# Patient Record
Sex: FEMALE | Race: WHITE | NOT HISPANIC OR LATINO | Employment: OTHER | URBAN - METROPOLITAN AREA
[De-identification: names, ages, dates, MRNs, and addresses within clinical notes are randomized per-mention and may not be internally consistent; named-entity substitution may affect disease eponyms.]

---

## 2017-02-16 ENCOUNTER — GENERIC CONVERSION - ENCOUNTER (OUTPATIENT)
Dept: OTHER | Facility: OTHER | Age: 62
End: 2017-02-16

## 2017-03-23 ENCOUNTER — GENERIC CONVERSION - ENCOUNTER (OUTPATIENT)
Dept: OTHER | Facility: OTHER | Age: 62
End: 2017-03-23

## 2017-06-09 ENCOUNTER — ALLSCRIPTS OFFICE VISIT (OUTPATIENT)
Dept: OTHER | Facility: OTHER | Age: 62
End: 2017-06-09

## 2017-06-22 ENCOUNTER — APPOINTMENT (OUTPATIENT)
Dept: LAB | Facility: CLINIC | Age: 62
End: 2017-06-22
Payer: COMMERCIAL

## 2017-06-22 ENCOUNTER — TRANSCRIBE ORDERS (OUTPATIENT)
Dept: LAB | Facility: CLINIC | Age: 62
End: 2017-06-22

## 2017-06-22 PROCEDURE — 36415 COLL VENOUS BLD VENIPUNCTURE: CPT

## 2017-06-22 PROCEDURE — 83036 HEMOGLOBIN GLYCOSYLATED A1C: CPT

## 2017-06-22 PROCEDURE — 80053 COMPREHEN METABOLIC PANEL: CPT

## 2017-06-22 PROCEDURE — 82306 VITAMIN D 25 HYDROXY: CPT

## 2017-06-22 PROCEDURE — 80061 LIPID PANEL: CPT

## 2017-06-24 ENCOUNTER — GENERIC CONVERSION - ENCOUNTER (OUTPATIENT)
Dept: OTHER | Facility: OTHER | Age: 62
End: 2017-06-24

## 2017-06-24 ENCOUNTER — APPOINTMENT (EMERGENCY)
Dept: CT IMAGING | Facility: HOSPITAL | Age: 62
End: 2017-06-24
Payer: COMMERCIAL

## 2017-06-24 ENCOUNTER — APPOINTMENT (EMERGENCY)
Dept: RADIOLOGY | Facility: HOSPITAL | Age: 62
End: 2017-06-24
Payer: COMMERCIAL

## 2017-06-24 ENCOUNTER — HOSPITAL ENCOUNTER (EMERGENCY)
Facility: HOSPITAL | Age: 62
Discharge: HOME | End: 2017-06-24
Admitting: EMERGENCY MEDICINE
Payer: COMMERCIAL

## 2017-06-24 VITALS
OXYGEN SATURATION: 98 % | WEIGHT: 180 LBS | HEART RATE: 75 BPM | TEMPERATURE: 98.7 F | SYSTOLIC BLOOD PRESSURE: 132 MMHG | RESPIRATION RATE: 16 BRPM | BODY MASS INDEX: 36.98 KG/M2 | DIASTOLIC BLOOD PRESSURE: 64 MMHG

## 2017-06-24 DIAGNOSIS — J06.9 URI WITH COUGH AND CONGESTION: ICD-10-CM

## 2017-06-24 DIAGNOSIS — S46.912A MUSCLE STRAIN OF LEFT SCAPULAR REGION, INITIAL ENCOUNTER: Primary | ICD-10-CM

## 2017-06-24 LAB
ALBUMIN SERPL BCP-MCNC: 3.9 G/DL (ref 3.5–5)
ALP SERPL-CCNC: 92 U/L (ref 46–116)
ALT SERPL W P-5'-P-CCNC: 25 U/L (ref 12–78)
ANION GAP SERPL CALCULATED.3IONS-SCNC: 10 MMOL/L (ref 4–13)
APTT PPP: 28 SECONDS (ref 23–35)
AST SERPL W P-5'-P-CCNC: 10 U/L (ref 5–45)
BASOPHILS # BLD AUTO: 0.02 THOUSANDS/ΜL (ref 0–0.1)
BASOPHILS NFR BLD AUTO: 0 % (ref 0–1)
BILIRUB SERPL-MCNC: 0.2 MG/DL (ref 0.2–1)
BUN SERPL-MCNC: 16 MG/DL (ref 5–25)
CALCIUM SERPL-MCNC: 9.2 MG/DL (ref 8.3–10.1)
CHLORIDE SERPL-SCNC: 103 MMOL/L (ref 100–108)
CO2 SERPL-SCNC: 27 MMOL/L (ref 21–32)
CREAT SERPL-MCNC: 0.73 MG/DL (ref 0.6–1.3)
EOSINOPHIL # BLD AUTO: 0.08 THOUSAND/ΜL (ref 0–0.61)
EOSINOPHIL NFR BLD AUTO: 1 % (ref 0–6)
ERYTHROCYTE [DISTWIDTH] IN BLOOD BY AUTOMATED COUNT: 13.5 % (ref 11.6–15.1)
GFR SERPL CREATININE-BSD FRML MDRD: >60 ML/MIN/1.73SQ M
GLUCOSE SERPL-MCNC: 129 MG/DL (ref 65–140)
HCT VFR BLD AUTO: 37.2 % (ref 34.8–46.1)
HGB BLD-MCNC: 12.8 G/DL (ref 11.5–15.4)
INR PPP: 0.87 (ref 0.86–1.16)
LYMPHOCYTES # BLD AUTO: 2.64 THOUSANDS/ΜL (ref 0.6–4.47)
LYMPHOCYTES NFR BLD AUTO: 28 % (ref 14–44)
MAGNESIUM SERPL-MCNC: 1.7 MG/DL (ref 1.6–2.6)
MCH RBC QN AUTO: 29 PG (ref 26.8–34.3)
MCHC RBC AUTO-ENTMCNC: 34.4 G/DL (ref 31.4–37.4)
MCV RBC AUTO: 84 FL (ref 82–98)
MONOCYTES # BLD AUTO: 0.66 THOUSAND/ΜL (ref 0.17–1.22)
MONOCYTES NFR BLD AUTO: 7 % (ref 4–12)
NEUTROPHILS # BLD AUTO: 6.1 THOUSANDS/ΜL (ref 1.85–7.62)
NEUTS SEG NFR BLD AUTO: 64 % (ref 43–75)
NRBC BLD AUTO-RTO: 0 /100 WBCS
PLATELET # BLD AUTO: 282 THOUSANDS/UL (ref 149–390)
PMV BLD AUTO: 10.5 FL (ref 8.9–12.7)
POTASSIUM SERPL-SCNC: 3.9 MMOL/L (ref 3.5–5.3)
PROT SERPL-MCNC: 7.7 G/DL (ref 6.4–8.2)
PROTHROMBIN TIME: 11.8 SECONDS (ref 12.1–14.4)
RBC # BLD AUTO: 4.42 MILLION/UL (ref 3.81–5.12)
SODIUM SERPL-SCNC: 140 MMOL/L (ref 136–145)
WBC # BLD AUTO: 9.5 THOUSAND/UL (ref 4.31–10.16)

## 2017-06-24 PROCEDURE — 83735 ASSAY OF MAGNESIUM: CPT | Performed by: NURSE PRACTITIONER

## 2017-06-24 PROCEDURE — 71020 HB CHEST X-RAY 2VW FRONTAL&LATL: CPT

## 2017-06-24 PROCEDURE — 93005 ELECTROCARDIOGRAM TRACING: CPT | Performed by: NURSE PRACTITIONER

## 2017-06-24 PROCEDURE — 71275 CT ANGIOGRAPHY CHEST: CPT

## 2017-06-24 PROCEDURE — 36415 COLL VENOUS BLD VENIPUNCTURE: CPT | Performed by: NURSE PRACTITIONER

## 2017-06-24 PROCEDURE — 85730 THROMBOPLASTIN TIME PARTIAL: CPT | Performed by: NURSE PRACTITIONER

## 2017-06-24 PROCEDURE — 96360 HYDRATION IV INFUSION INIT: CPT

## 2017-06-24 PROCEDURE — 80053 COMPREHEN METABOLIC PANEL: CPT | Performed by: NURSE PRACTITIONER

## 2017-06-24 PROCEDURE — 99284 EMERGENCY DEPT VISIT MOD MDM: CPT

## 2017-06-24 PROCEDURE — 85025 COMPLETE CBC W/AUTO DIFF WBC: CPT | Performed by: NURSE PRACTITIONER

## 2017-06-24 PROCEDURE — 85610 PROTHROMBIN TIME: CPT | Performed by: NURSE PRACTITIONER

## 2017-06-24 RX ADMIN — SODIUM CHLORIDE 1000 ML: 0.9 INJECTION, SOLUTION INTRAVENOUS at 20:44

## 2017-06-24 RX ADMIN — IOHEXOL 85 ML: 350 INJECTION, SOLUTION INTRAVENOUS at 21:29

## 2017-06-25 LAB
ATRIAL RATE: 104 BPM
P AXIS: 52 DEGREES
PR INTERVAL: 136 MS
QRS AXIS: 64 DEGREES
QRSD INTERVAL: 82 MS
QT INTERVAL: 320 MS
QTC INTERVAL: 420 MS
T WAVE AXIS: 46 DEGREES
VENTRICULAR RATE: 104 BPM

## 2017-06-26 ENCOUNTER — ALLSCRIPTS OFFICE VISIT (OUTPATIENT)
Dept: OTHER | Facility: OTHER | Age: 62
End: 2017-06-26

## 2017-06-27 ENCOUNTER — TRANSCRIBE ORDERS (OUTPATIENT)
Dept: SLEEP CENTER | Facility: CLINIC | Age: 62
End: 2017-06-27

## 2017-06-27 ENCOUNTER — HOSPITAL ENCOUNTER (OUTPATIENT)
Dept: SLEEP CENTER | Facility: CLINIC | Age: 62
Discharge: HOME | End: 2017-06-27
Payer: COMMERCIAL

## 2017-06-27 DIAGNOSIS — G47.33 OSA (OBSTRUCTIVE SLEEP APNEA): Primary | ICD-10-CM

## 2017-06-27 DIAGNOSIS — G47.33 OBSTRUCTIVE SLEEP APNEA (ADULT) (PEDIATRIC): ICD-10-CM

## 2017-07-05 ENCOUNTER — GENERIC CONVERSION - ENCOUNTER (OUTPATIENT)
Dept: OTHER | Facility: OTHER | Age: 62
End: 2017-07-05

## 2017-08-03 ENCOUNTER — ALLSCRIPTS OFFICE VISIT (OUTPATIENT)
Dept: OTHER | Facility: OTHER | Age: 62
End: 2017-08-03

## 2017-09-11 ENCOUNTER — GENERIC CONVERSION - ENCOUNTER (OUTPATIENT)
Dept: OTHER | Facility: OTHER | Age: 62
End: 2017-09-11

## 2017-09-12 ENCOUNTER — ALLSCRIPTS OFFICE VISIT (OUTPATIENT)
Dept: OTHER | Facility: OTHER | Age: 62
End: 2017-09-12

## 2017-09-29 ENCOUNTER — ALLSCRIPTS OFFICE VISIT (OUTPATIENT)
Dept: OTHER | Facility: OTHER | Age: 62
End: 2017-09-29

## 2017-09-29 DIAGNOSIS — R63.5 ABNORMAL WEIGHT GAIN: ICD-10-CM

## 2017-09-29 DIAGNOSIS — K21.9 GASTRO-ESOPHAGEAL REFLUX DISEASE WITHOUT ESOPHAGITIS: ICD-10-CM

## 2017-09-29 DIAGNOSIS — M25.559 PAIN IN HIP: ICD-10-CM

## 2017-09-29 DIAGNOSIS — R10.13 EPIGASTRIC PAIN: ICD-10-CM

## 2017-09-29 DIAGNOSIS — Z12.31 ENCOUNTER FOR SCREENING MAMMOGRAM FOR MALIGNANT NEOPLASM OF BREAST: ICD-10-CM

## 2017-10-04 ENCOUNTER — ALLSCRIPTS OFFICE VISIT (OUTPATIENT)
Dept: OTHER | Facility: OTHER | Age: 62
End: 2017-10-04

## 2017-10-12 ENCOUNTER — TRANSCRIBE ORDERS (OUTPATIENT)
Dept: ADMINISTRATIVE | Facility: HOSPITAL | Age: 62
End: 2017-10-12

## 2017-10-12 ENCOUNTER — APPOINTMENT (OUTPATIENT)
Dept: LAB | Facility: HOSPITAL | Age: 62
End: 2017-10-12
Attending: OBSTETRICS & GYNECOLOGY
Payer: COMMERCIAL

## 2017-10-12 ENCOUNTER — HOSPITAL ENCOUNTER (OUTPATIENT)
Dept: RADIOLOGY | Facility: HOSPITAL | Age: 62
Discharge: HOME | End: 2017-10-12
Attending: OBSTETRICS & GYNECOLOGY
Payer: COMMERCIAL

## 2017-10-12 DIAGNOSIS — R10.13 EPIGASTRIC PAIN: ICD-10-CM

## 2017-10-12 DIAGNOSIS — M79.7 FIBROMYALGIA: Primary | ICD-10-CM

## 2017-10-12 DIAGNOSIS — M25.559 PAIN IN HIP: ICD-10-CM

## 2017-10-12 DIAGNOSIS — M79.7 FIBROMYALGIA: ICD-10-CM

## 2017-10-12 DIAGNOSIS — K21.9 GASTRO-ESOPHAGEAL REFLUX DISEASE WITHOUT ESOPHAGITIS: ICD-10-CM

## 2017-10-12 DIAGNOSIS — R63.5 ABNORMAL WEIGHT GAIN: ICD-10-CM

## 2017-10-12 LAB
25(OH)D3 SERPL-MCNC: 37.8 NG/ML (ref 30–100)
ALBUMIN SERPL BCP-MCNC: 3.4 G/DL (ref 3.5–5)
ALP SERPL-CCNC: 88 U/L (ref 46–116)
ALT SERPL W P-5'-P-CCNC: 23 U/L (ref 12–78)
AMYLASE SERPL-CCNC: 23 IU/L (ref 25–115)
ANION GAP SERPL CALCULATED.3IONS-SCNC: 8 MMOL/L (ref 4–13)
AST SERPL W P-5'-P-CCNC: 10 U/L (ref 5–45)
BASOPHILS # BLD AUTO: 0.03 THOUSANDS/ΜL (ref 0–0.1)
BASOPHILS NFR BLD AUTO: 0 % (ref 0–1)
BILIRUB SERPL-MCNC: 0.35 MG/DL (ref 0.2–1)
BUN SERPL-MCNC: 15 MG/DL (ref 5–25)
CALCIUM SERPL-MCNC: 9.1 MG/DL (ref 8.3–10.1)
CHLORIDE SERPL-SCNC: 104 MMOL/L (ref 100–108)
CO2 SERPL-SCNC: 28 MMOL/L (ref 21–32)
CREAT SERPL-MCNC: 0.67 MG/DL (ref 0.6–1.3)
EOSINOPHIL # BLD AUTO: 0.11 THOUSAND/ΜL (ref 0–0.61)
EOSINOPHIL NFR BLD AUTO: 2 % (ref 0–6)
ERYTHROCYTE [DISTWIDTH] IN BLOOD BY AUTOMATED COUNT: 12.8 % (ref 11.6–15.1)
GFR SERPL CREATININE-BSD FRML MDRD: 95 ML/MIN/1.73SQ M
GLUCOSE P FAST SERPL-MCNC: 120 MG/DL (ref 65–99)
HCT VFR BLD AUTO: 37.5 % (ref 34.8–46.1)
HGB BLD-MCNC: 12.6 G/DL (ref 11.5–15.4)
IRON SERPL-MCNC: 89 UG/DL (ref 50–170)
LIPASE SERPL-CCNC: 77 U/L (ref 73–393)
LYMPHOCYTES # BLD AUTO: 2.29 THOUSANDS/ΜL (ref 0.6–4.47)
LYMPHOCYTES NFR BLD AUTO: 33 % (ref 14–44)
MCH RBC QN AUTO: 28.2 PG (ref 26.8–34.3)
MCHC RBC AUTO-ENTMCNC: 33.6 G/DL (ref 31.4–37.4)
MCV RBC AUTO: 84 FL (ref 82–98)
MONOCYTES # BLD AUTO: 0.49 THOUSAND/ΜL (ref 0.17–1.22)
MONOCYTES NFR BLD AUTO: 7 % (ref 4–12)
NEUTROPHILS # BLD AUTO: 3.95 THOUSANDS/ΜL (ref 1.85–7.62)
NEUTS SEG NFR BLD AUTO: 58 % (ref 43–75)
NRBC BLD AUTO-RTO: 0 /100 WBCS
PLATELET # BLD AUTO: 244 THOUSANDS/UL (ref 149–390)
PMV BLD AUTO: 10.5 FL (ref 8.9–12.7)
POTASSIUM SERPL-SCNC: 4 MMOL/L (ref 3.5–5.3)
PROT SERPL-MCNC: 7.2 G/DL (ref 6.4–8.2)
RBC # BLD AUTO: 4.47 MILLION/UL (ref 3.81–5.12)
SODIUM SERPL-SCNC: 140 MMOL/L (ref 136–145)
TSH SERPL DL<=0.05 MIU/L-ACNC: 1.3 UIU/ML (ref 0.36–3.74)
WBC # BLD AUTO: 6.87 THOUSAND/UL (ref 4.31–10.16)

## 2017-10-12 PROCEDURE — 73502 X-RAY EXAM HIP UNI 2-3 VIEWS: CPT

## 2017-10-12 PROCEDURE — 83690 ASSAY OF LIPASE: CPT

## 2017-10-12 PROCEDURE — 80053 COMPREHEN METABOLIC PANEL: CPT

## 2017-10-12 PROCEDURE — 36415 COLL VENOUS BLD VENIPUNCTURE: CPT

## 2017-10-12 PROCEDURE — 84443 ASSAY THYROID STIM HORMONE: CPT

## 2017-10-12 PROCEDURE — 82306 VITAMIN D 25 HYDROXY: CPT

## 2017-10-12 PROCEDURE — 83014 H PYLORI DRUG ADMIN: CPT

## 2017-10-12 PROCEDURE — 83540 ASSAY OF IRON: CPT

## 2017-10-12 PROCEDURE — 83013 H PYLORI (C-13) BREATH: CPT

## 2017-10-12 PROCEDURE — 85025 COMPLETE CBC W/AUTO DIFF WBC: CPT

## 2017-10-12 PROCEDURE — 82150 ASSAY OF AMYLASE: CPT

## 2017-10-16 ENCOUNTER — GENERIC CONVERSION - ENCOUNTER (OUTPATIENT)
Dept: OTHER | Facility: OTHER | Age: 62
End: 2017-10-16

## 2017-10-17 ENCOUNTER — GENERIC CONVERSION - ENCOUNTER (OUTPATIENT)
Dept: OTHER | Facility: OTHER | Age: 62
End: 2017-10-17

## 2017-10-18 LAB — SCAN RESULT: NORMAL

## 2017-10-26 ENCOUNTER — HOSPITAL ENCOUNTER (OUTPATIENT)
Dept: ULTRASOUND IMAGING | Facility: HOSPITAL | Age: 62
Discharge: HOME | End: 2017-10-26
Payer: COMMERCIAL

## 2017-10-26 DIAGNOSIS — K21.9 GASTRO-ESOPHAGEAL REFLUX DISEASE WITHOUT ESOPHAGITIS: ICD-10-CM

## 2017-10-26 DIAGNOSIS — R10.13 EPIGASTRIC PAIN: ICD-10-CM

## 2017-10-26 PROCEDURE — 76700 US EXAM ABDOM COMPLETE: CPT

## 2017-10-27 NOTE — PROGRESS NOTES
Assessment  1  GERD without esophagitis (530 81) (K21 9)   2  Epigastric pain (789 06) (R10 13)   3  Hip pain (719 45) (M25 559)    Plan  Epigastric pain, GERD without esophagitis    · (1) AMYLASE; Status:Active; Requested for:04Oct2017;    · (1) CBC/PLT/DIFF; Status:Active; Requested for:04Oct2017;    · (1) COMPREHENSIVE METABOLIC PANEL; Status:Active; Requested for:04Oct2017;    · (1) H  PYLORI BREATH TEST; Status:Active; Requested VBM:04PLP7107;    · (1) LIPASE; Status:Active; Requested for:04Oct2017;    · * US ABDOMEN COMPLETE; Status:Hold For - Scheduling; Requested FSQ:18VUR2769;   GERD without esophagitis    · Avoid alcoholic beverages ; Status:Complete;   Done: 46MWD3920   · Avoid foods and beverages that contain caffeine ; Status:Complete;   Done: 94ZTN2917   · Do not eat anything for at least 2 hours before going to bed ; Status:Complete;   Done:  88TNI4483   · Do not take aspirin or anti-inflammatory medicines ; Status:Complete;   Done:  70JJJ7346   · Raise the head of your bed to keep stomach acid from coming back up ;  Status:Complete;   Done: 45OEE2711   · Regular aerobic exercise can help reduce stress ; Status:Complete;   Done: 92RMP9408   · Several things can be done to help treat and prevent your gastric reflux ;  Status:Complete;   Done: 48VYP3590  Hip pain    · * XR HIP/PELV 2-3 VWS RIGHT W PELVIS IF PERFORMED; Status:Active; Requested  GRC:69UAM7257;     Discussion/Summary    #1  epigastric pain with hx GERD and hiatal hernia- increase protonix to BID, avoid ETOH, spicey acidic foods  Take Mobic with full meal instead of before bed  Check amylase, lipase, cbc and cmp as well as H  Pylori breath test  If negative will have pt go for abd U/S  If that is negative and pt still symptomatic than will have pt go for GI eval and EGD  Right hip pain- check xray  Take Mobic daily with meal       Chief Complaint  c/o epigastric pain and bloating x several months   Pt  describes it as a bloating feeling in rib cage  states she has been going to the gym and working out but seems to be gaining weight and not losing weight  kck      History of Present Illness  HPI: Pt  here b/c for the past few months she has been having epigastric pain with a fullness in her upper abdomen  She feels like she is more bloated and gaining weight when she is really trying to work hard since she has been retired on weight loss what her food and going to the gym 3-4 to 5 times a week  She is on Protonix for GERD and does have a history of hiatal hernia however she has not felt any heartburn  She does not drink alcohol does not smoke and does not like spicy acidic foods  She does however take her Mobic daily at bedtime without food  She states that her bowel movements have been more yellow but no blood or mucus  No diarrhea constipation  No fevers she has not tried anything over-the-counter for this  also is complaining of right hip pain worse in the morning when she 1st wakes up that occasionally radiates across her low back  No popping or giving out is noted no history of injury  Not improve with Mobic use  Review of Systems    Constitutional: No fever, no chills, feels well, no tiredness, no recent weight gain or loss  ENT: no ear ache, no loss of hearing, no nosebleeds or nasal discharge, no sore throat or hoarseness  Cardiovascular: no complaints of slow or fast heart rate, no chest pain, no palpitations, no leg claudication or lower extremity edema  Respiratory: no complaints of shortness of breath, no wheezing, no dyspnea on exertion, no orthopnea or PND  Breasts: no complaints of breast pain, breast lump or nipple discharge  Gastrointestinal: abdominal pain, but-- as noted in HPI  Genitourinary: no complaints of dysuria, no incontinence, no pelvic pain, no dysmenorrhea, no vaginal discharge or abnormal vaginal bleeding  Musculoskeletal: as noted in HPI     Integumentary: no complaints of skin rash or lesion, no itching or dry skin, no skin wounds  Neurological: no complaints of headache, no confusion, no numbness or tingling, no dizziness or fainting  ROS reviewed  Active Problems  1  Arthralgia (719 40) (M25 50)   2  Breast self examination education, encounter for (V65 49) (Z71 89)   3  Carpal tunnel syndrome (354 0) (G56 00)   4  Depression (311) (F32 9)   5  Encounter for screening mammogram for malignant neoplasm of breast (V76 12)   (Z12 31)   6  Endometrial thickening on ultra sound (793 5) (R93 8)   7  Fibromyalgia (729 1) (M79 7)   8  GERD without esophagitis (530 81) (K21 9)   9  History of self breast exam   10  Hyperglycemia (790 29) (R73 9)   11  Hyperlipidemia (272 4) (E78 5)   12  Hyperthyroidism (242 90) (E05 90)   13  Influenza vaccination administered at current visit (V04 81) (Z23)   14  Menopause (627 2) (Z78 0)   15  Myalgia (729 1) (M79 1)   16  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)   17  Need for pneumococcal vaccination (V03 82) (Z23)   18  Obesity (278 00) (E66 9)   19  Osteoarthritis, localized, knee (715 36) (M17 10)   20  Painful orthopaedic hardware (996 78) (T84 84XA)   21  Peritonsillar abscess (475) (J36)   22  Restless legs syndrome (333 94) (G25 81)   23  Screening for human papillomavirus (HPV) (V73 81) (Z11 51)   24  Sicca syndrome (710 2) (M35 00)   25  Sinusitis, acute (461 9) (J01 90)   26  Sleep apnea (780 57) (G47 30)   27  Thyroid Nodule   28  Trapezius strain (840 8) (S46 819A)   29  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 (284 3) (I04 921)   30  Visit for routine gyn exam (V72 31) (Z01 419)   31  Vitamin D deficiency (268 9) (E55 9)   32  Vulvar lesion (624 8) (N90 89)   33  Weight gain (783 1) (R63 5)    Past Medical History  1  History of Acute suppurative otitis media without spontaneous rupture of ear drum,   unspecified laterality   2  History of Acute upper respiratory infection (465 9) (J06 9)   3   History of Adhesive capsulitis of right shoulder (726 0) (M75 01)   4  History of Ankylosis Of The Right Knee (718 56)   5  History of Blister of leg (916 2) (S80 829A)   6  Breast self examination education, encounter for (V65 49) (Z71 89)   7  History of Cellulitis of left lower extremity (682 6) (L03 116)   8  History of Cervical polyp (622 7) (N84 1)   9  History of Closed displaced fracture of greater tuberosity of humerus (812 03)   (S42 253A)   10  History of Encounter for screening colonoscopy (V76 51) (Z12 11)   11  History of  3 (V22 2) (Z33 1)   12  History of allergic rhinitis (V12 69) (Z87 09)   13  History of conjunctivitis (V12 49) (Z86 69)   14  History of dermatitis (V13 3) (Z87 2)   15  History of edema (V13 89) (Z87 898)   16  History of headache (V13 89) (Z87 898)   17  History of insomnia (V13 89) (Z87 898)   18  History of sinusitis (V12 69) (Z87 09)   19  History of upper respiratory infection (V12 09) (Z87 09)   20  History of Mouth dryness (527 7) (R68 2)   21  History of Multiple joint pain (719 49) (M25 50)   22  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)   23  History of Pain of lower leg, unspecified laterality (729 5) (M79 669)   24  History of Post-operative state (V45 89) (Z98 890)   25  History of Preop examination (V72 84) (Z01 818)   26  History of Superficial phlebitis and thrombophlebitis of left lower extremity (451 0)    (I80 02)   27  History of Visit For: Pre-procedural Exam Prior To General Surgery (V72 83)  Active Problems And Past Medical History Reviewed: The active problems and past medical history were reviewed and updated today  Family History  Mother    1  Family history of Anxiety and depression   2  Family history of Coronary artery disease (414 00) (I25 10)   3  Family history of Alzheimer's disease (V17 2) (Z82 0)   4  Family history of Osteoporosis (733 00) (M81 0)  Father    5  Family history of Osteoarthritis (715 90) (M19 90)  Sister    10   Family history of Carcinosarcoma Of The Uterus (V16 49)   7  Family history of    6  Family history of congestive cardiomyopathy (V17 49) (Z82 49)  Brother    5  Family history of malignant neoplasm of uterus (V16 49) (Z80 49)  Family History Reviewed: The family history was reviewed and updated today  Social History   · Caffeine Use   · Denied: History of Drug Use   · Marital History - Currently    · Never a smoker   · No alcohol use   · No caffeine use   · Mandaen Affiliation Hinduism   · Second hand smoke exposure (V15 89) (Z77 22)   · Two children   · Uses Safety Equipment - Seatbelts  The social history was reviewed and updated today  Surgical History  1  History of Breast Surgery Reduction Procedure Elective   2  History of Cholecystectomy Laparoscopic   3  History of Dilation And Curettage   4  History of Endovenous Ablation Of Incompetent Vein Laser   5  History of Knee Arthroscopy (Therapeutic)   6  History of Knee Replacement   7  History of Knee Replacement   8  History of Shoulder Surgery  Surgical History Reviewed: The surgical history was reviewed and updated today  Current Meds   1  Atorvastatin Calcium 10 MG Oral Tablet; TAKE 1 TABLET DAILY; Therapy: 84MLC3111 to (Evaluate:2018)  Requested for: 2017; Last   Rx:2017; Status: ACTIVE - Transmit to [facility] Ordered   2  Cevimeline HCl - 30 MG Oral Capsule; Take 1 capsule twice daily Recorded   3  Furosemide 20 MG Oral Tablet; TAKE 1 TABLET TWICE DAILY; Therapy: 59YDB4025 to (Evaluate:2018)  Requested for: 2017; Last   Rx:2017 Ordered   4  Meloxicam 15 MG Oral Tablet; Take 1 tablet daily  Requested for: 2017; Last   Rx:2017 Ordered   5  Methocarbamol 500 MG Oral Tablet; TAKE 1 TABLET AT BEDTIME NIGHTLY  MAY TAKE   1 TABLET DURING THE DAY ALSO AS NEEDED; Therapy: 63ZUW8022 to (Evaluate:77Qbs9151)  Requested for: 2017; Last   Rx:02Cfh6687 Ordered   6   Multi-Vitamins TABS; Therapy: (Antoine Bachelor) to Recorded   7  Pantoprazole Sodium 40 MG Oral Tablet Delayed Release; TAKE 1 TABLET 30   MINUTES BEFORE BREAKFAST DAILY; Therapy: 19TPY1978 to (Evaluate:15Mar2018)  Requested for: 20Mar2017; Last   Rx:20Mar2017 Ordered   8  PARoxetine HCl - 30 MG Oral Tablet; TAKE 1 TABLET DAILY AS DIRECTED; Therapy: 63KLN2526 to (Evaluate:15Mar2018)  Requested for: 20Mar2017; Last   Rx:20Mar2017 Ordered   9  ROPINIRole HCl - 0 5 MG Oral Tablet; Therapy: 48GVF8086 to Recorded   10  TraMADol HCl - 50 MG Oral Tablet; TAKE 1 TABLET Every 6 hours PRN for pain; Therapy: 55ROJ5215 to (Evaluate:20Sep2017); Last Rx:02Fbv5911 Ordered   11  TraZODone HCl - 50 MG Oral Tablet; TAKE 1 TABLET AT BEDTIME; Therapy: 20LJM3852 to (Last Rx:30Nov2015) Ordered   12  Vitamin D 2000 UNIT Oral Capsule; TAKE 2 CAPSULE Daily; Therapy: 22GDX0681 to (Last PQ:43IYL5741) Ordered    The medication list was reviewed and updated today  Allergies  1  Toradol   2  Ansaid TABS   3  Pneumovax 23 25 MCG/0 5ML Injection Injectable    Vitals   Recorded: 92POH5683 01:29PM   Heart Rate 72   Systolic 592, LUE, Sitting   Diastolic 60, LUE, Sitting   Height 4 ft 11 in   Weight 193 lb 8 oz   BMI Calculated 39 08   BSA Calculated 1 82     Physical Exam    Constitutional   General appearance: No acute distress, well appearing and well nourished  Eyes   Conjunctiva and lids: No swelling, erythema or discharge  Ears, Nose, Mouth, and Throat   External inspection of ears and nose: Normal     Pulmonary   Respiratory effort: No increased work of breathing or signs of respiratory distress  Auscultation of lungs: Clear to auscultation  Cardiovascular   Auscultation of heart: Normal rate and rhythm, normal S1 and S2, without murmurs  Examination of extremities for edema and/or varicosities: Normal     Abdomen   Abdomen: Abnormal  -- epigastric tenderness w/o mass, gaurding or rigidity, +BS     Musculoskeletal   Gait and station: Normal     Psychiatric   Mood and affect: Normal          Results/Data  PHQ-9 Adult Depression Screening 84TRA0691 01:32PM User, Ahs     Test Name Result Flag Reference   PHQ-9 Adult Depression Score 1     Over the last two weeks, how often have you been bothered by any of the following problems? Little interest or pleasure in doing things: Not at all - 0  Feeling down, depressed, or hopeless: Not at all - 0  Trouble falling or staying asleep, or sleeping too much: Not at all - 0  Feeling tired or having little energy: Several days - 1  Poor appetite or over eating: Not at all - 0  Feeling bad about yourself - or that you are a failure or have let yourself or your family down: Not at all - 0  Trouble concentrating on things, such as reading the newspaper or watching television: Not at all - 0  Moving or speaking so slowly that other people could have noticed   Or the opposite -  being so fidgety or restless that you have been moving around a lot more than usual: Not at all - 0  Thoughts that you would be better off dead, or of hurting yourself in some way: Not at all - 0   PHQ-9 Adult Depression Screening Negative     PHQ-9 Difficulty Level Not difficult at all     PHQ-9 Severity Minimal Depression         Future Appointments    Date/Time Provider Specialty Site   01/08/2018 11:00 AM Aristides Goetz, [address] [place]     Signatures   Electronically signed by : Radha Palacios, [facility]; Oct  4 2017  2:12PM EST                       (Author)    Electronically signed by : NENA Hood ; Oct  5 2017  3:31PM EST

## 2017-10-27 NOTE — PROGRESS NOTES
Assessment  1  Visit for routine gyn exam (V72 31) (Z01 419)   2  Encounter for screening mammogram for malignant neoplasm of breast (V76 12)   (Z12 31)   3  Weight gain (783 1) (R63 5)    Plan  Encounter for screening mammogram for malignant neoplasm of breast    · * MAMMO SCREENING BILATERAL W CAD; Status:Hold For - Scheduling; Requested  FTP:40VUD2625;    Perform:[facility]; Due:21Vjw3684; Ordered;For:Encounter for screening mammogram for malignant neoplasm of breast; Ordered By:Sheila Torres;  Weight gain    · (1) TSH; Status:Active; Requested MSX:[id number];    Perform:[facility]; NIF:05YPN4373; Ordered; For:Weight gain; Ordered By:Sheila Torres;    Discussion/Summary  cervical cancer screening is current next cervical cancer screening is due 2018 Breast cancer screening: the risks and benefits of breast cancer screening were discussed, self breast exam technique was taught, monthly self breast exam was advised, mammogram is current and mammogram has been ordered  Colorectal cancer screening: the risks and benefits of colorectal cancer screening were discussed and colorectal cancer screening is current  Advice and education were given regarding aerobic exercise and weight bearing exercise  Epigastric discomfort - she will see her amanda harris  gain - TSH ordered and she will discuss this with the amanda harris and also gave her information for medical weight loss program through the hospital  her mammogram with her  The patient has the current Goals: See discussion  The patent has the current Barriers: None  Patient is able to Self-Care  Chief Complaint  1  Visit For: Preventive General Multisystem Exam    History of Present Illness  HPI: Pt here for yearly, she has gained about 8# over the past few months, she has been exercising 4-5 days a week and watching her diet  She has discomfort in her epigastric region because she feels she has gained the weight in her abdomen   She had a vulvar cyst or abscess earlier this month that has resolved  She is due for a mammogram in November  GYN HM, Adult Female [facility]: The patient is being seen for a health maintenance evaluation  The last health maintenance visit was 1 year(s) ago  General Health: The patient's health since the last visit is described as good  Lifestyle:  She has weight concerns  -- She exercises regularly  -- She does not use tobacco    Reproductive health: the patient is postmenopausal    Screening:      Review of Systems    Constitutional: No fever, no chills, feels well, no tiredness, no recent weight gain or loss  ENT: no ear ache, no loss of hearing, no nosebleeds or nasal discharge, no sore throat or hoarseness  Cardiovascular: no complaints of slow or fast heart rate, no chest pain, no palpitations, no leg claudication or lower extremity edema  Respiratory: no complaints of shortness of breath, no wheezing, no dyspnea on exertion, no orthopnea or PND  Breasts: no complaints of breast pain, breast lump or nipple discharge  Gastrointestinal: no complaints of abdominal pain, no constipation, no nausea or diarrhea, no vomiting, no bloody stools  Genitourinary: no complaints of dysuria, no incontinence, no pelvic pain, no dysmenorrhea, no vaginal discharge or abnormal vaginal bleeding  Musculoskeletal: no complaints of arthralgia, no myalgia, no joint swelling or stiffness, no limb pain or swelling  Integumentary: no complaints of skin rash or lesion, no itching or dry skin, no skin wounds  Neurological: no complaints of headache, no confusion, no numbness or tingling, no dizziness or fainting  Active Problems  1  Arthralgia (719 40) (M25 50)   2  Breast self examination education, encounter for (V65 49) (Z71 89)   3  Carpal tunnel syndrome (354 0) (G56 00)   4  Depression (311) (F32 9)   5  Encounter for screening mammogram for malignant neoplasm of breast (V76 12)   (Z12 31)   6   Endometrial thickening on ultra sound (793  5) (R93 8)   7  Fibromyalgia (729 1) (M79 7)   8  GERD without esophagitis (530 81) (K21 9)   9  History of self breast exam   10  Hyperglycemia (790 29) (R73 9)   11  Hyperlipidemia (272 4) (E78 5)   12  Hyperthyroidism (242 90) (E05 90)   13  Influenza vaccination administered at current visit (V04 81) (Z23)   14  Menopause (627 2) (Z78 0)   15  Myalgia (729 1) (M79 1)   16  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)   17  Need for pneumococcal vaccination (V03 82) (Z23)   18  Obesity (278 00) (E66 9)   19  Osteoarthritis, localized, knee (715 36) (M17 10)   20  Painful orthopaedic hardware (996 78) (T84 84XA)   21  Peritonsillar abscess (475) (J36)   22  Restless legs syndrome (333 94) (G25 81)   23  Screening for human papillomavirus (HPV) (V73 81) (Z11 51)   24  Sicca syndrome (710 2) (M35 00)   25  Sinusitis, acute (461 9) (J01 90)   26  Sleep apnea (780 57) (G47 30)   27  Thyroid Nodule   28  Trapezius strain (840 8) (S46 819A)   29  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 (830 6) (W67 321)   30  Visit for routine gyn exam (V72 31) (Z01 419)   31  Vitamin D deficiency (268 9) (E55 9)   32   Vulvar lesion (624 8) (N90 89)    Past Medical History   · History of Acute suppurative otitis media without spontaneous rupture of ear drum,  unspecified laterality   · History of Acute upper respiratory infection (465 9) (J06 9)   · History of Adhesive capsulitis of right shoulder (726 0) (M75 01)   · History of Ankylosis Of The Right Knee (718 56)   · History of Blister of leg (916 2) (U40 770L)   · Breast self examination education, encounter for (V65 49) (Z71 89)   · History of Cellulitis of left lower extremity (682 6) (L03 116)   · History of Cervical polyp (622 7) (N84 1)   · History of Closed displaced fracture of greater tuberosity of humerus (812 03)  (M12 243V)   · History of Encounter for screening colonoscopy (V76 51) (Z12 11)   · History of  3 (V22 2) (Z33 1)   · History of allergic rhinitis (V12 69) (Z87 09)   · History of conjunctivitis (V12 49) (Z86 69)   · History of dermatitis (V13 3) (Z87 2)   · History of edema (V13 89) (J63 850)   · History of headache (V13 89) (Z08 348)   · History of insomnia (V13 89) (X78 961)   · History of sinusitis (V12 69) (Z87 09)   · History of upper respiratory infection (V12 09) (Z87 09)   · History of Mouth dryness (527 7) (R68 2)   · History of Multiple joint pain (719 49) (M25 50)   · 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)   · History of Pain of lower leg, unspecified laterality (729 5) (M79 669)   · History of Post-operative state (V45 89) (Z98 890)   · History of Preop examination (V72 84) (Z01 818)   · History of Superficial phlebitis and thrombophlebitis of left lower extremity (451 0)  (I80 02)   · History of Visit For: Pre-procedural Exam Prior To General Surgery (V72 83)    The active problems and past medical history were reviewed and updated today  Surgical History   · History of Breast Surgery Reduction Procedure Elective   · History of Cholecystectomy Laparoscopic   · History of Dilation And Curettage   · History of Endovenous Ablation Of Incompetent Vein Laser   · History of Knee Arthroscopy (Therapeutic)   · History of Knee Replacement   · History of Knee Replacement   · History of Shoulder Surgery    The surgical history was reviewed and updated today         Family History  Mother    · Family history of Anxiety and depression   · Family history of Coronary artery disease (414 00) (I25 10)   · Family history of Alzheimer's disease (V17 2) (Z82 0)   · Family history of Osteoporosis (733 00) (M81 0)  Father    · Family history of Osteoarthritis (715 90) (M19 90)  Sister    · Family history of Carcinosarcoma Of The Uterus (V16 49)   · Family history of    · Family history of congestive cardiomyopathy (V17 49) (Z82 49)  Brother    · Family history of malignant neoplasm of uterus (V16 49) (Z80 49)    The family history was reviewed and updated today  Social History   · Caffeine Use   · Denied: History of Drug Use   · Marital History - Currently    · Never a smoker   · No alcohol use   · No caffeine use   · [employer] Affiliation Christian   · Second hand smoke exposure (V15 89) (Z77 22)   · Two children   · Uses Safety Equipment - Seatbelts  The social history was reviewed and updated today  Current Meds   1  Amitriptyline HCl - 25 MG Oral Tablet; TAKE 1 TABLET AT BEDTIME; Therapy: 20LLA4460 to (Evaluate:15Mar2018)  Requested for: 20Mar2017; Last   Rx:20Mar2017 Ordered   2  Atorvastatin Calcium 10 MG Oral Tablet; TAKE 1 TABLET DAILY; Therapy: 49XRY7541 to (Evaluate:15Mar2018)  Requested for: 20Mar2017; Last   Rx:20Mar2017; Status: ACTIVE - Transmit to Pharmacy - Awaiting Verification Ordered   3  Cevimeline HCl - 30 MG Oral Capsule; Take 1 capsule twice daily Recorded   4  Furosemide 20 MG Oral Tablet; TAKE 1 TABLET TWICE DAILY; Therapy: 83SXG3870 to (Evaluate:15Mar2018)  Requested for: 20Mar2017; Last   Rx:20Mar2017 Ordered   5  Meloxicam 15 MG Oral Tablet; Take 1 tablet daily  Requested for: 20Mar2017; Last   Rx:20Mar2017 Ordered   6  Methocarbamol 500 MG Oral Tablet; TAKE 1 TABLET AT BEDTIME NIGHTLY  MAY TAKE   1 TABLET DURING THE DAY ALSO AS NEEDED; Therapy: 35MRC7278 to (Evaluate:28Ivl4597)  Requested for: 43Dcz4006; Last   Rx:80Vdt1280 Ordered   7  Multi-Vitamins TABS; Therapy: (Oumou Lawson) to Recorded   8  Pantoprazole Sodium 40 MG Oral Tablet Delayed Release; TAKE 1 TABLET 30   MINUTES BEFORE BREAKFAST DAILY; Therapy: 24TIK5198 to (Evaluate:15Mar2018)  Requested for: 20Mar2017; Last   Rx:20Mar2017 Ordered   9  PARoxetine HCl - 30 MG Oral Tablet; TAKE 1 TABLET DAILY AS DIRECTED; Therapy: 73EZC2520 to (Evaluate:15Mar2018)  Requested for: 20Mar2017; Last   Rx:20Mar2017 Ordered   10  ROPINIRole HCl - 0 5 MG Oral Tablet; Therapy: 85SIX3130 to Recorded   11   TraMADol HCl - 50 MG Oral Tablet; TAKE 1 TABLET Every 6 hours PRN for pain; Therapy: 03ODU5277 to (Evaluate:36Pto0580); Last Rx:61Rqx1001 Ordered   12  TraZODone HCl - 50 MG Oral Tablet; TAKE 1 TABLET AT BEDTIME; Therapy: 55HQT5657 to (Last Rx:30Nov2015) Ordered   13  Vitamin D 2000 UNIT Oral Capsule; TAKE 2 CAPSULE Daily; Therapy: 07LRD7678 to (Last Rx:59Rnc5583) Ordered    Allergies  1  Toradol   2  Ansaid TABS   3  Pneumovax 23 25 MCG/0 5ML Injection Injectable    Vitals   Recorded: 34TGP1005 61:68VU   Systolic 410, LUE, Sitting   Diastolic 82, LUE, Sitting   Height 4 ft 11 in   Weight 196 lb    BMI Calculated 39 59   BSA Calculated 1 83   LMP POSTMENOPAUSAL     Physical Exam    Constitutional   General appearance: No acute distress, well appearing and well nourished  Neck   Thyroid: Normal, no thyromegaly  Pulmonary   Auscultation of lungs: Clear to auscultation  Cardiovascular   Auscultation of heart: Normal rate and rhythm, normal S1 and S2, no murmurs  Genitourinary   External genitalia: Normal and no lesions appreciated  Vagina: Normal, no lesions or dryness appreciated  Urethra: Normal     Urethral meatus: Normal     Bladder: Normal, soft, non-tender and no prolapse or masses appreciated  Cervix: Normal, no palpable masses  Uterus: Normal, non-tender, not enlarged, and no palpable masses  Adnexa/parametria: Normal, non-tender and no fullness or masses appreciated  Anus, perineum, and rectum: Normal sphincter tone, no masses, and no prolapse  Chest   Breasts: Normal and no dimpling or skin changes noted  Abdomen   Abdomen: Normal, non-tender, and no organomegaly noted  -- no mass  Liver and spleen: No hepatomegaly or splenomegaly  Examination for hernias: No hernias appreciated      Psychiatric   Orientation to person, place, and time: Normal     Mood and affect: Normal        Future Appointments    Date/Time Provider Specialty Site   10/04/2017 01:30 PM Aristides Goetz [facility] [address] [facility] [place]   01/08/2018 11:00 AM Patricia Carreon, [facility]     Signatures   Electronically signed by :  Chi Velasquez DO; Sep 29 2017  5:14PM EST                       (Author)

## 2017-10-29 ENCOUNTER — GENERIC CONVERSION - ENCOUNTER (OUTPATIENT)
Dept: OTHER | Facility: OTHER | Age: 62
End: 2017-10-29

## 2017-11-17 ENCOUNTER — HOSPITAL ENCOUNTER (OUTPATIENT)
Dept: MAMMOGRAPHY | Facility: MEDICAL CENTER | Age: 62
Discharge: HOME | End: 2017-11-17
Payer: COMMERCIAL

## 2017-11-17 DIAGNOSIS — Z12.31 ENCOUNTER FOR SCREENING MAMMOGRAM FOR MALIGNANT NEOPLASM OF BREAST: ICD-10-CM

## 2017-11-17 PROCEDURE — G0202 SCR MAMMO BI INCL CAD: HCPCS

## 2017-11-28 ENCOUNTER — ALLSCRIPTS OFFICE VISIT (OUTPATIENT)
Dept: OTHER | Facility: OTHER | Age: 62
End: 2017-11-28

## 2017-11-28 ENCOUNTER — APPOINTMENT (OUTPATIENT)
Dept: RADIOLOGY | Facility: MEDICAL CENTER | Age: 62
End: 2017-11-28
Payer: COMMERCIAL

## 2017-11-28 ENCOUNTER — GENERIC CONVERSION - ENCOUNTER (OUTPATIENT)
Dept: OTHER | Facility: OTHER | Age: 62
End: 2017-11-28

## 2017-11-28 ENCOUNTER — TRANSCRIBE ORDERS (OUTPATIENT)
Dept: ADMINISTRATIVE | Facility: HOSPITAL | Age: 62
End: 2017-11-28

## 2017-11-28 DIAGNOSIS — R06.2 WHEEZING: ICD-10-CM

## 2017-11-28 DIAGNOSIS — J40 BRONCHITIS: ICD-10-CM

## 2017-11-28 DIAGNOSIS — R10.13 EPIGASTRIC PAIN: ICD-10-CM

## 2017-11-28 DIAGNOSIS — R05.9 COUGH: ICD-10-CM

## 2017-11-28 DIAGNOSIS — R19.4 CHANGE IN BOWEL HABITS: ICD-10-CM

## 2017-11-28 DIAGNOSIS — J30.9 ALLERGIC RHINITIS: ICD-10-CM

## 2017-11-28 DIAGNOSIS — R14.0 ABDOMINAL DISTENSION (GASEOUS): ICD-10-CM

## 2017-11-28 PROCEDURE — 71020 HB CHEST X-RAY 2VW FRONTAL&LATL: CPT

## 2017-11-29 NOTE — PROGRESS NOTES
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)   4  Wheezing (786 07) (R06 2)    Plan  Allergic rhinitis, Cough, Mycoplasmal tracheobronchitis, Wheezing    · Continue with our present treatment plan ; Status:Complete;   Done: 69NBT989353:36YE   · Drink plenty of fluids ; Status:Complete;   Done: 48GLO0683 08:37AM   · Follow-up PRN Evaluation and Treatment  Follow-up  Status: Complete  Done:28Nov2017 08:37AM   · * XR CHEST PA & LATERAL; Status:Active; Requested for:28Nov2017;   Cough    · Promethazine-Codeine 6 25-10 MG/5ML Oral Syrup; TAKE 5 ML EVERY 4 TO 6HOURS AS NEEDED FOR COUGH    Discussion/Summary    1  Acute bronchitis favor mycoplasma, doxycycline is ordered for toxicity discussedCough  Phenergan with codeine was reordered drowsiness discussedWheezing, Ventolin inhaler was ordered  Chest x-ray is orderedAllergic rhinitis, singular is ordered patient may continue her Alavert over-the-counterPatient to return in 1 week if still symptoms  Possible side effects of new medications were reviewed with the patient/guardian today  The treatment plan was reviewed with the patient/guardian  The patient/guardian understands and agrees with the treatment plan     Self Referrals: No      Chief Complaint  chest congestion since 11-25=17 taking allerest  productive cough with yellow mucus some wheezing  no fever      History of Present Illness  HPI: Patient's sinus pain and pressure seems to marge the patient does have some head congestion sneezing clear rhinorrhea over the past week or so patient increasing mild mildly harsh productive cough with thick yellow-green sputum  No chest pain shortness of breath no hemoptysis but occasional wheezing  Patient is using over-the-counter allergy medication without relief      Review of Systems   Constitutional: Negative fever chills or night sweats positive mild fatigue  ENT: as noted in HPI  Cardiovascular: no complaints of slow or fast heart rate, no chest pain, no palpitations, no leg claudication or lower extremity edema  Respiratory: as noted in HPI  Breasts: no complaints of breast pain, breast lump or nipple discharge  Gastrointestinal: no complaints of abdominal pain, no constipation, no nausea or diarrhea, no vomiting, no bloody stools  Genitourinary: no complaints of dysuria, no incontinence, no pelvic pain, no dysmenorrhea, no vaginal discharge or abnormal vaginal bleeding  Musculoskeletal: no complaints of arthralgia, no myalgia, no joint swelling or stiffness, no limb pain or swelling  Integumentary: no complaints of skin rash or lesion, no itching or dry skin, no skin wounds  Neurological: no complaints of headache, no confusion, no numbness or tingling, no dizziness or fainting  Active Problems    1  Arthralgia (719 40) (M25 50)   2  Breast self examination education, encounter for (V65 49) (Z71 89)   3  Carpal tunnel syndrome (354 0) (G56 00)   4  Depression (311) (F32 9)   5  Encounter for screening mammogram for malignant neoplasm of breast (V76 12) (Z12 31)   6  Endometrial thickening on ultra sound (793 5) (R93 8)   7  Epigastric pain (789 06) (R10 13)   8  Fibromyalgia (729 1) (M79 7)   9  GERD without esophagitis (530 81) (K21 9)   10  Hip pain (719 45) (M25 559)   11  History of self breast exam   12  Hyperglycemia (790 29) (R73 9)   13  Hyperlipidemia (272 4) (E78 5)   14  Hyperthyroidism (242 90) (E05 90)   15  Influenza vaccination administered at current visit (V04 81) (Z23)   16  Menopause (627 2) (Z78 0)   17  Myalgia (729 1) (M79 1)   18  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)   19  Need for pneumococcal vaccination (V03 82) (Z23)   20  Obesity (278 00) (E66 9)   21  Osteoarthritis, localized, knee (715 36) (M17 10)   22  Painful orthopaedic hardware (996 78) (T84 84XA)   23  Peritonsillar abscess (475) (J36)   24  Restless legs syndrome (333 94) (G25 81)   25  Screening for human papillomavirus (HPV) (V73 81) (Z11 51)   26  Sicca syndrome (710 2) (M35 00)   27  Sinusitis, acute (461 9) (J01 90)   28  Sleep apnea (780 57) (G47 30)   29  Thyroid Nodule   30  Trapezius strain (840 8) (S46 819A)   31  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 (772 4) (S35 107)   32  Visit for routine gyn exam (V72 31) (Z01 419)   33  Vitamin D deficiency (268 9) (E55 9)   34  Vulvar lesion (624 8) (N90 89)   35  Weight gain (783 1) (R63 5)    Past Medical History    1  History of Acute suppurative otitis media without spontaneous rupture of ear drum, unspecified laterality   2  History of Acute upper respiratory infection (465 9) (J06 9)   3  History of Adhesive capsulitis of right shoulder (726 0) (M75 01)   4  History of Ankylosis Of The Right Knee (718 56)   5  History of Blister of leg (916 2) (S80 829A)   6  Breast self examination education, encounter for (V65 49) (Z71 89)   7  History of Cellulitis of left lower extremity (682 6) (L03 116)   8  History of Cervical polyp (622 7) (N84 1)   9  History of Closed displaced fracture of greater tuberosity of humerus (812 03) (S42 253A)   10  History of Encounter for screening colonoscopy (V76 51) (Z12 11)   11  History of  3 (V22 2) (Z33 1)   12  History of allergic rhinitis (V12 69) (Z87 09)   13  History of conjunctivitis (V12 49) (Z86 69)   14  History of dermatitis (V13 3) (Z87 2)   15  History of edema (V13 89) (Z87 898)   16  History of headache (V13 89) (Z87 898)   17  History of insomnia (V13 89) (Z87 898)   18  History of sinusitis (V12 69) (Z87 09)   19  History of upper respiratory infection (V12 09) (Z87 09)   20  History of Mouth dryness (527 7) (R68 2)   21  History of Multiple joint pain (719 49) (M25 50)   22  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)   23  History of Pain of lower leg, unspecified laterality (729 5) (M26 189)   24   History of Post-operative state (V44 89) (Z98 890) 25  History of Preop examination (V72 84) (Z01 818)   26  History of Superficial phlebitis and thrombophlebitis of left lower extremity (451 0)  (I80 02)   27  History of Visit For: Pre-procedural Exam Prior To General Surgery (V72 83)    Family History  Mother    1  Family history of Anxiety and depression   2  Family history of Coronary artery disease (414 00) (I25 10)   3  Family history of Alzheimer's disease (V17 2) (Z82 0)   4  Family history of Osteoporosis (733 00) (M81 0)  Father    5  Family history of Osteoarthritis (715 90) (M19 90)  Sister    10  Family history of Carcinosarcoma Of The Uterus (V16 49)   7  Family history of    6  Family history of congestive cardiomyopathy (V17 49) (Z82 49)  Brother    5  Family history of malignant neoplasm of uterus (V16 49) (Z80 49)  Family History Reviewed: The family history was reviewed and updated today  Social History   · Caffeine Use   · Denied: History of Drug Use   · Marital History - Currently    · Never a smoker   · No alcohol use   · No caffeine use   · Nondenominational Affiliation [employer]ism   · Second hand smoke exposure (V15 89) (Z77 22)   · Two children   · Uses Safety Equipment - Seatbelts  The social history was reviewed and updated today  Surgical History    1  History of Breast Surgery Reduction Procedure Elective   2  History of Cholecystectomy Laparoscopic   3  History of Dilation And Curettage   4  History of Endovenous Ablation Of Incompetent Vein Laser   5  History of Knee Arthroscopy (Therapeutic)   6  History of Knee Replacement   7  History of Knee Replacement   8  History of Shoulder Surgery  Surgical History Reviewed: The surgical history was reviewed and updated today  Current Meds   1  Amitriptyline HCl - 25 MG Oral Tablet; TAKE 1 TABLET AT BEDTIME; Therapy: 45GYD3449 to (Evaluate:2018)  Requested for: 2017; Last Rx:2017 Ordered   2   Atorvastatin Calcium 10 MG Oral Tablet; TAKE 1 TABLET DAILY; Therapy: 77SJS6146 to (Evaluate:15Mar2018)  Requested for: 20Mar2017; Last Rx:20Mar2017; Status: ACTIVE - Transmit to Pharmacy - Awaiting Verification Ordered   3  Cefuroxime Axetil 250 MG Oral Tablet; TAKE 1 TABLET EVERY 12 HOURS DAILY; Therapy: 16DWU1214 to (Last Rx:17Oct2017)  Requested for: 44WIL8790 Ordered   4  Cevimeline HCl - 30 MG Oral Capsule; Take 1 capsule twice daily Recorded   5  Furosemide 20 MG Oral Tablet; TAKE 1 TABLET TWICE DAILY; Therapy: 59MGJ4713 to (Evaluate:15Mar2018)  Requested for: 20Mar2017; Last Rx:20Mar2017 Ordered   6  Meloxicam 15 MG Oral Tablet; Take 1 tablet daily  Requested for: 20Mar2017; Last Rx:20Mar2017 Ordered   7  Methocarbamol 500 MG Oral Tablet; TAKE 1 TABLET AT BEDTIME NIGHTLY  MAY TAKE 1 TABLET DURING THE DAY ALSO AS NEEDED; Therapy: 11IXW3493 to (Evaluate:22Sep2017)  Requested for: 26Fkt3870; Last Rx:86Nuo3851 Ordered   8  Multi-Vitamins TABS; Therapy: (Lennie Sellers) to Recorded   9  Pantoprazole Sodium 40 MG Oral Tablet Delayed Release; TAKE 1 TABLET 30 MINUTES BEFORE BREAKFAST DAILY; Therapy: 92VXB9030 to (Evaluate:15Mar2018)  Requested for: 20Mar2017; Last Rx:20Mar2017 Ordered   10  PARoxetine HCl - 30 MG Oral Tablet; TAKE 1 TABLET DAILY AS DIRECTED; Therapy: 74EVJ2096 to (Evaluate:15Mar2018)  Requested for: 20Mar2017; Last  Rx:20Mar2017 Ordered   11  Promethazine-Codeine 6 25-10 MG/5ML Oral Syrup; TAKE 5 ML EVERY 4 TO 6 HOURS  AS NEEDED FOR COUGH; Therapy: 67Nfx2119 to (Evaluate:10Nov2017)  Requested for: 91HJY0358; Last  Rx:06Nov2017 Ordered   12  ROPINIRole HCl - 0 5 MG Oral Tablet; Therapy: 72MGW1396 to Recorded   13  TraMADol HCl - 50 MG Oral Tablet; TAKE 1 TABLET Every 6 hours PRN for pain; Therapy: 54EDM7927 to (Evaluate:20Sep2017); Last Rx:12Sep2017 Ordered   14  TraZODone HCl - 50 MG Oral Tablet; TAKE 1 TABLET AT BEDTIME; Therapy: 70VWP2478 to (Last Rx:30Nov2015) Ordered   15  Vitamin D 2000 UNIT Oral Capsule; TAKE 2 CAPSULE Daily;   Therapy: 33IYA1552 to (Last SH:85XKT4212) Ordered    The medication list was reviewed and updated today  Allergies  1  Toradol   2  Ansaid TABS   3  Pneumovax 23 25 MCG/0 5ML Injection Injectable    Vitals   Recorded: 28Nov2017 08:23AM   Temperature 98 5 F   Heart Rate 88   Respiration 16   Systolic 843   Diastolic 78   Height 4 ft 11 in   Weight 196 lb 2 08 oz   BMI Calculated 39 61   BSA Calculated 1 83       Physical Exam   Constitutional  General appearance: No acute distress, well appearing and well nourished  Eyes  Conjunctiva and lids: No swelling, erythema or discharge  Ears, Nose, Mouth, and Throat  External inspection of ears and nose: Normal    Otoscopic examination: Tympanic membranes translucent with normal light reflex  Canals patent without erythema  Nasal mucosa, septum, and turbinates: Abnormal  -- Positive allergic turbinates negative sinus tenderness to percussion  Oropharynx: Abnormal  -- Scan clear postnasal drip mild injection negative exudate  Pulmonary  Respiratory effort: No increased work of breathing or signs of respiratory distress  Auscultation of lungs: Abnormal  -- Bilateral coarse breath sounds negative rhonchi negative wheeze negative rales  Cardiovascular  Palpation of heart: Normal PMI, no thrills  Auscultation of heart: Normal rate and rhythm, normal S1 and S2, without murmurs  Lymphatic  Palpation of lymph nodes in neck: Abnormal  -- Shotty anterior nodes  Musculoskeletal  Gait and station: Normal    Skin Warm and dry  Neurologic Negative gross focal motor deficit          Future Appointments    Date/Time Provider Specialty Site   01/08/2018 11:00 AM Zaida Gill, [place]   12/18/2017 01:20 PM Keira Del Castillo MD Gastroenterology Adult [facility] 9       Signatures   Electronically signed by : Megan Mansfield DO; Nov 28 2017  8:38AM EST                       (Author)

## 2017-12-13 ENCOUNTER — GENERIC CONVERSION - ENCOUNTER (OUTPATIENT)
Dept: FAMILY MEDICINE CLINIC | Facility: CLINIC | Age: 62
End: 2017-12-13

## 2017-12-18 ENCOUNTER — ALLSCRIPTS OFFICE VISIT (OUTPATIENT)
Dept: OTHER | Facility: OTHER | Age: 62
End: 2017-12-18

## 2017-12-18 ENCOUNTER — TRANSCRIBE ORDERS (OUTPATIENT)
Dept: ADMINISTRATIVE | Facility: HOSPITAL | Age: 62
End: 2017-12-18

## 2017-12-18 DIAGNOSIS — R14.0 ABDOMINAL DISTENTION: Primary | ICD-10-CM

## 2017-12-19 ENCOUNTER — APPOINTMENT (OUTPATIENT)
Dept: LAB | Facility: CLINIC | Age: 62
End: 2017-12-19
Payer: COMMERCIAL

## 2017-12-19 ENCOUNTER — TRANSCRIBE ORDERS (OUTPATIENT)
Dept: LAB | Facility: CLINIC | Age: 62
End: 2017-12-19

## 2017-12-19 DIAGNOSIS — R19.4 CHANGE IN BOWEL HABITS: ICD-10-CM

## 2017-12-19 DIAGNOSIS — R10.13 EPIGASTRIC PAIN: ICD-10-CM

## 2017-12-19 PROCEDURE — 83516 IMMUNOASSAY NONANTIBODY: CPT

## 2017-12-19 PROCEDURE — 87177 OVA AND PARASITES SMEARS: CPT

## 2017-12-19 PROCEDURE — 87209 SMEAR COMPLEX STAIN: CPT

## 2017-12-19 PROCEDURE — 86255 FLUORESCENT ANTIBODY SCREEN: CPT

## 2017-12-19 PROCEDURE — 36415 COLL VENOUS BLD VENIPUNCTURE: CPT

## 2017-12-19 PROCEDURE — 87329 GIARDIA AG IA: CPT

## 2017-12-19 PROCEDURE — 82784 ASSAY IGA/IGD/IGG/IGM EACH: CPT

## 2017-12-19 NOTE — CONSULTS
Assessment  1  Epigastric pain (789 06) (R10 13)   2  Bloating symptom (787 3) (R14 0)   3  Change in bowel habits (787 99) (R19 4)   4  Fatty liver (571 8) (K76 0)   5  Hiatal hernia (553 3) (K44 9)    Plan  Bloating symptom    · NM GASTRIC EMPTYING; Status:Need Information - Financial Authorization; Requestedfor:[id number];    Perform:[facility]; Due:84Nci0687; Ordered; For:Bloating symptom; Ordered By:Willie Hunt;  Change in bowel habits    · (1) C  DIFFICILE TOXIN BY PCR; Status:Active; Requested for:05Qka8734;    Perform:[facility]; Due:50Iev0420; Ordered; For:Change in bowel habits; Ordered By:Willie Hunt;   · (1) GIARDIA LAMBLIA; Status:Active; Requested for:95Uqe5862;    Perform:[facility]; Due:05Inv1051; Ordered; For:Change in bowel habits; Ordered By:Willie Hunt;   · (1) OVA AND PARASITES EXAM; Status:Active; Requested for:78Xyt7361;    Perform:[facility]; Due:68Siq4473; Ordered; For:Change in bowel habits; Ordered By:Willie Hunt;  Epigastric pain    · (1) CELIAC DISEASE AB PROFILE; Status:Active; Requested for:65Voz9097;    Perform:[facility]; Due:72Fmb3092; Ordered;For:Epigastric pain; Ordered By:Willie Hunt;   · EGD; Status:Hold For - Scheduling; Requested for:02Qpi2827;    Perform:[facility]; Due:35Hgv8171;Ordered;For:Epigastric pain; Ordered By:Willie Hunt;  Irritable bowel syndrome with diarrhea    · Xifaxan 550 MG Oral Tablet; TAKE 1 TABLET 3 times daily   Rx By: Ap Hunt; Dispense: 14 Days ; #:42 Tablet; Refill: 0;For: Irritable bowel syndrome with diarrhea; LETY = N; Verified Transmission to [facility]01 Last Updated By: System, [employer]; 12/18/2017 2:03:25 PM    Discussion/Summary  Discussion Summary:   Epigastric pain DDx IBS vs  Celiac dz vs  SIBO vs  PUD   US of RUQ neg besides fatty liver- EGD for further eval- Trial of FODMAP- trial of probioitics- Avoidance of dairy- Limit NSAIDsgas/bloating rule out gastroparesisFatty liver- Discussed weight loss- Considering sleeve surgery- Discussed vit E therapyHx of hiatal hernia- Discussed may need this fix during sleeve surgery  Chief Complaint  Chief Complaint Free Text Note Form: Pt being seen for consult Abdominal pain, Diarrhea, Nausea, Vomiting  Patient referred by Nathaly Wiggins      History of Present Illness  HPI: Denny Farmer is a 57 yo F with high BMI presents for evaluation of ABD bloating, epigastric pain, change in bowel habits, and fatty liver disease  Further eval for epigastric pain she has had an ultrasound which did not identify any choledocholithiasis with previous cholecystectomy  She does take Meloxicam  Abdominal pain is located in the epigastric region worse with oral intake but pain can also be constant and chronic  No clear alleviating or aggravating factors identified  Change in bowel habits maybe due to IBS D  Colonoscopy earlier this year was within normal limits  This was performed at outside facility  She has ABD bloating and distention associated with this  Recent antibiotics use but symptoms started prior to this  US of the ABD identified fatty liver disease  BMI is close to 40  She is consider sleeve surgery  Last EGD was 8 years ago  HH was found on the EGD  No alarm signs  Lipase and HP breath test were neg  Review of Systems  Complete-Female GI Adult:  Constitutional: No fever, no chills, feels well, no tiredness, no recent weight gain or weight loss  Eyes: No complaints of eye pain, no red eyes, no eyesight problems, no discharge, no dry eyes, no itching of eyes  ENT: no complaints of earache, no loss of hearing, no nose bleeds, no nasal discharge, no sore throat, no hoarseness  Cardiovascular: No complaints of slow heart rate, no fast heart rate, no chest pain, no palpitations, no leg claudication, no lower extremity edema  Respiratory: No complaints of shortness of breath, no wheezing, no cough, no SOB on exertion, no orthopnea, no PND  Gastrointestinal: abdominal pain,-- nausea,-- vomiting-- and-- diarrhea, but-- no constipation-- and-- no blood in stools  Genitourinary: No complaints of dysuria, no incontinence, no pelvic pain, no dysmenorrhea, no vaginal discharge or bleeding  Musculoskeletal: No complaints of arthralgias, no myalgias, no joint swelling or stiffness, no limb pain or swelling  Integumentary: No complaints of skin rash or lesions, no itching, no skin wounds, no breast pain or lump  Neurological: No complaints of headache, no confusion, no convulsions, no numbness, no dizziness or fainting, no tingling, no limb weakness, no difficulty walking  Psychiatric: Not suicidal, no sleep disturbance, no anxiety or depression, no change in personality, no emotional problems  Endocrine: No complaints of proptosis, no hot flashes, no muscle weakness, no deepening of the voice, no feelings of weakness  Hematologic/Lymphatic: No complaints of swollen glands, no swollen glands in the neck, does not bleed easily, does not bruise easily  ROS Reviewed:   ROS reviewed  Active Problems  1  Allergic rhinitis (477 9) (J30 9)   2  Arthralgia (719 40) (M25 50)   3  Breast self examination education, encounter for (V65 49) (Z71 89)   4  Carpal tunnel syndrome (354 0) (G56 00)   5  Cough (786 2) (R05)   6  Depression (311) (F32 9)   7  Encounter for screening mammogram for malignant neoplasm of breast (V76 12) (Z12 31)   8  Endometrial thickening on ultra sound (793 5) (R93 8)   9  Epigastric pain (789 06) (R10 13)   10  Fibromyalgia (729 1) (M79 7)   11  GERD without esophagitis (530 81) (K21 9)   12  Hip pain (719 45) (M25 559)   13  History of self breast exam   14  Hyperglycemia (790 29) (R73 9)   15  Hyperlipidemia (272 4) (E78 5)   16  Hyperthyroidism (242 90) (E05 90)   17  Influenza vaccination administered at current visit (V04 81) (Z23)   18  Menopause (627 2) (Z78 0)   19  Myalgia (729 1) (M79 1)   20   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,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,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)   21  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)   22  Need for pneumococcal vaccination (V03 82) (Z23)   23  Obesity (278 00) (E66 9)   24  Osteoarthritis, localized, knee (715 36) (M17 10)   25  Painful orthopaedic hardware (996 78) (T84 84XA)   26  Peritonsillar abscess (475) (J36)   27  Restless legs syndrome (333 94) (G25 81)   28  Screening for human papillomavirus (HPV) (V73 81) (Z11 51)   29  Sicca syndrome (710 2) (M35 00)   30  Sinusitis, acute (461 9) (J01 90)   31  Sleep apnea (780 57) (G47 30)   32  Thyroid Nodule   33  Trapezius strain (840 8) (S46 819A)   34  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 (859 0) (C85 016)   35  Visit for routine gyn exam (V72 31) (Z01 419)   36  Vitamin D deficiency (268 9) (E55 9)   37  Vulvar lesion (624 8) (N90 89)   38  Weight gain (783 1) (R63 5)   39  Wheezing (786 07) (R06 2)    Past Medical History  1  History of Acute suppurative otitis media without spontaneous rupture of ear drum, unspecified laterality   2  History of Acute upper respiratory infection (465 9) (J06 9)   3  History of Adhesive capsulitis of right shoulder (726 0) (M75 01)   4  History of Ankylosis Of The Right Knee (718 56)   5  History of Blister of leg (916 2) (S80 829A)   6  Breast self examination education, encounter for (V65 49) (Z71 89)   7  History of Cellulitis of left lower extremity (682 6) (L03 116)   8  History of Cervical polyp (622 7) (N84 1)   9  History of Closed displaced fracture of greater tuberosity of humerus (812 03) (S42 253A)   10  History of Encounter for screening colonoscopy (V76 51) (Z12 11)   11  History of  3 (V22 2) (Z33 1)   12  History of allergic rhinitis (V12 69) (Z87 09)   13  History of conjunctivitis (V12 49) (Z86 69)   14  History of dermatitis (V13 3) (Z87 2)   15  History of edema (V13 89) (Z87 898)   16  History of headache (V13 89) (Z87 898)   17  History of insomnia (V13 89) (Z87 898)   18   History of sinusitis (V12 69) (Z87 09)   19  History of upper respiratory infection (V12 09) (Z87 09)   20  History of Mouth dryness (527 7) (R68 2)   21  History of Multiple joint pain (719 49) (M25 50)   22  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)   23  History of Pain of lower leg, unspecified laterality (729 5) (M79 669)   24  History of Post-operative state (V45 89) (Z98 890)   25  History of Preop examination (V72 84) (Z01 818)   26  History of Superficial phlebitis and thrombophlebitis of left lower extremity (451 0)  (I80 02)   27  History of Visit For: Pre-procedural Exam Prior To General Surgery (V72 83)  Active Problems And Past Medical History Reviewed: The active problems and past medical history were reviewed and updated today  Surgical History  1  History of Breast Surgery Reduction Procedure Elective   2  History of Cholecystectomy Laparoscopic   3  History of Dilation And Curettage   4  History of Endovenous Ablation Of Incompetent Vein Laser   5  History of Knee Arthroscopy (Therapeutic)   6  History of Knee Replacement   7  History of Knee Replacement   8  History of Shoulder Surgery  Surgical History Reviewed: The surgical history was reviewed and updated today  Family History  Mother    1  Family history of Anxiety and depression   2  Family history of Coronary artery disease (414 00) (I25 10)   3  Family history of Alzheimer's disease (V17 2) (Z82 0)   4  Family history of Osteoporosis (733 00) (M81 0)  Father    5  Family history of Osteoarthritis (715 90) (M19 90)  Sister    10  Family history of Carcinosarcoma Of The Uterus (V16 49)   7  Family history of    6  Family history of congestive cardiomyopathy (V17 49) (Z82 49)  Brother    5  Family history of malignant neoplasm of uterus (V16 49) (Z80 49)  Family History Reviewed: The family history was reviewed and updated today         Social History   · Caffeine Use   · Denied: History of Drug Use   · Marital History - Currently    · Never a smoker   · No alcohol use   · No caffeine use   · Jainism Affiliation Taoist   · Second hand smoke exposure (V15 89) (Z77 22)   · Two children   · Uses Safety Equipment - Seatbelts  Social History Reviewed: The social history was reviewed and updated today  Current Meds   1  Atorvastatin Calcium 10 MG Oral Tablet; TAKE 1 TABLET DAILY; Therapy: 02ZUR1457 to (Evaluate:15Mar2018)  Requested for: 20Mar2017; Last Rx:20Mar2017; Status: ACTIVE - Transmit to [facility] Ordered   2  Cefuroxime Axetil 250 MG Oral Tablet; TAKE 1 TABLET EVERY 12 HOURS DAILY; Therapy: 64JUF2847 to (Last Rx:17Oct2017)  Requested for: 17Oct2017 Ordered   3  Cevimeline HCl - 30 MG Oral Capsule; Take 1 capsule twice daily Recorded   4  Doxycycline Hyclate 100 MG Oral Capsule; Take 1 capsule twice daily; Therapy: 10ITN8574 to (Evaluate:61Vbo7556)  Requested for: 65BYU8071; Last Rx:28Nov2017 Ordered   5  Furosemide 20 MG Oral Tablet; TAKE 1 TABLET TWICE DAILY; Therapy: 51LNW4674 to (Evaluate:15Mar2018)  Requested for: 20Mar2017; Last Rx:20Mar2017 Ordered   6  Meloxicam 15 MG Oral Tablet; Take 1 tablet daily  Requested for: 20Mar2017; Last Rx:20Mar2017 Ordered   7  Methocarbamol 500 MG Oral Tablet; TAKE 1 TABLET AT BEDTIME NIGHTLY  MAY TAKE 1 TABLET DURING THE DAY ALSO AS NEEDED; Therapy: 71NNV7452 to (Evaluate:39Dan5529)  Requested for: 95Gsa2877; Last Rx:52Qko2405 Ordered   8  Montelukast Sodium 10 MG Oral Tablet; TAKE 1 TABLET BY MOUTH ONCE DAILY; Therapy: 38MUM3638 to (Ki Leslie)  Requested for: 51DZA7658; Last Rx:28Nov2017 Ordered   9  Multi-Vitamins TABS; Therapy: (Recorded:99Spc8845) to Recorded   10  Pantoprazole Sodium 40 MG Oral Tablet Delayed Release; TAKE 1 TABLET 30 MINUTES  BEFORE BREAKFAST DAILY; Therapy: 89FTL4722 to (Evaluate:15Mar2018)  Requested for: 20Mar2017; Last  Rx:20Mar2017 Ordered   11   PARoxetine HCl - 30 MG Oral Tablet; TAKE 1 TABLET DAILY AS DIRECTED; Therapy: 21LFJ4499 to (Evaluate:15Mar2018)  Requested for: 20Mar2017; Last  Rx:20Mar2017 Ordered   12  Promethazine-Codeine 6 25-10 MG/5ML Oral Syrup; TAKE 5 ML EVERY 4 TO 6 HOURS  AS NEEDED FOR COUGH; Therapy: 02UKR9940 to (Evaluate:61Ypn6654); Last Rx:28Nov2017 Ordered   13  ROPINIRole HCl - 0 5 MG Oral Tablet; Therapy: 16DHR1611 to Recorded   14  TraMADol HCl - 50 MG Oral Tablet; TAKE 1 TABLET Every 6 hours PRN for pain; Therapy: 72JBO5471 to (Evaluate:20Sep2017); Last Rx:40Yak2094 Ordered   15  TraZODone HCl - 50 MG Oral Tablet; TAKE 1 TABLET AT BEDTIME; Therapy: 00WZW6468 to (Last Rx:30Nov2015) Ordered   16  Ventolin  (90 Base) MCG/ACT Inhalation Aerosol Solution; INHALE 1-2 PUFFS  EVERY 4-6 HOURS AS NEEDED AND AS DIRECTED; Therapy: 83BRM9519 to (Last Rx:28Nov2017)  Requested for: 85VUZ2230 Ordered   17  Vitamin D 2000 UNIT Oral Capsule; TAKE 2 CAPSULE Daily; Therapy: 84PMF6312 to (Last Rx:20May2015) Ordered    Allergies  1  Toradol   2  Ansaid TABS   3  Pneumovax 23 25 MCG/0 5ML Injection Injectable    Physical Exam   Constitutional  General appearance: No acute distress, well appearing and well nourished  Eyes  Conjunctiva and lids: No swelling, erythema or discharge  Ears, Nose, Mouth, and Throat  External inspection of ears and nose: Normal    Oropharynx: Normal with no erythema, edema, exudate or lesions  Pulmonary  Respiratory effort: No increased work of breathing or signs of respiratory distress  Auscultation of lungs: Clear to auscultation  Cardiovascular  Palpation of heart: Normal PMI, no thrills  Auscultation of heart: Normal rate and rhythm, normal S1 and S2, without murmurs  Examination of extremities for edema and/or varicosities: Normal    Abdomen  Abdomen: Non-tender, no masses  Liver and spleen: No hepatomegaly or splenomegaly  Musculoskeletal  Gait and station: Normal    Neurologic  Cranial nerves: Cranial nerves 2-12 intact     Psychiatric  Orientation to person, place, and time: Normal          Results/Data  * US ABDOMEN COMPLETE 26Oct2017 09:03AM Disha Lamar Order Number: [id number]   - Patient Instructions: To schedule this appointment, please contact Central Scheduling at [phone]  Test Name Result Flag Reference   US ABDOMEN COMPLETE (Report)     ABDOMEN ULTRASOUND, COMPLETE WITH DOPPLER   INDICATION: Upper abdominal pain  K21 9: Gastro-esophageal reflux disease without esophagitis  R10 13: Epigastric pain  History taken directly from the electronic ordering system  Epigastric pain  History of cholecystectomy  COMPARISON: None  TECHNIQUE:  Real-time ultrasound of the abdomen was performed with a curvilinear transducer with both volumetric sweeps and still imaging techniques  FINDINGS:   PANCREAS: Portions of the pancreas are obscured by bowel gas  Visualized portions of the pancreas are unremarkable  AORTA AND IVC: Visualized portions are normal for patient age  LIVER:  Size: Mildly enlarged  The liver measures 16 9 cm in the midclavicular line  Contour: Surface contour is smooth  Parenchyma: There is moderate diffuse increased echogenicity with smooth echotexture and acoustic beam attenuation  Most consistent with moderate hepatic steatosis  No evidence of suspicious mass  Limited imaging of the main portal vein shows it to be patent and hepatopetal    BILIARY:  The gallbladder is surgically absent  Sonographic Enrigue Simona sign is negative  No intrahepatic biliary dilatation  CBD measures 4 mm  No choledocholithiasis  KIDNEY:   Right kidney measures 12 5 cm  Within normal limits  Left kidney measures 11 8 cm  Within normal limits  SPLEEN:   Measures 9 9 cm  Within normal limits  ASCITES: None  IMPRESSION:   Enlarged echogenic liver indicative of hepatic steatosis    Workstation performed: [id number]   Signed by:   Dav Trimble MD  10/29/17     (1) TSH 13XYP8966 11:01AM Jhon ROCHA Order Number: [id number]     Test Name Result Flag Reference   TSH 1 301 uIU/mL  0 358-3 740   Patients undergoing fluorescein dye angiography may retain small amounts of fluorescein in the body for 48-72 hours post procedure  Samples containing fluorescein can produce falsely depressed TSH values  If the patient had this procedure,a specimen should be resubmitted post fluorescein clearance  The recommended reference ranges for TSH during pregnancy are as follows: First trimester 0 1 to 2 5 uIU/mL Second trimester  0 2 to 3 0 uIU/mL Third trimester 0 3 to 3 0 uIU/m     (1) AMYLASE 12Oct2017 11:01AM [employer] Order Number: [id number]     Test Name Result Flag Reference   AMYLASE 23 IU/L L      (1) LIPASE 95LJA9132 11:01AM [employer] Order Number: [id number]     Test Name Result Flag Reference   LIPASE 77 u/L       (1) CBC/PLT/DIFF 34TQM4491 11:01AM [employer] Order Number: [id number]     Test Name Result Flag Reference   WBC COUNT 6 87 Thousand/uL  4 31-10 16   RBC COUNT 4 47 Million/uL  3 81-5 12   HEMOGLOBIN 12 6 g/dL  11 5-15 4   HEMATOCRIT 37 5 %  34 8-46  1   MCV 84 fL  82-98   MCH 28 2 pg  26 8-34 3   MCHC 33 6 g/dL  31 4-37 4   RDW 12 8 %  11 6-15 1   MPV 10 5 fL  8 9-12 7   PLATELET COUNT 378 Thousands/uL  149-390   nRBC AUTOMATED 0 /100 WBCs     NEUTROPHILS RELATIVE PERCENT 58 %  43-75   LYMPHOCYTES RELATIVE PERCENT 33 %  14-44   MONOCYTES RELATIVE PERCENT 7 %  4-12   EOSINOPHILS RELATIVE PERCENT 2 %  0-6   BASOPHILS RELATIVE PERCENT 0 %  0-1   NEUTROPHILS ABSOLUTE COUNT 3 95 Thousands/? ??L  1 85-7 62   LYMPHOCYTES ABSOLUTE COUNT 2 29 Thousands/? ??L  0 60-4 47   MONOCYTES ABSOLUTE COUNT 0 49 Thousand/? ??L  0 17-1 22   EOSINOPHILS ABSOLUTE COUNT 0 11 Thousand/? ??L  0 00-0 61   BASOPHILS ABSOLUTE COUNT 0 03 Thousands/? ??L  0 00-0 10     (1) COMPREHENSIVE METABOLIC PANEL 24EVO2742 27:62OH Erika Nathanael   TW Order Number: [id number]     Test Name Result Flag Reference   SODIUM 140 mmol/L  136-145   POTASSIUM 4 0 mmol/L  3 5-5 3   CHLORIDE 104 mmol/L  100-108   CARBON DIOXIDE 28 mmol/L  21-32   ANION GAP (CALC) 8 mmol/L  4-13   BLOOD UREA NITROGEN 15 mg/dL  5-25   CREATININE 0 67 mg/dL  0 60-1 30   Standardized to IDMS reference method   CALCIUM 9 1 mg/dL  8 3-10 1   BILI, TOTAL 0 35 mg/dL  0 20-1 00   ALK PHOSPHATAS 88 U/L     ALT (SGPT) 23 U/L  12-78   Specimen collection should occur prior to Sulfasalazine administration due to the potential for falsely depressed results  AST(SGOT) 10 U/L  5-45   Specimen collection should occur prior to Sulfasalazine administration due to the potential for falsely depressed results  ALBUMIN 3 4 g/dL L 3 5-5 0   TOTAL PROTEIN 7 2 g/dL  6 4-8 2   eGFR 95 ml/min/1 73sq m     National Kidney Disease Education Program recommendations are as follows: GFR calculation is accurate only with a steady state creatinine Chronic Kidney disease less than 60 ml/min/1 73 sq  meters Kidney failure less than 15 ml/min/1 73 sq  meters  GLUCOSE FASTING 120 mg/dL H 65-99   Specimen collection should occur prior to Sulfasalazine administration due to the potential for falsely depressed results  Specimen collection should occur prior to Sulfapyridine administration due to the potential for falsely elevated results       (1) [address] TEST 80SVO0839 11:01AM Rosa Parks    Order Number: [id number]     Test Name Result Flag Reference   SCAN RESULT SEE WRITTEN REPORT       Future Appointments    Date/Time Provider Specialty Site   01/08/2018 11:00 AM [facility]   12/26/2017 01:00 PM FREDDY Sears, RD Dietitian [facility]     Signatures   Electronically signed by : Evangelist Navarro MD; Dec 18 2017  2:19PM EST                       (Author)

## 2017-12-20 ENCOUNTER — ANESTHESIA EVENT (OUTPATIENT)
Dept: GASTROENTEROLOGY | Facility: HOSPITAL | Age: 62
End: 2017-12-20
Payer: COMMERCIAL

## 2017-12-20 RX ORDER — MONTELUKAST SODIUM 10 MG/1
10 TABLET ORAL
COMMUNITY
End: 2018-03-01

## 2017-12-20 RX ORDER — ALBUTEROL SULFATE 90 UG/1
2 AEROSOL, METERED RESPIRATORY (INHALATION) EVERY 6 HOURS PRN
COMMUNITY
End: 2018-03-01 | Stop reason: ALTCHOICE

## 2017-12-20 RX ORDER — PROMETHAZINE HYDROCHLORIDE AND CODEINE PHOSPHATE 6.25; 1 MG/5ML; MG/5ML
5 SYRUP ORAL EVERY 4 HOURS PRN
COMMUNITY
End: 2018-03-01

## 2017-12-21 ENCOUNTER — ANESTHESIA (OUTPATIENT)
Dept: GASTROENTEROLOGY | Facility: HOSPITAL | Age: 62
End: 2017-12-21
Payer: COMMERCIAL

## 2017-12-21 ENCOUNTER — GENERIC CONVERSION - ENCOUNTER (OUTPATIENT)
Dept: OTHER | Facility: OTHER | Age: 62
End: 2017-12-21

## 2017-12-21 ENCOUNTER — HOSPITAL ENCOUNTER (OUTPATIENT)
Facility: HOSPITAL | Age: 62
Setting detail: OUTPATIENT SURGERY
Discharge: HOME | End: 2017-12-21
Attending: INTERNAL MEDICINE | Admitting: INTERNAL MEDICINE
Payer: COMMERCIAL

## 2017-12-21 ENCOUNTER — GENERIC CONVERSION - ENCOUNTER (OUTPATIENT)
Dept: GASTROENTEROLOGY | Facility: CLINIC | Age: 62
End: 2017-12-21

## 2017-12-21 VITALS
HEART RATE: 81 BPM | DIASTOLIC BLOOD PRESSURE: 72 MMHG | OXYGEN SATURATION: 96 % | SYSTOLIC BLOOD PRESSURE: 110 MMHG | BODY MASS INDEX: 39.51 KG/M2 | TEMPERATURE: 98.3 F | HEIGHT: 59 IN | WEIGHT: 196 LBS | RESPIRATION RATE: 18 BRPM

## 2017-12-21 DIAGNOSIS — R10.13 EPIGASTRIC PAIN: ICD-10-CM

## 2017-12-21 LAB
ENDOMYSIUM IGA SER QL: NEGATIVE
G LAMBLIA AG STL QL IA: NEGATIVE
GLIADIN PEPTIDE IGA SER-ACNC: 12 UNITS (ref 0–19)
GLIADIN PEPTIDE IGG SER-ACNC: 2 UNITS (ref 0–19)
IGA SERPL-MCNC: 225 MG/DL (ref 87–352)
TTG IGA SER-ACNC: <2 U/ML (ref 0–3)
TTG IGG SER-ACNC: <2 U/ML (ref 0–5)

## 2017-12-21 PROCEDURE — 88342 IMHCHEM/IMCYTCHM 1ST ANTB: CPT | Performed by: INTERNAL MEDICINE

## 2017-12-21 PROCEDURE — 88341 IMHCHEM/IMCYTCHM EA ADD ANTB: CPT | Performed by: INTERNAL MEDICINE

## 2017-12-21 PROCEDURE — 88305 TISSUE EXAM BY PATHOLOGIST: CPT | Performed by: INTERNAL MEDICINE

## 2017-12-21 RX ORDER — PROPOFOL 10 MG/ML
INJECTION, EMULSION INTRAVENOUS AS NEEDED
Status: DISCONTINUED | OUTPATIENT
Start: 2017-12-21 | End: 2017-12-21 | Stop reason: SURG

## 2017-12-21 RX ORDER — SODIUM CHLORIDE 9 MG/ML
125 INJECTION, SOLUTION INTRAVENOUS CONTINUOUS
Status: DISCONTINUED | OUTPATIENT
Start: 2017-12-21 | End: 2017-12-22 | Stop reason: HOSPADM

## 2017-12-21 RX ORDER — GABAPENTIN 300 MG/1
300 CAPSULE ORAL 3 TIMES DAILY
Status: ON HOLD | COMMUNITY
End: 2018-06-28 | Stop reason: ALTCHOICE

## 2017-12-21 RX ADMIN — PROPOFOL 50 MG: 10 INJECTION, EMULSION INTRAVENOUS at 08:07

## 2017-12-21 RX ADMIN — PROPOFOL 50 MG: 10 INJECTION, EMULSION INTRAVENOUS at 08:08

## 2017-12-21 RX ADMIN — PROPOFOL 50 MG: 10 INJECTION, EMULSION INTRAVENOUS at 08:03

## 2017-12-21 RX ADMIN — PROPOFOL 150 MG: 10 INJECTION, EMULSION INTRAVENOUS at 08:00

## 2017-12-21 RX ADMIN — SODIUM CHLORIDE 125 ML/HR: 0.9 INJECTION, SOLUTION INTRAVENOUS at 07:25

## 2017-12-21 RX ADMIN — PROPOFOL 50 MG: 10 INJECTION, EMULSION INTRAVENOUS at 08:05

## 2017-12-21 NOTE — ANESTHESIA PREPROCEDURE EVALUATION
Review of Systems/Medical History  Patient summary reviewed  Chart reviewed  No history of anesthetic complications     Cardiovascular  Hyperlipidemia,    Pulmonary  Sleep apnea CPAP, ,        GI/Hepatic    GERD poorly controlled, Liver disease (fatty liver), ,  Hiatal hernia,             Endo/Other  History of thyroid disease , hypothyroidism, Arthritis     GYN       Hematology  Negative hematology ROS      Musculoskeletal  Obesity  morbid obesity,        Neurology    Neuromuscular disease ,   Comment: Fibromyalgia  [facility]   Anxiety, Depression , being treated for depression,            Physical Exam    Airway    Mallampati score: III  TM Distance: >3 FB  Neck ROM: full     Dental   No notable dental hx     Cardiovascular  Rhythm: regular, Rate: normal, Cardiovascular exam normal    Pulmonary  Pulmonary exam normal Breath sounds clear to auscultation,     Other Findings        Anesthesia Plan  ASA Score- 3       Anesthesia Type- IV sedation with anesthesia with ASA Monitors  Additional Monitors:   Airway Plan:           Induction- intravenous  Informed Consent- Anesthetic plan and risks discussed with patient  I personally reviewed this patient with the CRNA  Discussed and agreed on the Anesthesia Plan with the CRNA  Melony Szymanski

## 2017-12-21 NOTE — DISCHARGE INSTRUCTIONS
Gastritis   WHAT YOU NEED TO KNOW:   Gastritis is inflammation or irritation of the lining of your stomach  DISCHARGE INSTRUCTIONS:   Call 911 for any of the following:   · You develop chest pain or shortness of breath  Seek care immediately if:   · You vomit blood  · You have black or bloody bowel movements  · You have severe stomach or back pain  Contact your healthcare provider if:   · You have a fever  · You have new or worsening symptoms, even after treatment  · You have questions or concerns about your condition or care  Medicines:   · Medicines  may be given to help treat a bacterial infection or decrease stomach acid  · Take your medicine as directed  Contact your healthcare provider if you think your medicine is not helping or if you have side effects  Tell him or her if you are allergic to any medicine  Keep a list of the medicines, vitamins, and herbs you take  Include the amounts, and when and why you take them  Bring the list or the pill bottles to follow-up visits  Carry your medicine list with you in case of an emergency  Manage or prevent gastritis:   · Do not smoke  Nicotine and other chemicals in cigarettes and cigars can make your symptoms worse and cause lung damage  Ask your healthcare provider for information if you currently smoke and need help to quit  E-cigarettes or smokeless tobacco still contain nicotine  Talk to your healthcare provider before you use these products  · Do not drink alcohol  Alcohol can prevent healing and make your gastritis worse  Talk to your healthcare provider if you need help to stop drinking  · Do not take NSAIDs or aspirin unless directed  These and similar medicines can cause irritation  If your healthcare provider says it is okay to take NSAIDs, take them with food  · Do not eat foods that cause irritation  Foods such as oranges and salsa can cause burning or pain  Eat a variety of healthy foods   Examples include fruits (not citrus), vegetables, low-fat dairy products, beans, whole-grain breads, and lean meats and fish  Try to eat small meals, and drink water with your meals  Do not eat for at least 3 hours before you go to bed  · Find ways to relax and decrease stress  Stress can increase stomach acid and make gastritis worse  Activities such as yoga, meditation, or listening to music can help you relax  Spend time with friends, or do things you enjoy  Follow up with your healthcare provider as directed: You may need ongoing tests or treatment, or referral to a gastroenterologist  Write down your questions so you remember to ask them during your visits  © 2017 [employer] [employer] Information is for End User's use only and may not be sold, redistributed or otherwise used for commercial purposes  All illustrations and images included in CareNotes® are the copyrighted property of A D [employer] M , Inc  or [employer]  The above information is an  only  It is not intended as medical advice for individual conditions or treatments  Talk to your doctor, nurse or pharmacist before following any medical regimen to see if it is safe and effective for you  Gastric Polyps   WHAT YOU NEED TO KNOW:   Gastric polyps are growths that form in the lining of your stomach  They are not cancerous, but certain types of polyps can change into cancer  DISCHARGE INSTRUCTIONS:   Follow up with your healthcare provider as directed: You may need more tests or procedures  Write down any questions so you remember to ask them at your visits  Medicines:   · Medicines may  be given if you have an infection caused by H  pylori bacteria  · Take your medicine as directed  Contact your healthcare provider if you think your medicine is not helping or if you have side effects  Tell him or her if you are allergic to any medicine  Keep a list of the medicines, vitamins, and herbs you take   Include the amounts, and when and why you take them  Bring the list or the pill bottles to follow-up visits  Carry your medicine list with you in case of an emergency  Seek care immediately if:   · You have blood in your vomit  · You have dark bowel movements  · You have severe pain in your abdomen that does not go away after you take medicine  Contact your healthcare provider if:   · You have indigestion that does not go away with treatment  · You vomit after meals  · You have questions or concerns about your condition or care  © 2017 [address] [employer]  Information is for End User's use only and may not be sold, redistributed or otherwise used for commercial purposes  All illustrations and images included in CareNotes® are the copyrighted property of A D A M , Inc  or [address]  The above information is an  only  It is not intended as medical advice for individual conditions or treatments  Talk to your doctor, nurse or pharmacist before following any medical regimen to see if it is safe and effective for you

## 2017-12-21 NOTE — OP NOTE
OPERATIVE REPORT  PATIENT NAME: Porfirio Cuba    :  1955  MRN: [id number]  Pt Location: [facility] 01    SURGERY DATE: 2017    Surgeon(s) and Role:     * Cory Carl MD - Primary    ESOPHAGOGASTRODUODENOSCOPY    PROCEDURE: EGD    SEDATION: Monitored anesthesia care, check anesthesia records    ASA Class: 2    INDICATIONS:   Gastroesophageal reflux disease, epigastric pain, and bloating  CONSENT:  Informed consent was obtained for the procedure, including sedation after explaining the risks and benefits of the procedure  Risks including but not limited to bleeding, perforation, infection, and missed lesion  PREPARATION:   Telemetry, pulse oximetry, blood pressure were monitored throughout the procedure  Patient was identified by myself both verbally and by visual inspection of ID band  DESCRIPTION:   Patient was placed in the left lateral decubitus position and was sedated with the above medication  The gastroscope was introduced in to the oropharynx and the esophagus was intubated under direct visualization  Scope was passed down the esophagus up to 2nd part of the duodenum  A careful inspection was made as the gastroscope was withdrawn, including a retroflexed view of the stomach; findings and interventions are described below  FINDINGS:    #1  Esophagus- normal     #2  Stomach-gastric antral vascular ectasia  Random biopsies were taken from antrum body to rule out Helicobacter pylori infection  Multiple polyps were seen in the body of the stomach  The largest was removed with a cold snare polypectomy    #3  Duodenum-there is a 15 mm polyp at the junction of the second and third part of the duodenum  Multiple biopsies were taken to see if this is an adenoma           IMPRESSIONS:      Gastric polyps  Duodenal polyp  Gastric antral vascular ectasia    RECOMMENDATIONS:     Await pathology results  Follow-up in the office in one month  If the duodenal polyp is an adenoma then she will need a repeat endoscopy for removal     COMPLICATIONS:  None; patient tolerated the procedure well            DISPOSITION: PACU           CONDITION: Stable      SIGNATURE: Sarah Lawrence MD  DATE: December 21, 2017  TIME: 8:25 AM

## 2017-12-26 ENCOUNTER — GENERIC CONVERSION - ENCOUNTER (OUTPATIENT)
Dept: OTHER | Facility: OTHER | Age: 62
End: 2017-12-26

## 2017-12-26 DIAGNOSIS — E55.9 VITAMIN D DEFICIENCY: ICD-10-CM

## 2017-12-26 DIAGNOSIS — R73.9 HYPERGLYCEMIA: ICD-10-CM

## 2017-12-26 DIAGNOSIS — F32.9 MAJOR DEPRESSIVE DISORDER, SINGLE EPISODE: ICD-10-CM

## 2017-12-26 DIAGNOSIS — E78.5 HYPERLIPIDEMIA: ICD-10-CM

## 2017-12-26 DIAGNOSIS — G25.81 RESTLESS LEGS SYNDROME: ICD-10-CM

## 2017-12-26 DIAGNOSIS — M79.7 FIBROMYALGIA: ICD-10-CM

## 2017-12-26 DIAGNOSIS — E05.90 THYROTOXICOSIS WITHOUT THYROID STORM: ICD-10-CM

## 2017-12-26 DIAGNOSIS — K21.9 GASTRO-ESOPHAGEAL REFLUX DISEASE WITHOUT ESOPHAGITIS: ICD-10-CM

## 2017-12-26 LAB — O+P STL CONC: NORMAL

## 2017-12-27 ENCOUNTER — GENERIC CONVERSION - ENCOUNTER (OUTPATIENT)
Dept: OTHER | Facility: OTHER | Age: 62
End: 2017-12-27

## 2017-12-29 ENCOUNTER — APPOINTMENT (OUTPATIENT)
Dept: LAB | Facility: CLINIC | Age: 62
End: 2017-12-29
Payer: COMMERCIAL

## 2017-12-29 ENCOUNTER — TRANSCRIBE ORDERS (OUTPATIENT)
Dept: LAB | Facility: CLINIC | Age: 62
End: 2017-12-29

## 2017-12-29 DIAGNOSIS — R73.9 HYPERGLYCEMIA: ICD-10-CM

## 2017-12-29 DIAGNOSIS — E05.90 THYROTOXICOSIS WITHOUT THYROID STORM: ICD-10-CM

## 2017-12-29 DIAGNOSIS — E78.5 HYPERLIPIDEMIA: ICD-10-CM

## 2017-12-29 DIAGNOSIS — K21.9 GASTRO-ESOPHAGEAL REFLUX DISEASE WITHOUT ESOPHAGITIS: ICD-10-CM

## 2017-12-29 DIAGNOSIS — E55.9 VITAMIN D DEFICIENCY: ICD-10-CM

## 2017-12-29 DIAGNOSIS — M79.7 FIBROMYALGIA: ICD-10-CM

## 2017-12-29 DIAGNOSIS — F32.9 MAJOR DEPRESSIVE DISORDER, SINGLE EPISODE: ICD-10-CM

## 2017-12-29 DIAGNOSIS — G25.81 RESTLESS LEGS SYNDROME: ICD-10-CM

## 2017-12-29 LAB
25(OH)D3 SERPL-MCNC: 28.9 NG/ML (ref 30–100)
ALBUMIN SERPL BCP-MCNC: 3.6 G/DL (ref 3.5–5)
ALP SERPL-CCNC: 83 U/L (ref 46–116)
ALT SERPL W P-5'-P-CCNC: 22 U/L (ref 12–78)
ANION GAP SERPL CALCULATED.3IONS-SCNC: 6 MMOL/L (ref 4–13)
AST SERPL W P-5'-P-CCNC: 11 U/L (ref 5–45)
BILIRUB SERPL-MCNC: 0.44 MG/DL (ref 0.2–1)
BUN SERPL-MCNC: 12 MG/DL (ref 5–25)
CALCIUM SERPL-MCNC: 9 MG/DL (ref 8.3–10.1)
CHLORIDE SERPL-SCNC: 108 MMOL/L (ref 100–108)
CHOLEST SERPL-MCNC: 144 MG/DL (ref 50–200)
CO2 SERPL-SCNC: 25 MMOL/L (ref 21–32)
CREAT SERPL-MCNC: 0.57 MG/DL (ref 0.6–1.3)
EST. AVERAGE GLUCOSE BLD GHB EST-MCNC: 134 MG/DL
GFR SERPL CREATININE-BSD FRML MDRD: 100 ML/MIN/1.73SQ M
GLUCOSE P FAST SERPL-MCNC: 113 MG/DL (ref 65–99)
HBA1C MFR BLD: 6.3 % (ref 4.2–6.3)
HDLC SERPL-MCNC: 45 MG/DL (ref 40–60)
LDLC SERPL CALC-MCNC: 75 MG/DL (ref 0–100)
POTASSIUM SERPL-SCNC: 4 MMOL/L (ref 3.5–5.3)
PROT SERPL-MCNC: 7.5 G/DL (ref 6.4–8.2)
SODIUM SERPL-SCNC: 139 MMOL/L (ref 136–145)
TRIGL SERPL-MCNC: 121 MG/DL

## 2017-12-29 PROCEDURE — 80061 LIPID PANEL: CPT

## 2017-12-29 PROCEDURE — 36415 COLL VENOUS BLD VENIPUNCTURE: CPT

## 2017-12-29 PROCEDURE — 83036 HEMOGLOBIN GLYCOSYLATED A1C: CPT

## 2017-12-29 PROCEDURE — 82306 VITAMIN D 25 HYDROXY: CPT

## 2017-12-29 PROCEDURE — 80053 COMPREHEN METABOLIC PANEL: CPT

## 2018-01-08 ENCOUNTER — GENERIC CONVERSION - ENCOUNTER (OUTPATIENT)
Dept: OTHER | Facility: OTHER | Age: 63
End: 2018-01-08

## 2018-01-09 NOTE — MISCELLANEOUS
Message   Recorded as Task   Date: 09/15/2016 06:44 AM, Created By: Amy Martínez   Task Name: Review Note   Assigned To: [facility]   Regarding Patient: Raghu Herman, Status: Active   Comment:    Amy Martínez - 15 Sep 2016 6:44 AM     TASK CREATED  her ovaries appear normal but her endometrial lining is thickened, she should come in for an emb, thanks        Active Problems    1  Arthralgia (719 40) (M25 50)   2  Breast self examination education, encounter for (V65 49) (Z71 89)   3  Carpal tunnel syndrome (354 0) (G56 00)   4  Depression (311) (F32 9)   5  Encounter for screening mammogram for malignant neoplasm of breast (V76 12)   (Z12 31)   6  Fibromyalgia (729 1) (M79 7)   7  GERD without esophagitis (530 81) (K21 9)   8  History of self breast exam   9  Hyperlipidemia (272 4) (E78 5)   10  Hyperthyroidism (242 90) (E05 90)   11  Influenza vaccination administered at current visit (V04 81) (Z23)   12  Menopause (627 2) (Z78 0)   13  Myalgia (729 1) (M79 1)   14  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)   15  Obesity (278 00) (E66 9)   16  Osteoarthritis, localized, knee (715 36) (M17 9)   17  Painful orthopaedic hardware (996 78) (T84 84XA)   18  Restless legs syndrome (333 94) (G25 81)   19  Screening for human papillomavirus (HPV) (V73 81) (Z11 51)   20  Sicca syndrome (710 2) (M35 00)   21  Sleep apnea (780 57) (G47 30)   22  Thyroid Nodule   23  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 (086 5) (A81 276)   24  Visit for routine gyn exam (V72 31) (Z01 419)   25  Vitamin D deficiency (268 9) (E55 9)    Current Meds   1  Amitriptyline HCl - 50 MG Oral Tablet; TAKE 1 TABLET DAILY AT BEDTIME; Therapy: 58TYF8971 to (Deborra Mins)  Requested for: 12QRP3312; Last   Rx:21Omx6034 Ordered   2  Atorvastatin Calcium 10 MG Oral Tablet; TAKE 1 TABLET DAILY; Therapy: 28ADR0081 to (Deborra Mins)  Requested for: 74APB0381; Last   Rx:86Fsu7926 Ordered   3   Cevimeline HCl - 30 MG Oral Capsule; Take 1 capsule twice daily Recorded   4  Furosemide 20 MG Oral Tablet; TAKE 1 TABLET TWICE DAILY; Therapy: 11BCX9604 to (Evaluate:01Jun2017)  Requested for: 69PDF2180; Last   DX:59ZDE6314 Ordered   5  Meloxicam 15 MG Oral Tablet; Take 1 tablet daily  Requested for: 48PVW8004; Last   Rx:04Qlc7402 Ordered   6  Multi-Vitamins TABS; Therapy: (Lynda Ha) to Recorded   7  Pantoprazole Sodium 40 MG Oral Tablet Delayed Release (Protonix); TAKE 1 TABLET   30 MINUTES BEFORE BREAKFAST DAILY; Therapy: 29PUY3912 to (Kristian Kocher)  Requested for: 93AEK5675; Last   Rx:10May2016 Ordered   8  PARoxetine HCl - 30 MG Oral Tablet (Paxil); TAKE 1 TABLET DAILY AS DIRECTED; Therapy: 83URM1566 to (Evaluate:20May2017)  Requested for: 80MYK8828; Last   Rx:08Mey8556 Ordered   9  ROPINIRole HCl - 0 5 MG Oral Tablet; Therapy: 25MGM0475 to Recorded   10  TraZODone HCl - 50 MG Oral Tablet; TAKE 1 TABLET AT BEDTIME; Therapy: 76ADM9109 to (Last Rx:30Nov2015) Ordered   11  Vitamin D 2000 UNIT Oral Capsule; TAKE 2 CAPSULE Daily; Therapy: 87UPT9418 to (Last Rx:20May2015) Ordered    Allergies    1  Toradol   2   Ansaid TABS    Signatures   Electronically signed by : Temo Gutierrez, ; Sep 15 2016  8:13AM EST                       (Author)

## 2018-01-10 NOTE — MISCELLANEOUS
Message  per email from Dajuan Phelps "This pt is scheduled for an EVLT on 4/29/16 with Dr Chapin Spotted  Pt is calling because she has a flight out to [facility] for vacation on 5/15/16  Would she be able to fly at that time?" discussed w/ sidney Puga for pt to fly as long as she wears her comp hose  called pt and informed of same and also instructed to ambulate during flight  pt has comp hose and will wear them  Active Problems    1  Acute upper respiratory infection (465 9) (J06 9)   2  Adhesive capsulitis of right shoulder (726 0) (M75 01)   3  Allergic rhinitis (477 9) (J30 9)   4  Ankylosis Of The Right Knee (718 56)   5  Arthralgia (719 40) (M25 50)   6  Atrial fibrillation (427 31) (I48 91)   7  Breast self examination education, encounter for (V65 49) (Z71 89)   8  Carpal tunnel syndrome (354 0) (G56 00)   9  Cervical polyp (622 7) (N84 1)   10  Closed displaced fracture of greater tuberosity of humerus (812 03) (S42 253A)   11  Depression (311) (F32 9)   12  Dermatitis (692 9) (L30 9)   13  Edema (782 3) (R60 9)   14  Encounter for screening mammogram for malignant neoplasm of breast (V76 12)    (Z12 31)   15  Esophageal reflux (530 81) (K21 9)   16  Fibromyalgia (729 1) (M79 7)   17  Headache (784 0) (R51)   18  History of self breast exam   19  Hyperlipidemia (272 4) (E78 5)   20  Hyperthyroidism (242 90) (E05 90)   21  Influenza vaccination administered at current visit (V04 81) (Z23)   22  Insomnia (780 52) (G47 00)   23  Menopause (627 2) (Z78 0)   24  Mouth dryness (527 7) (R68 2)   25  Multiple joint pain (719 49) (M25 50)   26  Myalgia (729 1) (M79 1)   27  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)   28  Nontoxic nodular goiter (241 9) (E04 9)   29  Obesity (278 00) (E66 9)   30  Osteoarthritis, localized, knee (715 36) (M17 9)   31  Pain of lower leg, unspecified laterality (729 5) (M79 669)   32  Painful orthopaedic hardware (996 78) (T84 84XA)   33  Pelvic and perineal pain (625 9) (R10 2)   34  Periodic limb movement sleep disorder (327 51) (G47 61)   35  Preop examination (V72 84) (Z01 818)   36  Restless legs syndrome (333 94) (G25 81)   37  Screening for human papillomavirus (HPV) (V73 81) (Z11 51)   38  Sicca syndrome (710 2) (M35 00)   39  Sinusitis (473 9) (J32 9)   40  Sleep apnea (780 57) (G47 30)   41  Thyroid Nodule   42  Upper respiratory tract infection (465 9) (J06 9)   43  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 (687 7) (C18 009)   44  Visit for routine gyn exam (V72 31) (Z01 419)   45  Visit For: Pre-procedural Exam Prior To General Surgery (V72 83)   46  Vitamin D deficiency (268 9) (E55 9)    Current Meds   1  Amitriptyline HCl - 50 MG Oral Tablet; TAKE 1 TABLET DAILY AT BEDTIME; Therapy: 48YRX3163 to (Evaluate:61Ncs8711)  Requested for: 20JEU0095; Last   Rx:08Jys0449 Ordered   2  Atorvastatin Calcium 10 MG Oral Tablet; TAKE 1 TABLET DAILY; Therapy: 54RHV8171 to (Evaluate:78Oze2189)  Requested for: 22LMA0929; Last   Rx:45Bda3585 Ordered   3  Cefuroxime Axetil 250 MG Oral Tablet; TAKE 1 TABLET EVERY 12 HOURS DAILY; Therapy: 60EBX2437 to (Last Rx:52Apg7925)  Requested for: 16Ufm6498 Ordered   4  Cevimeline HCl - 30 MG Oral Capsule; Take 1 capsule twice daily Recorded   5  Endocet 5-325 MG Oral Tablet; TAKE 1 TABLET EVERY 4 TO 6 HOURS AS NEEDED   FOR PAIN;   Therapy: 91GKB1314 to (Last Rx:10Ydh4535) Ordered   6  Fluticasone Propionate 50 MCG/ACT Nasal Suspension; USE 2 SPRAYS IN EACH   NOSTRIL ONCE DAILY; Therapy: 57Umt8781 to (Last Rx:62Ygs5159)  Requested for: 84Pzm3967 Ordered   7  Furosemide 20 MG Oral Tablet; TAKE 1 TABLET DAILY AS DIRECTED; Therapy: 35KTL7425 to (Evaluate:82Tkm7364)  Requested for: 78CKN4373; Last   Rx:32Eyx5112 Ordered   8  Meloxicam 15 MG Oral Tablet; Take 1 tablet daily  Requested for: 70JAW7609; Last   Rx:50Lvd1171 Ordered   9  Multi-Vitamins TABS; Therapy: (Recorded:92Wvn5309) to Recorded   10   OxyCODONE HCl - 5 MG Oral Tablet; TAKE 1 TO 2 TABLETS EVERY 6 HOURS AS    NEEDED FOR PAIN;    Therapy: 44GCR8804 to (Evaluate:26Jan2016); Last Rx:19Jan2016 Ordered   11  Pantoprazole Sodium 40 MG Oral Tablet Delayed Release (Protonix); TAKE 1 TABLET    30 MINUTES BEFORE BREAKFAST DAILY; Therapy: 38PAJ5057 to (Evaluate:14May2016)  Requested for: 07UJJ2719; Last    Rx:20May2015 Ordered   12  PARoxetine HCl - 30 MG Oral Tablet (Paxil); TAKE 1 TABLET DAILY AS DIRECTED; Therapy: 63PXU5585 to (Evaluate:14May2016)  Requested for: 12EEV1133; Last    Rx:20May2015 Ordered   13  Promethazine-Codeine 6 25-10 MG/5ML Oral Syrup; TAKE 5 ML EVERY 6 HOURS AS    NEEDED; Therapy: 49JRW4661 to (Evaluate:77Hln9289); Last Rx:01Nuf1384 Ordered   14  ROPINIRole HCl - 0 5 MG Oral Tablet; Therapy: 35CQV0371 to Recorded   15  TraZODone HCl - 50 MG Oral Tablet; TAKE 1 TABLET AT BEDTIME; Therapy: 16LMI2086 to (Last Rx:30Nov2015) Ordered   16  TraZODone HCl - 50 MG Oral Tablet; Therapy: 69EAK0986 to Recorded   17  Vitamin D 2000 UNIT Oral Capsule; TAKE 2 CAPSULE Daily; Therapy: 38OVA0401 to (Last Rx:20May2015) Ordered    Allergies    1  Toradol   2   Ansaid TABS    Signatures   Electronically signed by : Licha Holt, ; Feb 23 2016 12:47PM EST                       (Author)

## 2018-01-10 NOTE — RESULT NOTES
Discussion/Summary     Pls let pt know that her U/S abd is normal except for a fatty liver  If she is still having epigastric symptoms than we should refer her to  GI for further eval with likely an EGD  Thank you  Verified Results  * [address] [id number] 09:03AM Miguel Homans Order Number: [id number]    - Patient Instructions: To schedule this appointment, please contact Central Scheduling at [phone]  Test Name Result Flag Reference   US ABDOMEN COMPLETE (Report)     ABDOMEN ULTRASOUND, COMPLETE WITH DOPPLER     INDICATION: Upper abdominal pain  K21 9: Gastro-esophageal reflux disease without esophagitis   R10 13: Epigastric pain  History taken directly from the electronic ordering system  Epigastric pain  History of cholecystectomy  COMPARISON: None  TECHNIQUE:  Real-time ultrasound of the abdomen was performed with a curvilinear transducer with both volumetric sweeps and still imaging techniques  FINDINGS:     PANCREAS: Portions of the pancreas are obscured by bowel gas  Visualized portions of the pancreas are unremarkable  AORTA AND IVC: Visualized portions are normal for patient age  LIVER:   Size: Mildly enlarged  The liver measures 16 9 cm in the midclavicular line  Contour: Surface contour is smooth  Parenchyma: There is moderate diffuse increased echogenicity with smooth echotexture and acoustic beam attenuation  Most consistent with moderate hepatic steatosis  No evidence of suspicious mass  Limited imaging of the main portal vein shows it to be patent and hepatopetal      BILIARY:   The gallbladder is surgically absent  Sonographic Omega Nyhan sign is negative  No intrahepatic biliary dilatation  CBD measures 4 mm  No choledocholithiasis  KIDNEY:    Right kidney measures 12 5 cm  Within normal limits  Left kidney measures 11 8 cm  Within normal limits  SPLEEN:    Measures 9 9 cm  Within normal limits       ASCITES: None        IMPRESSION:     Enlarged echogenic liver indicative of hepatic steatosis       Workstation performed: [id number]     Signed by:    Vijay Best MD   10/29/17       Signatures   Electronically signed by : Radha Palacios, [facility]; Oct 29 2017  1:36PM EST                       (Author)

## 2018-01-11 ENCOUNTER — HOSPITAL ENCOUNTER (OUTPATIENT)
Facility: HOSPITAL | Age: 63
Setting detail: OUTPATIENT SURGERY
Discharge: HOME | End: 2018-01-11
Attending: INTERNAL MEDICINE | Admitting: INTERNAL MEDICINE
Payer: COMMERCIAL

## 2018-01-11 ENCOUNTER — GENERIC CONVERSION - ENCOUNTER (OUTPATIENT)
Dept: GASTROENTEROLOGY | Facility: CLINIC | Age: 63
End: 2018-01-11

## 2018-01-11 ENCOUNTER — ANESTHESIA (OUTPATIENT)
Dept: GASTROENTEROLOGY | Facility: HOSPITAL | Age: 63
End: 2018-01-11
Payer: COMMERCIAL

## 2018-01-11 ENCOUNTER — ANESTHESIA EVENT (OUTPATIENT)
Dept: GASTROENTEROLOGY | Facility: HOSPITAL | Age: 63
End: 2018-01-11
Payer: COMMERCIAL

## 2018-01-11 VITALS
HEIGHT: 59 IN | OXYGEN SATURATION: 98 % | SYSTOLIC BLOOD PRESSURE: 117 MMHG | HEART RATE: 77 BPM | TEMPERATURE: 97.6 F | DIASTOLIC BLOOD PRESSURE: 88 MMHG | WEIGHT: 195 LBS | RESPIRATION RATE: 16 BRPM | BODY MASS INDEX: 39.31 KG/M2

## 2018-01-11 DIAGNOSIS — D13.2 BENIGN NEOPLASM OF DUODENUM: ICD-10-CM

## 2018-01-11 PROCEDURE — 88305 TISSUE EXAM BY PATHOLOGIST: CPT | Performed by: INTERNAL MEDICINE

## 2018-01-11 RX ORDER — LIDOCAINE HYDROCHLORIDE 10 MG/ML
INJECTION, SOLUTION EPIDURAL; INFILTRATION; INTRACAUDAL; PERINEURAL AS NEEDED
Status: DISCONTINUED | OUTPATIENT
Start: 2018-01-11 | End: 2018-01-11 | Stop reason: SURG

## 2018-01-11 RX ORDER — PROPOFOL 10 MG/ML
INJECTION, EMULSION INTRAVENOUS CONTINUOUS PRN
Status: DISCONTINUED | OUTPATIENT
Start: 2018-01-11 | End: 2018-01-11 | Stop reason: SURG

## 2018-01-11 RX ORDER — PROPOFOL 10 MG/ML
INJECTION, EMULSION INTRAVENOUS AS NEEDED
Status: DISCONTINUED | OUTPATIENT
Start: 2018-01-11 | End: 2018-01-11 | Stop reason: SURG

## 2018-01-11 RX ORDER — SODIUM CHLORIDE 9 MG/ML
50 INJECTION, SOLUTION INTRAVENOUS CONTINUOUS
Status: DISCONTINUED | OUTPATIENT
Start: 2018-01-11 | End: 2018-01-11 | Stop reason: HOSPADM

## 2018-01-11 RX ADMIN — PROPOFOL 150 MG: 10 INJECTION, EMULSION INTRAVENOUS at 12:52

## 2018-01-11 RX ADMIN — SODIUM CHLORIDE 50 ML/HR: 0.9 INJECTION, SOLUTION INTRAVENOUS at 11:56

## 2018-01-11 RX ADMIN — LIDOCAINE HYDROCHLORIDE 100 MG: 10 INJECTION, SOLUTION EPIDURAL; INFILTRATION; INTRACAUDAL; PERINEURAL at 12:52

## 2018-01-11 RX ADMIN — PROPOFOL 100 MCG/KG/MIN: 10 INJECTION, EMULSION INTRAVENOUS at 12:52

## 2018-01-11 RX ADMIN — GLUCAGON HYDROCHLORIDE 0.5 MG: KIT at 12:54

## 2018-01-11 NOTE — ANESTHESIA POSTPROCEDURE EVALUATION
Post-Op Assessment Note      CV Status:  Stable    Mental Status:  Alert and awake    Hydration Status:  Stable    PONV Controlled:  None    Airway Patency:  Patent        Staff: Anesthesiologist           BP      Temp      Pulse     Resp      SpO2

## 2018-01-11 NOTE — ANESTHESIA PREPROCEDURE EVALUATION
Review of Systems/Medical History  Patient summary reviewed  Chart reviewed      Cardiovascular  Hyperlipidemia,    Pulmonary  Sleep apnea CPAP, ,        GI/Hepatic    GERD well controlled, Liver disease, ,  Hiatal hernia,             Endo/Other  History of thyroid disease , Arthritis     GYN       Hematology   Musculoskeletal       Neurology    Neuromuscular disease ,    Psychology           Physical Exam    Airway    Mallampati score: III  TM Distance: <3 FB  Neck ROM: limited     Dental   No notable dental hx     Cardiovascular  Rhythm: regular, Rate: normal, Cardiovascular exam normal    Pulmonary  Pulmonary exam normal Breath sounds clear to auscultation,     Other Findings        Anesthesia Plan  ASA Score- 3     Anesthesia Type- IV sedation with anesthesia with ASA Monitors  Additional Monitors:   Airway Plan:         Plan Factors-    Induction- intravenous  Postoperative Plan-     Informed Consent- Anesthetic plan and risks discussed with patient

## 2018-01-11 NOTE — OP NOTE
**** GI/ENDOSCOPY REPORT ****     PATIENT NAME: BILLY VELA - VISIT ID:     INTRODUCTION: Esophagogastroduodenoscopy - A 58 female patient presents   for an outpatient Esophagogastroduodenoscopy at [address] [place] [address]  INDICATIONS: Duodenal polyp  CONSENT: The benefits, risks, and alternatives to the procedure were   discussed and informed consent was obtained from the patient  PREPARATION:  EKG, pulse, pulse oximetry and blood pressure were monitored   throughout the procedure  ASA Classification: Class 3 - Patient has severe   systemic disturbance that may or may not be related to the disorder   requiring surgery  MEDICATIONS: Anesthesia-check records     PROCEDURE:  The endoscope was passed without difficulty through the mouth   under direct visualization and advanced to the 2nd portion of the   duodenum  The scope was withdrawn and the mucosa was carefully examined  FINDINGS:   Esophagus: The esophagus appeared to be normal  The GE   junction was observed 37 cm from the entry site  Stomach: Multiple small   and medium-sized polyps was found in the body of the stomach  Some of hte   polyps were removed by cold snare polypectomy and were retrieved  Duodenum: A single medium-sized semi-pedunculated polyp was found in the   3rd portion of the duodenum  Saline and methylene blue was injected at the   base of each polyp to raise it prior to removal  The polyp was completely   removed by polypectomy with a hot snare  The polyp was retrieved  The   duodenal bulb and 2nd portion of the duodenum and ampulla appeared to be   normal  The proximal jejunum was normal as well  COMPLICATIONS: There were no complications  IMPRESSIONS: Normal esophagus  Polyps found in the body of the stomach  Likely fundic gland polys - removed and retrieved  A polyp found in the   3rd portion of the duodenum  Polypectomy was performed with saline   injection lift and hot snare  RECOMMENDATIONS: Follow up biopsies  Follow up with Dr Traci Multani  ESTIMATED BLOOD LOSS:     PATHOLOGY SPECIMENS: Polypectomy performed, using, hot snare  PROCEDURE CODES:     ICD-9 Codes: 211 1 Benign neoplasm of stomach 211 2 Benign neoplasm of   duodenum, jejunum, and ileum     ICD-10 Codes: 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

## 2018-01-11 NOTE — H&P
History and Physical -  [facility]  Miguel Grimes 58 y o  female MRN: [id number]    HPI: Miguel Grimes is a 58y o  year old female who presents with tubular adenoma in the duodenum  Plan for EGD with EMR  Review of Systems    Historical Information   Past Medical History:   Diagnosis Date    Allergic rhinitis     Bowel habit changes     Carpal tunnel syndrome     Chronic GERD     CPAP (continuous positive airway pressure) dependence     Fatty liver     Fibromyalgia     Hiatal hernia     High cholesterol     Insomnia     Liver disease     Morbid obesity (HCC)     Osteoarthritis     Palpitations     pt denies a fib    Restless leg     Sicca syndrome (HCC)     Sleep apnea     Thyroid nodule     nodule on thyroid    Varicose veins of left leg with edema      Past Surgical History:   Procedure Laterality Date    BLADDER SURGERY      sling    CHOLECYSTECTOMY  1998    CYST REMOVAL      thigh    DILATION AND CURETTAGE OF UTERUS      ESOPHAGOGASTRODUODENOSCOPY      ESOPHAGOGASTRODUODENOSCOPY N/A 12/21/2017    Procedure: ESOPHAGOGASTRODUODENOSCOPY (EGD) with biopsy and polypectomy;  Surgeon: Nelia Jimenez MD;  Location: [facility];   Service: Gastroenterology    HARDWARE REMOVAL      right shoulder    JOINT REPLACEMENT      both knees    KNEE ARTHROSCOPY      ORIF HUMERAL SHAFT FRACTURE Right     NM ENDOVENOUS LASER, 1ST VEIN Left 4/29/2016    Procedure: GREATER SAPHENOUS VEIN EVLT ;  Surgeon: Praveena Talbert DO;  Location: [facility];  Service: Vascular    NM PHLEB VEINS - EXTREM - TO 20 Left 4/29/2016    Procedure: AND STAB PHLEBECTOMIES ;  Surgeon: Praveena Talbert DO;  Location: [facility];  Service: Vascular    REDUCTION MAMMAPLASTY      Reduction    TOTAL KNEE ARTHROPLASTY Bilateral      Social History   History   Alcohol Use No     History   Drug Use No     History   Smoking Status    Never Smoker   Smokeless Tobacco    Never Used     Family History   Problem Relation Age of Onset    Heart disease Mother     Alzheimer's disease Mother     Arthritis Father        Meds/Allergies     Prescriptions Prior to Admission   Medication    atorvastatin (LIPITOR) 10 mg tablet    cevimeline (EVOXAC) 30 MG capsule    furosemide (LASIX) 20 mg tablet    meloxicam (MOBIC) 15 mg tablet    Multiple Vitamin (MULTIVITAMIN) capsule    pantoprazole (PROTONIX) 40 mg tablet    PARoxetine (PAXIL) 30 mg tablet    POTASSIUM PO    traZODone (DESYREL) 50 mg tablet    albuterol (PROVENTIL HFA,VENTOLIN HFA) 90 mcg/act inhaler    Cholecalciferol (VITAMIN D-3 PO)    Cyanocobalamin (VITAMIN B-12 PO)    docusate sodium (COLACE) 100 mg capsule    gabapentin (NEURONTIN) 300 mg capsule    montelukast (SINGULAIR) 10 mg tablet    promethazine-codeine (PHENERGAN WITH CODEINE) 6 25-10 mg/5 mL syrup    Pseudoephedrine-Acetaminophen (ALLEREST PO)    rOPINIRole (REQUIP) 2 mg tablet       Allergies   Allergen Reactions    Flurbiprofen      NSAIDS    Ketorolac Tromethamine Itching    Pneumovax [Pneumococcal Polysaccharide Vaccine] Swelling     Arm swelling and a rash       Objective     Blood pressure 143/84, pulse 88, temperature 97 6 °F (36 4 °C), temperature source Oral, resp  rate 18, height 4' 10 5" (1 486 m), weight 88 5 kg (195 lb), SpO2 97 %  PHYSICAL EXAM    Gen: NAD  CV: RRR  CHEST: Clear  ABD: soft, NT/ND  EXT: no edema  Neuro: AAO      ASSESSMENT/PLAN:  This is a 58y o  year old female here for EGD/EMR for duodenal polyp      PLAN:   Procedure:  EGD/EMR

## 2018-01-11 NOTE — MISCELLANEOUS
Message   Recorded as Task   Date: 07/05/2017 11:58 AM, Created By: Nikki Hopkins   Task Name: Medical Complaint Callback   Assigned To: Elizabet Bentley   Regarding Patient: Onel Sloan, Status: Active   CommentKalani Saez - 05 Jul 2017 11:58 AM     TASK CREATED  Caller: Self; Medical Complaint; [phone] (Home); [phone] (Work)  patient still has pain from when she pulled her muscle she say the tylenol isnt helping her, wants to know if you would be willing to refill the muscle relaxer until the pain is resolved  please advise [phone]   Texie Meckel - 05 Jul 2017 12:43 PM     TASK REASSIGNED: Previously Assigned To Andria Coates  Pls let pt know I called this in for her  Iqra Gordillo - 05 Jul 2017 1:25 PM     TASK REPLIED TO: Previously Assigned To Elizabet Bentley                      I spoke with pt  she has enough of the muscle relaxer, what she was asking for from something to help with the pain  Andria Coates - 05 Jul 2017 1:40 PM     TASK REPLIED TO: Previously Assigned To [employer]   Ok we can give her tramadol 50 mg to take 1 every 6 hours as needed 20/0  Thank you! Elizabet Bentley - 05 Jul 2017 2:05 PM     TASK EDITED  Pt  is notified and rx called to [facility]        Active Problems    1  Arthralgia (719 40) (M25 50)   2  Breast self examination education, encounter for (V65 49) (Z71 89)   3  Carpal tunnel syndrome (354 0) (G56 00)   4  Depression (311) (F32 9)   5  Encounter for screening mammogram for malignant neoplasm of breast (V76 12)   (Z12 31)   6  Endometrial thickening on ultra sound (793 5) (R93 8)   7  Fibromyalgia (729 1) (M79 7)   8  GERD without esophagitis (530 81) (K21 9)   9  History of self breast exam   10  Hyperglycemia (790 29) (R73 9)   11  Hyperlipidemia (272 4) (E78 5)   12  Hyperthyroidism (242 90) (E05 90)   13  Influenza vaccination administered at current visit (V04 81) (Z23)   14  Menopause (627 2) (Z78 0)   15   Myalgia (729 1) (M79 1) 16  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)   17  Need for pneumococcal vaccination (V03 82) (Z23)   18  Obesity (278 00) (E66 9)   19  Osteoarthritis, localized, knee (715 36) (M17 10)   20  Painful orthopaedic hardware (996 78) (T84 84XA)   21  Restless legs syndrome (333 94) (G25 81)   22  Screening for human papillomavirus (HPV) (V73 81) (Z11 51)   23  Sicca syndrome (710 2) (M35 00)   24  Sinusitis, acute (461 9) (J01 90)   25  Sleep apnea (780 57) (G47 30)   26  Thyroid Nodule   27  Trapezius strain (840 8) (S46 819A)   28  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 (146 8) (W51 210)   29  Visit for routine gyn exam (V72 31) (Z01 419)   30  Vitamin D deficiency (268 9) (E55 9)    Current Meds   1  Amitriptyline HCl - 25 MG Oral Tablet; TAKE 1 TABLET AT BEDTIME; Therapy: 91OYG7803 to (Evaluate:15Mar2018)  Requested for: 20Mar2017; Last   Rx:20Mar2017 Ordered   2  Atorvastatin Calcium 10 MG Oral Tablet; TAKE 1 TABLET DAILY; Therapy: 84JPD5831 to (Evaluate:15Mar2018)  Requested for: 20Mar2017; Last   Rx:20Mar2017; Status: ACTIVE - Transmit to Pharmacy - Awaiting Verification Ordered   3  Azithromycin 250 MG Oral Tablet; TAKE 2 TABLETS ON DAY 1 THEN TAKE 1 TABLET A   DAY FOR 4 DAYS; Therapy: 74NHA2100 to (Evaluate:14Jun2017)  Requested for: 92MCU0805; Last   JU:60RVS6607 Ordered   4  Cevimeline HCl - 30 MG Oral Capsule; Take 1 capsule twice daily Recorded   5  Furosemide 20 MG Oral Tablet; TAKE 1 TABLET TWICE DAILY; Therapy: 92HTI3104 to (Evaluate:15Mar2018)  Requested for: 20Mar2017; Last   Rx:20Mar2017 Ordered   6  Meloxicam 15 MG Oral Tablet; Take 1 tablet daily  Requested for: 20Mar2017; Last   Rx:20Mar2017 Ordered   7  Methocarbamol 500 MG Oral Tablet; Take one at bedtime nightly  May take one during   day also, as needed; Therapy: 31IHB1070 to (Last Rx:58Fkz1284)  Requested for: 08RTJ1400 Ordered   8  Multi-Vitamins TABS; Therapy: (Sylvia Klein) to Recorded   9  Pantoprazole Sodium 40 MG Oral Tablet Delayed Release (Protonix); TAKE 1 TABLET   30 MINUTES BEFORE BREAKFAST DAILY; Therapy: 19XIO8934 to (Evaluate:15Mar2018)  Requested for: 20Mar2017; Last   Rx:20Mar2017 Ordered   10  PARoxetine HCl - 30 MG Oral Tablet (Paxil); TAKE 1 TABLET DAILY AS DIRECTED; Therapy: 29FDD2990 to (Evaluate:15Mar2018)  Requested for: 20Mar2017; Last    Rx:20Mar2017 Ordered   11  Promethazine-Codeine 6 25-10 MG/5ML Oral Syrup; TAKE 5 ML EVERY 6 HOURS AS    NEEDED; Therapy: 13HYF2647 to (Evaluate:19Jun2017); Last Rx:09Jun2017 Ordered   12  ROPINIRole HCl - 0 5 MG Oral Tablet; Therapy: 33GMM1388 to Recorded   13  TraZODone HCl - 50 MG Oral Tablet; TAKE 1 TABLET AT BEDTIME; Therapy: 10GJL9798 to (Last Rx:30Nov2015) Ordered   14  Vitamin D 2000 UNIT Oral Capsule; TAKE 2 CAPSULE Daily; Therapy: 76IZX2530 to (Last Rx:61Wpg1881) Ordered    Allergies    1  Toradol   2  Ansaid TABS   3   Pneumovax 23 25 MCG/0 5ML Injection Injectable    Plan  Health Maintenance    · TraMADol HCl - 50 MG Oral Tablet; TAKE 1 TABLET Every 6 hours PRN for pain    Signatures   Electronically signed by : Roxann Montez, ; Jul 5 2017  2:05PM EST                       (Author) Attending Only

## 2018-01-12 ENCOUNTER — GENERIC CONVERSION - ENCOUNTER (OUTPATIENT)
Dept: OTHER | Facility: OTHER | Age: 63
End: 2018-01-12

## 2018-01-12 VITALS
DIASTOLIC BLOOD PRESSURE: 78 MMHG | HEIGHT: 59 IN | SYSTOLIC BLOOD PRESSURE: 132 MMHG | WEIGHT: 194.38 LBS | BODY MASS INDEX: 39.19 KG/M2

## 2018-01-12 VITALS
HEIGHT: 59 IN | WEIGHT: 196.13 LBS | RESPIRATION RATE: 16 BRPM | BODY MASS INDEX: 39.54 KG/M2 | HEART RATE: 88 BPM | TEMPERATURE: 98.5 F | DIASTOLIC BLOOD PRESSURE: 78 MMHG | SYSTOLIC BLOOD PRESSURE: 120 MMHG

## 2018-01-13 VITALS
WEIGHT: 196 LBS | DIASTOLIC BLOOD PRESSURE: 82 MMHG | SYSTOLIC BLOOD PRESSURE: 132 MMHG | HEIGHT: 59 IN | BODY MASS INDEX: 39.51 KG/M2

## 2018-01-13 NOTE — MISCELLANEOUS
Message   Date: 11 Sep 2017 3:38 PM EST, Recorded By: Ingris Arzola For: Eduardo Aus: David Sterling, Self   Phone: [phone] (Home), [phone] (Work)   Reason: Medical Complaint   pt has a large pimple on the vulvar area that has been hurting for 1 week    pt will schedule an apt, but I told her to do warm soaks until she comes in           Active Problems    1  Arthralgia (719 40) (M25 50)   2  Breast self examination education, encounter for (V65 49) (Z71 89)   3  Carpal tunnel syndrome (354 0) (G56 00)   4  Depression (311) (F32 9)   5  Encounter for screening mammogram for malignant neoplasm of breast (V76 12)   (Z12 31)   6  Endometrial thickening on ultra sound (793 5) (R93 8)   7  Fibromyalgia (729 1) (M79 7)   8  GERD without esophagitis (530 81) (K21 9)   9  History of self breast exam   10  Hyperglycemia (790 29) (R73 9)   11  Hyperlipidemia (272 4) (E78 5)   12  Hyperthyroidism (242 90) (E05 90)   13  Influenza vaccination administered at current visit (V04 81) (Z23)   14  Menopause (627 2) (Z78 0)   15  Myalgia (729 1) (M79 1)   16  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)   17  Need for pneumococcal vaccination (V03 82) (Z23)   18  Obesity (278 00) (E66 9)   19  Osteoarthritis, localized, knee (715 36) (M17 10)   20  Painful orthopaedic hardware (996 78) (T84 84XA)   21  Peritonsillar abscess (475) (J36)   22  Restless legs syndrome (333 94) (G25 81)   23  Screening for human papillomavirus (HPV) (V73 81) (Z11 51)   24  Sicca syndrome (710 2) (M35 00)   25  Sinusitis, acute (461 9) (J01 90)   26  Sleep apnea (780 57) (G47 30)   27  Thyroid Nodule   28  Trapezius strain (840 8) (S46 819A)   29  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 (925 0) (X92 337)   30  Visit for routine gyn exam (V72 31) (Z01 419)   31  Vitamin D deficiency (268 9) (E55 9)    Current Meds   1  Amitriptyline HCl - 25 MG Oral Tablet; TAKE 1 TABLET AT BEDTIME;    Therapy: 11HMT9134 to (Evaluate:15Mar2018)  Requested for: 20Mar2017; Last   Rx:20Mar2017 Ordered   2  Amoxicillin-Pot Clavulanate 875-125 MG Oral Tablet; TAKE 1 TABLET TWICE DAILY   AFTER MEALS UNTIL FINISHED; Therapy: 66Xey3961 to (Last Rx:03Aug2017)  Requested for: 03Aug2017 Ordered   3  Atorvastatin Calcium 10 MG Oral Tablet; TAKE 1 TABLET DAILY; Therapy: 99XSG3089 to (Evaluate:15Mar2018)  Requested for: 20Mar2017; Last   Rx:20Mar2017; Status: ACTIVE - Transmit to Pharmacy - Awaiting Verification Ordered   4  Cevimeline HCl - 30 MG Oral Capsule; Take 1 capsule twice daily Recorded   5  Furosemide 20 MG Oral Tablet; TAKE 1 TABLET TWICE DAILY; Therapy: 93QHQ4335 to (Evaluate:15Mar2018)  Requested for: 20Mar2017; Last   Rx:20Mar2017 Ordered   6  Meloxicam 15 MG Oral Tablet; Take 1 tablet daily  Requested for: 20Mar2017; Last   Rx:20Mar2017 Ordered   7  Methocarbamol 500 MG Oral Tablet; TAKE 1 TABLET AT BEDTIME NIGHTLY  MAY TAKE   1 TABLET DURING THE DAY ALSO AS NEEDED; Therapy: 65TFY4416 to (Evaluate:22Sep2017)  Requested for: 23Aug2017; Last   Rx:23Aug2017 Ordered   8  Multi-Vitamins TABS; Therapy: (Ry John) to Recorded   9  Pantoprazole Sodium 40 MG Oral Tablet Delayed Release (Protonix); TAKE 1 TABLET   30 MINUTES BEFORE BREAKFAST DAILY; Therapy: 66KDB0369 to (Evaluate:15Mar2018)  Requested for: 20Mar2017; Last   Rx:20Mar2017 Ordered   10  PARoxetine HCl - 30 MG Oral Tablet (Paxil); TAKE 1 TABLET DAILY AS DIRECTED; Therapy: 97CZH7398 to (Evaluate:15Mar2018)  Requested for: 20Mar2017; Last    Rx:20Mar2017 Ordered   11  Promethazine-Codeine 6 25-10 MG/5ML Oral Syrup; TAKE 5 ML EVERY 6 HOURS AS    NEEDED; Therapy: 62IMF0925 to (Evaluate:19Jun2017); Last Rx:09Jun2017 Ordered   12  ROPINIRole HCl - 0 5 MG Oral Tablet; Therapy: 79JLH1481 to Recorded   13  TraMADol HCl - 50 MG Oral Tablet; TAKE 1 TABLET Every 6 hours PRN for pain; Therapy: 94SDM5893 to (Evaluate:22Aug2017);  Last Rx:14Aug2017 Ordered   14  TraZODone HCl - 50 MG Oral Tablet; TAKE 1 TABLET AT BEDTIME; Therapy: 76ILR6067 to (Last Rx:39Suy9490) Ordered   15  Vitamin D 2000 UNIT Oral Capsule; TAKE 2 CAPSULE Daily; Therapy: 53AQC8486 to (Last Rx:82Uyt1008) Ordered    Allergies    1  Toradol   2  Ansaid TABS   3   Pneumovax 23 25 MCG/0 5ML Injection Injectable    Signatures   Electronically signed by : Carlos Sosa, ; Sep 11 2017  3:40PM EST                       (Author)

## 2018-01-14 VITALS
HEIGHT: 59 IN | DIASTOLIC BLOOD PRESSURE: 70 MMHG | SYSTOLIC BLOOD PRESSURE: 140 MMHG | WEIGHT: 188.38 LBS | BODY MASS INDEX: 37.98 KG/M2 | HEART RATE: 76 BPM

## 2018-01-14 VITALS
SYSTOLIC BLOOD PRESSURE: 150 MMHG | BODY MASS INDEX: 37.95 KG/M2 | HEIGHT: 59 IN | WEIGHT: 188.25 LBS | DIASTOLIC BLOOD PRESSURE: 70 MMHG | TEMPERATURE: 98.3 F | HEART RATE: 80 BPM

## 2018-01-14 VITALS
HEART RATE: 72 BPM | BODY MASS INDEX: 39.01 KG/M2 | DIASTOLIC BLOOD PRESSURE: 60 MMHG | SYSTOLIC BLOOD PRESSURE: 130 MMHG | HEIGHT: 59 IN | WEIGHT: 193.5 LBS

## 2018-01-14 NOTE — MISCELLANEOUS
Message   Recorded as Task   Date: 11/05/2016 01:06 PM, Created By: Pari Peters   Task Name: Go to Result   Assigned To: Samson Correa   Regarding Patient: Uzair Mckeon, Status: In Progress   Comment:    Pari Peters - 05 Nov 2016 1:06 PM     TASK CREATED  benign endometrium   Thea Frye - 07 Nov 2016 10:20 AM     TASK IN PROGRESS   Pt informed  Active Problems    1  Arthralgia (719 40) (M25 50)   2  Breast self examination education, encounter for (V65 49) (Z71 89)   3  Carpal tunnel syndrome (354 0) (G56 00)   4  Depression (311) (F32 9)   5  Encounter for screening mammogram for malignant neoplasm of breast (V76 12)   (Z12 31)   6  Endometrial thickening on ultra sound (793 5) (R93 8)   7  Fibromyalgia (729 1) (M79 7)   8  GERD without esophagitis (530 81) (K21 9)   9  History of self breast exam   10  Hyperlipidemia (272 4) (E78 5)   11  Hyperthyroidism (242 90) (E05 90)   12  Influenza vaccination administered at current visit (V04 81) (Z23)   13  Menopause (627 2) (Z78 0)   14  Myalgia (729 1) (M79 1)   15  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)   16  Need for pneumococcal vaccination (V03 82) (Z23)   17  Obesity (278 00) (E66 9)   18  Osteoarthritis, localized, knee (715 36) (M17 9)   19  Painful orthopaedic hardware (996 78) (T84 84XA)   20  Restless legs syndrome (333 94) (G25 81)   21  Screening for human papillomavirus (HPV) (V73 81) (Z11 51)   22  Sicca syndrome (710 2) (M35 00)   23  Sleep apnea (780 57) (G47 30)   24  Thyroid Nodule   25  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 (215 4) (A69 463)   26  Visit for routine gyn exam (V72 31) (Z01 419)   27  Vitamin D deficiency (268 9) (E55 9)    Current Meds   1  Amitriptyline HCl - 50 MG Oral Tablet; TAKE 1 TABLET DAILY AT BEDTIME; Therapy: 57OIA7542 to (Anitha Henley)  Requested for: 05CCJ4327; Last   Rx:02Zbs8168 Ordered   2   Atorvastatin Calcium 10 MG Oral Tablet; TAKE 1 TABLET DAILY; Therapy: 35YJM7465 to (Maia Turcios)  Requested for: 53XVC3745; Last   Rx:73Ool8889 Ordered   3  Cevimeline HCl - 30 MG Oral Capsule; Take 1 capsule twice daily Recorded   4  Furosemide 20 MG Oral Tablet; TAKE 1 TABLET TWICE DAILY; Therapy: 79FZK7067 to (Evaluate:01Jun2017)  Requested for: 09UGE5908; Last   VV:79AHD7957 Ordered   5  Meloxicam 15 MG Oral Tablet; Take 1 tablet daily  Requested for: 75MBA8226; Last   Rx:25Hfz8770 Ordered   6  Multi-Vitamins TABS; Therapy: (Rosiland Buerger) to Recorded   7  Pantoprazole Sodium 40 MG Oral Tablet Delayed Release (Protonix); TAKE 1 TABLET   30 MINUTES BEFORE BREAKFAST DAILY; Therapy: 67KUB1351 to (Maia Turcios)  Requested for: 14WXA0150; Last   Rx:10May2016 Ordered   8  PARoxetine HCl - 30 MG Oral Tablet (Paxil); TAKE 1 TABLET DAILY AS DIRECTED; Therapy: 32TYU9686 to (Evaluate:20May2017)  Requested for: 76RPK7291; Last   Rx:25May2016 Ordered   9  ROPINIRole HCl - 0 5 MG Oral Tablet; Therapy: 47GEK2852 to Recorded   10  TraZODone HCl - 50 MG Oral Tablet; TAKE 1 TABLET AT BEDTIME; Therapy: 77ULV2739 to (Last Rx:30Nov2015) Ordered   11  Vitamin D 2000 UNIT Oral Capsule; TAKE 2 CAPSULE Daily; Therapy: 53CUH6529 to (Last Rx:29Dei9166) Ordered    Allergies    1  Toradol   2   Ansaid TABS    Signatures   Electronically signed by : Thomas Durham, ; Nov 7 2016 12:43PM EST                       (Author)

## 2018-01-15 VITALS
SYSTOLIC BLOOD PRESSURE: 130 MMHG | BODY MASS INDEX: 38.4 KG/M2 | HEIGHT: 59 IN | WEIGHT: 190.5 LBS | DIASTOLIC BLOOD PRESSURE: 70 MMHG | HEART RATE: 80 BPM | TEMPERATURE: 98.6 F

## 2018-01-15 NOTE — MISCELLANEOUS
Message   Recorded as Task   Date: 10/12/2017 10:26 PM, Created By: Mcarthur Boas   Task Name: Go to Result   Assigned To: Albert Cummings   Regarding Patient: Dwayne Bernabe, Status: In Progress   Comment:    Mcarthur Boas - 12 Oct 2017 10:26 PM     TASK CREATED  nml tsh   Thea Frye - 13 Oct 2017 8:11 AM     TASK IN PROGRESS   Thea Frye - 16 Oct 2017 8:54 AM     TASK EDITED  pt informed        Active Problems    1  Arthralgia (719 40) (M25 50)   2  Breast self examination education, encounter for (V65 49) (Z71 89)   3  Carpal tunnel syndrome (354 0) (G56 00)   4  Depression (311) (F32 9)   5  Encounter for screening mammogram for malignant neoplasm of breast (V76 12)   (Z12 31)   6  Endometrial thickening on ultra sound (793 5) (R93 8)   7  Epigastric pain (789 06) (R10 13)   8  Fibromyalgia (729 1) (M79 7)   9  GERD without esophagitis (530 81) (K21 9)   10  Hip pain (719 45) (M25 559)   11  History of self breast exam   12  Hyperglycemia (790 29) (R73 9)   13  Hyperlipidemia (272 4) (E78 5)   14  Hyperthyroidism (242 90) (E05 90)   15  Influenza vaccination administered at current visit (V04 81) (Z23)   16  Menopause (627 2) (Z78 0)   17  Myalgia (729 1) (M79 1)   18  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)   19  Need for pneumococcal vaccination (V03 82) (Z23)   20  Obesity (278 00) (E66 9)   21  Osteoarthritis, localized, knee (715 36) (M17 10)   22  Painful orthopaedic hardware (996 78) (T84 84XA)   23  Peritonsillar abscess (475) (J36)   24  Restless legs syndrome (333 94) (G25 81)   25  Screening for human papillomavirus (HPV) (V73 81) (Z11 51)   26  Sicca syndrome (710 2) (M35 00)   27  Sinusitis, acute (461 9) (J01 90)   28  Sleep apnea (780 57) (G47 30)   29  Thyroid Nodule   30  Trapezius strain (840 8) (S46 819A)   31  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 (574 6) (Z71 686)   32  Visit for routine gyn exam (V72 31) (Z01 041)   33   Vitamin D deficiency (268 9) (E55 9)   34  Vulvar lesion (624 8) (N90 89)   35  Weight gain (783 1) (R63 5)    Current Meds   1  Amitriptyline HCl - 25 MG Oral Tablet; TAKE 1 TABLET AT BEDTIME; Therapy: 94DQI4641 to (Evaluate:15Mar2018)  Requested for: 20Mar2017; Last   Rx:20Mar2017 Ordered   2  Atorvastatin Calcium 10 MG Oral Tablet; TAKE 1 TABLET DAILY; Therapy: 38WLI2908 to (Evaluate:15Mar2018)  Requested for: 20Mar2017; Last   Rx:20Mar2017; Status: ACTIVE - Transmit to Pharmacy - Awaiting Verification Ordered   3  Cevimeline HCl - 30 MG Oral Capsule; Take 1 capsule twice daily Recorded   4  Furosemide 20 MG Oral Tablet; TAKE 1 TABLET TWICE DAILY; Therapy: 15VTI6993 to (Evaluate:15Mar2018)  Requested for: 20Mar2017; Last   Rx:20Mar2017 Ordered   5  Meloxicam 15 MG Oral Tablet; Take 1 tablet daily  Requested for: 20Mar2017; Last   Rx:20Mar2017 Ordered   6  Methocarbamol 500 MG Oral Tablet; TAKE 1 TABLET AT BEDTIME NIGHTLY  MAY TAKE   1 TABLET DURING THE DAY ALSO AS NEEDED; Therapy: 05PIW6282 to (Evaluate:03Vfx9240)  Requested for: 69Iiz1942; Last   Rx:60Glu7216 Ordered   7  Multi-Vitamins TABS; Therapy: (Britta Camp) to Recorded   8  Pantoprazole Sodium 40 MG Oral Tablet Delayed Release (Protonix); TAKE 1 TABLET   30 MINUTES BEFORE BREAKFAST DAILY; Therapy: 16OEF5782 to (Evaluate:15Mar2018)  Requested for: 20Mar2017; Last   Rx:20Mar2017 Ordered   9  PARoxetine HCl - 30 MG Oral Tablet (Paxil); TAKE 1 TABLET DAILY AS DIRECTED; Therapy: 38BPG6030 to (Evaluate:15Mar2018)  Requested for: 20Mar2017; Last   Rx:20Mar2017 Ordered   10  ROPINIRole HCl - 0 5 MG Oral Tablet; Therapy: 96JOD2458 to Recorded   11  TraMADol HCl - 50 MG Oral Tablet; TAKE 1 TABLET Every 6 hours PRN for pain; Therapy: 99HZQ2068 to (Evaluate:20Sep2017); Last Rx:13Anb9162 Ordered   12  TraZODone HCl - 50 MG Oral Tablet; TAKE 1 TABLET AT BEDTIME; Therapy: 78ROI3280 to (Last Rx:30Nov2015) Ordered   13   Vitamin D 2000 UNIT Oral Capsule; TAKE 2 CAPSULE Daily; Therapy: 92GTR1730 to (Last Rx:69Tzc2323) Ordered    Allergies    1  Toradol   2  Ansaid TABS   3   Pneumovax 23 25 MCG/0 5ML Injection Injectable    Signatures   Electronically signed by : Spencer Edge, ; Oct 16 2017  8:55AM EST                       (Author)

## 2018-01-16 NOTE — MISCELLANEOUS
Message  Pt called and said she has a cord like lump to lower thigh incision  Was told that is expected s/p EVLT  She is to watch it, use warm compresses  She will call us Friday to see if it is any better  She is to call us if area worsens, pain becomes constant, leg is red and painful  Active Problems    1  Acute upper respiratory infection (465 9) (J06 9)   2  Adhesive capsulitis of right shoulder (726 0) (M75 01)   3  Allergic rhinitis (477 9) (J30 9)   4  Ankylosis Of The Right Knee (718 56)   5  Arthralgia (719 40) (M25 50)   6  Atrial fibrillation (427 31) (I48 91)   7  Breast self examination education, encounter for (V65 49) (Z71 89)   8  Carpal tunnel syndrome (354 0) (G56 00)   9  Cervical polyp (622 7) (N84 1)   10  Closed displaced fracture of greater tuberosity of humerus (812 03) (S42 253A)   11  Depression (311) (F32 9)   12  Dermatitis (692 9) (L30 9)   13  Edema (782 3) (R60 9)   14  Encounter for screening mammogram for malignant neoplasm of breast (V76 12)    (Z12 31)   15  Esophageal reflux (530 81) (K21 9)   16  Fibromyalgia (729 1) (M79 7)   17  Headache (784 0) (R51)   18  History of self breast exam   19  Hyperlipidemia (272 4) (E78 5)   20  Hyperthyroidism (242 90) (E05 90)   21  Influenza vaccination administered at current visit (V04 81) (Z23)   22  Insomnia (780 52) (G47 00)   23  Menopause (627 2) (Z78 0)   24  Mouth dryness (527 7) (R68 2)   25  Multiple joint pain (719 49) (M25 50)   26  Myalgia (729 1) (M79 1)   27  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)   28  Nontoxic nodular goiter (241 9) (E04 9)   29  Obesity (278 00) (E66 9)   30  Osteoarthritis, localized, knee (715 36) (M17 9)   31  Pain of lower leg, unspecified laterality (729 5) (M79 669)   32  Painful orthopaedic hardware (996 78) (T84 84XA)   33  Pelvic and perineal pain (625 9) (R10 2)   34  Periodic limb movement sleep disorder (327 51) (G47 61)   35  Post-operative state (V45 89) (Z98 89)   36   Preop examination (V72 84) (Z01 818)   37  Restless legs syndrome (333 94) (G25 81)   38  Screening for human papillomavirus (HPV) (V73 81) (Z11 51)   39  Sicca syndrome (710 2) (M35 00)   40  Sinusitis (473 9) (J32 9)   41  Sleep apnea (780 57) (G47 30)   42  Thyroid Nodule   43  Upper respiratory tract infection (465 9) (J06 9)   44  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 (878 5) (E32 512)   45  Visit for routine gyn exam (V72 31) (Z01 419)   46  Visit For: Pre-procedural Exam Prior To General Surgery (V72 83)   47  Vitamin D deficiency (268 9) (E55 9)    Current Meds   1  Amitriptyline HCl - 50 MG Oral Tablet; TAKE 1 TABLET DAILY AT BEDTIME; Therapy: 84NIA6195 to (Sailaja Mcrae)  Requested for: 31BBV4017; Last   Rx:72Gee9309 Ordered   2  Atorvastatin Calcium 10 MG Oral Tablet; TAKE 1 TABLET DAILY; Therapy: 78DCE9382 to (Sailaja Mcrae)  Requested for: 79QJB6554; Last   Rx:55Afq0537 Ordered   3  Cefuroxime Axetil 250 MG Oral Tablet; TAKE 1 TABLET EVERY 12 HOURS DAILY; Therapy: 94BWU4952 to (Last Rx:90Ntw0776)  Requested for: 88Qgu7968 Ordered   4  Cevimeline HCl - 30 MG Oral Capsule; Take 1 capsule twice daily Recorded   5  Endocet 5-325 MG Oral Tablet; TAKE 1 TABLET EVERY 4 TO 6 HOURS AS NEEDED   FOR PAIN;   Therapy: 39EMU2478 to (Last Rx:31Wkc6888) Ordered   6  Fluticasone Propionate 50 MCG/ACT Nasal Suspension; USE 2 SPRAYS IN EACH   NOSTRIL ONCE DAILY; Therapy: 72Edg5498 to (Last Rx:89Vab8154)  Requested for: 40Dun5556 Ordered   7  Furosemide 20 MG Oral Tablet; TAKE 1 TABLET DAILY AS DIRECTED; Therapy: 67GRT7107 to (Sailaja Mcrae)  Requested for: 96DXG8379; Last   Rx:19Ngb9842 Ordered   8  Meloxicam 15 MG Oral Tablet; Take 1 tablet daily  Requested for: 64MOE2998; Last   Rx:93Iuy2748 Ordered   9  Multi-Vitamins TABS; Therapy: (Recorded:76Cnp5756) to Recorded   10  OxyCODONE HCl - 5 MG Oral Capsule; TAKE 1 CAPSULE EVERY 4 HOURS AS    NEEDED;     Therapy: 28BKM4370 to (Evaluate:15May2016); Last Rx:10May2016 Ordered   11  OxyCODONE HCl - 5 MG Oral Tablet; TAKE 1 TO 2 TABLETS EVERY 6 HOURS AS    NEEDED FOR PAIN;    Therapy: 65VXA0975 to (Evaluate:26Jan2016); Last Rx:19Jan2016 Ordered   12  OxyCODONE HCl - 5 MG Oral Tablet; TAKE 1 TO 2 TABLETS EVERY 6 HOURS AS    NEEDED FOR PAIN;    Therapy: 92NBN3317 to (Evaluate:05Mar2016); Last Rx:03Ebk7810 Ordered   13  Pantoprazole Sodium 40 MG Oral Tablet Delayed Release (Protonix); TAKE 1 TABLET    30 MINUTES BEFORE BREAKFAST DAILY; Therapy: 41PVW4200 to (Amy Grubbs)  Requested for: 84IGB7463; Last    Rx:10May2016 Ordered   14  PARoxetine HCl - 30 MG Oral Tablet (Paxil); TAKE 1 TABLET DAILY AS DIRECTED; Therapy: 32VGL3407 to (Evaluate:14May2016)  Requested for: 23VPA5561; Last    Rx:20May2015 Ordered   15  Promethazine-Codeine 6 25-10 MG/5ML Oral Syrup; TAKE 5 ML EVERY 6 HOURS AS    NEEDED; Therapy: 57JOL3396 to (Evaluate:72Aad2059); Last Rx:19Pyr8371 Ordered   16  ROPINIRole HCl - 0 5 MG Oral Tablet; Therapy: 57XOU7768 to Recorded   17  TraZODone HCl - 50 MG Oral Tablet; TAKE 1 TABLET AT BEDTIME; Therapy: 74IBD9673 to (Last Rx:30Nov2015) Ordered   18  TraZODone HCl - 50 MG Oral Tablet; Therapy: 07PDN9546 to Recorded   19  Vitamin D 2000 UNIT Oral Capsule; TAKE 2 CAPSULE Daily; Therapy: 88QVV2599 to (Last Rx:20May2015) Ordered    Allergies    1  Toradol   2   Ansaid TABS    Signatures   Electronically signed by : Kelsi Liang, ; May 23 2016  2:26PM EST                       (Author)

## 2018-01-16 NOTE — MISCELLANEOUS
Message   Recorded as Task   Date: 11/05/2016 01:06 PM, Created By: Paulina Sánchez   Task Name: Go to Result   Assigned To: Hitesh Dee   Regarding Patient: Fabricio Morel, Status: In Progress   Comment:    Paulina Sánchez - 05 Nov 2016 1:06 PM     TASK CREATED  benign endometrium   Thea Frye - 07 Nov 2016 10:20 AM     TASK IN PROGRESS        Active Problems    1  Arthralgia (719 40) (M25 50)   2  Breast self examination education, encounter for (V65 49) (Z71 89)   3  Carpal tunnel syndrome (354 0) (G56 00)   4  Depression (311) (F32 9)   5  Encounter for screening mammogram for malignant neoplasm of breast (V76 12)   (Z12 31)   6  Endometrial thickening on ultra sound (793 5) (R93 8)   7  Fibromyalgia (729 1) (M79 7)   8  GERD without esophagitis (530 81) (K21 9)   9  History of self breast exam   10  Hyperlipidemia (272 4) (E78 5)   11  Hyperthyroidism (242 90) (E05 90)   12  Influenza vaccination administered at current visit (V04 81) (Z23)   13  Menopause (627 2) (Z78 0)   14  Myalgia (729 1) (M79 1)   15  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)   16  Need for pneumococcal vaccination (V03 82) (Z23)   17  Obesity (278 00) (E66 9)   18  Osteoarthritis, localized, knee (715 36) (M17 9)   19  Painful orthopaedic hardware (996 78) (T84 84XA)   20  Restless legs syndrome (333 94) (G25 81)   21  Screening for human papillomavirus (HPV) (V73 81) (Z11 51)   22  Sicca syndrome (710 2) (M35 00)   23  Sleep apnea (780 57) (G47 30)   24  Thyroid Nodule   25  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 (833 2) (E60 512)   26  Visit for routine gyn exam (V72 31) (Z01 419)   27  Vitamin D deficiency (268 9) (E55 9)    Current Meds   1  Amitriptyline HCl - 50 MG Oral Tablet; TAKE 1 TABLET DAILY AT BEDTIME; Therapy: 57LKQ4298 to (Pawan Graham)  Requested for: 53CBZ0935; Last   Rx:60Qng7692 Ordered   2  Atorvastatin Calcium 10 MG Oral Tablet; TAKE 1 TABLET DAILY;    Therapy: 20ACV1904 to (Simba Lenard)  Requested for: 44SOD4027; Last   Rx:10May2016 Ordered   3  Cevimeline HCl - 30 MG Oral Capsule; Take 1 capsule twice daily Recorded   4  Furosemide 20 MG Oral Tablet; TAKE 1 TABLET TWICE DAILY; Therapy: 43CSZ7709 to (Evaluate:01Jun2017)  Requested for: 46NWS0083; Last   CU:26CGY0669 Ordered   5  Meloxicam 15 MG Oral Tablet; Take 1 tablet daily  Requested for: 42DZE5724; Last   Rx:10May2016 Ordered   6  Multi-Vitamins TABS; Therapy: (Langston Snellen) to Recorded   7  Pantoprazole Sodium 40 MG Oral Tablet Delayed Release (Protonix); TAKE 1 TABLET   30 MINUTES BEFORE BREAKFAST DAILY; Therapy: 89TQB1536 to (Simba Lenard)  Requested for: 46SSE5412; Last   Rx:10May2016 Ordered   8  PARoxetine HCl - 30 MG Oral Tablet (Paxil); TAKE 1 TABLET DAILY AS DIRECTED; Therapy: 80BHK4426 to (Evaluate:20May2017)  Requested for: 90NUK8442; Last   Rx:25May2016 Ordered   9  ROPINIRole HCl - 0 5 MG Oral Tablet; Therapy: 11CSP5270 to Recorded   10  TraZODone HCl - 50 MG Oral Tablet; TAKE 1 TABLET AT BEDTIME; Therapy: 11OKA7170 to (Last Rx:30Nov2015) Ordered   11  Vitamin D 2000 UNIT Oral Capsule; TAKE 2 CAPSULE Daily; Therapy: 47WXV1787 to (Last Rx:20May2015) Ordered    Allergies    1  Toradol   2   Ansaid TABS    Signatures   Electronically signed by : Kyle Palacios, ; Nov 14 2016  2:57PM EST                       (Author)

## 2018-01-17 NOTE — RESULT NOTES
Verified Results  * XR CHEST PA & LATERAL 47UBC8604 09:03AM Kelly Camargo Order Number: [id number]     Test Name Result Flag Reference   XR CHEST PA & LATERAL (Report)     CHEST - DUAL ENERGY     INDICATION: Productive cough, wheezing, chest tightness     COMPARISON: 6/24/2017     VIEWS: PA (including soft tissue/bone algorithms) and lateral projections     IMAGES: 4     FINDINGS:        Cardiomediastinal silhouette appears unremarkable  The lungs are clear  No pneumothorax or pleural effusion  Visualized osseous structures appear within normal limits for the patient's age  IMPRESSION:     No active pulmonary disease          Workstation performed: [id number]     Signed by:   Romario Westbrook MD   11/28/17

## 2018-01-18 ENCOUNTER — GENERIC CONVERSION - ENCOUNTER (OUTPATIENT)
Dept: OTHER | Facility: OTHER | Age: 63
End: 2018-01-18

## 2018-01-18 ENCOUNTER — HOSPITAL ENCOUNTER (OUTPATIENT)
Dept: NUCLEAR MEDICINE | Facility: HOSPITAL | Age: 63
Discharge: HOME | End: 2018-01-18
Attending: INTERNAL MEDICINE
Payer: COMMERCIAL

## 2018-01-18 DIAGNOSIS — R14.0 ABDOMINAL DISTENTION: ICD-10-CM

## 2018-01-18 PROCEDURE — 78264 GASTRIC EMPTYING IMG STUDY: CPT

## 2018-01-18 PROCEDURE — A9541 TC99M SULFUR COLLOID: HCPCS

## 2018-01-19 ENCOUNTER — ALLSCRIPTS OFFICE VISIT (OUTPATIENT)
Dept: OTHER | Facility: OTHER | Age: 63
End: 2018-01-19

## 2018-01-20 NOTE — CONSULTS
Assessment   1  Morbid obesity (278 01) (E66 01)   2  Hyperlipidemia (272 4) (E78 5)   3  Obstructive sleep apnea on CPAP (327 23,V46 8) (G47 33,Z99 89)   4  Osteoarthritis, localized, knee (715 36) (M17 10)   5  Gastric polyps (211 1) (K31 7)    Review of Systems   Focused-Female:      Constitutional: No fever, no chills, feels well, no tiredness, no recent weight gain or loss  ENT: no ear ache, no loss of hearing, no nosebleeds or nasal discharge, no sore throat or hoarseness  Cardiovascular: no complaints of slow or fast heart rate, no chest pain, no palpitations, no leg claudication or lower extremity edema  Respiratory: no complaints of shortness of breath, no wheezing, no dyspnea on exertion, no orthopnea or PND  Breasts: no complaints of breast pain, breast lump or nipple discharge  Gastrointestinal: no complaints of abdominal pain, no constipation, no nausea or diarrhea, no vomiting, no bloody stools  Genitourinary: no complaints of dysuria, no incontinence, no pelvic pain, no dysmenorrhea, no vaginal discharge or abnormal vaginal bleeding  Musculoskeletal: no complaints of arthralgia, no myalgia, no joint swelling or stiffness, no limb pain or swelling  Integumentary: no complaints of skin rash or lesion, no itching or dry skin, no skin wounds  Neurological: no complaints of headache, no confusion, no numbness or tingling, no dizziness or fainting  ROS Reviewed:    ROS reviewed        Active Problems    · Acid reflux (530 81) (K21 9)   · 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)   · Allergic rhinitis (477 9) (J30 9)   · Arthralgia (719 40) (M25 50)   · Bloating symptom (787 3) (R14 0)   · Breast self examination education, encounter for (V65 49) (Z71 89)   · Carpal tunnel syndrome (354 0) (G56 00)   · Change in bowel habits (787 99) (R19 4)   · Cough (786 2) (R05)   · Depression (311) (F32 9)   · Edema, unspecified type (782 3) (R60 9)   · Encounter for screening mammogram for malignant neoplasm of breast (V76 12)    (Z12 31)   · Endometrial thickening on ultra sound (793 5) (R93 8)   · Epigastric pain (789 06) (R10 13)   · Fatty liver (571 8) (K76 0)   · Fibromyalgia (729 1) (M79 7)   · Gastric polyps (211 1) (K31 7)   · GERD without esophagitis (530 81) (K21 9)   · Hiatal hernia (553 3) (K44 9)   · Hip pain (719 45) (M25 559)   · History of self breast exam   · Hyperglycemia (790 29) (R73 9)   · Hyperlipidemia (272 4) (E78 5)   · Hyperthyroidism (242 90) (E05 90)   · Influenza vaccination administered at current visit (V04 81) (Z23)   · Insomnia, unspecified type (780 52) (G47 00)   · Irritable bowel syndrome with diarrhea (564 1) (K58 0)   · Menopause (627 2) (Z78 0)   · Morbid obesity (278 01) (E66 01)   · Myalgia (729 1) (M79 1)   · 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,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,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)   · 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)   · Need for pneumococcal vaccination (V03 82) (Z23)   · Obesity (278 00) (E66 9)   · Obstructive sleep apnea on CPAP (327 23,V46 8) (G47 33,Z99 89)   · Osteoarthritis, localized, knee (715 36) (M17 10)   · Painful orthopaedic hardware (996 78) (T84 84XA)   · Peritonsillar abscess (475) (J36)   · Restless legs syndrome (333 94) (G25 81)   · Screening for human papillomavirus (HPV) (V73 81) (Z11 51)   · Sicca syndrome (710 2) (M35 00)   · Sinusitis, acute (461 9) (J01 90)   · Sleep apnea (780 57) (G47 30)   · Thyroid Nodule   · Trapezius strain (840 8) (W81 781W)   · 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 (230 1) (H80 738)   · Visit for routine gyn exam (V72 31) (Z01 419)   · Vitamin D deficiency (268 9) (E55 9)   · Vulvar lesion (624 8) (N90 89)   · Weight gain (783 1) (R63 5)   · Wheezing (786 07) (R06 2)    Discussion/Summary   Discussion Summary:    59 yo female with a long standing h/o of obesity and inability to sustain any meaningful weight loss on her own despite several attempts   is interested in the Laparoscopic Sleeve gastrectomy  a part of her pre op evaluation, she will be referred to a cardiologist    She has obstructive sleep apnea with CPAP machine  had an EGD in December that revealed multiple gastric polyps and a large duodenal polyp that was removed about a week later by Dr Rivka Harrell  Pathology revealed no evidence of dysplasia or malignancy  have spent over 45 minutes with her face to face in the office today discussing her options and details of the surgery  We have seen an animation of the surgery on the computer that illustrates how the operation is done and how the anatomy will be altered with the procedure  Over 50% of this was coordinating care  was given the opportunity to ask questions and I have answered all of them  have discussed and educated the patient with regards to the components of our multidisciplinary program and the importance of compliance and follow up in the post operative period  The patient was also instructed with regards to the importance of behavior modification, nutritional counseling, support meeting attendance and lifestyle changes that are important to ensure success  there is a great statistical chance of improvement or even resolution of most of her associated comorbidities, the results vary from patient to patient and they largely depend on her commitment and compliance  needs to lose 10 lbs prior to the operation  Chief Complaint   Chief Complaint Free Text Note Form: Pt in office today for consult with Dr Catherine Castanon  History of Present Illness   HPI: 69-year-old female with a longstanding history of morbid obesity  Here today to discuss bariatric options  Bariatric Surgery Evaluation: The patient is being seen for an initial visit for bariatric surgery evaluation  Symptoms:  inability to lose weight  Initial presentation included inability to lose weight  Reported interest in weight loss is high   Diets tried in the past include low calorie, low carbohydrate and low fat  Past Medical History   1  History of Acute suppurative otitis media without spontaneous rupture of ear drum,     unspecified laterality   2  History of Acute upper respiratory infection (465 9) (J06 9)   3  History of Adhesive capsulitis of right shoulder (726 0) (M75 01)   4  History of Ankylosis Of The Right Knee (718 56)   5  History of Blister of leg (916 2) (S80 829A)   6  Breast self examination education, encounter for (V65 49) (Z71 89)   7  History of Cellulitis of left lower extremity (682 6) (L03 116)   8  History of Cervical polyp (622 7) (N84 1)   9  History of Closed displaced fracture of greater tuberosity of humerus (812 03)     (S42 253A)   10  History of Encounter for screening colonoscopy (V76 51) (Z12 11)   11  History of  3 (V22 2) (Z33 1)   12  History of allergic rhinitis (V12 69) (Z87 09)   13  History of conjunctivitis (V12 49) (Z86 69)   14  History of dermatitis (V13 3) (Z87 2)   15  History of headache (V13 89) (Z87 898)   16  History of sinusitis (V12 69) (Z87 09)   17  History of upper respiratory infection (V12 09) (Z87 09)   18  History of Mouth dryness (527 7) (R68 2)   19  History of Multiple joint pain (719 49) (M25 50)   20  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)   21  History of Pain of lower leg, unspecified laterality (729 5) (M79 669)   22  History of Post-operative state (V45 89) (Z98 890)   23  History of Preop examination (V72 84) (Z01 818)   24  History of Superficial phlebitis and thrombophlebitis of left lower extremity (451 0)      (I80 02)   25  History of Visit For: Pre-procedural Exam Prior To General Surgery (V72 83)  Active Problems And Past Medical History Reviewed: The active problems and past medical history were reviewed and updated today  Surgical History   1  History of Breast Surgery Reduction Procedure Elective   2  History of Cholecystectomy Laparoscopic   3  History of Dilation And Curettage   4  History of Endovenous Ablation Of Incompetent Vein Laser   5  History of Knee Arthroscopy (Therapeutic)   6  History of Knee Replacement   7  History of Knee Replacement   8  History of Shoulder Surgery  Surgical History Reviewed: The surgical history was reviewed and updated today  Family History   Mother    1  Family history of Anxiety and depression   2  Family history of Coronary artery disease (414 00) (I25 10)   3  Family history of Alzheimer's disease (V17 2) (Z82 0)   4  Family history of Osteoporosis (733 00) (M81 0)  Father    5  Family history of Osteoarthritis (715 90) (M19 90)  Sister    10  Family history of Carcinosarcoma Of The Uterus (V16 49)   7  Family history of    6  Family history of congestive cardiomyopathy (V17 49) (Z82 49)  Brother    5  Family history of malignant neoplasm of uterus (V16 49) (Z80 49)  Family History Reviewed: The family history was reviewed and updated today  Social History    · Caffeine Use   · Denied: History of Drug Use   · Marital History - Currently    · Never a smoker   · No alcohol use   · No caffeine use   · Rastafari Affiliation Yazdanism   · Second hand smoke exposure (V15 89) (Z77 22)   · Two children   · Uses Safety Equipment - Seatbelts  Social History Reviewed: The social history was reviewed and updated today  Current Meds    1  Atorvastatin Calcium 10 MG Oral Tablet; TAKE 1 TABLET DAILY; Therapy: 39WOO2542 to (Evaluate:2018)  Requested for: 2017; Last     Rx:2017; Status: ACTIVE - Transmit to [facility] Verification Ordered   2  Cevimeline HCl - 30 MG Oral Capsule; Take 1 capsule twice daily Recorded   3  Furosemide 20 MG Oral Tablet; TAKE 1 TABLET TWICE DAILY; Therapy: 91MVI8325 to (Evaluate:2018)  Requested for: 2017; Last     Rx:2017 Ordered   4  Gabapentin 300 MG TABS; Therapy: (Recorded:23Ris4765) to Recorded   5  Meloxicam 15 MG Oral Tablet;  Take 1 tablet daily Requested for: 20Mar2017; Last     Rx:20Mar2017 Ordered   6  Multi-Vitamins TABS; Therapy: (Juan Flatten) to Recorded   7  Pantoprazole Sodium 40 MG Oral Tablet Delayed Release; TAKE 1 TABLET 30 MINUTES     BEFORE BREAKFAST DAILY; Therapy: 42OKN2964 to (Evaluate:15Mar2018)  Requested for: 20Mar2017; Last     Rx:20Mar2017 Ordered   8  PARoxetine HCl - 30 MG Oral Tablet; TAKE 1 TABLET DAILY AS DIRECTED; Therapy: 59IDS3065 to (Evaluate:15Mar2018)  Requested for: 20Mar2017; Last     Rx:20Mar2017 Ordered   9  TraZODone HCl - 50 MG Oral Tablet; TAKE 1 TABLET AT BEDTIME; Therapy: 13BBX8235 to (Last Rx:30Nov2015) Ordered   10  Ventolin  (90 Base) MCG/ACT Inhalation Aerosol Solution; INHALE 1-2 PUFFS      EVERY 4-6 HOURS AS NEEDED AND AS DIRECTED; Therapy: 25KPU6187 to (Last Rx:28Nov2017)  Requested for: 20IXE3803 Ordered   11  Vitamin D 2000 UNIT Oral Capsule; TAKE 2 CAPSULE Daily; Therapy: 56DMP7668 to (Last PO:76PLV8935) Ordered  Medication List Reviewed: The medication list was reviewed and updated today  Allergies   1  Toradol   2  Ansaid TABS   3  Pneumovax 23 25 MCG/0 5ML Injection Injectable    Vitals   Vital Signs    Recorded: 55AES9303 11:07AM   Temperature 98 6 F   Heart Rate 86   Respiration 16   Systolic 586   Diastolic 82   Height 4 ft 10 in   Weight 199 lb    BMI Calculated 41 59   BSA Calculated 1 82     Physical Exam        Constitutional      General appearance: No acute distress, well appearing and well nourished  well developed,-- appears healthy,-- morbidly obese,-- well hydrated-- and-- appearance reflects stated age  Psychiatric      Orientation to person, place, and time: Normal        Mood and affect: Normal           Future Appointments      Date/Time Provider Specialty Site   01/22/2018 10:20 AM NENA Chin   Cardiology  CARDIOLOGY  [place]     Signatures    Electronically signed by : NENA Nguyen ; Jan 19 2018 11:26AM EST (Author)

## 2018-01-22 ENCOUNTER — GENERIC CONVERSION - ENCOUNTER (OUTPATIENT)
Dept: OTHER | Facility: OTHER | Age: 63
End: 2018-01-22

## 2018-01-22 ENCOUNTER — ALLSCRIPTS OFFICE VISIT (OUTPATIENT)
Dept: OTHER | Facility: OTHER | Age: 63
End: 2018-01-22

## 2018-01-22 VITALS
TEMPERATURE: 96.4 F | RESPIRATION RATE: 18 BRPM | BODY MASS INDEX: 41.79 KG/M2 | HEART RATE: 76 BPM | HEIGHT: 58 IN | DIASTOLIC BLOOD PRESSURE: 80 MMHG | SYSTOLIC BLOOD PRESSURE: 128 MMHG | WEIGHT: 199.1 LBS

## 2018-01-22 VITALS
TEMPERATURE: 98.6 F | WEIGHT: 199 LBS | RESPIRATION RATE: 16 BRPM | DIASTOLIC BLOOD PRESSURE: 82 MMHG | SYSTOLIC BLOOD PRESSURE: 138 MMHG | BODY MASS INDEX: 41.77 KG/M2 | HEIGHT: 58 IN | HEART RATE: 86 BPM

## 2018-01-22 VITALS
TEMPERATURE: 98.2 F | HEART RATE: 64 BPM | WEIGHT: 198.13 LBS | SYSTOLIC BLOOD PRESSURE: 124 MMHG | HEIGHT: 59 IN | BODY MASS INDEX: 39.94 KG/M2 | DIASTOLIC BLOOD PRESSURE: 58 MMHG

## 2018-01-23 VITALS — HEIGHT: 59 IN | WEIGHT: 199.06 LBS | BODY MASS INDEX: 40.13 KG/M2

## 2018-01-23 VITALS — WEIGHT: 199.1 LBS | HEIGHT: 58 IN | BODY MASS INDEX: 41.79 KG/M2

## 2018-01-23 NOTE — CONSULTS
Chief Complaint  Pt being seen for consult Abdominal pain, Diarrhea, Nausea, Vomiting  Patient referred by Edwige Moise      History of Present Illness  Hamida Hastings is a 57 yo F with high BMI presents for evaluation of ABD bloating, epigastric pain, change in bowel habits, and fatty liver disease  Further eval for epigastric pain she has had an ultrasound which did not identify any choledocholithiasis with previous cholecystectomy  She does take Meloxicam  Abdominal pain is located in the epigastric region worse with oral intake but pain can also be constant and chronic  No clear alleviating or aggravating factors identified  Change in bowel habits maybe due to IBS D  Colonoscopy earlier this year was within normal limits  This was performed at outside facility  She has ABD bloating and distention associated with this  Recent antibiotics use but symptoms started prior to this  US of the ABD identified fatty liver disease  BMI is close to 40  She is consider sleeve surgery  Last EGD was 8 years ago  HH was found on the EGD  No alarm signs  Lipase and HP breath test were neg  Review of Systems    Constitutional: No fever, no chills, feels well, no tiredness, no recent weight gain or weight loss  Eyes: No complaints of eye pain, no red eyes, no eyesight problems, no discharge, no dry eyes, no itching of eyes  ENT: no complaints of earache, no loss of hearing, no nose bleeds, no nasal discharge, no sore throat, no hoarseness  Cardiovascular: No complaints of slow heart rate, no fast heart rate, no chest pain, no palpitations, no leg claudication, no lower extremity edema  Respiratory: No complaints of shortness of breath, no wheezing, no cough, no SOB on exertion, no orthopnea, no PND  Gastrointestinal: abdominal pain, nausea, vomiting and diarrhea, but no constipation and no blood in stools     Genitourinary: No complaints of dysuria, no incontinence, no pelvic pain, no dysmenorrhea, no vaginal discharge or bleeding  Musculoskeletal: No complaints of arthralgias, no myalgias, no joint swelling or stiffness, no limb pain or swelling  Integumentary: No complaints of skin rash or lesions, no itching, no skin wounds, no breast pain or lump  Neurological: No complaints of headache, no confusion, no convulsions, no numbness, no dizziness or fainting, no tingling, no limb weakness, no difficulty walking  Psychiatric: Not suicidal, no sleep disturbance, no anxiety or depression, no change in personality, no emotional problems  Endocrine: No complaints of proptosis, no hot flashes, no muscle weakness, no deepening of the voice, no feelings of weakness  Hematologic/Lymphatic: No complaints of swollen glands, no swollen glands in the neck, does not bleed easily, does not bruise easily  ROS reviewed  Active Problems    1  Allergic rhinitis (477 9) (J30 9)   2  Arthralgia (719 40) (M25 50)   3  Breast self examination education, encounter for (V65 49) (Z71 89)   4  Carpal tunnel syndrome (354 0) (G56 00)   5  Cough (786 2) (R05)   6  Depression (311) (F32 9)   7  Encounter for screening mammogram for malignant neoplasm of breast (V76 12)   (Z12 31)   8  Endometrial thickening on ultra sound (793 5) (R93 8)   9  Epigastric pain (789 06) (R10 13)   10  Fibromyalgia (729 1) (M79 7)   11  GERD without esophagitis (530 81) (K21 9)   12  Hip pain (719 45) (M25 559)   13  History of self breast exam   14  Hyperglycemia (790 29) (R73 9)   15  Hyperlipidemia (272 4) (E78 5)   16  Hyperthyroidism (242 90) (E05 90)   17  Influenza vaccination administered at current visit (V04 81) (Z23)   18  Menopause (627 2) (Z78 0)   19  Myalgia (729 1) (M79 1)   20  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,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,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)   21  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)   22  Need for pneumococcal vaccination (V03 82) (Z23)   23  Obesity (278 00) (E66 9)   24   Osteoarthritis, localized, knee (715 36) (M17 10)   25  Painful orthopaedic hardware (996 78) (T84 84XA)   26  Peritonsillar abscess (475) (J36)   27  Restless legs syndrome (333 94) (G25 81)   28  Screening for human papillomavirus (HPV) (V73 81) (Z11 51)   29  Sicca syndrome (710 2) (M35 00)   30  Sinusitis, acute (461 9) (J01 90)   31  Sleep apnea (780 57) (G47 30)   32  Thyroid Nodule   33  Trapezius strain (840 8) (S46 819A)   34  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 (779 1) (R31 155)   35  Visit for routine gyn exam (V72 31) (Z01 419)   36  Vitamin D deficiency (268 9) (E55 9)   37  Vulvar lesion (624 8) (N90 89)   38  Weight gain (783 1) (R63 5)   39   Wheezing (786 07) (R06 2)    Past Medical History    · History of Acute suppurative otitis media without spontaneous rupture of ear drum,  unspecified laterality   · History of Acute upper respiratory infection (465 9) (J06 9)   · History of Adhesive capsulitis of right shoulder (726 0) (M75 01)   · History of Ankylosis Of The Right Knee (718 56)   · History of Blister of leg (916 2) (L03 016A)   · Breast self examination education, encounter for (V65 49) (Z71 89)   · History of Cellulitis of left lower extremity (682 6) (L03 116)   · History of Cervical polyp (622 7) (N84 1)   · History of Closed displaced fracture of greater tuberosity of humerus (812 03)  (S51 459B)   · History of Encounter for screening colonoscopy (V76 51) (Z12 11)   · History of  3 (V22 2) (Z33 1)   · History of allergic rhinitis (V12 69) (Z87 09)   · History of conjunctivitis (V12 49) (Z86 69)   · History of dermatitis (V13 3) (Z87 2)   · History of edema (V13 89) (H89 223)   · History of headache (V13 89) (X25 257)   · History of insomnia (V13 89) (V71 691)   · History of sinusitis (V12 69) (Z87 09)   · History of upper respiratory infection (V12 09) (Z87 09)   · History of Mouth dryness (527 7) (R68 2)   · History of Multiple joint pain (719 49) (M25 50)   · 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)   · History of Pain of lower leg, unspecified laterality (729 5) (M79 669)   · History of Post-operative state (V45 89) (Z98 890)   · History of Preop examination (V72 84) (Z01 818)   · History of Superficial phlebitis and thrombophlebitis of left lower extremity (451 0)  (I80 02)   · History of Visit For: Pre-procedural Exam Prior To General Surgery (V72 83)    The active problems and past medical history were reviewed and updated today  Surgical History    · History of Breast Surgery Reduction Procedure Elective   · History of Cholecystectomy Laparoscopic   · History of Dilation And Curettage   · History of Endovenous Ablation Of Incompetent Vein Laser   · History of Knee Arthroscopy (Therapeutic)   · History of Knee Replacement   · History of Knee Replacement   · History of Shoulder Surgery    The surgical history was reviewed and updated today  Family History    · Family history of Anxiety and depression   · Family history of Coronary artery disease (414 00) (I25 10)   · Family history of Alzheimer's disease (V17 2) (Z82 0)   · Family history of Osteoporosis (733 00) (M81 0)    · Family history of Osteoarthritis (715 90) (M19 90)    · Family history of Carcinosarcoma Of The Uterus (V16 49)   · Family history of    · Family history of congestive cardiomyopathy (V17 49) (Z82 49)    · Family history of malignant neoplasm of uterus (V16 49) (Z80 49)    The family history was reviewed and updated today  Social History    · Caffeine Use   · Denied: History of Drug Use   · Marital History - Currently    · Never a smoker   · No alcohol use   · No caffeine use   · [employer]   · Second hand smoke exposure (V15 89) (Z77 22)   · Two children   · Uses Safety Equipment - Seatbelts  The social history was reviewed and updated today  Current Meds   1  Atorvastatin Calcium 10 MG Oral Tablet; TAKE 1 TABLET DAILY;    Therapy: 45MMQ7595 to (Evaluate:15Mar2018)  Requested for: 20Mar2017; Last   Rx:20Mar2017; Status: ACTIVE - Transmit to Pharmacy - Awaiting Verification Ordered   2  Cefuroxime Axetil 250 MG Oral Tablet; TAKE 1 TABLET EVERY 12 HOURS DAILY; Therapy: 69BMC9567 to (Last Rx:17Oct2017)  Requested for: 17Oct2017 Ordered   3  Cevimeline HCl - 30 MG Oral Capsule; Take 1 capsule twice daily Recorded   4  Doxycycline Hyclate 100 MG Oral Capsule; Take 1 capsule twice daily; Therapy: 46LZI4026 to (Evaluate:27Rgt8495)  Requested for: 01POQ3378; Last   Rx:28Nov2017 Ordered   5  Furosemide 20 MG Oral Tablet; TAKE 1 TABLET TWICE DAILY; Therapy: 12KVN5892 to (Evaluate:15Mar2018)  Requested for: 20Mar2017; Last   Rx:20Mar2017 Ordered   6  Meloxicam 15 MG Oral Tablet; Take 1 tablet daily  Requested for: 20Mar2017; Last   Rx:20Mar2017 Ordered   7  Methocarbamol 500 MG Oral Tablet; TAKE 1 TABLET AT BEDTIME NIGHTLY  MAY TAKE 1   TABLET DURING THE DAY ALSO AS NEEDED; Therapy: 24TWR8510 to (Evaluate:44Icj6271)  Requested for: 23Aug2017; Last   Rx:23Aug2017 Ordered   8  Montelukast Sodium 10 MG Oral Tablet; TAKE 1 TABLET BY MOUTH ONCE DAILY; Therapy: 04TUW6792 to (Nayana Riley)  Requested for: 11YOV6556; Last   Rx:28Nov2017 Ordered   9  Multi-Vitamins TABS; Therapy: (Recorded:25Sdn4604) to Recorded   10  Pantoprazole Sodium 40 MG Oral Tablet Delayed Release; TAKE 1 TABLET 30 MINUTES    BEFORE BREAKFAST DAILY; Therapy: 52PHZ7099 to (Evaluate:15Mar2018)  Requested for: 20Mar2017; Last    Rx:20Mar2017 Ordered   11  PARoxetine HCl - 30 MG Oral Tablet; TAKE 1 TABLET DAILY AS DIRECTED; Therapy: 21BCE2168 to (Evaluate:15Mar2018)  Requested for: 20Mar2017; Last    Rx:20Mar2017 Ordered   12  Promethazine-Codeine 6 25-10 MG/5ML Oral Syrup; TAKE 5 ML EVERY 4 TO 6 HOURS    AS NEEDED FOR COUGH; Therapy: 44XOR0416 to (Evaluate:53Edb7752); Last Rx:28Nov2017 Ordered   13  ROPINIRole HCl - 0 5 MG Oral Tablet; Therapy: 45SBA0628 to Recorded   14  TraMADol HCl - 50 MG Oral Tablet; TAKE 1 TABLET Every 6 hours PRN for pain; Therapy: 61RVU9618 to (Evaluate:69Xyy6525); Last Rx:57Uww3483 Ordered   15  TraZODone HCl - 50 MG Oral Tablet; TAKE 1 TABLET AT BEDTIME; Therapy: 68QUH4378 to (Last Rx:30Nov2015) Ordered   16  Ventolin  (90 Base) MCG/ACT Inhalation Aerosol Solution; INHALE 1-2 PUFFS    EVERY 4-6 HOURS AS NEEDED AND AS DIRECTED; Therapy: 43VRA4376 to (Last Rx:28Nov2017)  Requested for: 77COQ7468 Ordered   17  Vitamin D 2000 UNIT Oral Capsule; TAKE 2 CAPSULE Daily; Therapy: 63MIC1986 to (Last Rx:42Ile5178) Ordered    Allergies    1  Toradol   2  Ansaid TABS   3  Pneumovax 23 25 MCG/0 5ML Injection Injectable    Physical Exam    Constitutional   General appearance: No acute distress, well appearing and well nourished  Eyes   Conjunctiva and lids: No swelling, erythema or discharge  Ears, Nose, Mouth, and Throat   External inspection of ears and nose: Normal     Oropharynx: Normal with no erythema, edema, exudate or lesions  Pulmonary   Respiratory effort: No increased work of breathing or signs of respiratory distress  Auscultation of lungs: Clear to auscultation  Cardiovascular   Palpation of heart: Normal PMI, no thrills  Auscultation of heart: Normal rate and rhythm, normal S1 and S2, without murmurs  Examination of extremities for edema and/or varicosities: Normal     Abdomen   Abdomen: Non-tender, no masses  Liver and spleen: No hepatomegaly or splenomegaly  Musculoskeletal   Gait and station: Normal     Neurologic   Cranial nerves: Cranial nerves 2-12 intact  Psychiatric   Orientation to person, place, and time: Normal          Results/Data  * US ABDOMEN COMPLETE 26Oct2017 09:03AM [facility] Order Number: [id number]    - Patient Instructions: To schedule this appointment, please contact Central Scheduling at [phone]       Test Name Result Flag Reference   US ABDOMEN COMPLETE (Report) ABDOMEN ULTRASOUND, COMPLETE WITH DOPPLER     INDICATION: Upper abdominal pain  K21 9: Gastro-esophageal reflux disease without esophagitis   R10 13: Epigastric pain  History taken directly from the electronic ordering system  Epigastric pain  History of cholecystectomy  COMPARISON: None  TECHNIQUE:  Real-time ultrasound of the abdomen was performed with a curvilinear transducer with both volumetric sweeps and still imaging techniques  FINDINGS:     PANCREAS: Portions of the pancreas are obscured by bowel gas  Visualized portions of the pancreas are unremarkable  AORTA AND IVC: Visualized portions are normal for patient age  LIVER:   Size: Mildly enlarged  The liver measures 16 9 cm in the midclavicular line  Contour: Surface contour is smooth  Parenchyma: There is moderate diffuse increased echogenicity with smooth echotexture and acoustic beam attenuation  Most consistent with moderate hepatic steatosis  No evidence of suspicious mass  Limited imaging of the main portal vein shows it to be patent and hepatopetal      BILIARY:   The gallbladder is surgically absent  Sonographic Cortez Gold sign is negative  No intrahepatic biliary dilatation  CBD measures 4 mm  No choledocholithiasis  KIDNEY:    Right kidney measures 12 5 cm  Within normal limits  Left kidney measures 11 8 cm  Within normal limits  SPLEEN:    Measures 9 9 cm  Within normal limits  ASCITES: None  IMPRESSION:     Enlarged echogenic liver indicative of hepatic steatosis       Workstation performed: [id number]     Signed by: Ray Mancera MD   10/29/17     (1) TSH 53Phk7527 11:01AM Yue ROCHA Order Number: [id number]     Test Name Result Flag Reference   TSH 1 301 uIU/mL  0 358-3 740   Patients undergoing fluorescein dye angiography may retain small amounts of fluorescein in the body for 48-72 hours post procedure   Samples containing fluorescein can produce falsely depressed TSH values  If the patient had this procedure,a specimen should be resubmitted post fluorescein clearance  The recommended reference ranges for TSH during pregnancy are as follows:  First trimester 0 1 to 2 5 uIU/mL  Second trimester  0 2 to 3 0 uIU/mL  Third trimester 0 3 to 3 0 uIU/m     (1) AMYLASE 12Oct2017 11:01AM [facility] Order Number: [id number]     Test Name Result Flag Reference   AMYLASE 23 IU/L L      (1) LIPASE 51JGQ7279 11:01AM [facility] Order Number: [id number]     Test Name Result Flag Reference   LIPASE 77 u/L       (1) CBC/PLT/DIFF 11WHT1311 11:01AM [facility] Order Number: [id number]     Test Name Result Flag Reference   WBC COUNT 6 87 Thousand/uL  4 31-10 16   RBC COUNT 4 47 Million/uL  3 81-5 12   HEMOGLOBIN 12 6 g/dL  11 5-15 4   HEMATOCRIT 37 5 %  34 8-46  1   MCV 84 fL  82-98   MCH 28 2 pg  26 8-34 3   MCHC 33 6 g/dL  31 4-37 4   RDW 12 8 %  11 6-15 1   MPV 10 5 fL  8 9-12 7   PLATELET COUNT 090 Thousands/uL  149-390   nRBC AUTOMATED 0 /100 WBCs     NEUTROPHILS RELATIVE PERCENT 58 %  43-75   LYMPHOCYTES RELATIVE PERCENT 33 %  14-44   MONOCYTES RELATIVE PERCENT 7 %  4-12   EOSINOPHILS RELATIVE PERCENT 2 %  0-6   BASOPHILS RELATIVE PERCENT 0 %  0-1   NEUTROPHILS ABSOLUTE COUNT 3 95 Thousands/? ??L  1 85-7 62   LYMPHOCYTES ABSOLUTE COUNT 2 29 Thousands/? ??L  0 60-4 47   MONOCYTES ABSOLUTE COUNT 0 49 Thousand/? ??L  0 17-1 22   EOSINOPHILS ABSOLUTE COUNT 0 11 Thousand/? ??L  0 00-0 61   BASOPHILS ABSOLUTE COUNT 0 03 Thousands/? ??L  0 00-0 10     (1) COMPREHENSIVE METABOLIC PANEL 91SSY1854 91:24TX [facility] Order Number: [id number]     Test Name Result Flag Reference   SODIUM 140 mmol/L  136-145   POTASSIUM 4 0 mmol/L  3 5-5 3   CHLORIDE 104 mmol/L  100-108   CARBON DIOXIDE 28 mmol/L  21-32   ANION GAP (CALC) 8 mmol/L  4-13   BLOOD UREA NITROGEN 15 mg/dL  5-25   CREATININE 0 67 mg/dL 0  60-1 30   Standardized to IDMS reference method   CALCIUM 9 1 mg/dL  8 3-10 1   BILI, TOTAL 0 35 mg/dL  0 20-1 00   ALK PHOSPHATAS 88 U/L     ALT (SGPT) 23 U/L  12-78   Specimen collection should occur prior to Sulfasalazine administration due to the potential for falsely depressed results  AST(SGOT) 10 U/L  5-45   Specimen collection should occur prior to Sulfasalazine administration due to the potential for falsely depressed results  ALBUMIN 3 4 g/dL L 3 5-5 0   TOTAL PROTEIN 7 2 g/dL  6 4-8 2   eGFR 95 ml/min/1 73sq m     National Kidney Disease Education Program recommendations are as follows:  GFR calculation is accurate only with a steady state creatinine  Chronic Kidney disease less than 60 ml/min/1 73 sq  meters  Kidney failure less than 15 ml/min/1 73 sq  meters  GLUCOSE FASTING 120 mg/dL H 65-99   Specimen collection should occur prior to Sulfasalazine administration due to the potential for falsely depressed results  Specimen collection should occur prior to Sulfapyridine administration due to the potential for falsely elevated results  (1) [facility] TEST 92CIV1912 11:01AM Wilber Larson    Order Number: [id number]     Test Name Result Flag Reference   SCAN RESULT SEE WRITTEN REPORT       Assessment    1  Epigastric pain (789 06) (R10 13)   2  Bloating symptom (787 3) (R14 0)   3  Change in bowel habits (787 99) (R19 4)   4  Fatty liver (571 8) (K76 0)   5  Hiatal hernia (553 3) (K44 9)    Plan  Bloating symptom    · NM GASTRIC EMPTYING; Status:Need Information - Financial Authorization; Requested  for:[id number];    [facility]; Due:21Pgz8950; Ordered; For:Bloating symptom; Ordered By:Willie Hunt;  Change in bowel habits    · (1) C  DIFFICILE TOXIN BY PCR; Status:Active; Requested for:76Kvs0448;    Perform:[facility]; Due:91Jrb1584; Ordered;   For:Change in bowel habits; Ordered By:Willie Hunt;   · (1) GIARDIA LAMBLIA; Status:Active; Requested for:02Vvd3234;    Perform:[facility]; Due:47Yhv6043; Ordered; For:Change in bowel habits; Ordered By:Willie Hunt;   · (1) OVA AND PARASITES EXAM; Status:Active; Requested for:01Kmu6639;    Perform:[facility]; Due:94Nlv6955; Ordered; For:Change in bowel habits; Ordered By:Willie Hunt;  Epigastric pain    · (1) CELIAC DISEASE AB PROFILE; Status:Active; Requested for:82Rxq1152;    Perform:[facility]; Due:33Wtf9937; Ordered;  For:Epigastric pain; Ordered By:Willie Hunt;   · EGD; Status:Hold For - Scheduling; Requested for:28Ons0003;    Perform:[facility]; NOC:10LLL5089;ASCWHLR;  For:Epigastric pain; Ordered By:Willie Hunt;  Irritable bowel syndrome with diarrhea    · Xifaxan 550 MG Oral Tablet; TAKE 1 TABLET 3 times daily   Rx By: Kumar Sweeney; Dispense: 14 Days ; #:42 Tablet; Refill: 0; For: Irritable bowel syndrome with diarrhea; LETY = N; Verified Transmission to [facility] #[id number] Last Updated By: System, [employer]; 12/18/2017 2:03:25 PM    Discussion/Summary    Epigastric pain DDx IBS vs  Celiac dz vs  SIBO vs  PUD  US of RUQ neg besides fatty liver   - EGD for further eval   - Trial of FODMAP   - trial of probioitics   - Avoidance of dairy   - Limit NSAIDs    gas/bloating rule out gastroparesis     Fatty liver   - Discussed weight loss   - Considering sleeve surgery   - Discussed vit E therapy     Hx of hiatal hernia   - Discussed may need this fix during sleeve surgery        Future Appointments    Signatures   Electronically signed by : Evangelist Navarro MD; Dec 18 2017  2:19PM EST                       (Author)

## 2018-01-23 NOTE — RESULT NOTES
Discussion/Summary   Gastric emptying is normal     Verified Results  NM GASTRIC EMPTYING 86TFZ4963 08:00AM Cari Wing     Test Name Result Flag Reference   NM GASTRIC EMPTYING (Report)     GASTRIC EMPTYING STUDY     INDICATION: R14 0: Abdominal distension (gaseous)  History taken directly from the electronic ordering system  COMPARISON: None available     TECHNIQUE:  The study was performed following the oral administration of 0 9 mCi Tc-99m sulfur colloid combined with scrambled eggs, as part of a standard meal  Following the meal, one minute anterior and posterior images were obtained immediately and    at 0 25 hours, 0 5 hour, 1 hour, 1 5 hour, 2 hour, 3 hour   intervals from the time of ingestion  Geometric mean analyses were then performed  As of March 1, 2016, this gastric emptying protocol has been modified and updated  The gastric emptying    times and the normal values reported below reflect the change in protocol  FINDINGS:     Gastric emptying at 0 5 hours = 18 (N < 30%)    Gastric emptying at 1 hour = 33 % (N = 10 - 70%)   Gastric emptying at 2 hours = 70 % (N = > 40%)   Gastric emptying at 3 hours = 92 % (N = > 70%)   Linear T-1/2 = 100 minutes         IMPRESSION:     Normal rate of gastric emptying         Workstation performed: [id number]     Signed by:   Brian Lloyd MD   1/18/18

## 2018-01-23 NOTE — PROGRESS NOTES
History of Present Illness  Bariatric MNT [facility] Screening Preop [facility]:     She was on time  the appointment lasted: 60 minutes  The patient was present at the session  The diagnosis/reason for the appointment is: She has Grade III Obesity with a BMI of 41 6  Diet Order: Nutritional Assesment for Bariatric Surgery  Her goal weight is 143#-151#  She has the following comorbidities: PETER, CPAP, Arthritis, OA, Hiatal Hernia, Heartburn, Reflux, Gastritis, Rona  Labs: Cherelle Kenney She was reminded to have her labs drawn   She does not need to monitor her glucose  She does not have a meter to test her blood glucose  Exercise Frequency:  She exercises bowls once weekly, gym 3-5 times per week: bike/elliptical/treadmill/pilates/stretchy bands  Relationship to food: She is an emotional eater, is a stress eater, eats when she is bored and grazes  She knows the difference between being comfortably full and uncomfortably full and knows the difference between being stuffed and comfortably full  She felt/thought they felt her best at 140# pounds she last weighed this 7 years ago  She completed a food journal She drinks 8+ cups of water daily She drinks 2-3 caffienated beverages daily Her motivators are:Pt wants to improve health and quality of life  Her obesity/being overweight is related to positive energy balance and is evidenced by: BMI=41 6  Knowledge Deficit Prior to Education  She is new to the diet  Barriers to education:  She has no barriers to education  Medical Nutrition Therapy Intervention: Individualized Meal Plan, Daily Food /Exercise Diary, Lifestyle /Behavior Modification Techniques, Basic Pathophysiology of Obesity, Checklist of the Overview of Lesson Plans and Surgical changes to Stomach/GI     Area's Reviewed: Post - surgery goal weight, Web forum, Lifestyle Annabelle Fonseca Modification Techniques, [employer] in [place], [employer] ( hand out for [employer]) and Pre- surgery goals Olive Dumont  Brief Review of Vitamins, diet progression for post-op and Pathophysiology of Obesity  Her comprehension of the presented material was good  Her receptivity to the presented material was good  Her motivation was good  Provided: Nutrition Guidelines for Gastric Surgery, Food Journaling Application handout, Bariatric Program Pre- Surgery Nutrition and Goals  Goals: Her set goal for physical activity is walk , for 30 minutes, 5 days a week  Review [employer] in [place]   Post Surgery: She will adhere to the diet progression: remain hydrated, consume adequate protein; and take vitamins as outlined in guidelines  Patient is a 58year old who is here for nutritional evaluation for weight loss surgery  I reviewed co-morbidities and medications with patient  She first recalls having problems with weight gain as a young adult  She has dieted in the past with variable success but would regain the weight back  For her personal pre-op goals she will: eat three meals daily, stop snacking between meals, and eliminate drinking regular soda  She plans to start food journaling to track her intake  Provided pt with information on [employer] paul  She was instructed on the importance of consistent vitamin and mineral intake after her surgery to prevent deficiencies  She currently takes 1000 mcg vitamin B12, 4000 IU Vitamin D3, and a Probiotic daily  Reviewed importance of support after surgery and discussed the [employer] paul, [employer], PEP rally, and Support Group  Patient states adequate knowledge of nutrition, exercise, and behavior modification required for long term success  Pt  is recommended for surgery and is aware to practice lifestyle modification, complete all six lesson plans in bariatric manual, and complete two-week liver shrinking diet prior to surgery  Recommendations: She was provided contact information for any questions  She is recommended for surgery   ~He/She needs additional education  She states adequate knowledge of nutrition, exercise, and behavior modification  She will attend a Team Meeting approximately 2-3 weeks prior to surgery  Active Problems    1  Allergic rhinitis (477 9) (J30 9)   2  Arthralgia (719 40) (M25 50)   3  Bloating symptom (787 3) (R14 0)   4  Breast self examination education, encounter for (V65 49) (Z71 89)   5  Carpal tunnel syndrome (354 0) (G56 00)   6  Change in bowel habits (787 99) (R19 4)   7  Cough (786 2) (R05)   8  Depression (311) (F32 9)   9  Encounter for screening mammogram for malignant neoplasm of breast (V76 12)   (Z12 31)   10  Endometrial thickening on ultra sound (793 5) (R93 8)   11  Epigastric pain (789 06) (R10 13)   12  Fatty liver (571 8) (K76 0)   13  Fibromyalgia (729 1) (M79 7)   14  GERD without esophagitis (530 81) (K21 9)   15  Hiatal hernia (553 3) (K44 9)   16  Hip pain (719 45) (M25 559)   17  History of self breast exam   18  Hyperglycemia (790 29) (R73 9)   19  Hyperlipidemia (272 4) (E78 5)   20  Hyperthyroidism (242 90) (E05 90)   21  Influenza vaccination administered at current visit (V04 81) (Z23)   22  Irritable bowel syndrome with diarrhea (564 1) (K58 0)   23  Menopause (627 2) (Z78 0)   24  Myalgia (729 1) (M79 1)   25  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,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,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)   26  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)   27  Need for pneumococcal vaccination (V03 82) (Z23)   28  Obesity (278 00) (E66 9)   29  Osteoarthritis, localized, knee (715 36) (M17 10)   30  Painful orthopaedic hardware (996 78) (T84 84XA)   31  Peritonsillar abscess (475) (J36)   32  Restless legs syndrome (333 94) (G25 81)   33  Screening for human papillomavirus (HPV) (V73 81) (Z11 51)   34  Sicca syndrome (710 2) (M35 00)   35  Sinusitis, acute (461 9) (J01 90)   36  Sleep apnea (780 57) (G47 30)   37  Thyroid Nodule   38  Trapezius strain (840 8) (S45 469A)   39   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 (653 1) (Y64 214)   40  Visit for routine gyn exam (V72 31) (Z01 419)   41  Vitamin D deficiency (268 9) (E55 9)   42  Vulvar lesion (624 8) (N90 89)   43  Weight gain (783 1) (R63 5)   44  Wheezing (786 07) (R06 2)    Past Medical History    1  History of Acute suppurative otitis media without spontaneous rupture of ear drum,   unspecified laterality   2  History of Acute upper respiratory infection (465 9) (J06 9)   3  History of Adhesive capsulitis of right shoulder (726 0) (M75 01)   4  History of Ankylosis Of The Right Knee (718 56)   5  History of Blister of leg (916 2) (S80 829A)   6  Breast self examination education, encounter for (V65 49) (Z71 89)   7  History of Cellulitis of left lower extremity (682 6) (L03 116)   8  History of Cervical polyp (622 7) (N84 1)   9  History of Closed displaced fracture of greater tuberosity of humerus (812 03)   (S42 253A)   10  History of Encounter for screening colonoscopy (V76 51) (Z12 11)   11  History of  3 (V22 2) (Z33 1)   12  History of allergic rhinitis (V12 69) (Z87 09)   13  History of conjunctivitis (V12 49) (Z86 69)   14  History of dermatitis (V13 3) (Z87 2)   15  History of edema (V13 89) (Z87 898)   16  History of headache (V13 89) (Z87 898)   17  History of insomnia (V13 89) (Z87 898)   18  History of sinusitis (V12 69) (Z87 09)   19  History of upper respiratory infection (V12 09) (Z87 09)   20  History of Mouth dryness (527 7) (R68 2)   21  History of Multiple joint pain (719 49) (M25 50)   22  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)   23  History of Pain of lower leg, unspecified laterality (729 5) (M79 669)   24  History of Post-operative state (V45 89) (Z98 890)   25  History of Preop examination (V72 84) (Z01 818)   26  History of Superficial phlebitis and thrombophlebitis of left lower extremity (451 0)    (I80 02)   27   History of Visit For: Pre-procedural Exam Prior To General Surgery (X06 72)    Surgical History 1  History of Breast Surgery Reduction Procedure Elective   2  History of Cholecystectomy Laparoscopic   3  History of Dilation And Curettage   4  History of Endovenous Ablation Of Incompetent Vein Laser   5  History of Knee Arthroscopy (Therapeutic)   6  History of Knee Replacement   7  History of Knee Replacement   8  History of Shoulder Surgery    Family History  Mother    1  Family history of Anxiety and depression   2  Family history of Coronary artery disease (414 00) (I25 10)   3  Family history of Alzheimer's disease (V17 2) (Z82 0)   4  Family history of Osteoporosis (733 00) (M81 0)  Father    5  Family history of Osteoarthritis (715 90) (M19 90)  Sister    10  Family history of Carcinosarcoma Of The Uterus (V16 49)   7  Family history of    6  Family history of congestive cardiomyopathy (V17 49) (Z82 49)  Brother    5  Family history of malignant neoplasm of uterus (V16 49) (Z80 49)    Social History    · Caffeine Use   · Denied: History of Drug Use   · Marital History - Currently    · Never a smoker   · No alcohol use   · No caffeine use   · [employer]   · Second hand smoke exposure (V15 89) (Z77 22)   · Two children   · Uses Safety Equipment - Seatbelts    Current Meds   1  Atorvastatin Calcium 10 MG Oral Tablet; TAKE 1 TABLET DAILY; Therapy: 83GMN2400 to (Evaluate:2018)  Requested for: 2017; Last   Rx:2017; [facility]dered   2  Cefuroxime Axetil 250 MG Oral Tablet; TAKE 1 TABLET EVERY 12 HOURS DAILY; Therapy: 47OII7761 to (Last Rx:2017)  Requested for: 2017 Ordered   3  Cevimeline HCl - 30 MG Oral Capsule; Take 1 capsule twice daily Recorded   4  Doxycycline Hyclate 100 MG Oral Capsule; Take 1 capsule twice daily; Therapy: 32PUP9626 to (Evaluate:49Wgn3248)  Requested for: 93KRS0841; Last   Rx:2017 Ordered   5  Furosemide 20 MG Oral Tablet; TAKE 1 TABLET TWICE DAILY;    Therapy: 78BXU4731 to (Evaluate:15Mar2018)  Requested for: 20Mar2017; Last   Rx:20Mar2017 Ordered   6  Meloxicam 15 MG Oral Tablet; Take 1 tablet daily  Requested for: 20Mar2017; Last   Rx:20Mar2017 Ordered   7  Methocarbamol 500 MG Oral Tablet; TAKE 1 TABLET AT BEDTIME NIGHTLY  MAY TAKE 1   TABLET DURING THE DAY ALSO AS NEEDED; Therapy: 60MGN2938 to (Evaluate:22Sep2017)  Requested for: 23Aug2017; Last   Rx:23Aug2017 Ordered   8  Montelukast Sodium 10 MG Oral Tablet; TAKE 1 TABLET BY MOUTH ONCE DAILY; Therapy: 88PEG3668 to (Praveena Gomes)  Requested for: 46LZH6623; Last   Rx:28Nov2017 Ordered   9  Multi-Vitamins TABS; Therapy: (Recorded:63Oyh4558) to Recorded   10  Pantoprazole Sodium 40 MG Oral Tablet Delayed Release; TAKE 1 TABLET 30 MINUTES    BEFORE BREAKFAST DAILY; Therapy: 19DQQ3119 to (Evaluate:15Mar2018)  Requested for: 20Mar2017; Last    Rx:20Mar2017 Ordered   11  PARoxetine HCl - 30 MG Oral Tablet; TAKE 1 TABLET DAILY AS DIRECTED; Therapy: 98APS4698 to (Evaluate:15Mar2018)  Requested for: 20Mar2017; Last    Rx:20Mar2017 Ordered   12  Promethazine-Codeine 6 25-10 MG/5ML Oral Syrup; TAKE 5 ML EVERY 4 TO 6 HOURS    AS NEEDED FOR COUGH; Therapy: 66WOQ1202 to (Evaluate:75Ssx6549); Last Rx:28Nov2017 Ordered   13  ROPINIRole HCl - 0 5 MG Oral Tablet; Therapy: 54YBX8732 to Recorded   14  TraMADol HCl - 50 MG Oral Tablet; TAKE 1 TABLET Every 6 hours PRN for pain; Therapy: 12PQN9644 to (Evaluate:20Sep2017); Last Rx:12Sep2017 Ordered   15  TraZODone HCl - 50 MG Oral Tablet; TAKE 1 TABLET AT BEDTIME; Therapy: 15KOP9078 to (Last Rx:30Nov2015) Ordered   16  Ventolin  (90 Base) MCG/ACT Inhalation Aerosol Solution; INHALE 1-2 PUFFS    EVERY 4-6 HOURS AS NEEDED AND AS DIRECTED; Therapy: 98IGX2051 to (Last Rx:28Nov2017)  Requested for: 67TZF3481 Ordered   17  Vitamin D 2000 UNIT Oral Capsule; TAKE 2 CAPSULE Daily; Therapy: 02WUD7123 to (Last Rx:98Nai1611) Ordered   18   Xifaxan 550 MG Oral Tablet; TAKE 1 TABLET 3 times daily; Therapy: 46HQC1976 to (Evaluate:01Jan2018)  Requested for: 72HEO8498; Last    Rx:79Gue7977 Ordered    Allergies    1  Toradol   2  Ansaid TABS   3  Pneumovax 23 25 MCG/0 5ML Injection Injectable    Vitals  Signs   Recorded: 82RNS8202 02:42PM   Height: 4 ft 10 in  Weight: 199 lb 1 6 oz  BMI Calculated: 41 61  BSA Calculated: 1 82    Future Appointments    Date/Time Provider Specialty Site   01/08/2018 11:00 AM [facility]     Signatures   Electronically signed by : SAVANNAH AbbottRD;  Dec 26 2017  2:42PM EST                       (Author)    Electronically signed by : NENA Nguyen ; Dec 26 2017  3:26PM EST                       (Validation)

## 2018-01-23 NOTE — CONSULTS
Assessment   1  Obesity (278 00) (E66 9)   2  Hyperlipidemia (272 4) (E78 5)   3  Preop cardiovascular exam (V72 81) (Z01 810)    Plan   Obesity    · Follow-up PRN Evaluation and Treatment  Follow-up  Status: Complete  Done:    55LAG5336   Ordered; For: Obesity; Ordered By: Joseph Spaulding Performed:  Due: 34BSH2820  Preop cardiovascular exam    · EKG/ECG- POC; Status:Complete;   Done: 85YVK2498 10:26AM   Perform: In Office; Last Updated By:Jose Harevy; 1/22/2018 10:30:16 AM;Ordered; For:Preop cardiovascular exam; Ordered By:Cipriano Ortiz;    Discussion/Summary      It is my impression that the patient represents a low risk for upcoming bariatric surgery  I have cleared her for this procedure  She is reasonably active and having no cardiac symptomatology  Her EKG and exam is normal  She does have hyperlipidemia which is well controlled with atorvastatin  She does have mild glucose intolerance which should correct with weight loss  Hopefully her sleep apnea will be resolved with significant weight loss  I will see her on an as-needed basis  Chief Complaint   Patient is here for bariatric surgery clearance      History of Present Illness   Cardiology HPI Free Text Note Form [facility]: The patient is a 57-year-old female with history of obesity  She anticipates having bariatric surgery  She smoked very little in her youth  She does not have hypertension  She has a history of hyperlipidemia which has been well controlled with atorvastatin  She is moderately active and denies chest pain or shortness of breath  She does get an occasional palpitation which sounds like perhaps a premature be  She does get some edema if she consumes too much salt  She denies lightheadedness  Review of Systems           Cardiac: palpitations present , but-- no chest pain,-- no rhythm problems,-- no heart murmur present-- and-- no signs of swelling  Skin: No complaints of nonhealing sores or skin rash        Genitourinary: post menopausal, but-- no frequent urination at night,-- no difficult urination,-- no blood in urine,-- no kidney stones,-- no loss of bladder control,-- no kidney problems,-- no birth control or hormone replacement therapy-- and-- not pregnant      Psychological: no depression-- and-- no anxiety  General: no trouble sleeping  Respiratory: no shortness of breath-- and-- no cough/sputum  Gastrointestinal: heartburn, but-- no nausea,-- no vomiting,-- no bloody stools,-- no diarrhea-- and-- no constipation      Neurological: no numbness,-- no weakness-- and-- no headaches      Musculoskeletal: arthritis, but-- no back pain       ROS reviewed  Active Problems   1  Acid reflux (530 81) (K21 9)   2  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)   3  Allergic rhinitis (477 9) (J30 9)   4  Arthralgia (719 40) (M25 50)   5  Bloating symptom (787 3) (R14 0)   6  Breast self examination education, encounter for (V65 49) (Z71 89)   7  Carpal tunnel syndrome (354 0) (G56 00)   8  Change in bowel habits (787 99) (R19 4)   9  Cough (786 2) (R05)   10  Depression (311) (F32 9)   11  Edema, unspecified type (782 3) (R60 9)   12  Encounter for screening mammogram for malignant neoplasm of breast (V76 12)      (Z12 31)   13  Endometrial thickening on ultra sound (793 5) (R93 8)   14  Epigastric pain (789 06) (R10 13)   15  Fatty liver (571 8) (K76 0)   16  Fibromyalgia (729 1) (M79 7)   17  Gastric polyps (211 1) (K31 7)   18  GERD without esophagitis (530 81) (K21 9)   19  Hiatal hernia (553 3) (K44 9)   20  Hip pain (719 45) (M25 559)   21  History of self breast exam   22  Hyperglycemia (790 29) (R73 9)   23  Hyperlipidemia (272 4) (E78 5)   24  Hyperthyroidism (242 90) (E05 90)   25  Influenza vaccination administered at current visit (V04 81) (Z23)   26  Insomnia, unspecified type (780 52) (G47 00)   27  Irritable bowel syndrome with diarrhea (564 1) (K58 0)   28  Menopause (627 2) (Z78 0)   29   Morbid obesity (278 01) (E66 01)   30  Myalgia (729 1) (M79 1)   31  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,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,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)   32  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)   33  Need for pneumococcal vaccination (V03 82) (Z23)   34  Obesity (278 00) (E66 9)   35  Obstructive sleep apnea on CPAP (327 23,V46 8) (G47 33,Z99 89)   36  Osteoarthritis, localized, knee (715 36) (M17 10)   37  Painful orthopaedic hardware (996 78) (T84 84XA)   38  Peritonsillar abscess (475) (J36)   39  Preop cardiovascular exam (V72 81) (Z01 810)   40  Restless legs syndrome (333 94) (G25 81)   41  Screening for human papillomavirus (HPV) (V73 81) (Z11 51)   42  Sicca syndrome (710 2) (M35 00)   43  Sinusitis, acute (461 9) (J01 90)   44  Sleep apnea (780 57) (G47 30)   45  Thyroid Nodule   46  Trapezius strain (840 8) (S46 819A)   47  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 (200 6) (O63 425)   48  Visit for routine gyn exam (V72 31) (Z01 419)   49  Vitamin D deficiency (268 9) (E55 9)   50  Vulvar lesion (624 8) (N90 89)   51  Weight gain (783 1) (R63 5)   52   Wheezing (786 07) (R06 2)    Past Medical History    · History of Acute suppurative otitis media without spontaneous rupture of ear drum,    unspecified laterality   · History of Acute upper respiratory infection (465 9) (J06 9)   · History of Adhesive capsulitis of right shoulder (726 0) (M75 01)   · History of Ankylosis Of The Right Knee (718 56)   · History of Blister of leg (916 2) (M81 005A)   · Breast self examination education, encounter for (V65 49) (Z71 89)   · History of Cellulitis of left lower extremity (682 6) (L03 116)   · History of Cervical polyp (622 7) (N84 1)   · History of Closed displaced fracture of greater tuberosity of humerus (812 03)    (W70 704T)   · History of Encounter for screening colonoscopy (V76 51) (Z12 11)   · History of  3 (V22 2) (Z33 1)   · History of allergic rhinitis (V12 69) (Z87 09)   · History of conjunctivitis (V12 49) (Z86 69)   · History of dermatitis (V13 3) (Z87 2)   · History of headache (V13 89) (S41 023)   · History of sinusitis (V12 69) (Z87 09)   · History of upper respiratory infection (V12 09) (Z87 09)   · History of Mouth dryness (527 7) (R68 2)   · History of Multiple joint pain (719 49) (M25 50)   · 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)   · History of Pain of lower leg, unspecified laterality (729 5) (M79 669)   · History of Post-operative state (V45 89) (Z98 890)   · History of Preop examination (V72 84) (Z01 818)   · History of Superficial phlebitis and thrombophlebitis of left lower extremity (451 0)    (I80 02)   · History of Visit For: Pre-procedural Exam Prior To General Surgery (V72 83)     The active problems and past medical history were reviewed and updated today  Surgical History    · History of Breast Surgery Reduction Procedure Elective   · History of Cholecystectomy Laparoscopic   · History of Dilation And Curettage   · History of Endovenous Ablation Of Incompetent Vein Laser   · History of Knee Arthroscopy (Therapeutic)   · History of Knee Replacement   · History of Knee Replacement   · History of Shoulder Surgery     The surgical history was reviewed and updated today  Family History   Mother    · Family history of Anxiety and depression   · Family history of Coronary artery disease (414 00) (I25 10)   · Family history of Alzheimer's disease (V17 2) (Z82 0)   · Family history of Osteoporosis (733 00) (M81 0)  Father    · Family history of Osteoarthritis (715 90) (M19 90)  Sister    · Family history of Carcinosarcoma Of The Uterus (V16 49)   · Family history of    · Family history of congestive cardiomyopathy (V17 49) (Z82 49)  Brother    · Family history of malignant neoplasm of uterus (V16 49) (Z80 49)  Family History Reviewed: The family history was reviewed and updated today         Social History    · Caffeine Use   · Denied: History of Drug Use   · Former smoker (V15 82) (Z87 891)   · smoked a little when she was young   · Marital History - Currently    · No alcohol use   · No caffeine use   · Worship Affiliation [employer]   · Second hand smoke exposure (V15 89) (Z77 22)   · Two children   · Uses Safety Equipment - Seatbelts  The social history was reviewed and updated today  The social history was reviewed and is unchanged  Current Meds    1  Atorvastatin Calcium 10 MG Oral Tablet; TAKE 1 TABLET DAILY; Therapy: 36HVB1582 to (Evaluate:15Mar2018)  Requested for: 20Mar2017; Last     Rx:20Mar2017; Status: ACTIVE - [facility] Ordered   2  Cevimeline HCl - 30 MG Oral Capsule; Take 1 capsule twice daily Recorded   3  Furosemide 20 MG Oral Tablet; TAKE 1 TABLET TWICE DAILY; Therapy: 08TAM5154 to (Evaluate:15Mar2018)  Requested for: 20Mar2017; Last     Rx:20Mar2017 Ordered   4  Gabapentin 300 MG TABS; Therapy: (Recorded:35Frn0891) to Recorded   5  Meloxicam 15 MG Oral Tablet; Take 1 tablet daily  Requested for: 20Mar2017; Last     Rx:20Mar2017 Ordered   6  Multi-Vitamins TABS; Therapy: (Jazmine Adam) to Recorded   7  Pantoprazole Sodium 40 MG Oral Tablet Delayed Release; TAKE 1 TABLET 30 MINUTES     BEFORE BREAKFAST DAILY; Therapy: 10BCX8933 to (Evaluate:15Mar2018)  Requested for: 20Mar2017; Last     Rx:20Mar2017 Ordered   8  PARoxetine HCl - 30 MG Oral Tablet; TAKE 1 TABLET DAILY AS DIRECTED; Therapy: 48YDS4441 to (Evaluate:15Mar2018)  Requested for: 20Mar2017; Last     Rx:20Mar2017 Ordered   9  TraZODone HCl - 50 MG Oral Tablet; TAKE 1 TABLET AT BEDTIME; Therapy: 42LAJ3771 to (Last Rx:30Nov2015) Ordered   10  Ventolin  (90 Base) MCG/ACT Inhalation Aerosol Solution; INHALE 1-2 PUFFS      EVERY 4-6 HOURS AS NEEDED AND AS DIRECTED; Therapy: 48FAK6928 to (Last Rx:28Nov2017)  Requested for: 17NNE8110 Ordered   11  Vitamin D 2000 UNIT Oral Capsule; TAKE 2 CAPSULE Daily;       Therapy: 91KGW2131 to (Last XO:63QQV0304) Ordered     The medication list was reviewed and updated today  Allergies   1  Toradol   2  Ansaid TABS   3  Pneumovax 23 25 MCG/0 5ML Injection Injectable    Vitals   Signs   Heart Rate: 89  Respiration: 16  Systolic: 389, RUE, Sitting  Diastolic: 66, RUE, Sitting  Height: 4 ft 10 in  Weight: 197 lb   BMI Calculated: 41 17  BSA Calculated: 1 81    Physical Exam        Constitutional      General appearance: Abnormal        Eyes      Conjunctiva and Sclera examination: Conjunctiva pink, sclera anicteric  Ears, Nose, Mouth, and Throat - Oropharynx: Clear, nares are clear, mucous membranes are moist       Neck      Neck and thyroid: Normal, supple, trachea midline, no thyromegaly  Pulmonary      Auscultation of lungs: Clear to auscultation, no rales, no rhonchi, no wheezing, good air movement  Cardiovascular      Auscultation of heart: Normal rate and rhythm, normal S1 and S2, no murmurs  Carotid pulses: Normal, 2+ bilaterally  Pedal pulses: Normal, 2+ bilaterally  Examination of extremities for edema and/or varicosities: Normal        Abdomen      Abdomen: Non-tender and no distention  Liver and spleen: No hepatomegaly or splenomegaly  Results/Data   NM GASTRIC EMPTYING 28WNT9118 08:00AM Ramya Zaragoza      Test Name Result Flag Reference   NM GASTRIC EMPTYING (Report)     GASTRIC EMPTYING STUDY           INDICATION: R14 0: Abdominal distension (gaseous)  History taken directly from the electronic ordering system  COMPARISON: None available           TECHNIQUE:  The study was performed following the oral administration of 0 9 mCi Tc-99m sulfur colloid combined with scrambled eggs, as part of a standard meal  Following the meal, one minute anterior and posterior images were obtained immediately and       at 0 25 hours, 0 5 hour, 1 hour, 1 5 hour, 2 hour, 3 hour   intervals from the time of ingestion   Geometric mean analyses were then performed  As of March 1, 2016, this gastric emptying protocol has been modified and updated  The gastric emptying       times and the normal values reported below reflect the change in protocol  FINDINGS:           Gastric emptying at 0 5 hours = 18 (N < 30%)       Gastric emptying at 1 hour = 33 % (N = 10 - 70%)      Gastric emptying at 2 hours = 70 % (N = > 40%)      Gastric emptying at 3 hours = 92 % (N = > 70%)      Linear T-1/2 = 100 minutes                     IMPRESSION:           Normal rate of gastric emptying  Workstation performed: [id number]           Signed by:      Ghazal Cowart MD      1/18/18        End of Encounter Meds   1  PARoxetine HCl - 30 MG Oral Tablet (Paxil); TAKE 1 TABLET DAILY AS DIRECTED; Therapy: 55HHJ5831 to (Evaluate:15Mar2018)  Requested for: 20Mar2017; Last     Rx:20Mar2017 Ordered  2  Furosemide 20 MG Oral Tablet; TAKE 1 TABLET TWICE DAILY; Therapy: 00RWU6659 to (Evaluate:15Mar2018)  Requested for: 20Mar2017; Last     Rx:20Mar2017 Ordered  3  Pantoprazole Sodium 40 MG Oral Tablet Delayed Release (Protonix); TAKE 1 TABLET 30     MINUTES BEFORE BREAKFAST DAILY; Therapy: 33EAV4439 to (Evaluate:15Mar2018)  Requested for: 20Mar2017; Last     Rx:20Mar2017 Ordered  4  Atorvastatin Calcium 10 MG Oral Tablet; TAKE 1 TABLET DAILY; Therapy: 86PYX2406 to (Evaluate:15Mar2018)  Requested for: 20Mar2017; Last     Rx:20Mar2017; Status: ACTIVE - Transmit to Pharmacy - Awaiting Verification Ordered  5  TraZODone HCl - 50 MG Oral Tablet; TAKE 1 TABLET AT BEDTIME; Therapy: 10SYU1886 to (Last Rx:30Nov2015) Ordered  6  Meloxicam 15 MG Oral Tablet; Take 1 tablet daily  Requested for: 20Mar2017; Last     Rx:20Mar2017 Ordered  7  Vitamin D 2000 UNIT Oral Capsule; TAKE 2 CAPSULE Daily; Therapy: 64DQK5312 to (Last Rx:20May2015) Ordered  8   Ventolin  (90 Base) MCG/ACT Inhalation Aerosol Solution; INHALE 1-2 PUFFS     EVERY 4-6 HOURS AS NEEDED AND AS DIRECTED; Therapy: 71GKC8868 to (Last Rx:28Nov2017)  Requested for: 38DES4217 Ordered  9  Cevimeline HCl - 30 MG Oral Capsule; Take 1 capsule twice daily Recorded   10  Gabapentin 300 MG TABS; Therapy: (Recorded:08Udg6648) to Recorded   11  Multi-Vitamins TABS;       Therapy: (Recorded:94Spo6469) to Recorded    Signatures    Electronically signed by : NENA Culp ; Jan 22 2018 11:07AM EST                       (Author)

## 2018-01-23 NOTE — RESULT NOTES
Discussion/Summary   celiac serologies are neg      Verified Results  (1) CELIAC DISEASE AB PROFILE 95LPP8535 11:02AM [facility] Order Number: [id number]     Test Name Result Flag Reference   tTG IGG <2 U/mL  0 - 5   Negative        0 - 5                                Weak Positive   6 - 9                                Positive           >9   tTG IGA <2 U/mL  0 - 3   Negative        0 -  3                                Weak Positive   4 - 10                                Positive           >10   Tissue Transglutaminase (tTG) has been identified   as the endomysial antigen  Studies have demonstr-   ated that endomysial IgA antibodies have over 99%   specificity for gluten sensitive enteropathy     GLIADA 12 units  0 - 19   Negative                   0 - 19                     Weak Positive             20 - 30                     Moderate to Strong Positive   >30   GLIADG 2 units  0 - 19   Negative                   0 - 19                     Weak Positive             20 - 30                     Moderate to Strong Positive   >30   ENDOMYSIAL AB IGA Negative  Negative   Performed at:  [facility] [place] [address] [facility] [address]  [id number]  : Carlo Ingram MD, Phone:  [phone]    mg/dL  [phone]  352

## 2018-01-23 NOTE — RESULT NOTES
Discussion/Summary   neg ova and parasite      Verified Results  (1) OVA AND PARASITES EXAM 78VBV7905 11:02AM Loise Rolling Order Number: [id number]     Test Name Result Flag Reference   O&P CONC  EXAM      No ova, cysts, or parasites seen     One negative specimen does not rule out the possibility of a  parasitic infection  These results were obtained using wet preparation(s) and trichrome  stained smear  This test does not include testing for Cryptosporidium  parvum, Cyclospora, or Microsporidia      Performed at:  [address] [place] [address]  [id number]  : Aminta Juarez MD, Phone:  [phone]
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
## 2018-01-24 VITALS
DIASTOLIC BLOOD PRESSURE: 60 MMHG | SYSTOLIC BLOOD PRESSURE: 130 MMHG | HEART RATE: 68 BPM | BODY MASS INDEX: 41.85 KG/M2 | WEIGHT: 199.38 LBS | HEIGHT: 58 IN

## 2018-01-24 NOTE — RESULT NOTES
Verified Results  (1) TISSUE EXAM [id number] 01:06PM Catrachita Martinez     Test Name Result Flag Reference   LAB AP CASE REPORT (Report)     Surgical Pathology Report             Case: [id number]                   Authorizing Provider: Rosa Doshi MD       Collected:      01/11/2018 1306        Ordering Location:   [address] [place] [address]   Received:      01/11/2018 [address] [place] [address] Endoscopy                               Pathologist:      Wilbur Oppenheim, MD                                 Specimens:  A) - Polyp, Stomach/Small Intestine, duodenal polyp                          B) - Polyp, Stomach/Small Intestine, gastric x 4   LAB AP FINAL DIAGNOSIS (Report)     A  Duodenal polyp (biopsy):    - Portions of polypoid small bowel mucosa, favor adenomatous polyp     - No high-grade dysplasia or malignancy identified  B  Gastric polyp ??4 (biopsy):    - Portions of fundic gland polyps  - No dysplasia or malignancy identified  Electronically signed by Wilbur Oppenheim, MD on 1/15/2018 at 10:37 AM   LAB AP NOTE      Interpretation performed [facility]  LAB AP SURGICAL ADDITIONAL INFORMATION (Report)     All controls performed with the immunohistochemical stains reported above   reacted appropriately  These tests were developed and their performance   characteristics determined by [facility] or   [facility]  They may not be cleared or approved by the [place]  Food and Drug Administration  The FDA has determined that such clearance   or approval is not necessary  These tests are used for clinical purposes  They should not be regarded as investigational or for research  This   laboratory has been approved by [place] 88, designated as a high-complexity   laboratory and is qualified to perform these tests  LAB AP GROSS DESCRIPTION (Report)     A   The specimen is received in formalin, labeled with the patient's name   and hospital number, and is designated duodenal polyp  The specimen   consists 3 pink-purple, focally rubbery, focally friable polypoid portions   of tissue ranging from 0 1-0 6 cm in greatest dimension  The specimen is   entirely submitted in 1 cassette  B  The specimen is received in formalin, labeled with the patient's name   and hospital number, and is designated gastric polyp x4  The specimen   consists of 5 tan-pink, rubbery, irregularly shaped polypoid portions of   tissue ranging from 0 3-0 7 cm in greatest dimension  The specimen is   entirely submitted in 1 cassette  Note: The estimated total formalin fixation time based upon information   provided by the submitting clinician and the standard processing schedule   is under 72 hours       AGerczyk   LAB AP CLINICAL INFORMATION      Benign neoplasm of duodenum

## 2018-02-08 PROBLEM — G47.33 OSA (OBSTRUCTIVE SLEEP APNEA): Status: ACTIVE | Noted: 2018-02-08

## 2018-02-08 PROBLEM — E66.01 MORBID OBESITY (HCC): Status: ACTIVE | Noted: 2018-02-08

## 2018-02-08 PROBLEM — K76.0 FATTY LIVER: Status: ACTIVE | Noted: 2018-02-08

## 2018-02-08 PROBLEM — E78.5 HYPERLIPIDEMIA: Status: ACTIVE | Noted: 2018-02-08

## 2018-02-20 ENCOUNTER — TELEPHONE (OUTPATIENT)
Dept: GASTROENTEROLOGY | Facility: CLINIC | Age: 63
End: 2018-02-20

## 2018-02-20 PROBLEM — D13.2 ADENOMATOUS DUODENAL POLYP: Status: ACTIVE | Noted: 2018-02-20

## 2018-02-21 ENCOUNTER — TELEPHONE (OUTPATIENT)
Dept: GASTROENTEROLOGY | Facility: CLINIC | Age: 63
End: 2018-02-21

## 2018-02-21 NOTE — TELEPHONE ENCOUNTER
MIKE---patient has an egd 6-14-18 with you and she wanted to let you know that she is having a gastric sleeve done 3-19-18

## 2018-02-26 NOTE — RESULT NOTES
Verified Results  (1) TISSUE EXAM [id number] 08:03AM Noemi Devine     Test Name Result Flag Reference   LAB AP CASE REPORT (Report)     Surgical Pathology Report             Case: [id number]                   Authorizing Provider: Maya Jack MD      Collected:      12/21/2017 0803        Ordering Location:   [facility]    Received:      12/21/2017 [address] [place] [address] Endoscopy                              Pathologist:      Audi Louise MD                                Specimens:  A) - Duodenum, Duodenal polyp biopsy                                  B) - Stomach, Gastric biopsy r/o h pylori                               C) - Polyp, Stomach/Small Intestine, Gastric polyp   LAB AP MICROSCOPIC DESCRIPTION (Report)     A  Duodenum, Duodenal polyp biopsy:  Adenomatous polyp/Tubular adenoma  -No evidence of high grade dysplasia or malignancy seen  B  Stomach, Gastric biopsy r/o h pylori:  Benign gastric-oxyntoantral type mucosa with mild chronic inflammation and   reactive surface foveolar epithelial changes consistent with reactive/   chemical gastritis   -No evidence of ulceration   -No evidence of dysplasia or malignancy   -No H Pylori organisms identified on immunohistochemical stained slide  Control reacted appropriately  C  Polyp, Stomach/Small Intestine, Gastric polyp:  Benign fundic gland polyps  -No evidence of malignancy seen  LAB AP NOTE      Interpretation performed at [address] [address]   LAB AP SURGICAL ADDITIONAL INFORMATION (Report)     All controls performed with the immunohistochemical stains reported above   reacted appropriately  These tests were developed and their performance   characteristics determined by [facility] or   [address] [place] [address]  They may not be cleared or approved by the [place] S  Food and Drug Administration  The FDA has determined that such clearance   or approval is not necessary   These tests are used for clinical purposes  They should not be regarded as investigational or for research  This   laboratory has been approved by [facility], designated as a high-complexity   laboratory and is qualified to perform these tests  LAB AP GROSS DESCRIPTION (Report)     A  The specimen is received in formalin, labeled with the patient's name   and hospital number, and is designated duodenum polyp biopsy  The   specimen consists of 3 tan soft tissue fragments measuring 0 1, 0 3, 0 3   cm in greatest dimension  Entirely submitted in 1 cassette  B  The specimen is received in formalin, labeled with the patient's name   and hospital number, and is designated Gastric biopsy  The specimen   consists of 3 tan soft tissue fragments measuring 0 1, 0 1, and 0 3 cm in   greatest dimension  Entirely submitted in 1 cassette  C  The specimen is received in formalin, labeled with the patient's name   and hospital number, and is designated Gastric polyp  The specimen   consists of multiple tan soft tissue fragments measuring in loose   aggregate 1 3 x 0 8 x 0 2 cm  Entirely submitted in 1 cassette  Note: The estimated total formalin fixation time based upon information   provided by the submitting clinician and the standard processing schedule   is under 72 hours  AKemmerer   LAB AP ADDENDUM 1 (Report)     The purpose of this supplemental report is to add the result of   immunostains performed on specimens B&C:    -Pankeratin stain MCK is used in evaluation and highlighted surface   epithelium  Stromal cells are negative for staining   Appropriate positive   and negative controls reacted appropriately  Addendum electronically signed by Mee Jeffers MD on 12/26/2017 at 2:41 PM

## 2018-02-27 RX ORDER — CEFUROXIME AXETIL 250 MG/1
1 TABLET ORAL EVERY 12 HOURS
COMMUNITY
Start: 2017-10-17 | End: 2018-03-01

## 2018-02-27 RX ORDER — CEPHALEXIN 500 MG/1
CAPSULE ORAL
Refills: 0 | COMMUNITY
Start: 2018-01-25 | End: 2018-03-01

## 2018-02-27 RX ORDER — DOXYCYCLINE HYCLATE 100 MG/1
1 CAPSULE ORAL 2 TIMES DAILY
COMMUNITY
Start: 2017-11-28 | End: 2018-03-01

## 2018-02-27 RX ORDER — METHOCARBAMOL 500 MG/1
TABLET, FILM COATED ORAL
COMMUNITY
Start: 2017-06-26 | End: 2018-03-01

## 2018-02-27 RX ORDER — RIFAXIMIN 550 MG/1
550 TABLET ORAL 3 TIMES DAILY
Refills: 0 | COMMUNITY
Start: 2017-12-18 | End: 2018-03-01

## 2018-02-27 RX ORDER — AMITRIPTYLINE HYDROCHLORIDE 25 MG/1
1 TABLET, FILM COATED ORAL
COMMUNITY
Start: 2012-06-07 | End: 2018-03-01

## 2018-02-28 ENCOUNTER — ANESTHESIA EVENT (OUTPATIENT)
Dept: PERIOP | Facility: HOSPITAL | Age: 63
DRG: 621 | End: 2018-02-28
Payer: COMMERCIAL

## 2018-03-01 ENCOUNTER — OFFICE VISIT (OUTPATIENT)
Dept: BARIATRICS | Facility: CLINIC | Age: 63
End: 2018-03-01
Payer: COMMERCIAL

## 2018-03-01 VITALS
HEIGHT: 58 IN | SYSTOLIC BLOOD PRESSURE: 130 MMHG | WEIGHT: 201 LBS | HEART RATE: 85 BPM | TEMPERATURE: 98.7 F | BODY MASS INDEX: 42.19 KG/M2 | DIASTOLIC BLOOD PRESSURE: 90 MMHG

## 2018-03-01 DIAGNOSIS — G47.33 OSA (OBSTRUCTIVE SLEEP APNEA): ICD-10-CM

## 2018-03-01 DIAGNOSIS — K76.0 FATTY LIVER: ICD-10-CM

## 2018-03-01 DIAGNOSIS — E66.01 MORBID OBESITY (HCC): Primary | ICD-10-CM

## 2018-03-01 DIAGNOSIS — E78.5 HYPERLIPIDEMIA, UNSPECIFIED HYPERLIPIDEMIA TYPE: ICD-10-CM

## 2018-03-01 DIAGNOSIS — E66.01 MORBID (SEVERE) OBESITY DUE TO EXCESS CALORIES (HCC): Primary | ICD-10-CM

## 2018-03-01 PROCEDURE — 99213 OFFICE O/P EST LOW 20 MIN: CPT | Performed by: SURGERY

## 2018-03-01 RX ORDER — OXYCODONE HYDROCHLORIDE AND ACETAMINOPHEN 5; 325 MG/1; MG/1
1 TABLET ORAL EVERY 4 HOURS PRN
Qty: 30 TABLET | Refills: 0 | Status: SHIPPED | OUTPATIENT
Start: 2018-03-01 | End: 2018-03-01

## 2018-03-01 RX ORDER — CEVIMELINE HYDROCHLORIDE 30 MG/1
30 CAPSULE ORAL
Status: ON HOLD | COMMUNITY
End: 2018-03-19 | Stop reason: SDUPTHER

## 2018-03-01 NOTE — H&P
BARIATRIC H&P - BARIATRIC SURGERY  Celina German 58 y o  female MRN: [id number]  Unit/Bed#:  Encounter: [id number]      HPI:  Celina German is a 58 y o  female who presents with a long-standing history of morbid obesity  She was found to be a good candidate to undergo a bariatric operation upon [facility]  She is here today to discuss details of her surgery  Review of Systems   All other systems reviewed and are negative  Historical Information   Past Medical History:   Diagnosis Date    Allergic rhinitis     Basal cell carcinoma     Bowel habit changes     Cataract     starting with a catract    Chronic GERD     CPAP (continuous positive airway pressure) dependence     Fatty liver     Fibromyalgia     Fibromyalgia, primary     Hiatal hernia     High cholesterol     Insomnia     Morbid obesity (HCC)     Osteoarthritis     Palpitations     pt denies a fib    PONV (postoperative nausea and vomiting)     Restless leg     Sicca syndrome (HCC)     Sleep apnea     Thyroid nodule     nodule on thyroid    Varicose veins of left leg with edema     Wears glasses      Past Surgical History:   Procedure Laterality Date    BLADDER SURGERY      sling    CHOLECYSTECTOMY  1998    COLONOSCOPY      CYST REMOVAL      thigh    DILATION AND CURETTAGE OF UTERUS      ESOPHAGOGASTRODUODENOSCOPY      ESOPHAGOGASTRODUODENOSCOPY N/A 12/21/2017    Procedure: ESOPHAGOGASTRODUODENOSCOPY (EGD) with biopsy and polypectomy;  Surgeon: William Gaxiola MD;  Location: [facility];   Service: Gastroenterology    HARDWARE REMOVAL      right shoulder    JOINT REPLACEMENT      both knees    KNEE ARTHROSCOPY      ORIF HUMERAL SHAFT FRACTURE Right     NJ ENDOVENOUS LASER, 1ST VEIN Left 4/29/2016    Procedure: GREATER SAPHENOUS VEIN EVLT ;  Surgeon: Becca Pickett DO;  [facility];  Service: Vascular    NJ ESOPHAGOGASTRODUODENOSCOPY TRANSORAL DIAGNOSTIC N/A 1/11/2018    Procedure: egd w/ emr ;  Surgeon: Jess Benitez MD;  [facility]; Service: Gastroenterology    NE PHLEB VEINS - EXTREM - TO 20 Left 4/29/2016    Procedure: AND STAB PHLEBECTOMIES ;  Surgeon: Melany Bynum DO;  [facility];  Service: Vascular    REDUCTION MAMMAPLASTY      Reduction    SKIN CANCER EXCISION      TOTAL KNEE ARTHROPLASTY Bilateral      Social History   History   Alcohol Use No     History   Drug Use No     History   Smoking Status    Never Smoker   Smokeless Tobacco    Never Used     Family History: non-contributory    Meds/Allergies   all medications and allergies reviewed  Allergies   Allergen Reactions    Ketorolac Itching    Flurbiprofen Other (See Comments)     NSAIDS    Ketorolac Tromethamine Itching    Pneumovax [Pneumococcal Polysaccharide Vaccine] Swelling     Arm swelling and a rash       Objective     Current Vitals:   Blood Pressure: 130/90 (03/01/18 1457)  Pulse: 85 (03/01/18 1457)  Temperature: 98 7 °F (37 1 °C) (03/01/18 1457)  Height: 4' 10" (147 3 cm) (03/01/18 1457)  Weight - Scale: 91 2 kg (201 lb) (03/01/18 1457)      Invasive Devices          No matching active lines, drains, or airways          Physical Exam   Constitutional: She is oriented to person, place, and time  Vital signs are normal  She appears well-developed and well-nourished  No distress  HENT:   Head: Normocephalic and atraumatic  Nose: Nose normal    Mouth/Throat: Oropharynx is clear and moist    Eyes: Conjunctivae are normal  Right eye exhibits no discharge  No scleral icterus  Neck: Normal range of motion  Neck supple  Cardiovascular: Normal rate, regular rhythm, normal heart sounds and intact distal pulses  Pulmonary/Chest: Effort normal and breath sounds normal  No stridor  No respiratory distress  She has no wheezes  She has no rales  She exhibits no tenderness  Abdominal: Soft   Normal appearance and bowel sounds are normal  There is no tenderness  There is no rebound, no guarding and no CVA tenderness  Well-healed laparoscopic incisions from gallbladder surgery   Musculoskeletal: Normal range of motion  Lymphadenopathy:     She has no cervical adenopathy  Neurological: She is alert and oriented to person, place, and time  Skin: Skin is warm and dry  No rash noted  She is not diaphoretic  No erythema  Psychiatric: She has a normal mood and affect  Her behavior is normal  Judgment and thought content normal    Nursing note and vitals reviewed  Lab Results: I have personally reviewed pertinent lab results  Imaging: I have personally reviewed pertinent reports  EKG, Pathology, and Other Studies: I have personally reviewed pertinent reports  Assessment/PLAN:    58 y o  female morbidly obese found to be a good candidate to undergo a weight loss operation upon being enrolled here at the [facility]     Patient has a long history of morbid obesity and is presenting to discuss the surgical weight loss options  Despite the patient best efforts patient was unable to lose any meaningful or sustainable weight using nonsurgical means  We had a long discussion regarding all the surgical weight-loss options at our disposal at this point and reviewed the risks and benefits of each procedure in details as it relates to her age, BMI and medical conditions  She has been pre certified to undergo a Laparoscopic sleeve gastrectomy  Here today to review her pre op test results  Has been medically cleared for the procedure   +++++++++++++++++++++++++++++++++++  She has obstructive sleep apnea with CPAP machine   +++++++++++++++++++++++++++++++++++    I have discussed with her at length the risks and benefits of the operation and reiterated the components of our multidisciplinary program and the importance of compliance and follow up in the post operative period   Although there is a great statistical chance of improvement or even resolution of most of her associated comorbidities, the results vary from patient to patient and they largely depend on her commitment  The patient was also instructed with regards to the importance of behavior modification, nutritional counseling, support meeting attendance and lifestyle changes that are important to ensure success  She was given the opportunity to ask questions and I have answered all of them  I have addressed with the patient the level of CODE STATUS for this hospital stay and after explaining the different options currently she wishes to be a Level I  She understands and wishes to proceed  She still has a couple more lb lose prior to the surgery      Brent Pizarro MD  3/1/2018  3:26 PM

## 2018-03-01 NOTE — PRE-PROCEDURE INSTRUCTIONS
Pre-Surgery Instructions:   Medication Instructions    albuterol (PROVENTIL HFA,VENTOLIN HFA) 90 mcg/act inhaler Patient was instructed by Physician and understands   atorvastatin (LIPITOR) 10 mg tablet Patient was instructed by Physician and understands   cevimeline (EVOXAC) 30 MG capsule Patient was instructed by Physician and understands   Cholecalciferol (VITAMIN D-3 PO) Patient was instructed by Physician and understands   Cyanocobalamin (VITAMIN B-12 PO) Patient was instructed by Physician and understands   docusate sodium (COLACE) 100 mg capsule Patient was instructed by Physician and understands   furosemide (LASIX) 20 mg tablet Patient was instructed by Physician and understands   gabapentin (NEURONTIN) 300 mg capsule Patient was instructed by Physician and understands   meloxicam (MOBIC) 15 mg tablet Patient was instructed by Physician and understands   Multiple Vitamin (MULTIVITAMIN) capsule Patient was instructed by Physician and understands   pantoprazole (PROTONIX) 40 mg tablet Patient was instructed by Physician and understands   PARoxetine (PAXIL) 30 mg tablet Patient was instructed by Physician and understands   POTASSIUM PO Patient was instructed by Physician and understands   Pseudoephedrine-Acetaminophen (ALLEREST PO) Patient was instructed by Physician and understands   traZODone (DESYREL) 50 mg tablet Patient was instructed by Physician and understands  Patient was seen by Dr Yashira Rome and was instructed to take Protonix am of surgery with a sip of water  Patient was instructed to avoid NSAIDs, Aspirin, Vitamins, and supplements 7 days prior to surgery  [facility] pre-op instructions reviewed  Pre-op bathing reviewed and patient was given chlorhexidine soap

## 2018-03-06 DIAGNOSIS — F41.9 ANXIETY: ICD-10-CM

## 2018-03-06 DIAGNOSIS — E78.2 MIXED HYPERLIPIDEMIA: ICD-10-CM

## 2018-03-06 DIAGNOSIS — R60.9 EDEMA, UNSPECIFIED TYPE: ICD-10-CM

## 2018-03-06 DIAGNOSIS — K21.9 GASTROESOPHAGEAL REFLUX DISEASE WITHOUT ESOPHAGITIS: Primary | ICD-10-CM

## 2018-03-06 RX ORDER — ATORVASTATIN CALCIUM 10 MG/1
TABLET, FILM COATED ORAL
Qty: 90 TABLET | Refills: 3 | Status: SHIPPED | OUTPATIENT
Start: 2018-03-06 | End: 2018-03-21 | Stop reason: HOSPADM

## 2018-03-06 RX ORDER — PAROXETINE 30 MG/1
TABLET, FILM COATED ORAL
Qty: 90 TABLET | Refills: 3 | Status: SHIPPED | OUTPATIENT
Start: 2018-03-06 | End: 2019-01-02 | Stop reason: SDUPTHER

## 2018-03-06 RX ORDER — PANTOPRAZOLE SODIUM 40 MG/1
TABLET, DELAYED RELEASE ORAL
Qty: 90 TABLET | Refills: 3 | Status: SHIPPED | OUTPATIENT
Start: 2018-03-06 | End: 2018-07-16 | Stop reason: SDUPTHER

## 2018-03-06 RX ORDER — FUROSEMIDE 20 MG/1
TABLET ORAL
Qty: 180 TABLET | Refills: 3 | Status: SHIPPED | OUTPATIENT
Start: 2018-03-06 | End: 2018-03-21 | Stop reason: HOSPADM

## 2018-03-12 ENCOUNTER — TELEPHONE (OUTPATIENT)
Dept: BARIATRICS | Facility: CLINIC | Age: 63
End: 2018-03-12

## 2018-03-12 NOTE — TELEPHONE ENCOUNTER
Outreach phone call; no response but left message  How is patient coming along with pre-op diet? Any ? or concerns  If so please call office since staff is available to provide support   [place]

## 2018-03-16 DIAGNOSIS — E66.01 MORBID (SEVERE) OBESITY DUE TO EXCESS CALORIES (HCC): Primary | ICD-10-CM

## 2018-03-19 ENCOUNTER — HOSPITAL ENCOUNTER (INPATIENT)
Facility: HOSPITAL | Age: 63
LOS: 2 days | Discharge: HOME | DRG: 621 | End: 2018-03-21
Attending: SURGERY | Admitting: SURGERY
Payer: COMMERCIAL

## 2018-03-19 ENCOUNTER — ANESTHESIA (OUTPATIENT)
Dept: PERIOP | Facility: HOSPITAL | Age: 63
DRG: 621 | End: 2018-03-19
Payer: COMMERCIAL

## 2018-03-19 DIAGNOSIS — K76.0 FATTY LIVER: ICD-10-CM

## 2018-03-19 DIAGNOSIS — E78.5 HYPERLIPIDEMIA, UNSPECIFIED HYPERLIPIDEMIA TYPE: ICD-10-CM

## 2018-03-19 DIAGNOSIS — E66.01 MORBID OBESITY (HCC): Primary | ICD-10-CM

## 2018-03-19 DIAGNOSIS — Z98.84 S/P LAPAROSCOPIC SLEEVE GASTRECTOMY: ICD-10-CM

## 2018-03-19 DIAGNOSIS — G47.33 OSA (OBSTRUCTIVE SLEEP APNEA): ICD-10-CM

## 2018-03-19 PROCEDURE — 0DB64Z3 EXCISION OF STOMACH, PERCUTANEOUS ENDOSCOPIC APPROACH, VERTICAL: ICD-10-PCS | Performed by: SURGERY

## 2018-03-19 PROCEDURE — 88307 TISSUE EXAM BY PATHOLOGIST: CPT | Performed by: PATHOLOGY

## 2018-03-19 PROCEDURE — 0DJ08ZZ INSPECTION OF UPPER INTESTINAL TRACT, VIA NATURAL OR ARTIFICIAL OPENING ENDOSCOPIC: ICD-10-PCS | Performed by: SURGERY

## 2018-03-19 PROCEDURE — C1781 MESH (IMPLANTABLE): HCPCS | Performed by: SURGERY

## 2018-03-19 PROCEDURE — 43282 LAP PARAESOPH HER RPR W/MESH: CPT | Performed by: SURGERY

## 2018-03-19 PROCEDURE — 0BQT4ZZ REPAIR DIAPHRAGM, PERCUTANEOUS ENDOSCOPIC APPROACH: ICD-10-PCS | Performed by: SURGERY

## 2018-03-19 PROCEDURE — 43775 LAP SLEEVE GASTRECTOMY: CPT | Performed by: SURGERY

## 2018-03-19 PROCEDURE — C9113 INJ PANTOPRAZOLE SODIUM, VIA: HCPCS | Performed by: SURGERY

## 2018-03-19 DEVICE — MESH BIO-A MESH GORE: Type: IMPLANTABLE DEVICE | Site: ABDOMEN | Status: FUNCTIONAL

## 2018-03-19 RX ORDER — MORPHINE SULFATE 4 MG/ML
4 INJECTION, SOLUTION INTRAMUSCULAR; INTRAVENOUS EVERY 2 HOUR PRN
Status: DISCONTINUED | OUTPATIENT
Start: 2018-03-19 | End: 2018-03-21 | Stop reason: HOSPADM

## 2018-03-19 RX ORDER — OXYCODONE HCL 5 MG/5 ML
10 SOLUTION, ORAL ORAL EVERY 4 HOURS PRN
Status: DISCONTINUED | OUTPATIENT
Start: 2018-03-19 | End: 2018-03-21 | Stop reason: HOSPADM

## 2018-03-19 RX ORDER — FENTANYL CITRATE/PF 50 MCG/ML
50 SYRINGE (ML) INJECTION
Status: DISCONTINUED | OUTPATIENT
Start: 2018-03-19 | End: 2018-03-19 | Stop reason: HOSPADM

## 2018-03-19 RX ORDER — MORPHINE SULFATE 2 MG/ML
2 INJECTION, SOLUTION INTRAMUSCULAR; INTRAVENOUS EVERY 2 HOUR PRN
Status: DISCONTINUED | OUTPATIENT
Start: 2018-03-19 | End: 2018-03-21 | Stop reason: HOSPADM

## 2018-03-19 RX ORDER — EPHEDRINE SULFATE 50 MG/ML
INJECTION, SOLUTION INTRAVENOUS AS NEEDED
Status: DISCONTINUED | OUTPATIENT
Start: 2018-03-19 | End: 2018-03-19 | Stop reason: SURG

## 2018-03-19 RX ORDER — ONDANSETRON 2 MG/ML
INJECTION INTRAMUSCULAR; INTRAVENOUS AS NEEDED
Status: DISCONTINUED | OUTPATIENT
Start: 2018-03-19 | End: 2018-03-19 | Stop reason: SURG

## 2018-03-19 RX ORDER — ONDANSETRON 2 MG/ML
4 INJECTION INTRAMUSCULAR; INTRAVENOUS EVERY 4 HOURS PRN
Status: DISCONTINUED | OUTPATIENT
Start: 2018-03-19 | End: 2018-03-21 | Stop reason: HOSPADM

## 2018-03-19 RX ORDER — ACETAMINOPHEN 160 MG/5ML
325 SUSPENSION, ORAL (FINAL DOSE FORM) ORAL EVERY 4 HOURS PRN
Status: DISCONTINUED | OUTPATIENT
Start: 2018-03-19 | End: 2018-03-21 | Stop reason: HOSPADM

## 2018-03-19 RX ORDER — GLYCOPYRROLATE 0.2 MG/ML
INJECTION INTRAMUSCULAR; INTRAVENOUS AS NEEDED
Status: DISCONTINUED | OUTPATIENT
Start: 2018-03-19 | End: 2018-03-19 | Stop reason: SURG

## 2018-03-19 RX ORDER — SODIUM CHLORIDE 9 MG/ML
125 INJECTION, SOLUTION INTRAVENOUS CONTINUOUS
Status: DISCONTINUED | OUTPATIENT
Start: 2018-03-19 | End: 2018-03-19

## 2018-03-19 RX ORDER — ACETAMINOPHEN 160 MG/5ML
320 SUSPENSION, ORAL (FINAL DOSE FORM) ORAL EVERY 4 HOURS PRN
Status: DISCONTINUED | OUTPATIENT
Start: 2018-03-19 | End: 2018-03-21 | Stop reason: HOSPADM

## 2018-03-19 RX ORDER — DEXAMETHASONE SODIUM PHOSPHATE 4 MG/ML
INJECTION, SOLUTION INTRA-ARTICULAR; INTRALESIONAL; INTRAMUSCULAR; INTRAVENOUS; SOFT TISSUE AS NEEDED
Status: DISCONTINUED | OUTPATIENT
Start: 2018-03-19 | End: 2018-03-19 | Stop reason: SURG

## 2018-03-19 RX ORDER — SCOLOPAMINE TRANSDERMAL SYSTEM 1 MG/1
1 PATCH, EXTENDED RELEASE TRANSDERMAL ONCE AS NEEDED
Status: DISCONTINUED | OUTPATIENT
Start: 2018-03-19 | End: 2018-03-19

## 2018-03-19 RX ORDER — PROMETHAZINE HYDROCHLORIDE 25 MG/ML
25 INJECTION, SOLUTION INTRAMUSCULAR; INTRAVENOUS EVERY 4 HOURS PRN
Status: DISCONTINUED | OUTPATIENT
Start: 2018-03-19 | End: 2018-03-21 | Stop reason: HOSPADM

## 2018-03-19 RX ORDER — FENTANYL CITRATE 50 UG/ML
INJECTION, SOLUTION INTRAMUSCULAR; INTRAVENOUS AS NEEDED
Status: DISCONTINUED | OUTPATIENT
Start: 2018-03-19 | End: 2018-03-19 | Stop reason: SURG

## 2018-03-19 RX ORDER — SODIUM CHLORIDE, SODIUM LACTATE, POTASSIUM CHLORIDE, CALCIUM CHLORIDE 600; 310; 30; 20 MG/100ML; MG/100ML; MG/100ML; MG/100ML
50 INJECTION, SOLUTION INTRAVENOUS CONTINUOUS
Status: DISCONTINUED | OUTPATIENT
Start: 2018-03-19 | End: 2018-03-21 | Stop reason: HOSPADM

## 2018-03-19 RX ORDER — BUPIVACAINE HYDROCHLORIDE AND EPINEPHRINE 5; 5 MG/ML; UG/ML
INJECTION, SOLUTION PERINEURAL AS NEEDED
Status: DISCONTINUED | OUTPATIENT
Start: 2018-03-19 | End: 2018-03-19 | Stop reason: HOSPADM

## 2018-03-19 RX ORDER — PROPOFOL 10 MG/ML
INJECTION, EMULSION INTRAVENOUS AS NEEDED
Status: DISCONTINUED | OUTPATIENT
Start: 2018-03-19 | End: 2018-03-19 | Stop reason: SURG

## 2018-03-19 RX ORDER — ROCURONIUM BROMIDE 10 MG/ML
INJECTION, SOLUTION INTRAVENOUS AS NEEDED
Status: DISCONTINUED | OUTPATIENT
Start: 2018-03-19 | End: 2018-03-19 | Stop reason: SURG

## 2018-03-19 RX ORDER — MEPERIDINE HYDROCHLORIDE 50 MG/ML
12.5 INJECTION INTRAMUSCULAR; INTRAVENOUS; SUBCUTANEOUS AS NEEDED
Status: DISCONTINUED | OUTPATIENT
Start: 2018-03-19 | End: 2018-03-19 | Stop reason: HOSPADM

## 2018-03-19 RX ORDER — OXYCODONE HCL 5 MG/5 ML
5 SOLUTION, ORAL ORAL EVERY 4 HOURS PRN
Status: DISCONTINUED | OUTPATIENT
Start: 2018-03-19 | End: 2018-03-21 | Stop reason: HOSPADM

## 2018-03-19 RX ORDER — ALBUTEROL SULFATE 2.5 MG/3ML
2.5 SOLUTION RESPIRATORY (INHALATION) ONCE AS NEEDED
Status: DISCONTINUED | OUTPATIENT
Start: 2018-03-19 | End: 2018-03-19 | Stop reason: HOSPADM

## 2018-03-19 RX ORDER — PANTOPRAZOLE SODIUM 40 MG/1
40 INJECTION, POWDER, FOR SOLUTION INTRAVENOUS
Status: DISCONTINUED | OUTPATIENT
Start: 2018-03-19 | End: 2018-03-21 | Stop reason: HOSPADM

## 2018-03-19 RX ORDER — MAGNESIUM HYDROXIDE 1200 MG/15ML
LIQUID ORAL AS NEEDED
Status: DISCONTINUED | OUTPATIENT
Start: 2018-03-19 | End: 2018-03-19 | Stop reason: HOSPADM

## 2018-03-19 RX ORDER — HYDRALAZINE HYDROCHLORIDE 20 MG/ML
10 INJECTION INTRAMUSCULAR; INTRAVENOUS EVERY 6 HOURS PRN
Status: DISCONTINUED | OUTPATIENT
Start: 2018-03-19 | End: 2018-03-21 | Stop reason: HOSPADM

## 2018-03-19 RX ORDER — MIDAZOLAM HYDROCHLORIDE 1 MG/ML
INJECTION INTRAMUSCULAR; INTRAVENOUS AS NEEDED
Status: DISCONTINUED | OUTPATIENT
Start: 2018-03-19 | End: 2018-03-19 | Stop reason: SURG

## 2018-03-19 RX ADMIN — FENTANYL CITRATE 50 MCG: 50 INJECTION, SOLUTION INTRAMUSCULAR; INTRAVENOUS at 10:43

## 2018-03-19 RX ADMIN — LIDOCAINE HYDROCHLORIDE 100 MG: 20 INJECTION, SOLUTION INTRAVENOUS at 10:03

## 2018-03-19 RX ADMIN — ROCURONIUM BROMIDE 50 MG: 10 INJECTION INTRAVENOUS at 10:03

## 2018-03-19 RX ADMIN — SODIUM CHLORIDE, SODIUM LACTATE, POTASSIUM CHLORIDE, AND CALCIUM CHLORIDE 125 ML/HR: .6; .31; .03; .02 INJECTION, SOLUTION INTRAVENOUS at 12:16

## 2018-03-19 RX ADMIN — PROMETHAZINE HYDROCHLORIDE 25 MG: 25 INJECTION INTRAMUSCULAR; INTRAVENOUS at 16:12

## 2018-03-19 RX ADMIN — FENTANYL CITRATE 50 MCG: 50 INJECTION, SOLUTION INTRAMUSCULAR; INTRAVENOUS at 11:57

## 2018-03-19 RX ADMIN — SODIUM CHLORIDE: 0.9 INJECTION, SOLUTION INTRAVENOUS at 10:24

## 2018-03-19 RX ADMIN — MORPHINE SULFATE 4 MG: 4 INJECTION, SOLUTION INTRAMUSCULAR; INTRAVENOUS at 16:04

## 2018-03-19 RX ADMIN — ONDANSETRON 4 MG: 2 INJECTION INTRAMUSCULAR; INTRAVENOUS at 13:19

## 2018-03-19 RX ADMIN — ENOXAPARIN SODIUM 40 MG: 40 INJECTION SUBCUTANEOUS at 08:56

## 2018-03-19 RX ADMIN — MORPHINE SULFATE 4 MG: 4 INJECTION, SOLUTION INTRAMUSCULAR; INTRAVENOUS at 13:23

## 2018-03-19 RX ADMIN — EPHEDRINE SULFATE 10 MG: 50 INJECTION, SOLUTION INTRAMUSCULAR; INTRAVENOUS; SUBCUTANEOUS at 10:32

## 2018-03-19 RX ADMIN — FENTANYL CITRATE 50 MCG: 50 INJECTION, SOLUTION INTRAMUSCULAR; INTRAVENOUS at 10:25

## 2018-03-19 RX ADMIN — HYDROMORPHONE HYDROCHLORIDE 0.5 MG: 1 INJECTION, SOLUTION INTRAMUSCULAR; INTRAVENOUS; SUBCUTANEOUS at 10:53

## 2018-03-19 RX ADMIN — ONDANSETRON HYDROCHLORIDE 4 MG: 2 INJECTION, SOLUTION INTRAVENOUS at 09:54

## 2018-03-19 RX ADMIN — FENTANYL CITRATE 100 MCG: 50 INJECTION, SOLUTION INTRAMUSCULAR; INTRAVENOUS at 10:03

## 2018-03-19 RX ADMIN — ONDANSETRON 4 MG: 2 INJECTION INTRAMUSCULAR; INTRAVENOUS at 18:45

## 2018-03-19 RX ADMIN — SODIUM CHLORIDE 125 ML/HR: 0.9 INJECTION, SOLUTION INTRAVENOUS at 08:12

## 2018-03-19 RX ADMIN — ACETAMINOPHEN 325 MG: 160 SUSPENSION ORAL at 20:35

## 2018-03-19 RX ADMIN — FENTANYL CITRATE 50 MCG: 50 INJECTION, SOLUTION INTRAMUSCULAR; INTRAVENOUS at 11:51

## 2018-03-19 RX ADMIN — MIDAZOLAM HYDROCHLORIDE 2 MG: 1 INJECTION, SOLUTION INTRAMUSCULAR; INTRAVENOUS at 09:54

## 2018-03-19 RX ADMIN — SCOPALAMINE 1 PATCH: 1 PATCH, EXTENDED RELEASE TRANSDERMAL at 08:15

## 2018-03-19 RX ADMIN — EPHEDRINE SULFATE 10 MG: 50 INJECTION, SOLUTION INTRAMUSCULAR; INTRAVENOUS; SUBCUTANEOUS at 10:35

## 2018-03-19 RX ADMIN — TRIMETHOBENZAMIDE HYDROCHLORIDE 200 MG: 100 INJECTION INTRAMUSCULAR at 12:07

## 2018-03-19 RX ADMIN — NEOSTIGMINE METHYLSULFATE 4 MG: 1 INJECTION, SOLUTION INTRAMUSCULAR; INTRAVENOUS; SUBCUTANEOUS at 11:18

## 2018-03-19 RX ADMIN — SODIUM CHLORIDE, SODIUM LACTATE, POTASSIUM CHLORIDE, AND CALCIUM CHLORIDE 125 ML/HR: .6; .31; .03; .02 INJECTION, SOLUTION INTRAVENOUS at 20:36

## 2018-03-19 RX ADMIN — CEFAZOLIN SODIUM 2000 MG: 2 SOLUTION INTRAVENOUS at 10:13

## 2018-03-19 RX ADMIN — GLYCOPYRROLATE 0.8 MG: 0.2 INJECTION, SOLUTION INTRAMUSCULAR; INTRAVENOUS at 11:18

## 2018-03-19 RX ADMIN — DEXAMETHASONE SODIUM PHOSPHATE 4 MG: 4 INJECTION, SOLUTION INTRAMUSCULAR; INTRAVENOUS at 10:10

## 2018-03-19 RX ADMIN — OXYCODONE HYDROCHLORIDE 10 MG: 5 SOLUTION ORAL at 20:35

## 2018-03-19 RX ADMIN — PANTOPRAZOLE SODIUM 40 MG: 40 INJECTION, POWDER, FOR SOLUTION INTRAVENOUS at 13:23

## 2018-03-19 RX ADMIN — PROPOFOL 200 MG: 10 INJECTION, EMULSION INTRAVENOUS at 10:03

## 2018-03-19 NOTE — OP NOTE
[facility]   [address] [facility] [address], [address]  [phone] (Fax)      Operative Report  GASTRECTOMY SLEEVE LAPAROSCOPIC AND INTRAOPERATIVE EGD     Patient Name: Rafael Barnett    :  1955  MRN: [id number]  [facility] Location: [facility]  Surgery Date : 3/19/2018  Surgeons:  Surgeon(s) and Role:          * Senia Preciado MD - Primary     * Elsi Soares MD - Marisa Meredith MD - Resident    Diagnosis:    Pre-Op Diagnosis Codes: Morbid obesity ([facility] ) [E66 01]  Body mass index is 38 97 kg/m²  Fatty liver [K76 0]  PETER (obstructive sleep apnea) [G47 33]  Hyperlipidemia, unspecified hyperlipidemia type [E78 5]    Post-Op Diagnosis Codes:     * Morbid obesity ([facility] ) [E66 01]     * Body mass index is 38 97 kg/m²  * Fatty liver [K76 0]     * PETER (obstructive sleep apnea) [G47 33]     * Hyperlipidemia, unspecified hyperlipidemia type [E78 5]    Procedure  1  Laparoscopic Sleeve Gastrectomy  2  Laparoscopic Paraesophageal Hernia Repair  3  Intraoperative Endoscopy      Specimen(s):  ID Type Source Tests Collected by Time Destination   1 : Portion of stomach Tissue Stomach TISSUE EXAM Senia Preciado MD 3/19/2018 1039        Estimated Blood Loss:    20 cc    Anesthesia Type:     General    Operative Indications: Morbid obesity ([facility] ) [E66 01]  Body mass index is 38 97 kg/m²  Fatty liver [K76 0]  PETER (obstructive sleep apnea) [G47 33]  Hyperlipidemia, unspecified hyperlipidemia type [E78 5]      Operative Findings:    Para esophageal hernia    Complications:     None    Procedure and Technique:    INDICATION    Rafael Barnett is a 58 y o  female with a Body mass index is 38 97 kg/m²  and a long standing history of morbid obesity and inability to lose a significant amount of weight on its own    This patient was found to be a good candidate to undergo a bariatric procedure upon being enrolled here at the [facility]  OPERATIVE TECHNIQUE    The patient was taken to the operating room at [facility] and placed in the supine position  A dose of IV antibiotic prophylaxis that consisted of Ancef 2g was given  Also 40 mg of subcutaneous Enoxaparin to prevent deep vein thrombosis were administered  Sequential compression devices were placed on both lower extremities  After satisfactory general anesthesia induction and endotracheal intubation was achieved, the extremities were placed and properly secured to prevent neuromuscular damage as best as possible  Subsequently, the abdominal wall was prepped and draped in a surgical standard sterile fashion  After a timeout was done and the patient was properly identified and the type of procedure was confirmed a supra-umbilical transverse skin incision was made and the subcutaneous tissues dissected  Access to the peritoneal cavity was gained with the Visiport trocar  With this device, we were able to visualize the layers of the abdominal wall, and enter the peritoneal cavity under direct visualization  Pneumoperitoneum was then established with CO2 insufflation  Under direct laparoscopic visualization, four additional trocars were placed: a 12 mm in the right upper quadrant subcostal position in the anterior axillary line, a 12-mm port was placed in the right flank midclavicular line, a 12-mm port was placed in the left upper quadrant subcostal position in the midclavicular line and another 12-mm port was placed in the left quadrant anterior axillary line lateral to the supraumbilical port  The [facility] liver retractor was placed in the subxiphoid position through the use of a 5-mm trocar incision  The patient was repositioned to a reverse Trendelenburg position  Upon elevating the liver we noticed that this patient had a bilateral circumferential para-esophageal hernia    We started to dissect the hernia sac anteriorly and laterally with the energy dissector  The G-E junction was reduced back into its normal intra-abdominal location  We then encircled the upper portion of the stomach with a ¼ inch Penrose drain and providing lateral traction allowed us to visualize the confluence of the andre of the diaphragm and to complete the inferior and posterior dissection of the hernia sac  Subsequently we proceeded to repair the crural defect with interrupted stitches of 0 non absorbable suture  We further reinforced the posterior repair with a Bio A mesh that was secured in place with a 0 non absorbable stitch and Tiseel spray  Upon confirming that there was good hemostasis and that the GE junction was back into the intra-abdominal cavity, the Penrose drain was removed  We proceeded to divide the gastrocolic ligament with the energy device to enter the lesser sac  We continued to divide this ligament along the greater curvature of the stomach towards the angle of His  Special care was taken while dividing the short gastric vessels close to the spleen  This process was completely hemostatic  We then turned to the creation of an elongated and thin gastric pouch  A 36 Burmese calibration tube was placed by the anesthesia staff into the stomach under our laparoscopic surveillance  Once the tip of the bougie was confirmed to be next to the pylorus, serial firings of the laparoscopic stapler with 60-mm cartridges were utilized  We started in a point inferior to the incisura angularis and the Crows foot nerve looking to preserve the gastric emptying  This was 5 to 6 centimeters proximal to the pylorus  The staple lines were reinforced with buttressing material  We created a pouch based on the lesser curve, and in a semi vertical orientation  We continued the vertical serial firings of the stapler to the angle of His gently cinching the bougie with our laparoscopic stapler looking to create a thin pouch   As we approached the fundus of the stomach and the angle of His, the stapler loads were changed appropriately according to the variable thickness of the tissue  This completely  the pouch from the remnant stomach  We then turned our attention to the newly created pouch and examined it for bleeding or obvious defects on the staple lines and none were found  The distal stomach pouch was occluded with a Kendell clamp, and an EGD as well as an air insufflation test was performed  Neither intraoperative bleeding nor leaks were detected  The periumbilical trocar site was dilated and the gastric remnant was externalized through it and passed off the surgical field to be sent to pathology  I then covered the staple line with a tongue of omentum in a Wing patch fashion and secured it in place with a single 2/0 Vicryl stitch  The sponge, needle and instrument count was reported complete  The previously dilated trocar site was then closed with use of a suture closure device and a figure-of-eight with absorbable suture  The liver retractor and the remainder ports were then removed under direct laparoscopic visualization and no back bleeding was noted  The skin incisions were all closed with 4-0 absorbable subcuticular suture  The patient tolerated the procedure well, was extubated uneventfully and was transferred to the recovery room in stable condition  I was present for the entire length of the procedure as the attending of record  No qualified resident was available to assist   The presence of an assistant was necessary for camera holding, traction and counter traction and for help with suturing and stapling in addition to performing the intraop-EGD        Patient Disposition:    PACU     Signature: Salinas Gonzalez MD  Date: March 19, 2018  Time: 11:17 AM

## 2018-03-19 NOTE — H&P
Patient seen and examined by me  H&P updated  Original H&P  in the electronic chart      Senia Preciado MD  3/19/2018  9:32 AM

## 2018-03-19 NOTE — PLAN OF CARE
Problem: PAIN - ADULT  Goal: Verbalizes/displays adequate comfort level or baseline comfort level  Interventions:  - Encourage patient to monitor pain and request assistance  - Assess pain using appropriate pain scale  - Administer analgesics based on type and severity of pain and evaluate response  - Implement non-pharmacological measures as appropriate and evaluate response  - Consider cultural and social influences on pain and pain management  - Notify physician/advanced practitioner if interventions unsuccessful or patient reports new pain   Outcome: Progressing      Problem: INFECTION - ADULT  Goal: Absence or prevention of progression during hospitalization  INTERVENTIONS:  - Assess and monitor for signs and symptoms of infection  - Monitor lab/diagnostic results  - Monitor all insertion sites, i e  indwelling lines, tubes, and drains  - Monitor endotracheal (as able) and nasal secretions for changes in amount and color  - [place] appropriate cooling/warming therapies per order  - Administer medications as ordered  - Instruct and encourage patient and family to use good hand hygiene technique  - Identify and instruct in appropriate isolation precautions for identified infection/condition   Outcome: Progressing    Goal: Absence of fever/infection during neutropenic period  INTERVENTIONS:  - Monitor WBC  - Implement neutropenic guidelines   Outcome: Progressing      Problem: SAFETY ADULT  Goal: Patient will remain free of falls  INTERVENTIONS:  - Assess patient frequently for physical needs  -  Identify cognitive and physical deficits and behaviors that affect risk of falls    -  [place] fall precautions as indicated by assessment   - Educate patient/family on patient safety including physical limitations  - Instruct patient to call for assistance with activity based on assessment  - Modify environment to reduce risk of injury  - Consider OT/PT consult to assist with strengthening/mobility   Outcome: Progressing    Goal: Maintain or return to baseline ADL function  INTERVENTIONS:  -  Assess patient's ability to carry out ADLs; assess patient's baseline for ADL function and identify physical deficits which impact ability to perform ADLs (bathing, care of mouth/teeth, toileting, grooming, dressing, etc )  - Assess/evaluate cause of self-care deficits   - Assess range of motion  - Assess patient's mobility; develop plan if impaired  - Assess patient's need for assistive devices and provide as appropriate  - Encourage maximum independence but intervene and supervise when necessary  ¯ Involve family in performance of ADLs  ¯ Assess for home care needs following discharge   ¯ Request OT consult to assist with ADL evaluation and planning for discharge  ¯ Provide patient education as appropriate   Outcome: Progressing    Goal: Maintain or return mobility status to optimal level  INTERVENTIONS:  - Assess patient's baseline mobility status (ambulation, transfers, stairs, etc )    - Identify cognitive and physical deficits and behaviors that affect mobility  - Identify mobility aids required to assist with transfers and/or ambulation (gait belt, sit-to-stand, lift, walker, cane, etc )  - [place] fall precautions as indicated by assessment  - Record patient progress and toleration of activity level on Mobility SBAR; progress patient to next Phase/Stage  - Instruct patient to call for assistance with activity based on assessment  - Request Rehabilitation consult to assist with strengthening/weightbearing, etc    Outcome: Progressing      Problem: DISCHARGE PLANNING  Goal: Discharge to home or other facility with appropriate resources  INTERVENTIONS:  - Identify barriers to discharge w/patient and caregiver  - Arrange for needed discharge resources and transportation as appropriate  - Identify discharge learning needs (meds, wound care, etc )  - Arrange for interpretive services to assist at discharge as needed  - Refer to Case Management Department for coordinating discharge planning if the patient needs post-hospital services based on physician/advanced practitioner order or complex needs related to functional status, cognitive ability, or social support system   Outcome: Progressing      Problem: CARDIOVASCULAR - ADULT  Goal: Maintains optimal cardiac output and hemodynamic stability  INTERVENTIONS:  - Monitor I/O, vital signs and rhythm  - Monitor for S/S and trends of decreased cardiac output i e  bleeding, hypotension  - Administer and titrate ordered vasoactive medications to optimize hemodynamic stability  - Assess quality of pulses, skin color and temperature  - Assess for signs of decreased coronary artery perfusion - ex   Angina  - Instruct patient to report change in severity of symptoms   Outcome: Progressing    Goal: Absence of cardiac dysrhythmias or at baseline rhythm  INTERVENTIONS:  - Continuous cardiac monitoring, monitor vital signs, obtain 12 lead EKG if indicated  - Administer antiarrhythmic and heart rate control medications as ordered  - Monitor electrolytes and administer replacement therapy as ordered   Outcome: Progressing      Problem: RESPIRATORY - ADULT  Goal: Achieves optimal ventilation and oxygenation  INTERVENTIONS:  - Assess for changes in respiratory status  - Assess for changes in mentation and behavior  - Position to facilitate oxygenation and minimize respiratory effort  - Oxygen administration by appropriate delivery method based on oxygen saturation (per order) or ABGs  - Initiate smoking cessation education as indicated  - Encourage broncho-pulmonary hygiene including cough, deep breathe, Incentive Spirometry  - Assess the need for suctioning and aspirate as needed  - Assess and instruct to report SOB or any respiratory difficulty  - Respiratory Therapy support as indicated   Outcome: Progressing      Problem: GASTROINTESTINAL - ADULT  Goal: Minimal or absence of nausea and/or vomiting  INTERVENTIONS:  - Administer IV fluids as ordered to ensure adequate hydration  - Maintain NPO status until nausea and vomiting are resolved  - Nasogastric tube as ordered  - Administer ordered antiemetic medications as needed  - Provide nonpharmacologic comfort measures as appropriate  - Advance diet as tolerated, if ordered  - Nutrition services referral to assist patient with adequate nutrition and appropriate food choices   Outcome: Progressing    Goal: Maintains adequate nutritional intake  INTERVENTIONS:  - Monitor percentage of each meal consumed  - Identify factors contributing to decreased intake, treat as appropriate  - Assist with meals as needed  - Monitor I&O, WT and lab values  - Obtain nutrition services referral as needed   Outcome: Progressing      Problem: GENITOURINARY - ADULT  Goal: Absence of urinary retention  INTERVENTIONS:  - Assess patients ability to void and empty bladder  - Monitor I/O  - Bladder scan as needed  - Discuss with physician/AP medications to alleviate retention as needed  - Discuss catheterization for long term situations as appropriate   Outcome: Progressing      Problem: METABOLIC, FLUID AND ELECTROLYTES - ADULT  Goal: Electrolytes maintained within normal limits  INTERVENTIONS:  - Monitor labs and assess patient for signs and symptoms of electrolyte imbalances  - Administer electrolyte replacement as ordered  - Monitor response to electrolyte replacements, including repeat lab results as appropriate  - Instruct patient on fluid and nutrition as appropriate   Outcome: Progressing    Goal: Fluid balance maintained  INTERVENTIONS:  - Monitor labs and assess for signs and symptoms of volume excess or deficit  - Monitor I/O and WT  - Instruct patient on fluid and nutrition as appropriate   Outcome: Progressing      Problem: SKIN/TISSUE INTEGRITY - ADULT  Goal: Incision(s), wounds(s) or drain site(s) healing without S/S of infection  INTERVENTIONS  - Assess and document risk factors for skin impairment   - Assess and document dressing, incision, wound bed, drain sites and surrounding tissue  - Initiate Nutrition services consult and/or wound management as needed   Outcome: Progressing

## 2018-03-20 LAB
ANION GAP SERPL CALCULATED.3IONS-SCNC: 9 MMOL/L (ref 4–13)
BUN SERPL-MCNC: 9 MG/DL (ref 5–25)
CALCIUM SERPL-MCNC: 8.8 MG/DL (ref 8.3–10.1)
CHLORIDE SERPL-SCNC: 105 MMOL/L (ref 100–108)
CO2 SERPL-SCNC: 25 MMOL/L (ref 21–32)
CREAT SERPL-MCNC: 0.57 MG/DL (ref 0.6–1.3)
ERYTHROCYTE [DISTWIDTH] IN BLOOD BY AUTOMATED COUNT: 13.2 % (ref 11.6–15.1)
GFR SERPL CREATININE-BSD FRML MDRD: 100 ML/MIN/1.73SQ M
GLUCOSE SERPL-MCNC: 95 MG/DL (ref 65–140)
HCT VFR BLD AUTO: 36 % (ref 34.8–46.1)
HGB BLD-MCNC: 12 G/DL (ref 11.5–15.4)
MCH RBC QN AUTO: 28.8 PG (ref 26.8–34.3)
MCHC RBC AUTO-ENTMCNC: 33.3 G/DL (ref 31.4–37.4)
MCV RBC AUTO: 86 FL (ref 82–98)
PLATELET # BLD AUTO: 236 THOUSANDS/UL (ref 149–390)
PMV BLD AUTO: 11 FL (ref 8.9–12.7)
POTASSIUM SERPL-SCNC: 4.1 MMOL/L (ref 3.5–5.3)
RBC # BLD AUTO: 4.17 MILLION/UL (ref 3.81–5.12)
SODIUM SERPL-SCNC: 139 MMOL/L (ref 136–145)
WBC # BLD AUTO: 12.21 THOUSAND/UL (ref 4.31–10.16)

## 2018-03-20 PROCEDURE — C9113 INJ PANTOPRAZOLE SODIUM, VIA: HCPCS | Performed by: SURGERY

## 2018-03-20 PROCEDURE — 99253 IP/OBS CNSLTJ NEW/EST LOW 45: CPT | Performed by: HOSPITALIST

## 2018-03-20 PROCEDURE — 85027 COMPLETE CBC AUTOMATED: CPT | Performed by: SURGERY

## 2018-03-20 PROCEDURE — 80048 BASIC METABOLIC PNL TOTAL CA: CPT | Performed by: SURGERY

## 2018-03-20 RX ADMIN — ACETAMINOPHEN 325 MG: 160 SUSPENSION ORAL at 02:07

## 2018-03-20 RX ADMIN — PANTOPRAZOLE SODIUM 40 MG: 40 INJECTION, POWDER, FOR SOLUTION INTRAVENOUS at 08:43

## 2018-03-20 RX ADMIN — ENOXAPARIN SODIUM 40 MG: 40 INJECTION SUBCUTANEOUS at 08:43

## 2018-03-20 RX ADMIN — OXYCODONE HYDROCHLORIDE 10 MG: 5 SOLUTION ORAL at 02:07

## 2018-03-20 RX ADMIN — SODIUM CHLORIDE, SODIUM LACTATE, POTASSIUM CHLORIDE, AND CALCIUM CHLORIDE 50 ML/HR: .6; .31; .03; .02 INJECTION, SOLUTION INTRAVENOUS at 22:19

## 2018-03-20 RX ADMIN — ACETAMINOPHEN 325 MG: 160 SUSPENSION ORAL at 17:54

## 2018-03-20 RX ADMIN — ONDANSETRON 4 MG: 2 INJECTION INTRAMUSCULAR; INTRAVENOUS at 02:07

## 2018-03-20 NOTE — PLAN OF CARE
CARDIOVASCULAR - ADULT     Maintains optimal cardiac output and hemodynamic stability Progressing     Absence of cardiac dysrhythmias or at baseline rhythm Progressing        DISCHARGE PLANNING     Discharge to home or other facility with appropriate resources Progressing        GASTROINTESTINAL - ADULT     Minimal or absence of nausea and/or vomiting Progressing     Maintains adequate nutritional intake Progressing        GENITOURINARY - ADULT     Absence of urinary retention Progressing        INFECTION - ADULT     Absence or prevention of progression during hospitalization Progressing     Absence of fever/infection during neutropenic period Progressing        METABOLIC, FLUID AND ELECTROLYTES - ADULT     Electrolytes maintained within normal limits Progressing     Fluid balance maintained Progressing        PAIN - ADULT     Verbalizes/displays adequate comfort level or baseline comfort level Progressing        RESPIRATORY - ADULT     Achieves optimal ventilation and oxygenation Progressing        SAFETY ADULT     Patient will remain free of falls Progressing     Maintain or return to baseline ADL function Progressing     Maintain or return mobility status to optimal level Progressing        SKIN/TISSUE INTEGRITY - ADULT     Incision(s), wounds(s) or drain site(s) healing without S/S of infection Progressing

## 2018-03-20 NOTE — CONSULTS
Consulted for Medical Management by:  Dr Silveira      Assessment/Plan     1-POD#1-laparoscopic sleeve gastrectomy for morbid obesity-doing well  Patient denies any nausea vomiting or abdominal pain  2-obstructive sleep apnea patient is on CPAP which will be continued at her home settings  3-GERD-symptoms well controlled  Continue PPI    3-hcdewnfykjbt-qldwrc improvement in her lipid parameters with ongoing weight loss  She is on a low-dose of Lipitor 10 mg daily which could be discontinued with repeat fasting lipid profile in 3 months time  5-depression-mood stable continue paroxetine 30 mg daily      Patient is stable for discharge from a medical point of view  History of Present Illness     HPI:  Miguel Grimes is a 58 y o  female who underwent a sleeve gastrectomy on 03/18/2018 for morbid obesity failing multiple attempts at meaningful weight loss  The patient had an uneventful night and is doing well  Other than mild nausea she has no other complaints  Slim was consulted for medical management  Her medical history significant for GERD, obstructive sleep apnea for which she is on CPAP, fibromyalgia, depression   She currently denies any significant nausea, vomiting, chest pain, palpitations, shortness of breath          Historical Information   Past Medical History:   Diagnosis Date    Allergic rhinitis     Basal cell carcinoma     Bowel habit changes     Cataract     starting with a catract    Cellulitis of left lower extremity     last assessed 06/29/2016    Chronic GERD     Closed displaced fracture of greater tuberosity of humerus     last assessed 05/31/2016    CPAP (continuous positive airway pressure) dependence     Fatty liver     Fibromyalgia     Fibromyalgia, primary     Hiatal hernia     High cholesterol     Insomnia     Morbid obesity (HCC)     Osteoarthritis     Palpitations     pt denies a fib    PONV (postoperative nausea and vomiting)     Restless leg  Sicca syndrome (HCC)     Sleep apnea     Superficial phlebitis and thrombophlebitis of left lower extremity     last assessed 06/08/2016    Thyroid nodule     nodule on thyroid    Varicose veins of left leg with edema     Wears glasses      Patient Active Problem List   Diagnosis    Varicose veins of left leg with edema    Morbid obesity (HCC)    Fatty liver    PETER (obstructive sleep apnea)    Hyperlipidemia    Adenomatous duodenal polyp     Past Surgical History:   Procedure Laterality Date    BLADDER SURGERY      sling    CHOLECYSTECTOMY  1998    COLONOSCOPY      CYST REMOVAL      thigh    DILATION AND CURETTAGE OF UTERUS      ESOPHAGOGASTRODUODENOSCOPY      ESOPHAGOGASTRODUODENOSCOPY N/A 12/21/2017    Procedure: ESOPHAGOGASTRODUODENOSCOPY (EGD) with biopsy and polypectomy;  Surgeon: Blank Moya MD;  Location: [facility]; Service: Gastroenterology    HARDWARE REMOVAL      right shoulder    JOINT REPLACEMENT      both knees    KNEE ARTHROSCOPY      ORIF HUMERAL SHAFT FRACTURE Right     GA ENDOVENOUS LASER, 1ST VEIN Left 4/29/2016    Procedure: GREATER SAPHENOUS VEIN EVLT ;  Surgeon: Crispin Corea DO;  [facility];  Service: Vascular    GA ESOPHAGOGASTRODUODENOSCOPY TRANSORAL DIAGNOSTIC N/A 1/11/2018    Procedure: egd w/ emr ;  Surgeon: Chace Adams MD;  [facility];   Service: Gastroenterology    GA LAP, ESA RESTRICT PROC, LONGITUDINAL GASTRECTOMY N/A 3/19/2018    Procedure: GASTRECTOMY SLEEVE LAPAROSCOPIC AND INTRAOPERATIVE EGD;  Surgeon: Salinas Gonzalez MD;  [facility];  Service: [address] [place] [address] TO  Left 4/29/2016    Procedure: AND STAB PHLEBECTOMIES ;  Surgeon: Crispin Corea DO;  [facility];  Service: Vascular    REDUCTION MAMMAPLASTY      Reduction    SKIN CANCER EXCISION      TOTAL KNEE ARTHROPLASTY Bilateral        Social History   History   Alcohol Use No     History   Drug Use No     History   Smoking Status    Never Smoker   Smokeless Tobacco    Never Used     Comment: Former smoker (smoked a little when she was young) and second hand smoke exposure per [employer]       Family History:   Family History   Problem Relation Age of Onset    Heart disease Mother     Alzheimer's disease Mother     Depression Mother     Anxiety disorder Mother     Coronary artery disease Mother     Osteoporosis Mother     Arthritis Father     Osteoarthritis Father     Uterine cancer Sister     Cardiomyopathy Sister      congestive    Uterine cancer Brother        Meds/Allergies       Current Facility-Administered Medications:     acetaminophen (TYLENOL) oral suspension 320 mg, 320 mg, Oral, Q4H PRN, Danyelle White MD    oxyCODONE (ROXICODONE) oral solution 5 mg, 5 mg, Oral, Q4H PRN **AND** acetaminophen (TYLENOL) oral suspension 325 mg, 325 mg, Oral, Q4H PRN, Danyelle White MD, 325 mg at 03/20/18 0207    oxyCODONE (ROXICODONE) oral solution 10 mg, 10 mg, Oral, Q4H PRN, 10 mg at 03/20/18 0207 **AND** acetaminophen (TYLENOL) oral suspension 325 mg, 325 mg, Oral, Q4H PRN, Danyelle White MD    enoxaparin (LOVENOX) subcutaneous injection 40 mg, 40 mg, Subcutaneous, Q24H [facility], Ines Guerra MD, 40 mg at 03/20/18 0843    hydrALAZINE (APRESOLINE) injection 10 mg, 10 mg, Intravenous, Q6H PRN, Ines Guerra MD    lactated ringers infusion, 125 mL/hr, Intravenous, Continuous, Danyelle White MD, Last Rate: 125 mL/hr at 03/19/18 2036, 125 mL/hr at 03/19/18 2036    morphine (PF) 4 mg/mL injection 4 mg, 4 mg, Intravenous, Q2H PRN, Danyelle White MD, 4 mg at 03/19/18 1604    morphine injection 2 mg, 2 mg, Intravenous, Q2H PRN, Danyelle White MD    [facility]ron [facility] injection 4 mg, 4 mg, Intravenous, Q4H PRN, Danyelle White MD, 4 mg at 03/20/18 0207    pantoprazole (PROTONIX) injection 40 mg, 40 mg, Intravenous, Q24H [facility], Danyelle White MD, 40 mg at 03/20/18 0843    phenol (CHLORASEPTIC) 1 4 % mucosal liquid 2 spray, 2 spray, Mouth/Throat, Q2H PRN, Ramona Smith MD    promethazine (PHENERGAN) injection 25 mg, 25 mg, Intravenous, Q4H PRN, Ramona Smith MD, 25 mg at 03/19/18 1612    Allergies   Allergen Reactions    Ketorolac Itching    Flurbiprofen Other (See Comments)     NSAIDS    Ketorolac Tromethamine Itching    Pneumovax [Pneumococcal Polysaccharide Vaccine] Swelling     Arm swelling and a rash       Review of Systems  A detailed 12 point review of systems was conducted and is negative apart from those mentioned in the HPI  Objective   Vitals: Blood pressure 128/60, pulse 86, temperature 99 6 °F (37 6 °C), temperature source Temporal, resp  rate 18, height 4' 10" (1 473 m), weight 84 6 kg (186 lb 7 oz), SpO2 95 %  Physical Exam     General-NAD, obese  HEENT: PERRLA, EOMI, sclera anicteric, dry mucous membranes, tongue mucosa dry without lesions  Neck: supple, no JVD, lymphadenopathy, thyromegaly  Heart: Regular rate and rhythm, S1S2 present  No murmur, rub or gallop  Lungs; Clear to auscultation bilaterally  No wheezing, crackles or rhonchi  No accessory muscle use or respiratory distress  Abdomen: soft, non-tender, non-distended, NABS  No guarding or rebound  No peritoneal sound or mass  Extremities: no clubbing, cyanosis, or edema  2+ pedal pulses bilaterally  Full range of motion  Neurologic:  Cranial nerves II-XII intact  Strength and sensation globally intact  Speech fluent and goal directed  Awake, alert and oriented x 3  Skin: warm and dry  No petechiae, purpura or rash  Lab Results:   Personally reviewed  Sodium 139, potassium 4 1, BUN 9, creatinine 0 57, glucose 95, calcium 8 8  Hemoglobin 12, hematocrit 36, WBC 12 21, platelets 970      EKG-normal sinus rhythm without evidence of any ischemia or arrhythmia  Code Status: Level 1 - Full Code      Counseling / Coordination of Care  Total floor / unit time spent today 25 minutes    Greater than 50% of total time was spent with the patient and / or family counseling and / or coordination of care  A description of the counseling / coordination of care: Fredy Hansen

## 2018-03-20 NOTE — CASE MANAGEMENT
Initial Clinical Review    Age/Sex: 58 y o  female    Surgery Date:   3/20/2018    Procedure:    Pre-Op Diagnosis Codes: Morbid obesity ([facility] [address] ) [E66 01]  Body mass index is 38 97 kg/m²  Fatty liver [K76 0]  PETER (obstructive sleep apnea) [G47 33]  Hyperlipidemia, unspecified hyperlipidemia type [E78 5]     Post-Op Diagnosis Codes:     * Morbid obesity ([facility] [address] ) [E66 01]     * Body mass index is 38 97 kg/m²  * Fatty liver [K76 0]     * PETER (obstructive sleep apnea) [G47 33]     * Hyperlipidemia, unspecified hyperlipidemia type [E78 5]     Procedure  1  Laparoscopic Sleeve Gastrectomy  2  Laparoscopic Paraesophageal Hernia Repair  Intraoperative Endoscopy    Anesthesia:   general    Admission Orders: Date/Time/Statement: 3/19/18 @ 1118     Orders Placed This Encounter   Procedures    Inpatient Admission     Standing Status:   Standing     Number of Occurrences:   1     Order Specific Question:   Admitting Physician     Answer:   Sejal Martinez     Order Specific Question:   Level of Care     Answer:   Med Surg [16]     Order Specific Question:   Bed Type     Answer:   Bariatric [1]     Order Specific Question:   Estimated length of stay     Answer:   One Midnight       Vital Signs: /60 (BP Location: Right arm)   Pulse 86   Temp 99 6 °F (37 6 °C) (Temporal)   Resp 18   Ht 4' 10" (1 473 m)   Wt 84 6 kg (186 lb 7 oz)   SpO2 95%   BMI 38 97 kg/m²     Diet:        Diet Orders            Start     Ordered    03/19/18 1800  Diet Bariatric; Bariatric Clear Liquid  Diet effective now     Question Answer Comment   Diet Type Bariatric    Bariatric Bariatric Clear Liquid    RD to adjust diet per protocol?  No        03/19/18 1252    03/19/18 1241  Room Service  Once     Question:  Type of Service  Answer:  Room Service-Appropriate    03/19/18 1240          Mobility:   As  amberly    DVT Prophylaxis:   SCD'S    Pain Control:   Pain Medications             gabapentin (NEURONTIN) 300 mg capsule Take 300 mg by mouth 3 (three) times a day      meloxicam (MOBIC) 15 mg tablet Take 15 mg by mouth daily  PARoxetine (PAXIL) 30 mg tablet TAKE 1 TABLET DAILY AS     DIRECTED    traZODone (DESYREL) 50 mg tablet Take 50 mg by mouth daily at bedtime  Bariatric  Cl liq  Diet  IV  zofran Prn ( x1  3/19 and X 1  3 /20 thus far)  Cons  IM    PROGRESS NOTE  3/20  Assessment  62F POD 1 s/p lap sleeve gastrectomy & hiatal hernia repair     Plan  - decrease IVF  - stop telemetry/pulse ox  - okay to give Lovenox  - encourage incentive spirometry, ambulation, clear liquids      Cosigned by: Bruno Bullock MD at 3/20/2018 11:52 AM       Thank you,  [facility] in the [facility] by [employer] for 2017  Network Utilization Review Department  Phone: [phone]; Fax [phone]  ATTENTION: The Network Utilization Review Department is now centralized for our 7 Facilities  Make a note that we have a new phone and fax numbers for our Department  Please call with any questions or concerns to [phone] and carefully follow the prompts so that you are directed to the right person  All voicemails are confidential  Fax any determinations, approvals, denials, and requests for initial or continue stay review clinical to [phone]  Due to HIGH CALL volume, it would be easier if you could please send faxed requests to expedite your requests and in part, help us provide discharge notifications faster

## 2018-03-20 NOTE — POST OP PROGRESS NOTES
Bariatric Surgery Progress Note    Subjective  No adverse events  Advancing PO hydration, ambulation, spirometry  Pain/nausea control adequate      Objective  Vitals:    03/20/18 0711   BP: 128/60   Pulse: 86   Resp: 18   Temp: 99 6 °F (37 6 °C)   SpO2: 95%     NAD, alert  Normal inspiratory effort  Abdomen soft, non-distended, appropriately tender  Incisions c/d/i    Labs  H/H 12/36  BMP unremarkable    Assessment  62F POD 1 s/p lap sleeve gastrectomy & hiatal hernia repair    Plan  - decrease IVF  - stop telemetry/pulse ox  - okay to give Lovenox  - encourage incentive spirometry, ambulation, clear liquids

## 2018-03-21 ENCOUNTER — TRANSITIONAL CARE MANAGEMENT (OUTPATIENT)
Dept: FAMILY MEDICINE CLINIC | Facility: CLINIC | Age: 63
End: 2018-03-21

## 2018-03-21 VITALS
DIASTOLIC BLOOD PRESSURE: 59 MMHG | BODY MASS INDEX: 39.14 KG/M2 | RESPIRATION RATE: 18 BRPM | TEMPERATURE: 98.1 F | HEART RATE: 79 BPM | WEIGHT: 186.44 LBS | HEIGHT: 58 IN | OXYGEN SATURATION: 98 % | SYSTOLIC BLOOD PRESSURE: 125 MMHG

## 2018-03-21 PROCEDURE — 99024 POSTOP FOLLOW-UP VISIT: CPT | Performed by: PHYSICIAN ASSISTANT

## 2018-03-21 RX ORDER — TRAMADOL HYDROCHLORIDE 50 MG/1
50 TABLET ORAL EVERY 6 HOURS PRN
Qty: 20 TABLET | Refills: 0 | Status: SHIPPED | OUTPATIENT
Start: 2018-03-21 | End: 2018-03-31

## 2018-03-21 RX ADMIN — ENOXAPARIN SODIUM 40 MG: 40 INJECTION SUBCUTANEOUS at 08:29

## 2018-03-21 NOTE — NURSING NOTE
Discharge to home prescriptions picked up from pharmacy verbal understanding discharge instructions

## 2018-03-21 NOTE — PLAN OF CARE
CARDIOVASCULAR - ADULT     Maintains optimal cardiac output and hemodynamic stability Progressing     Absence of cardiac dysrhythmias or at baseline rhythm Progressing        DISCHARGE PLANNING     Discharge to home or other facility with appropriate resources Progressing        GASTROINTESTINAL - ADULT     Minimal or absence of nausea and/or vomiting Progressing     Maintains adequate nutritional intake Progressing        GENITOURINARY - ADULT     Absence of urinary retention Progressing        INFECTION - ADULT     Absence or prevention of progression during hospitalization Progressing     Absence of fever/infection during neutropenic period Progressing        METABOLIC, FLUID AND ELECTROLYTES - ADULT     Electrolytes maintained within normal limits Progressing     Fluid balance maintained Progressing        PAIN - ADULT     Verbalizes/displays adequate comfort level or baseline comfort level Progressing        Potential for Falls     Patient will remain free of falls Progressing        RESPIRATORY - ADULT     Achieves optimal ventilation and oxygenation Progressing        SAFETY ADULT     Patient will remain free of falls Progressing     Maintain or return to baseline ADL function Progressing     Maintain or return mobility status to optimal level Progressing        SKIN/TISSUE INTEGRITY - ADULT     Incision(s), wounds(s) or drain site(s) healing without S/S of infection Progressing

## 2018-03-21 NOTE — POST OP PROGRESS NOTES
Progress Note - General Surgery   Renie Najjar Laudenslager 58 y o  female MRN: [id number]  Unit/Bed#: E5 -01 Encounter: [id number]      Subjective/Objective     Subjective: Patient with morbid obesity s/p Procedure(s):  GASTRECTOMY SLEEVE LAPAROSCOPIC AND INTRAOPERATIVE EGD POD #2   Symptoms improved  Tolerating liquid diet without nausea or vomiting, pain controlled on oral pain medication, ambulating without assistance, voiding well, using incentive spirometer  Objective:    /59 (BP Location: Left arm)   Pulse 79   Temp 98 1 °F (36 7 °C) (Temporal)   Resp 18   Ht 4' 10" (1 473 m)   Wt 84 6 kg (186 lb 7 oz)   SpO2 98%   BMI 38 97 kg/m²       Intake/Output Summary (Last 24 hours) at 03/21/18 0910  Last data filed at 03/21/18 0407   Gross per 24 hour   Intake             1190 ml   Output             1200 ml   Net              -10 ml       Invasive Devices          No matching active lines, drains, or airways            Physical Exam    General Appearance:    Alert, cooperative, no distress, appears stated age   Head:    Normocephalic, without obvious abnormality, atraumatic   Lungs:     Clear to auscultation bilaterally, respirations unlabored   Heart:    Regular rate and rhythm, S1 and S2 normal, no murmur, rub    or gallop   Abdomen:     Soft, appropriate tenderness, bowel sounds active all four quadrants, no masses, no organomegaly, non distended, incisions clean, dry, and intact   Extremities:   Extremities normal, atraumatic, no cyanosis or edema   Neurologic:   CNII-XII intact  Normal strength and sensation               Lab, Imaging and other studies:I have personally reviewed pertinent lab results  Eryn Duncan yesterday    VTE Mechanical Prophylaxis: sequential compression device, Lovenox    Assessment/Plan  #1  Morbid Obesity s/p Procedure(s):  GASTRECTOMY SLEEVE LAPAROSCOPIC AND INTRAOPERATIVE EGD POD #2   Surgically stable for d/c  #2  MDD: resume paroxetine  #3  PETER: resume CPAP  #4  Dyslipidemia: d/c lipid and f/u for lipid profile at 3 to 6 month check up    Plan of care was discussed with patient's nurse    Dispo: Continue liquid diet, ambulation, incentive spirometry  Discharge    This text is generated with voice recognition software  There may be translation, syntax,  or grammatical errors  If you have any questions, please contact the dictating provider

## 2018-03-21 NOTE — DISCHARGE INSTRUCTIONS
Bariatric/Weight Loss Surgery  Hospital Discharge Instructions  1  ACTIVITY:  a  Progress as feels comfortable - a good rule is:  if you are doing something and it begins to hurt, stop doing the activity  Walk at least 3 times per day at home  b  Conrad Seats may walk stairs if you do so slowly  c  You may shower 48 hours after surgery  d  Use your incentive spirometer 10 times per hour while awake for 1 week  e  No driving if you are still taking certain prescription pain medication  Examples of such medication are Percocet, Darvocet, Oxycodone, Tylenol #3, and Tylenol with Codeine  Follow your pharmacists orders  2  DIET  a  Stay on a liquid diet for 7 days after your surgery date, sipping slowly  Refer to your manual for examples of choices  Remember to keep your liquids sugar free or low calorie  You may have protein drinks  Make sure to drink 48 to 64 ounces per day of fluids  b  On the 8th day after surgery, start a pureed/blenderized diet  (As an example, if you had surgery on a Monday, you can start your pureed/blenderized diet the following Monday)  Start 3 meals per day, breakfast, lunch and dinner, each meal to consist of 30 minutes without drinking, 20 minutes eating food and then another 60 minutes without drinking  Follow the directions in your manual under Diet Progression, section 3  You will be on a pureed/blenderized diet for 3 to 5 days  c  Once you start the pureed/blenderized diet, remember to keep hydrated by drinking water or low calorie liquids between meal times (48 to 64 ounces per day) following the 30/60 minute rule  d  Conrad Seats may start a soft diet once you get approval from your surgeon at your first follow up visit  3  MEDICATIONS:  a  Start vitamins and minerals when you get home  b  Pain and Anti-acid Medication as per prescription  c  Other medications as indicated on the Physician Patient Discharge Instructions form given to you at the time of discharge    d  Make sure that you are splitting your pill or tablet medications in halves or fourths or even crushing them before you take them  Capsules should be opened and mixed with water or jello  You need to do this for at least 2 to 4 weeks after surgery  Eventually you will be able to take your medications the regular way as they were prescribed  Ask your nurse prior to discharge about your medications  rusty  Olga Shawanda will need to consult with your Family Doctor in regards to all your prescribed medication, particularly those for blood pressure and diabetes  As you lose weight, medical conditions may change, requiring an alteration or elimination of the drug dose  4  INCISION CARE  a  You may shower and get incisions wet 2 days after surgery  b  If you have a drain, empty the drain as the nurses instructed  5  FOLLOW-UP APPOINTMENT should be made for one week after discharge  Call surgeons office at [phone] to schedule an appointment  CALL YOUR DOCTOR FOR:  pain not controlled by pain medications, a temperature greater than 101 5° F, any increase or change in drainage or redness from any incision, any vomiting or inability to keep liquids down, shortness of breath, shoulder pain, or bleeding  ********Letter and Information for Patient's Primary Health Care Provider************      Dear Easton Butler Provider,       Your patient had bariatric surgery on this admission to [address] [place] [address]  Due to the restrictive and/or malabsorptive nature of their procedure our surgeons recommend routine lab studies  Your patients surgeon will provide orders initially and ask that they continue to follow-up with us for life even if they see you  As time moves on some patients prefer to follow-up only with their family doctor  We ask that you discuss this regular work-up with them when they make an appointment to see you                           *The lab studies recommended to best identify deficiencies are: CBC, CMP, Lipid Profile, Fe, TIBC, %Sat, Vitamin D, Folate, B12, Whole Blood Thiamine, Vitamin A, PTH, Zinc and Ferritin  We recommend HgbA1C for diabetics  *These studies should be done minimally at 6 months and a year post-operatively and then yearly thereafter  *Recommended Daily Supplements: Please see the attached Vitamin Sheet    If your patient has any dietary or psycho-social concerns, they can follow-up with our Team Dietitian and Licensed Clinical   They can reach these team members by calling the [address] [place] [address] [facility]  We also encourage them to follow up at our regularly scheduled support groups or join our [place] at [phone] Patient Forum  For your convenience we have also included a list of medicines that should be avoided after weight loss surgery and a list of the vitamin and minerals they should be taking for the rest of their lives  The patients are provided this information as well  The [facility] Bariatric Team would like to thank you for the opportunity to assist in the care of your patient  Feel free to contact us with any questions by calling the [address] [place] [address] Weight Management office at [phone]    Sincerely,         [address] [place] [address] [facility] Management Center Team      ****************Additional Information for Providers and Patients******************                    Vitamins After       [place]ese[place] en Y Gastric Bypass or Sleeve Gastrectomy Surgery    Due to the decreased absorption of nutrients and the decreased amount of food eaten it is difficult to obtain all the nutrients needed consuming food  We recommend a bariatric formulated vitamin for the rest of your life  If you wish to use an over the counter vitamins please understand you may not get all the recommended daily requirements  Use the following guidelines for over the counter vitamins        Multivitamins   We recommend 2 chewable multivitamins with iron (do not take gummy chewable as they do not contain thiamine)    You can continue with the chewable or take any well formulated, high potency multivitamin containing 22 nutrients including zinc and copper  If you decide to take a bariatric vitamin the number of vitamins that you need to take will vary  Refer to the chart provided at team meeting    Calcium - Calcium is absorbed in the part of the small bowel that is bypassed in gastric bypass patients  In addition, as you lose weight, you are more at risk for loss of bone density leading to osteoporosis  The best form of calcium is Calcium Citrate  This form of calcium is better absorbed after your surgery  Recommended daily dose is 1500 mg  Take 500 mg in (3) divided doses  You can only absorb about 500 mg at a time  We recommend 2000 IU of vitamin D3 per day, in addition to what is in your calcium supplement  If you were instructed to take a higher dose based on a deficiency, then continue to take the higher vitamin D dose  Iron - Iron is absorbed in the part of the small bowel that is bypassed  You will need to take extra iron in addition to what is already in the multivitamin if you are a menstruating woman (25-45 mg of additional elemental iron) or have been diagnosed with an iron deficiency  We recommend iron in the form of Ferrous Fumarate with Vitamin C  Follow the instructions on the package or bottle unless your physician has given other dosage amounts  B12 (Cyanocobalamin) may also be decreased  Additional Vitamin B12 is recommended if you are not taking a bariatric vitamin  Take 350 to 500 micrograms (mcg) per day of B12 (Cyanocobalamin) in a sublingual form (for under the tongue)  Note:  Calcium interferes with the absorption of iron, so it is recommended that you take the calcium at least 2 hours apart from iron  The tannins in tea also interfere with the absorption of iron      Note: Anti-ulcer medications interfere with the absorption of calcium iron and B12  Space your anti-ulcer medication 2 hours apart from your vitamins  * Based on the recommendations of the ASMBS and the National Osteoporosis foundation    Non-steroidal anti-inflammatory drugs or medications containing them  You should take caution or avoid these medications as they could harm your pouch or sleeve  **This is a sample list and is not all inclusive  Please read labels carefully  **      Non Steroidal anti-inflammatory drugs  Advil (ibuprofen)  Aleve (naproxen)  Anaprox (naproxen)  Ansaid (flurbiprofen)  Azolid (phenylbutazone)  Bextra (valdecoxib)  Butazolidin (phenylbutazone)  Celebrex (celecoxib)  Clinoril (sulindac)  Dolobid (diflunisal)  Excedrin IB (ibuprofen)  Feldene (piroxicam)  Ibuprin (ibuprofen)  Indocin (indomethacin)  Lodine (etodolac)  Meclomen (meclofenamate)  Midol IB (ibuprofen)  Motrin IB (ibuprofen)  Nalfon (fenoprofen)  Naprosyn (naproxen)  Nuprin (ibuprofen)  Orudis (ketoprofen)  Oruvail (ketoprofen)  Pamprin - IB (ibuprofen)  Ponstel (mefenamic acid)  Rexolate (sodium thiosalicylate)  Tandearil (oxyphenbutazone)  Tolectin (tolmetin)  Voltaren (diclofenac)      Barbiturate  Fiorinal (butalbital/aspirin/caffeine)    Salicylates  Amigesic (salsalate)  Anacin (aspirin)  Arthropan (choline salicylate)  Ascriptin (buffered aspirin)  Aspirin (aspirin)  Aspirtab (aspirin)  Bufferin (buffered aspirin)  Disalcid (salsalate)  Ecotrin (aspirin)  Uracel (sodium salicylate)    Analgesics  Equagesic (meprobamate/aspirin)  Micrainin (meprobamate/aspirin)  Percodan (oxycodone/aspirin)    OTC  Pepto-Bismol®  Teresa-Gilbert®  Excedrin®    For Gastric Bypass Patients  Extended Release Medications  Sustained Release Medications  Time Released Medications    6

## 2018-03-21 NOTE — DISCHARGE SUMMARY
Discharge Summary - Rafael Barnett 58 y o  female MRN: [id number]    Unit/Bed#: E5 -01 Encounter: [id number]    Admission Date: 3/19/2018     Discharge Date: 03/21/18    Admitting Diagnosis: Morbid obesity ([address] ) [E66 01]  Fatty liver [K76 0]  PETER (obstructive sleep apnea) [G47 33]  Hyperlipidemia, unspecified hyperlipidemia type [E78 5]    Secondary Diagnosis:   Past Medical History:   Diagnosis Date    Allergic rhinitis     Basal cell carcinoma     Bowel habit changes     Cataract     starting with a catract    Cellulitis of left lower extremity     last assessed 06/29/2016    Chronic GERD     Closed displaced fracture of greater tuberosity of humerus     last assessed 05/31/2016    CPAP (continuous positive airway pressure) dependence     Fatty liver     Fibromyalgia     Fibromyalgia, primary     Hiatal hernia     High cholesterol     Insomnia     Morbid obesity (HCC)     Osteoarthritis     Palpitations     pt denies a fib    PONV (postoperative nausea and vomiting)     Restless leg     Sicca syndrome (HCC)     Sleep apnea     Superficial phlebitis and thrombophlebitis of left lower extremity     last assessed 06/08/2016    Thyroid nodule     nodule on thyroid    Varicose veins of left leg with edema     Wears glasses        Discharge Diagnosis: Same    Procedures Performed: Procedure(s):  GASTRECTOMY SLEEVE LAPAROSCOPIC AND INTRAOPERATIVE EGD    Consults: [facility] Medicine for Medical Management      Hospital Course: Patient with morbid obesity was admitted to the hospital on 3/19/2018 for elective Procedure(s):  GASTRECTOMY SLEEVE LAPAROSCOPIC AND INTRAOPERATIVE EGD  Postoperatively, the patient was stable and transferred to the [address] for further observation  By POD #2, the patient was tolerating a liquid diet and pain was controlled oral pain medications  The patient was ambulating without assistance, vital signs and lab work were stable   The patient was cleared for discharge on 03/21/18  Disposition: The patient should follow up with Jonathan Tobar MD in 2 weeks for a post op check and with Darlin Leblanc PA-C as needed for medical management  The patient should refer to the handout "Discharge Instructions" for further information  Call physician for temperature over 101, wound redness or discharge, vomiting, or intolerance to diet  Discharge Medications:  See after visit summary for reconciled discharge medications provided to patient and family  This text is generated with voice recognition software  There may be translation, syntax,  or grammatical errors  If you have any questions, please contact the dictating provider

## 2018-03-21 NOTE — CASE MANAGEMENT
Notification of Discharge  This is a Notification of Discharge from our facility [address]  Please be advised that this patient has been discharge from our facility  Below you will find the admission and discharge date and time including the patients disposition  PRESENTATION DATE: 3/19/2018  7:10 AM  IP ADMISSION DATE: 3/19/18 1118  DISCHARGE DATE: 3/21/2018  9:53 AM  DISPOSITION: Home/Self Care    [address] [facility] in the [facility] by Loyd Nunn for 2017  Network Utilization Review Department  Phone: [phone]; Fax [phone]  ATTENTION: The Network Utilization Review Department is now centralized for our 7 Facilities  Make a note that we have a new phone and fax numbers for our Department  Please call with any questions or concerns to [phone] and carefully follow the prompts so that you are directed to the right person  All voicemails are confidential  Fax any determinations, approvals, denials, and requests for initial or continue stay review clinical to [phone]  Due to HIGH CALL volume, it would be easier if you could please send faxed requests to expedite your requests and in part, help us provide discharge notifications faster

## 2018-03-22 ENCOUNTER — TELEPHONE (OUTPATIENT)
Dept: BARIATRICS | Facility: CLINIC | Age: 63
End: 2018-03-22

## 2018-03-26 ENCOUNTER — OFFICE VISIT (OUTPATIENT)
Dept: FAMILY MEDICINE CLINIC | Facility: CLINIC | Age: 63
End: 2018-03-26
Payer: COMMERCIAL

## 2018-03-26 VITALS
BODY MASS INDEX: 37.87 KG/M2 | WEIGHT: 181.2 LBS | DIASTOLIC BLOOD PRESSURE: 70 MMHG | SYSTOLIC BLOOD PRESSURE: 124 MMHG | HEART RATE: 68 BPM

## 2018-03-26 DIAGNOSIS — R73.9 HYPERGLYCEMIA: ICD-10-CM

## 2018-03-26 DIAGNOSIS — Z98.84 S/P BARIATRIC SURGERY: Primary | ICD-10-CM

## 2018-03-26 DIAGNOSIS — B37.0 THRUSH: ICD-10-CM

## 2018-03-26 PROBLEM — K44.9 HIATAL HERNIA: Status: ACTIVE | Noted: 2017-12-18

## 2018-03-26 PROBLEM — K58.0 IRRITABLE BOWEL SYNDROME WITH DIARRHEA: Status: ACTIVE | Noted: 2017-12-18

## 2018-03-26 PROBLEM — K31.7 GASTRIC POLYPS: Status: ACTIVE | Noted: 2018-01-08

## 2018-03-26 PROBLEM — E78.2 MIXED HYPERLIPIDEMIA: Status: ACTIVE | Noted: 2018-02-08

## 2018-03-26 PROBLEM — N90.89 VULVAR LESION: Status: ACTIVE | Noted: 2017-09-12

## 2018-03-26 PROBLEM — J30.9 ALLERGIC RHINITIS: Status: ACTIVE | Noted: 2017-11-28

## 2018-03-26 PROBLEM — K21.9 ACID REFLUX: Status: ACTIVE | Noted: 2017-12-26

## 2018-03-26 PROCEDURE — 99496 TRANSJ CARE MGMT HIGH F2F 7D: CPT | Performed by: PHYSICIAN ASSISTANT

## 2018-03-26 RX ORDER — CLOTRIMAZOLE 10 MG/1
10 LOZENGE ORAL; TOPICAL 4 TIMES DAILY
Qty: 40 TABLET | Refills: 0 | Status: SHIPPED | OUTPATIENT
Start: 2018-03-26 | End: 2018-04-05

## 2018-03-26 NOTE — PROGRESS NOTES
Assessment/Plan:     S/P bariatric surgery  Patient has follow-up on 03/29/2018  Mixed hyperlipidemia  Pt was told to stop her STATIN and to have lipid checked in 3-6 months  Pt has labs set for July already  Hyperglycemia  Check as set for July  Thrush  Will treat with mycelex troches as directed  Diagnoses and all orders for this visit:    S/P bariatric surgery    Hyperglycemia    Thrush  -     clotrimazole (MYCELEX) 10 mg drew; Take 1 tablet (10 mg total) by mouth 4 (four) times a day for 10 days         Subjective:    CC: Pt  Here for hospital follow up after bariatric surgery  No concerns noted  Pt  Has follow up with surgeon scheduled for 3/29/18  Discuss medication  Patient ID: Patricia Cannon is a 58 y o  female  Patient here today for follow-up to [facility] and March 21st   She was discharged on the 21st and does have an upcoming appointment with bariatric surgeon for follow-up on 03/29/2018  Was told to stop her  STATIN and recheck in 3-6 months with labs  Just today is now allowed to have pureed food  Up until today only liquids  Pt has lost 20 pounds in 26 days so far  Noticed a white on her tongue that will not brush off a few days ago  Patient declines any shortness of breath chest pain leg pain swelling frequent urination or burning  Review of Systems   Constitutional: Negative  HENT: Negative  Eyes: Negative  Respiratory: Negative  Cardiovascular: Negative  Gastrointestinal: Negative  Endocrine: Negative  Genitourinary: Negative  Musculoskeletal: Negative  Skin: Negative  Allergic/Immunologic: Negative  Neurological: Negative  Hematological: Negative  Psychiatric/Behavioral: Negative  Objective:     Physical Exam   Constitutional: She is oriented to person, place, and time  She appears well-developed and well-nourished  No distress  HENT:   Head: Normocephalic and atraumatic  Tongue with a white adherent coating  Eyes: Conjunctivae are normal  Right eye exhibits no discharge  Left eye exhibits no discharge  Neck: Neck supple  Carotid bruit is not present  Cardiovascular: Normal rate, regular rhythm and normal heart sounds  Exam reveals no gallop and no friction rub  No murmur heard  Pulmonary/Chest: Effort normal and breath sounds normal  No respiratory distress  She has no wheezes  She has no rales  Abdominal: Soft  Bowel sounds are normal  She exhibits no distension  There is no tenderness  There is no rebound and no guarding  Neurological: She is alert and oriented to person, place, and time  Skin: Skin is warm and dry  She is not diaphoretic  Psychiatric: She has a normal mood and affect  Judgment normal    Nursing note and vitals reviewed  Vitals:    03/26/18 1101   BP: 124/70   BP Location: Left arm   Patient Position: Sitting   Pulse: 68   Weight: 82 2 kg (181 lb 3 2 oz)       Transitional Care Management Review:  Georgia Morrell is a 58 y o  female here for TCM follow up       During the TCM phone call patient stated:    Date and time hospital follow up call was made:  3/21/2018 10:55 AM  Hospital care reviewed:  Records reviewed  Patient was hopsitalized at:  [address]  Date of admission:  3/19/18  Date of discharge:  3/21/18  Diagnosis:  bariatric surgery  Were the patients medicaitons reviewed and updated:  Yes  Current symptoms:  (Comment: flatus)  Post hospital issues:  None  Should patient be enrolled in anticoag monitoring?:  No  Scheduled for follow up?:  Yes  Did you obtain your prescribed medications:  Yes  Do you need help managing your perscriptions or medications:  No  Is transportation to your appointments needed:  No  I have advised the patient to call PCP with any new or worsening symptoms (please type in name along with any credentials):  Mary Jo Richardson LPN Urvashi Dumont PA-C

## 2018-03-26 NOTE — ASSESSMENT & PLAN NOTE
Pt was told to stop her STATIN and to have lipid checked in 3-6 months  Pt has labs set for July already

## 2018-03-26 NOTE — PATIENT INSTRUCTIONS
Problem List Items Addressed This Visit     Hyperglycemia     Check as set for July  S/P bariatric surgery - Primary     Patient has follow-up on 03/29/2018  Thrush     Will treat with mycelex troches as directed            Relevant Medications    clotrimazole (MYCELEX) 10 mg drew

## 2018-03-28 RX ORDER — ROPINIROLE 2 MG/1
TABLET, FILM COATED ORAL
COMMUNITY
Start: 2018-03-22 | End: 2018-03-29 | Stop reason: ALTCHOICE

## 2018-03-28 RX ORDER — OXYCODONE HYDROCHLORIDE AND ACETAMINOPHEN 5; 325 MG/1; MG/1
TABLET ORAL
COMMUNITY
Start: 2018-03-05 | End: 2018-03-29 | Stop reason: ALTCHOICE

## 2018-03-29 ENCOUNTER — OFFICE VISIT (OUTPATIENT)
Dept: BARIATRICS | Facility: CLINIC | Age: 63
End: 2018-03-29

## 2018-03-29 VITALS
HEIGHT: 58 IN | BODY MASS INDEX: 37.57 KG/M2 | SYSTOLIC BLOOD PRESSURE: 136 MMHG | HEART RATE: 78 BPM | TEMPERATURE: 98 F | DIASTOLIC BLOOD PRESSURE: 80 MMHG | WEIGHT: 179 LBS

## 2018-03-29 DIAGNOSIS — Z98.84 S/P BARIATRIC SURGERY: Primary | ICD-10-CM

## 2018-03-29 DIAGNOSIS — Z98.84 BARIATRIC SURGERY STATUS: Primary | ICD-10-CM

## 2018-03-29 PROBLEM — K76.0 FATTY LIVER: Status: RESOLVED | Noted: 2018-02-08 | Resolved: 2018-03-29

## 2018-03-29 PROCEDURE — 99024 POSTOP FOLLOW-UP VISIT: CPT | Performed by: SURGERY

## 2018-03-29 PROCEDURE — RECHECK: Performed by: DIETITIAN, REGISTERED

## 2018-03-29 RX ORDER — CEPHALEXIN 500 MG/1
CAPSULE ORAL
Status: ON HOLD | COMMUNITY
Start: 2018-03-27 | End: 2018-06-28 | Stop reason: ALTCHOICE

## 2018-03-29 RX ORDER — AMITRIPTYLINE HYDROCHLORIDE 25 MG/1
25 TABLET, FILM COATED ORAL
Status: ON HOLD | COMMUNITY
End: 2018-06-28 | Stop reason: ALTCHOICE

## 2018-03-29 NOTE — PROGRESS NOTES
Weight Management Nutrition Class     Diagnosis: Obesity    Bariatric Surgeon: Dr Meron Salgado    Surgery: Vertical Sleeve Gastrectomy    Class: first post op note    Topics discussed today include:     fluid goals post op, protein goals post op, constipation, chew food well, exercise, diet progression, protein supplems and vitamin/mineral supplements    Patient was able to verbalize basic diet (protein, fluid, vitamin and mineral) recommendations and possible nutrition-related complications   Yes

## 2018-03-29 NOTE — PROGRESS NOTES
Assessment/Plan: Patient is doing well status post uncomplicated elective sleeve gastrectomy on March 19, 2018  Surgical pathology revealed no abnormalities of her gastric specimen  She reports expected restriction but is able to fully hydrate  She has good excess weight loss to this point of 17%  She will continue her Protonix beyond 1 month due to her previous history of reflux for which she follows with Dr Yareli Lopez  She will finish her Lovenox as scheduled  She will follow up with the dietitian today and return to clinic in 1 month  Diagnoses and all orders for this visit:    S/P bariatric surgery    Other orders  -     Discontinue: oxyCODONE-acetaminophen (PERCOCET) 5-325 mg per tablet; Earliest Fill Date: 3/5/18   -     Discontinue: rOPINIRole (REQUIP) 2 mg tablet;   -     cephalexin (KEFLEX) 500 mg capsule;   -     amitriptyline (ELAVIL) 25 mg tablet; Take 25 mg by mouth daily at bedtime          Subjective: patient is doing well  Denies nausea, vomiting, change in bowel habits, or pain  She is compliant with all dietary and medication plans to this point  Patient ID: Ran Rice is a 58 y o  female      HPI see above    Review of Systems    Denies fever/chills  Denies lightheadedness  Denies visual changes  Denies dyspnea, cough  Denies chest pain  Denies nausea/vomiting  Denies change in urinary/bowel habits  Denies masses/hernias  Denies parasthesia  Denies peripheral edema    Objective:     Physical Exam    Vitals:    03/29/18 1039   BP: 136/80   Pulse: 78   Temp: 98 °F (36 7 °C)     NAD, alert  No pallor  MMM  No JVD  RRR  Normal inspiratory effort  Abdomen soft, NT/ND  Incisions c/d/i, no masses or hernias  No peripheral edema  Normal affect, no agitation

## 2018-04-24 ENCOUNTER — TRANSITIONAL CARE MANAGEMENT (OUTPATIENT)
Dept: FAMILY MEDICINE CLINIC | Facility: CLINIC | Age: 63
End: 2018-04-24

## 2018-04-26 ENCOUNTER — TELEPHONE (OUTPATIENT)
Dept: BARIATRICS | Facility: CLINIC | Age: 63
End: 2018-04-26

## 2018-04-26 NOTE — TELEPHONE ENCOUNTER
Spoke with pt regarding a letter/card for if pt goes to a buffet stating that she has had bariatric surgery  Spoke with Jeevan Pratt who informed me pt should have recieved a card upon leaving the hospital  She is going to look when she goes home to see if she has the card otherwise she will call back to speak with Arin Huffman in order to get a new one

## 2018-05-02 ENCOUNTER — CLINICAL SUPPORT (OUTPATIENT)
Dept: BARIATRICS | Facility: CLINIC | Age: 63
End: 2018-05-02

## 2018-05-02 VITALS — HEIGHT: 58 IN | BODY MASS INDEX: 35.73 KG/M2 | WEIGHT: 170.2 LBS

## 2018-05-02 DIAGNOSIS — E66.9 OBESITY, CLASS II, BMI 35-39.9: Primary | ICD-10-CM

## 2018-05-02 DIAGNOSIS — Z98.84 BARIATRIC SURGERY STATUS: ICD-10-CM

## 2018-05-02 PROCEDURE — RECHECK

## 2018-05-02 NOTE — PROGRESS NOTES
Weight Management Nutrition Class     Diagnosis: Obesity    Bariatric Surgeon: Dr Carin James    Surgery: Vertical Sleeve Gastrectomy    Class: 5 week post op     Topics discussed today include:     fluid goals post op, protein goals post op, constipation, chew food well, exercise, avoidance of alcohol, PPI use, diet progression, hypoglycemia, dumping syndrome, protein supplems, vitamin/mineral supplements, calcium supplements, additional vitamin B12, iron supplements and fat soluble vitamins     Patient was able to verbalize basic diet (protein, fluid, vitamin and mineral) recommendations and possible nutrition-related complications   Yes

## 2018-06-25 RX ORDER — AMOXICILLIN 500 MG/1
CAPSULE ORAL
Status: ON HOLD | COMMUNITY
End: 2018-06-28 | Stop reason: ALTCHOICE

## 2018-06-25 RX ORDER — ROPINIROLE 2 MG/1
TABLET, FILM COATED ORAL
Status: ON HOLD | COMMUNITY
End: 2018-06-28 | Stop reason: ALTCHOICE

## 2018-06-25 RX ORDER — CEPHALEXIN 500 MG/1
TABLET ORAL
Refills: 0 | COMMUNITY
Start: 2018-04-02 | End: 2018-07-16

## 2018-06-25 RX ORDER — HYDROCODONE BITARTRATE AND ACETAMINOPHEN 5; 300 MG/1; MG/1
TABLET ORAL
Status: ON HOLD | COMMUNITY
End: 2018-06-28 | Stop reason: ALTCHOICE

## 2018-06-25 RX ORDER — CELECOXIB 100 MG/1
CAPSULE ORAL
Status: ON HOLD | COMMUNITY
End: 2018-06-28 | Stop reason: ALTCHOICE

## 2018-06-25 RX ORDER — CYCLOSPORINE 0.5 MG/ML
EMULSION OPHTHALMIC
Status: ON HOLD | COMMUNITY
End: 2018-06-28 | Stop reason: ALTCHOICE

## 2018-06-25 RX ORDER — ATORVASTATIN CALCIUM 10 MG/1
TABLET, FILM COATED ORAL
Status: ON HOLD | COMMUNITY
End: 2018-06-28 | Stop reason: ALTCHOICE

## 2018-06-25 RX ORDER — DEXTROMETHORPHAN HYDROBROMIDE AND PROMETHAZINE HYDROCHLORIDE 15; 6.25 MG/5ML; MG/5ML
SYRUP ORAL
Status: ON HOLD | COMMUNITY
End: 2018-06-28 | Stop reason: ALTCHOICE

## 2018-06-25 RX ORDER — ROPINIROLE 0.5 MG/1
TABLET, FILM COATED ORAL
Status: ON HOLD | COMMUNITY
End: 2018-06-28 | Stop reason: ALTCHOICE

## 2018-06-25 RX ORDER — BUPIVACAINE HYDROCHLORIDE 2.5 MG/ML
INJECTION, SOLUTION INFILTRATION; PERINEURAL
Status: ON HOLD | COMMUNITY
End: 2018-06-28 | Stop reason: ALTCHOICE

## 2018-06-25 RX ORDER — METHYLPREDNISOLONE ACETATE 40 MG/ML
INJECTION, SUSPENSION INTRA-ARTICULAR; INTRALESIONAL; INTRAMUSCULAR; SOFT TISSUE
Status: ON HOLD | COMMUNITY
End: 2018-06-28 | Stop reason: ALTCHOICE

## 2018-06-25 RX ORDER — AMOXICILLIN 875 MG/1
TABLET, COATED ORAL
Status: ON HOLD | COMMUNITY
End: 2018-06-28 | Stop reason: ALTCHOICE

## 2018-06-25 RX ORDER — ROPINIROLE 1 MG/1
TABLET, FILM COATED ORAL
Status: ON HOLD | COMMUNITY
End: 2018-06-28 | Stop reason: ALTCHOICE

## 2018-06-25 RX ORDER — OXYCODONE HYDROCHLORIDE AND ACETAMINOPHEN 5; 325 MG/1; MG/1
TABLET ORAL
Status: ON HOLD | COMMUNITY
End: 2018-06-28 | Stop reason: ALTCHOICE

## 2018-06-25 RX ORDER — MELOXICAM 15 MG/1
TABLET ORAL
Status: ON HOLD | COMMUNITY
End: 2018-06-28 | Stop reason: ALTCHOICE

## 2018-06-25 RX ORDER — TRAMADOL HYDROCHLORIDE 50 MG/1
TABLET ORAL
Status: ON HOLD | COMMUNITY
End: 2018-06-28 | Stop reason: ALTCHOICE

## 2018-06-25 RX ORDER — WARFARIN SODIUM 2 MG/1
TABLET ORAL
Status: ON HOLD | COMMUNITY
End: 2018-06-28 | Stop reason: ALTCHOICE

## 2018-06-25 RX ORDER — FEXOFENADINE HCL 180 MG/1
TABLET ORAL
Status: ON HOLD | COMMUNITY
End: 2018-06-28 | Stop reason: ALTCHOICE

## 2018-06-25 RX ORDER — GABAPENTIN 600 MG/1
TABLET ORAL
Status: ON HOLD | COMMUNITY
End: 2018-06-28 | Stop reason: ALTCHOICE

## 2018-06-25 RX ORDER — AZELASTINE 1 MG/ML
SPRAY, METERED NASAL
Status: ON HOLD | COMMUNITY
End: 2018-06-28 | Stop reason: ALTCHOICE

## 2018-06-25 RX ORDER — FUROSEMIDE 20 MG/1
TABLET ORAL
Status: ON HOLD | COMMUNITY
End: 2018-06-28 | Stop reason: ALTCHOICE

## 2018-06-25 RX ORDER — CEFUROXIME AXETIL 500 MG/1
TABLET ORAL
Status: ON HOLD | COMMUNITY
End: 2018-06-28 | Stop reason: ALTCHOICE

## 2018-06-25 RX ORDER — PREDNISONE 20 MG/1
TABLET ORAL
Status: ON HOLD | COMMUNITY
End: 2018-06-28 | Stop reason: ALTCHOICE

## 2018-06-25 RX ORDER — CELECOXIB 200 MG/1
CAPSULE ORAL
Status: ON HOLD | COMMUNITY
End: 2018-06-28 | Stop reason: ALTCHOICE

## 2018-06-25 RX ORDER — FLUTICASONE PROPIONATE 50 MCG
SPRAY, SUSPENSION (ML) NASAL
Status: ON HOLD | COMMUNITY
End: 2018-06-28 | Stop reason: ALTCHOICE

## 2018-06-25 RX ORDER — DOXYCYCLINE HYCLATE 100 MG
TABLET ORAL
Status: ON HOLD | COMMUNITY
End: 2018-06-28 | Stop reason: ALTCHOICE

## 2018-06-25 RX ORDER — GABAPENTIN 600 MG/1
600 TABLET ORAL DAILY
Refills: 3 | COMMUNITY
Start: 2018-06-02

## 2018-06-27 ENCOUNTER — ANESTHESIA EVENT (OUTPATIENT)
Dept: GASTROENTEROLOGY | Facility: HOSPITAL | Age: 63
End: 2018-06-27
Payer: COMMERCIAL

## 2018-06-28 ENCOUNTER — HOSPITAL ENCOUNTER (OUTPATIENT)
Facility: HOSPITAL | Age: 63
Setting detail: OUTPATIENT SURGERY
Discharge: HOME | End: 2018-06-28
Attending: INTERNAL MEDICINE | Admitting: INTERNAL MEDICINE
Payer: COMMERCIAL

## 2018-06-28 ENCOUNTER — ANESTHESIA (OUTPATIENT)
Dept: GASTROENTEROLOGY | Facility: HOSPITAL | Age: 63
End: 2018-06-28
Payer: COMMERCIAL

## 2018-06-28 VITALS
SYSTOLIC BLOOD PRESSURE: 100 MMHG | HEART RATE: 66 BPM | BODY MASS INDEX: 30.24 KG/M2 | WEIGHT: 150 LBS | HEIGHT: 59 IN | OXYGEN SATURATION: 97 % | DIASTOLIC BLOOD PRESSURE: 58 MMHG | TEMPERATURE: 98.1 F | RESPIRATION RATE: 16 BRPM

## 2018-06-28 DIAGNOSIS — D13.2 ADENOMATOUS DUODENAL POLYP: ICD-10-CM

## 2018-06-28 PROCEDURE — 88305 TISSUE EXAM BY PATHOLOGIST: CPT | Performed by: PATHOLOGY

## 2018-06-28 PROCEDURE — 43239 EGD BIOPSY SINGLE/MULTIPLE: CPT | Performed by: INTERNAL MEDICINE

## 2018-06-28 RX ORDER — SENNOSIDES 8.6 MG
650 CAPSULE ORAL 2 TIMES DAILY
COMMUNITY
End: 2020-12-04

## 2018-06-28 RX ORDER — PROPOFOL 10 MG/ML
INJECTION, EMULSION INTRAVENOUS AS NEEDED
Status: DISCONTINUED | OUTPATIENT
Start: 2018-06-28 | End: 2018-06-28 | Stop reason: SURG

## 2018-06-28 RX ORDER — SODIUM CHLORIDE 9 MG/ML
125 INJECTION, SOLUTION INTRAVENOUS CONTINUOUS
Status: DISCONTINUED | OUTPATIENT
Start: 2018-06-28 | End: 2018-06-28 | Stop reason: HOSPADM

## 2018-06-28 RX ADMIN — PROPOFOL 100 MG: 10 INJECTION, EMULSION INTRAVENOUS at 14:16

## 2018-06-28 RX ADMIN — SODIUM CHLORIDE 125 ML/HR: 0.9 INJECTION, SOLUTION INTRAVENOUS at 13:09

## 2018-06-28 RX ADMIN — SODIUM CHLORIDE: 0.9 INJECTION, SOLUTION INTRAVENOUS at 14:05

## 2018-06-28 RX ADMIN — PROPOFOL 100 MG: 10 INJECTION, EMULSION INTRAVENOUS at 14:07

## 2018-06-28 RX ADMIN — PROPOFOL 100 MG: 10 INJECTION, EMULSION INTRAVENOUS at 14:19

## 2018-06-28 RX ADMIN — PROPOFOL 100 MG: 10 INJECTION, EMULSION INTRAVENOUS at 14:14

## 2018-06-28 RX ADMIN — PROPOFOL 100 MG: 10 INJECTION, EMULSION INTRAVENOUS at 13:56

## 2018-06-28 NOTE — ANESTHESIA POSTPROCEDURE EVALUATION
Post-Op Assessment Note      CV Status:  Stable    Mental Status:  Awake    Hydration Status:  Stable    PONV Controlled:  None    Airway Patency:  Patent    Post Op Vitals Reviewed: Yes          Staff: CRNA           BP      Temp      Pulse     Resp      SpO2

## 2018-06-28 NOTE — ANESTHESIA PREPROCEDURE EVALUATION
Review of Systems/Medical History  Patient summary reviewed  Chart reviewed  History of anesthetic complications PONV    Cardiovascular  EKG reviewed, Hyperlipidemia,    Pulmonary  Sleep apnea ,   Comment: 45 pound weight loss  Has not been retested for PETER     GI/Hepatic    GERD , Liver disease ,  Hiatal hernia, Bariatric surgery,   Comment: Fatty liver          Endo/Other  History of thyroid disease ,   Comment: Thyroid nnodule    GYN       Hematology   Musculoskeletal    Arthritis     Neurology    Neuromuscular disease , Fibromyalgia  Comment: Restless legs, fibromyalgia Psychology   Depression ,              Physical Exam    Airway    Mallampati score: I  TM Distance: >3 FB  Neck ROM: full     Dental   No notable dental hx     Cardiovascular      Pulmonary      Other Findings        Anesthesia Plan  ASA Score- 2     Anesthesia Type- IV sedation with anesthesia with ASA Monitors  Additional Monitors:   Airway Plan:         Plan Factors-  Patient did not smoke on day of surgery  Induction- intravenous  Postoperative Plan-     Informed Consent- Anesthetic plan and risks discussed with patient  I personally reviewed this patient with the CRNA  Discussed and agreed on the Anesthesia Plan with the CRNA  Madhav Sahu

## 2018-06-28 NOTE — OP NOTE
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  CONSENT: The benefits, risks, and alternatives to the procedure were   discussed and informed consent was obtained from the patient  PREPARATION:  EKG, pulse, pulse oximetry and blood pressure were monitored   throughout the procedure  ASA Classification: Class 2 - Patient has mild   to moderate systemic disturbance that may or may not be related to the   disorder requiring surgery  MEDICATIONS: Anesthesia-check records     PROCEDURE:  The endoscope was passed without difficulty through the mouth   under direct visualization and advanced to the 2nd portion of the   duodenum  The scope was withdrawn and the mucosa was carefully examined  FINDINGS:   Esophagus: The esophagus appeared to be normal   Stomach: There was evidence of a prior gastroplasty in the stomach  A few small   and medium-sized polyps was found in the body of the stomach  Two forceps   biopsies was taken  Duodenum: The duodenal bulb and 2nd portion of the   duodenum appeared to be normal    A single diminutive polyp was found in   the 3rd portion of the duodenum at the area of the prior polypectomy  Two   biopsies was taken  COMPLICATIONS: There were no complications  IMPRESSIONS: Normal esophagus  evidence of prior gastric sleeve   /gastroplasty surgery  Polyps found in the body of the stomach  Two   biopsies taken  These were likely fundic gland polyps  A diminutive   polyp was found in the 3rd portion of the duodenum in the area of the   prior polypectomy  Two biopsies taken  RECOMMENDATIONS: Follow-up on the results of the biopsy specimens in 2   weeks   EGD in 1 year  ESTIMATED BLOOD LOSS:     PATHOLOGY SPECIMENS: Two forceps biopsies taken  Associated finding:   Polyp  Two biopsies taken  Associated finding: Polyp  PROCEDURE CODES:     ICD-9 Codes: 211 1 Benign neoplasm of stomach 211 2 Benign neoplasm of   duodenum, jejunum, and ileum     ICD-10 Codes: 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

## 2018-06-29 ENCOUNTER — OFFICE VISIT (OUTPATIENT)
Dept: BARIATRICS | Facility: CLINIC | Age: 63
End: 2018-06-29
Payer: COMMERCIAL

## 2018-06-29 VITALS
WEIGHT: 155 LBS | DIASTOLIC BLOOD PRESSURE: 80 MMHG | SYSTOLIC BLOOD PRESSURE: 120 MMHG | HEART RATE: 73 BPM | BODY MASS INDEX: 32.54 KG/M2 | HEIGHT: 58 IN | TEMPERATURE: 98.5 F

## 2018-06-29 DIAGNOSIS — Z98.84 BARIATRIC SURGERY STATUS: Primary | ICD-10-CM

## 2018-06-29 DIAGNOSIS — E78.2 MIXED HYPERLIPIDEMIA: ICD-10-CM

## 2018-06-29 DIAGNOSIS — G47.33 OSA (OBSTRUCTIVE SLEEP APNEA): ICD-10-CM

## 2018-06-29 DIAGNOSIS — K21.9 GERD WITHOUT ESOPHAGITIS: ICD-10-CM

## 2018-06-29 DIAGNOSIS — K91.2 POSTSURGICAL MALABSORPTION: ICD-10-CM

## 2018-06-29 PROBLEM — B37.0 THRUSH: Status: RESOLVED | Noted: 2018-03-26 | Resolved: 2018-06-29

## 2018-06-29 PROBLEM — E66.01 MORBID OBESITY (HCC): Status: RESOLVED | Noted: 2018-02-08 | Resolved: 2018-06-29

## 2018-06-29 PROBLEM — K44.9 HIATAL HERNIA: Status: RESOLVED | Noted: 2017-12-18 | Resolved: 2018-06-29

## 2018-06-29 PROCEDURE — 99214 OFFICE O/P EST MOD 30 MIN: CPT | Performed by: PHYSICIAN ASSISTANT

## 2018-06-29 NOTE — ASSESSMENT & PLAN NOTE
-At risk for malabsorption of vitamins/minerals secondary to malabsorption and restriction of intake from their procedure  -Currently taking adequate postop bariatric surgery vitamin supplementation-yes  -  -Next set of bariatric labs ordered for approximately 2 weeks    Taking 2 bariatric advanatage MVI and 2-650 mg viactiv     She is taking an EXTRA 6000 Iu  D3 per day (so coming close to 10,000 IU per day now)

## 2018-06-29 NOTE — ASSESSMENT & PLAN NOTE
-s/p Vertical Sleeve Gastrectomy with paraesophageal hernia repair by  Dr Eliel Sosa on 3/19/2018  Overall doing Well  Initial:199 1 lb  Current: 155 lb  EWL: 55% which is above average for this time frame  Stanley: Current  Current BMI is Body mass index is 32 4 kg/m²      Tolerating a regular diet-yes  Eating at least 60 grams of protein per day-yes  Following 30/60 minute rule with liquids-yes  Drinking at least 64 ounces of fluid per day-no-advised on importance of hydration and ways to boost this-especially with hot weather  Drinking carbonated beverages-no  Sufficient exercise-yes  Using NSAIDs regularly-no  Using nicotine-no  Using alcohol-no

## 2018-06-29 NOTE — PATIENT INSTRUCTIONS
Follow-up in 3 months  We kindly ask that you arrive 15 minutes before your scheduled appointment time with your provider to allow for our staff to room you and check your vital signs and update your chart  We thank you for your patience at your visit  Follow diet as discussed  Get lab work done prior to next office visit  It is recommended to check with your insurance BEFORE getting labs done to make sure they are covered by your policy  Make sure to HOLD any multivitamins that may contain biotin and any biotin supplements FOR 5 DAYS before any labs since it can affect the results  Follow vitamin  and mineral recommendations as reviewed with you  Exercise as tolerated  Call our office if you have any problems with abdominal pain especially associated with fever, chills, nausea, vomiting or any other concerns  All  Post-bariatric surgery patients should be aware that very small quantities of any alcohol  can cause impairment and it is very possible not to feel the effect  The effect can be in the system for several hours  It is also a stomach irritant  It is advised to AVOID alcohol, Nonsteroidal antiinflammatory drugs (NSAIDS) and nicotine of all forms   Any of these can cause stomach irritation/pain

## 2018-06-29 NOTE — ASSESSMENT & PLAN NOTE
Is wearing Cpap   Advised that we recommend continuing to use Cpap for up to one year post-op unless otherwise advised by sleep medicine as this may have a beneficial effect on long-term weight loss

## 2018-06-29 NOTE — PROGRESS NOTES
Assessment/Plan:    GERD without esophagitis  She has been taking ppi daily with control of symptoms  -but had egd with Dr Tim Guaman who indicated her ppi could be culprit with her gastric polyps  I am advising her to trial a gradual taper to off over the next couple of months but if she cannot tolerate same, she will let me know    She has h/o polyps and just had EGD with polypectomies yesterday and she will follow-up with Dr Tim Guaman for same  PETER (obstructive sleep apnea)  Is wearing Cpap  Advised that we recommend continuing to use Cpap for up to one year post-op unless otherwise advised by sleep medicine as this may have a beneficial effect on long-term weight loss      Bariatric surgery status  -s/p Vertical Sleeve Gastrectomy with paraesophageal hernia repair by  Dr Beatriz Riggins on 3/19/2018  Overall doing Well  Initial:199 1 lb  Current: 155 lb  EWL: 55% which is above average for this time frame  Stanley: Current  Current BMI is Body mass index is 32 4 kg/m²  Tolerating a regular diet-yes  Eating at least 60 grams of protein per day-yes  Following 30/60 minute rule with liquids-yes  Drinking at least 64 ounces of fluid per day-no-advised on importance of hydration and ways to boost this-especially with hot weather  Drinking carbonated beverages-no  Sufficient exercise-yes  Using NSAIDs regularly-no  Using nicotine-no  Using alcohol-no      Postsurgical malabsorption  -At risk for malabsorption of vitamins/minerals secondary to malabsorption and restriction of intake from their procedure  -Currently taking adequate postop bariatric surgery vitamin supplementation-yes  -  -Next set of bariatric labs ordered for approximately 2 weeks    Taking 2 bariatric advanatage MVI and 2-650 mg viactiv     She is taking an EXTRA 6000 Iu  D3 per day (so coming close to 10,000 IU per day now)         Diagnoses and all orders for this visit:    Bariatric surgery status  -     CBC and Platelet; Future  -     Copper Level;  Future  - Ferritin; Future  -     Folate; Future  -     Iron Saturation %; Future  -     PTH, intact; Future  -     Vitamin A; Future  -     Vitamin B1, whole blood; Future  -     Vitamin B12; Future  -     Zinc; Future    Postsurgical malabsorption  -     CBC and Platelet; Future  -     Copper Level; Future  -     Ferritin; Future  -     Folate; Future  -     Iron Saturation %; Future  -     PTH, intact; Future  -     Vitamin A; Future  -     Vitamin B1, whole blood; Future  -     Vitamin B12; Future  -     Zinc; Future    GERD without esophagitis    PETER (obstructive sleep apnea)    Mixed hyperlipidemia          Subjective:      Patient ID: Jackeline Kilpatrick is a 58 y o  female  She is tolerating a regular diet  She is taking bariatric supplements and is exercising  She has no complaints today        The following portions of the patient's history were reviewed and updated as appropriate: allergies, current medications, past family history, past medical history, past social history, past surgical history and problem list     Review of Systems   Constitutional: Negative for chills and fever  Unexpected weight change: planned weight loss  Respiratory: Negative for shortness of breath and wheezing  Cardiovascular: Negative for chest pain and palpitations  Gastrointestinal: Negative for abdominal pain, constipation, diarrhea, nausea and vomiting  Psychiatric/Behavioral: Suicidal ideas: no complait of anxiety or depression  Objective:      /80   Pulse 73   Temp 98 5 °F (36 9 °C)   Ht 4' 10" (1 473 m)   Wt 70 3 kg (155 lb)   BMI 32 40 kg/m²          Physical Exam   Constitutional: She is oriented to person, place, and time  She appears well-developed and well-nourished  HENT:   Mouth/Throat: Oropharynx is clear and moist    Eyes: Conjunctivae are normal  No scleral icterus  Cardiovascular: Normal rate, regular rhythm and normal heart sounds      Pulmonary/Chest: Effort normal and breath sounds normal    Abdominal: Soft  There is no tenderness  No incisional hernias appreciated   Musculoskeletal:   Normal gait   Neurological: She is alert and oriented to person, place, and time  Psychiatric: She has a normal mood and affect  Her behavior is normal  Judgment and thought content normal    Nursing note and vitals reviewed  GOALS: Continued weight loss with good nutrition intakes    Normal vitamin and mineral levels  Exercise as tolerated    BARRIERS: none identified

## 2018-06-29 NOTE — ASSESSMENT & PLAN NOTE
She has been taking ppi daily with control of symptoms  -but had egd with Dr Maurisio Rocha who indicated her ppi could be culprit with her gastric polyps  I am advising her to trial a gradual taper to off over the next couple of months but if she cannot tolerate same, she will let me know    She has h/o polyps and just had EGD with polypectomies yesterday and she will follow-up with Dr Maurisio Rocha for same

## 2018-07-08 DIAGNOSIS — F32.9 MAJOR DEPRESSIVE DISORDER, SINGLE EPISODE: ICD-10-CM

## 2018-07-08 DIAGNOSIS — G25.81 RESTLESS LEGS SYNDROME: ICD-10-CM

## 2018-07-08 DIAGNOSIS — E78.5 HYPERLIPIDEMIA: ICD-10-CM

## 2018-07-08 DIAGNOSIS — M79.7 FIBROMYALGIA: ICD-10-CM

## 2018-07-08 DIAGNOSIS — R60.9 EDEMA: ICD-10-CM

## 2018-07-08 DIAGNOSIS — E55.9 VITAMIN D DEFICIENCY: ICD-10-CM

## 2018-07-08 DIAGNOSIS — K21.9 GASTRO-ESOPHAGEAL REFLUX DISEASE WITHOUT ESOPHAGITIS: ICD-10-CM

## 2018-07-08 DIAGNOSIS — Z23 ENCOUNTER FOR IMMUNIZATION: ICD-10-CM

## 2018-07-08 DIAGNOSIS — R73.9 HYPERGLYCEMIA: ICD-10-CM

## 2018-07-08 DIAGNOSIS — G47.00 INSOMNIA: ICD-10-CM

## 2018-07-09 ENCOUNTER — APPOINTMENT (OUTPATIENT)
Dept: LAB | Facility: CLINIC | Age: 63
End: 2018-07-09
Payer: COMMERCIAL

## 2018-07-09 DIAGNOSIS — Z98.84 BARIATRIC SURGERY STATUS: ICD-10-CM

## 2018-07-09 DIAGNOSIS — G25.81 RESTLESS LEGS SYNDROME: ICD-10-CM

## 2018-07-09 DIAGNOSIS — E78.5 HYPERLIPIDEMIA: ICD-10-CM

## 2018-07-09 DIAGNOSIS — R73.9 HYPERGLYCEMIA: ICD-10-CM

## 2018-07-09 DIAGNOSIS — K21.9 GASTRO-ESOPHAGEAL REFLUX DISEASE WITHOUT ESOPHAGITIS: ICD-10-CM

## 2018-07-09 DIAGNOSIS — Z23 ENCOUNTER FOR IMMUNIZATION: ICD-10-CM

## 2018-07-09 DIAGNOSIS — R60.9 EDEMA: ICD-10-CM

## 2018-07-09 DIAGNOSIS — M79.7 FIBROMYALGIA: ICD-10-CM

## 2018-07-09 DIAGNOSIS — E55.9 VITAMIN D DEFICIENCY: ICD-10-CM

## 2018-07-09 DIAGNOSIS — F32.9 MAJOR DEPRESSIVE DISORDER, SINGLE EPISODE: ICD-10-CM

## 2018-07-09 DIAGNOSIS — K91.2 POSTSURGICAL MALABSORPTION: ICD-10-CM

## 2018-07-09 DIAGNOSIS — G47.00 INSOMNIA: ICD-10-CM

## 2018-07-09 LAB
25(OH)D3 SERPL-MCNC: 49.4 NG/ML (ref 30–100)
ALBUMIN SERPL BCP-MCNC: 3.6 G/DL (ref 3.5–5)
ALP SERPL-CCNC: 82 U/L (ref 46–116)
ALT SERPL W P-5'-P-CCNC: 14 U/L (ref 12–78)
ANION GAP SERPL CALCULATED.3IONS-SCNC: 5 MMOL/L (ref 4–13)
AST SERPL W P-5'-P-CCNC: 7 U/L (ref 5–45)
BILIRUB SERPL-MCNC: 0.61 MG/DL (ref 0.2–1)
BUN SERPL-MCNC: 9 MG/DL (ref 5–25)
CALCIUM SERPL-MCNC: 8.7 MG/DL (ref 8.3–10.1)
CHLORIDE SERPL-SCNC: 107 MMOL/L (ref 100–108)
CHOLEST SERPL-MCNC: 184 MG/DL (ref 50–200)
CO2 SERPL-SCNC: 27 MMOL/L (ref 21–32)
CREAT SERPL-MCNC: 0.54 MG/DL (ref 0.6–1.3)
ERYTHROCYTE [DISTWIDTH] IN BLOOD BY AUTOMATED COUNT: 13.2 % (ref 11.6–15.1)
FERRITIN SERPL-MCNC: 48 NG/ML (ref 8–388)
FOLATE SERPL-MCNC: 18.4 NG/ML (ref 3.1–17.5)
GFR SERPL CREATININE-BSD FRML MDRD: 101 ML/MIN/1.73SQ M
GLUCOSE P FAST SERPL-MCNC: 97 MG/DL (ref 65–99)
HCT VFR BLD AUTO: 37.6 % (ref 34.8–46.1)
HDLC SERPL-MCNC: 38 MG/DL (ref 40–60)
HGB BLD-MCNC: 12.2 G/DL (ref 11.5–15.4)
IRON SATN MFR SERPL: 44 %
IRON SERPL-MCNC: 106 UG/DL (ref 50–170)
LDLC SERPL CALC-MCNC: 123 MG/DL (ref 0–100)
MCH RBC QN AUTO: 28.6 PG (ref 26.8–34.3)
MCHC RBC AUTO-ENTMCNC: 32.4 G/DL (ref 31.4–37.4)
MCV RBC AUTO: 88 FL (ref 82–98)
PLATELET # BLD AUTO: 247 THOUSANDS/UL (ref 149–390)
PMV BLD AUTO: 11.3 FL (ref 8.9–12.7)
POTASSIUM SERPL-SCNC: 4 MMOL/L (ref 3.5–5.3)
PROT SERPL-MCNC: 7.2 G/DL (ref 6.4–8.2)
PTH-INTACT SERPL-MCNC: 52.9 PG/ML (ref 18.4–80.1)
RBC # BLD AUTO: 4.27 MILLION/UL (ref 3.81–5.12)
SODIUM SERPL-SCNC: 139 MMOL/L (ref 136–145)
TIBC SERPL-MCNC: 239 UG/DL (ref 250–450)
TRIGL SERPL-MCNC: 116 MG/DL
VIT B12 SERPL-MCNC: 683 PG/ML (ref 100–900)
WBC # BLD AUTO: 5.21 THOUSAND/UL (ref 4.31–10.16)

## 2018-07-09 PROCEDURE — 85027 COMPLETE CBC AUTOMATED: CPT

## 2018-07-09 PROCEDURE — 82525 ASSAY OF COPPER: CPT

## 2018-07-09 PROCEDURE — 83550 IRON BINDING TEST: CPT

## 2018-07-09 PROCEDURE — 36415 COLL VENOUS BLD VENIPUNCTURE: CPT

## 2018-07-09 PROCEDURE — 80061 LIPID PANEL: CPT

## 2018-07-09 PROCEDURE — 82728 ASSAY OF FERRITIN: CPT

## 2018-07-09 PROCEDURE — 84590 ASSAY OF VITAMIN A: CPT

## 2018-07-09 PROCEDURE — 82607 VITAMIN B-12: CPT

## 2018-07-09 PROCEDURE — 82746 ASSAY OF FOLIC ACID SERUM: CPT

## 2018-07-09 PROCEDURE — 83540 ASSAY OF IRON: CPT

## 2018-07-09 PROCEDURE — 84425 ASSAY OF VITAMIN B-1: CPT

## 2018-07-09 PROCEDURE — 80053 COMPREHEN METABOLIC PANEL: CPT

## 2018-07-09 PROCEDURE — 82306 VITAMIN D 25 HYDROXY: CPT

## 2018-07-09 PROCEDURE — 83970 ASSAY OF PARATHORMONE: CPT

## 2018-07-09 PROCEDURE — 84630 ASSAY OF ZINC: CPT

## 2018-07-11 LAB
COPPER SERPL-MCNC: 110 UG/DL (ref 72–166)
ZINC SERPL-MCNC: 105 UG/DL (ref 56–134)

## 2018-07-12 LAB — VIT B1 BLD-SCNC: 91.7 NMOL/L (ref 66.5–200)

## 2018-07-13 LAB — VIT A SERPL-MCNC: 29.5 UG/DL (ref 36.4–108)

## 2018-07-15 PROBLEM — K21.9 ACID REFLUX: Status: RESOLVED | Noted: 2017-12-26 | Resolved: 2018-07-15

## 2018-07-16 ENCOUNTER — OFFICE VISIT (OUTPATIENT)
Dept: FAMILY MEDICINE CLINIC | Facility: CLINIC | Age: 63
End: 2018-07-16
Payer: COMMERCIAL

## 2018-07-16 VITALS
HEART RATE: 72 BPM | SYSTOLIC BLOOD PRESSURE: 126 MMHG | DIASTOLIC BLOOD PRESSURE: 60 MMHG | BODY MASS INDEX: 32.27 KG/M2 | WEIGHT: 154.4 LBS

## 2018-07-16 DIAGNOSIS — K21.9 GASTROESOPHAGEAL REFLUX DISEASE WITHOUT ESOPHAGITIS: ICD-10-CM

## 2018-07-16 DIAGNOSIS — F33.41 RECURRENT MAJOR DEPRESSIVE DISORDER, IN PARTIAL REMISSION (HCC): ICD-10-CM

## 2018-07-16 DIAGNOSIS — E04.1 THYROID NODULE: ICD-10-CM

## 2018-07-16 DIAGNOSIS — K58.0 IRRITABLE BOWEL SYNDROME WITH DIARRHEA: Primary | ICD-10-CM

## 2018-07-16 DIAGNOSIS — K21.9 GERD WITHOUT ESOPHAGITIS: ICD-10-CM

## 2018-07-16 DIAGNOSIS — E78.2 MIXED HYPERLIPIDEMIA: ICD-10-CM

## 2018-07-16 DIAGNOSIS — E05.90 HYPERTHYROIDISM: ICD-10-CM

## 2018-07-16 DIAGNOSIS — E55.9 VITAMIN D DEFICIENCY: ICD-10-CM

## 2018-07-16 DIAGNOSIS — G25.81 RESTLESS LEGS SYNDROME: ICD-10-CM

## 2018-07-16 DIAGNOSIS — R73.9 HYPERGLYCEMIA: ICD-10-CM

## 2018-07-16 DIAGNOSIS — F51.01 PRIMARY INSOMNIA: ICD-10-CM

## 2018-07-16 PROCEDURE — 99214 OFFICE O/P EST MOD 30 MIN: CPT | Performed by: PHYSICIAN ASSISTANT

## 2018-07-16 RX ORDER — PANTOPRAZOLE SODIUM 20 MG/1
40 TABLET, DELAYED RELEASE ORAL
Qty: 90 TABLET | Refills: 3 | Status: SHIPPED | OUTPATIENT
Start: 2018-07-16 | End: 2019-04-09 | Stop reason: ALTCHOICE

## 2018-07-16 NOTE — PROGRESS NOTES
Assessment/Plan:    Mixed hyperlipidemia  Pt off statin now for the past 3 months since gastric bypass and her LDL has gone from  and HDL has gone from 45-38  Triglycerides overall doing very well  Recheck 6 months  Hyperglycemia  Glucose 97  A1c not done  Patient is status post gastric bypass  Recheck 6 months  Thyroid nodule  Overdue for thyroid U/S  Ordered  Call with results  Vitamin D deficiency  Very stable with a level of 49 4  GERD without esophagitis  Will decrease PPI to 20 mg daily and see if in future pt may stop  Diagnoses and all orders for this visit:    Irritable bowel syndrome with diarrhea    GERD without esophagitis    Hyperthyroidism    Vitamin D deficiency  -     Vitamin D 25 hydroxy; Future    Restless legs syndrome    Mixed hyperlipidemia  -     Lipid Panel with Direct LDL reflex; Future    Hyperglycemia  -     Hemoglobin A1C; Future  -     Comprehensive metabolic panel; Future    Recurrent major depressive disorder, in partial remission ([facility] )    Primary insomnia    Thyroid nodule  -     US thyroid; Future    Gastroesophageal reflux disease without esophagitis  -     pantoprazole (PROTONIX) 20 mg tablet; Take 2 tablets (40 mg total) by mouth daily before breakfast Take 30 minutes before          Subjective:   CC: 6 month follow up for chronic conditions and to review blood work  Jas Up       Patient ID: Sissy Granger is a 58 y o  female  Sissy Granger is here for chronic conditions f/u including the diagnosis of Irritable bowel syndrome with diarrhea  (primary encounter diagnosis)  Regina Turnerams without esophagitis  Hyperthyroidism  Vitamin d deficiency  Restless legs syndrome  Mixed hyperlipidemia  Hyperglycemia  Recurrent major depressive disorder, in partial remission ([facility])  Primary insomnia   Pt  states they are taking all medications as directed without complaints or side effects   Pt  had labs done prior to today's visit which included Recent Results (from the past 672 hour(s))  -Tissue Exam  Collection Time: 06/28/18  2:18 PM       Result                      Value             Ref Range           Case Report                                                   Surgical Pathology Report                         Case: [id number]                                  Authorizing Provider:  Celso Rodriguez MD             Collected:           06/28/2018 1418             Ordering Location:     [address] [place] [address]      Received:            06/28/2018 [facility]                                                          Pathologist:           Vera Troncoso MD                                                               Specimens:   A) - Polyp, Stomach/Small Intestine, duodenum  cold bx                                             B) - Polyp, Stomach/Small Intestine, gastric  cold bx                                        Final Diagnosis                                               A  Duodenal polyp (cold biopsy):    - Polypoid small bowel mucosa with lymphangiectasia  - No villous atrophy or increase in intraepithelial lymphocytes  - No granulomas, dysplasia or malignancy identified  B  Gastric polyp (cold biopsy):    - Polypoid gastric oxyntic mucosa with suggestion of medication (PPI)  effect     - No dysplasia or malignancy identified  (FORMATTING REMOVED)       Note                                                          Interpretation performed at [facility], [address] [place] [address], [address] [place] [address]  (FORMATTING REMOVED)       Additional Information                                        All controls performed with the immunohistochemical stains reported above  reacted appropriately  These tests were developed and their performance  characteristics determined by [facility] or  [facility]  They may not be cleared or approved by the [place]  Food and Drug Administration   The FDA has determined that such clearance  or approval is not necessary  These tests are used for clinical purposes  They should not be regarded as investigational or for research  This  laboratory has been approved by [place] [id number], designated as a high-complexity  laboratory and is qualified to perform these tests  (FORMATTING REMOVED)       Gross Description                                             A  The specimen is received in formalin, labeled with the patient's name  and hospital number, and is designated "duodenal polyp"  The specimen  consists of a single tan-pink soft tissue fragment measuring 0 2 cm in  greatest dimension  The specimen is entirely submitted in 1 cassette  B  The specimen is received in formalin, labeled with the patient's name  and hospital number, and is designated "gastric polyp"  The specimen  consists of a single tan-pink soft tissue fragment measuring 0 3 cm in  greatest dimension  The specimen is entirely submitted in 1 cassette  Note: The estimated total formalin fixation time based upon information  provided by the submitting clinician and the standard processing schedule  is under 72 hours     [place] (FORMATTING REMOVED)       Clinical Information                                          Adenomatous duodenal polyp  -Lipid Panel with Direct LDL reflex  Collection Time: 07/09/18  9:28 AM       Result                      Value             Ref Range           Cholesterol                 184               50 - 200 mg/*       Triglycerides               116               <=150 mg/dL         HDL, Direct                 38 (L)            40 - 60 mg/dL       LDL Calculated              123 (H)           0 - 100 mg/dL  -Comprehensive metabolic panel  Collection Time: 07/09/18  9:28 AM       Result                      Value             Ref Range           Sodium                      139               136 - 145 mm*       Potassium                   4 0               3 5 - 5 3 mm*       Chloride 107               100 - 108 mm*       CO2                         27                21 - 32 mmol*       Anion Gap                   5                 4 - 13 mmol/L       BUN                         9                 5 - 25 mg/dL        Creatinine                  0 54 (L)          0 60 - 1 30 *       Glucose, Fasting            97                65 - 99 mg/dL       Calcium                     8 7               8 3 - 10 1 m*       AST                         7                 5 - 45 U/L          ALT                         14                12 - 78 U/L         Alkaline Phosphatase        82                46 - 116 U/L        Total Protein               7 2               6 4 - 8 2 g/*       Albumin                     3 6               3 5 - 5 0 g/*       Total Bilirubin             0 61              0 20 - 1 00 *       eGFR                        101               ml/min/1 73s*  -Vitamin D 25 hydroxy  Collection Time: 07/09/18  9:28 AM       Result                      Value             Ref Range           Vit D, 25-Hydroxy           49 4              30 0 - 100 0*  -CBC and Platelet  Collection Time: 07/09/18  9:28 AM       Result                      Value             Ref Range           WBC                         5 21              4 31 - 10 16*       RBC                         4 27              3 81 - 5 12 *       Hemoglobin                  12 2              11 5 - 15 4 *       Hematocrit                  37 6              34 8 - 46 1 %       MCV                         88                82 - 98 fL          MCH                         28 6              26 8 - 34 3 *       MCHC                        32 4              31 4 - 37 4 *       RDW                         13 2              11 6 - 15 1 %       Platelets                   247               149 - 390 Th*       MPV                         11 3              8 9 - 12 7 fL  -Copper Level  Collection Time: 07/09/18  9:28 AM       Result Value             Ref Range           Copper                      110               72 - 166 ug/*  -Ferritin  Collection Time: 07/09/18  9:28 AM       Result                      Value             Ref Range           Ferritin                    48                8 - 388 ng/mL  -Folate  Collection Time: 07/09/18  9:28 AM       Result                      Value             Ref Range           Folate                      18 4 (H)          3 1 - 17 5 n*  -Iron Saturation %  Collection Time: 07/09/18  9:28 AM       Result                      Value             Ref Range           Iron Saturation             44                %                   TIBC                        239 (L)           250 - 450 ug*       Iron                        106               50 - 170 ug/*  -PTH, intact  Collection Time: 07/09/18  9:28 AM       Result                      Value             Ref Range           PTH                         52 9              18 4 - 80 1 *  -Vitamin A  Collection Time: 07/09/18  9:28 AM       Result                      Value             Ref Range           Vitamin A                   29 5 (L)          36 4 - 108 0*  -Vitamin B1, whole blood  Collection Time: 07/09/18  9:28 AM       Result                      Value             Ref Range           Vitamin B1, Whole Blood     91 7              66 5 - 200 0*  -Vitamin B12  Collection Time: 07/09/18  9:28 AM       Result                      Value             Ref Range           Vitamin B-12                683               100 - 900 pg*  -Zinc  Collection Time: 07/09/18  9:28 AM       Result                      Value             Ref Range           Zinc                        105               56 - 134 ug/*      Pt has now lost 45 pounds since her gastric bypass and feels great  Her fibro is much better  Pt was told to continue her PETER mask at bedtime for one year  She is going to [employer] on vacation with her family this year           The following portions of the patient's history were reviewed and updated as appropriate: allergies, current medications, past family history, past medical history, past social history, past surgical history and problem list     Review of Systems   Constitutional: Negative  HENT: Negative  Eyes: Negative  Respiratory: Negative  Cardiovascular: Negative  Gastrointestinal: Negative  Endocrine: Negative  Genitourinary: Negative  Musculoskeletal: Negative  Skin: Negative  Allergic/Immunologic: Negative  Neurological: Negative  Hematological: Negative  Psychiatric/Behavioral: Negative  Objective:      Vitals:    07/16/18 1057   BP: 126/60   BP Location: Left arm   Patient Position: Sitting   Pulse: 72   Weight: 70 kg (154 lb 6 4 oz)            Physical Exam   Constitutional: She is oriented to person, place, and time  She appears well-developed and well-nourished  No distress  HENT:   Head: Normocephalic and atraumatic  Eyes: Conjunctivae are normal  Right eye exhibits no discharge  Left eye exhibits no discharge  Neck: Neck supple  Carotid bruit is not present  Cardiovascular: Normal rate, regular rhythm and normal heart sounds  Exam reveals no gallop and no friction rub  No murmur heard  Pulmonary/Chest: Effort normal and breath sounds normal  No respiratory distress  She has no wheezes  She has no rales  Neurological: She is alert and oriented to person, place, and time  Skin: Skin is warm and dry  She is not diaphoretic  Psychiatric: She has a normal mood and affect  Judgment normal    Nursing note and vitals reviewed

## 2018-07-16 NOTE — PATIENT INSTRUCTIONS
Problem List Items Addressed This Visit        Digestive    Irritable bowel syndrome with diarrhea - Primary    GERD without esophagitis     Will decrease PPI to 20 mg daily and see if in future pt may stop  Relevant Medications    pantoprazole (PROTONIX) 20 mg tablet       Endocrine    Thyroid nodule     Overdue for thyroid U/S  Ordered  Call with results  Relevant Orders    US thyroid    Hyperthyroidism       Other    Mixed hyperlipidemia     Pt off statin now for the past 3 months since gastric bypass and her LDL has gone from  and HDL has gone from 45-38  Triglycerides overall doing very well  Recheck 6 months  Relevant Orders    Lipid Panel with Direct LDL reflex    Vitamin D deficiency     Very stable with a level of 49 4  Relevant Orders    Vitamin D 25 hydroxy    Restless legs syndrome    Primary insomnia    Hyperglycemia     Glucose 97  A1c not done  Patient is status post gastric bypass  Recheck 6 months           Relevant Orders    Hemoglobin A1C    Comprehensive metabolic panel    Recurrent major depressive disorder, in partial remission ([facility] [address] )      Other Visit Diagnoses     Gastroesophageal reflux disease without esophagitis        Relevant Medications    pantoprazole (PROTONIX) 20 mg tablet

## 2018-07-16 NOTE — ASSESSMENT & PLAN NOTE
Pt off statin now for the past 3 months since gastric bypass and her LDL has gone from  and HDL has gone from 45-38  Triglycerides overall doing very well  Recheck 6 months

## 2018-07-19 ENCOUNTER — HOSPITAL ENCOUNTER (OUTPATIENT)
Dept: ULTRASOUND IMAGING | Facility: MEDICAL CENTER | Age: 63
Discharge: HOME | End: 2018-07-19
Payer: COMMERCIAL

## 2018-07-19 DIAGNOSIS — E04.1 THYROID NODULE: ICD-10-CM

## 2018-07-19 PROCEDURE — 76536 US EXAM OF HEAD AND NECK: CPT

## 2018-07-27 ENCOUNTER — OFFICE VISIT (OUTPATIENT)
Dept: SLEEP CENTER | Facility: CLINIC | Age: 63
End: 2018-07-27
Payer: COMMERCIAL

## 2018-07-27 VITALS
SYSTOLIC BLOOD PRESSURE: 112 MMHG | BODY MASS INDEX: 30.8 KG/M2 | WEIGHT: 152.8 LBS | HEIGHT: 59 IN | DIASTOLIC BLOOD PRESSURE: 68 MMHG | HEART RATE: 86 BPM

## 2018-07-27 DIAGNOSIS — J31.0 CHRONIC RHINITIS: ICD-10-CM

## 2018-07-27 DIAGNOSIS — G47.33 OSA (OBSTRUCTIVE SLEEP APNEA): Primary | ICD-10-CM

## 2018-07-27 DIAGNOSIS — G47.00 INSOMNIA, UNSPECIFIED TYPE: ICD-10-CM

## 2018-07-27 DIAGNOSIS — M79.7 FIBROMYALGIA SYNDROME: ICD-10-CM

## 2018-07-27 DIAGNOSIS — G25.81 RESTLESS LEG SYNDROME: ICD-10-CM

## 2018-07-27 DIAGNOSIS — G47.61 PLMD (PERIODIC LIMB MOVEMENT DISORDER): ICD-10-CM

## 2018-07-27 PROCEDURE — 99214 OFFICE O/P EST MOD 30 MIN: CPT | Performed by: INTERNAL MEDICINE

## 2018-07-27 NOTE — PROGRESS NOTES
Review of Systems      Genitourinary none   Cardiology none   Gastrointestinal none   Neurology frequent headaches   Constitutional none   Integumentary none   Psychiatry none   Musculoskeletal joint pain and muscle aches   Pulmonary snoring   ENT none   Endocrine none   Hematological none

## 2018-07-27 NOTE — PROGRESS NOTES
Follow-Up Note - Sleep [address] Aguilar  58 y o  female  DII:[id number]  QKB:[id number]    CC: I saw this patient for follow-up in clinic today for her Sleep Disordered Breathing, Coexisting Sleep and Medical Problems  PFSH, Problem List, Medications & Allergies were reviewed in EMR  Interval changes:   She has ha approximately 50 lb weight loss since bariatric surgery in March of 2018  She  has a past medical history of Allergic rhinitis; Basal cell carcinoma; Bowel habit changes; Cataract; Cellulitis of left lower extremity; Chronic GERD; Closed displaced fracture of greater tuberosity of humerus; CPAP (continuous positive airway pressure) dependence; Fatty liver; Fibromyalgia; Fibromyalgia, primary; Hiatal hernia; High cholesterol; Insomnia; Morbid obesity ([address] ); Osteoarthritis; Palpitations; PONV (postoperative nausea and vomiting); Restless leg; Sicca syndrome ([address] ); Sleep apnea; Superficial phlebitis and thrombophlebitis of left lower extremity; Thyroid nodule; Varicose veins of left leg with edema; and Wears glasses  She has a current medication list which includes the following prescription(s): acetaminophen, calcium-vitamin d-vitamin k, cholecalciferol, gabapentin, multivitamin, pantoprazole, paroxetine, and trazodone  ROS: Reviewed (see attached)  [Significant for infrequent headaches ]     HPI:  With respect to compliance using PAP, data download shows:  No data was available, but she reports regular use for > 4hours   Deidre Cartagena reports  · no  difficulty tolerating PAP;   · no  adverse effects:   · Benefitting from use: sleeping better:  She has nasal congestion that is cleared up by CPAP and is unable to sleep without it  · She feels restless leg symptoms are controlled and she is not aware of jerking movements during sleep  She is no longer on amitriptyline or Requip  She is using gabapentin at 600 mg q h s       Sleep Routine:  She reports getting 8-9 hours sleep; she has no difficulty initiating or maintaining sleep   She uses trazodone episodically as a sleep aid  She awakens spontaneously feeling refreshed  She denies excessive drowsiness [ ]  [She rated herself at Total score: 1 /24 on the [place] sleepiness scale ]  [  ]  Habits:[ reports that she has never smoked  She has never used smokeless tobacco ], [ reports that she does not drink alcohol ], [ reports that she does not use drugs  ], Caffeine use: limited[ ], Exercise routine: regular [ ]  EXAM: /68   Pulse 86   Ht 4' 10 5" (1 486 m)   Wt 69 3 kg (152 lb 12 8 oz)   BMI 31 39 kg/m²      Patient is alert, orientated, cooperative [and in no distress]  Mental state [appears normal]  Craniofacial anatomy normal  There are [no] facial pressure marks or rashes  Neck Circumference: 38 cm  There are no abnormal neck masses  Nasal airway is [patent ]  Mucous membranes appeared normal  The oral airway [is crowded ] Base of tongue is at Mallampati class IV (only hard palate visible)  [Apart from truncal obesity,] the rest of exam (Heart, Lungs, Abdomen, CNS and Musculoskeletal systems) was unremarkable   IMPRESSION:     1  Restless leg syndrome     2  PETER (obstructive sleep apnea)  Sleep F/U  - established patient   3  PLMD (periodic limb movement disorder)     4  Fibromyalgia syndrome         PLAN:  1  I reviewed results of the Sleep studies with the patient:  Her diagnostic study in 2014 demonstrated an RDI of 18 per hour with minimum oxygen saturation of 70%  2  Treatment with  PAP is medically necessary and Gloria Dodd is agreable to continue use  3  Instruction on care of equipment and strategies to improve comfort with use of PAP were discussed  4  Care coordinated with DME provider and Rx to replace supplies provided  5  Pressure setting:  Continue at 8 cm H2O     6  Compliance with therapy was emphasized and strategies for weight reduction discussed      7  She continues to lose weight and her target weight is below 135 lb  She will likely be able to come off CPAP once she is at goal   8  She may also be able to reduce her dose of gabapentin  9  I suggested regular nasal saline rinse with topical nasal steroid if necessary  10  Consideration should be given to Cymbalta in place of Paxil because of her restless leg symptoms  With your consent, follow-up is advised in [1 Sallyanne Sides [or sooner if needed] [to monitor progress, compliance and to adjust therapy]  Thank you for allowing me to participate in the care of this patient      Sincerely,    Authenticated electronically by Michael Linder MD on 77/13/68   Board Certified Specialist

## 2018-09-25 ENCOUNTER — OFFICE VISIT (OUTPATIENT)
Dept: FAMILY MEDICINE CLINIC | Facility: CLINIC | Age: 63
End: 2018-09-25
Payer: COMMERCIAL

## 2018-09-25 VITALS
DIASTOLIC BLOOD PRESSURE: 64 MMHG | HEART RATE: 76 BPM | WEIGHT: 149.4 LBS | BODY MASS INDEX: 30.69 KG/M2 | SYSTOLIC BLOOD PRESSURE: 112 MMHG | TEMPERATURE: 97.9 F

## 2018-09-25 DIAGNOSIS — J30.1 NON-SEASONAL ALLERGIC RHINITIS DUE TO POLLEN: Primary | ICD-10-CM

## 2018-09-25 DIAGNOSIS — J06.9 ACUTE UPPER RESPIRATORY INFECTION: ICD-10-CM

## 2018-09-25 PROBLEM — J31.0 CHRONIC RHINITIS: Status: RESOLVED | Noted: 2018-07-27 | Resolved: 2018-09-25

## 2018-09-25 PROCEDURE — 99214 OFFICE O/P EST MOD 30 MIN: CPT | Performed by: PHYSICIAN ASSISTANT

## 2018-09-25 RX ORDER — PROMETHAZINE HYDROCHLORIDE AND CODEINE PHOSPHATE 6.25; 1 MG/5ML; MG/5ML
5 SYRUP ORAL EVERY 4 HOURS PRN
Qty: 118 ML | Refills: 0 | Status: SHIPPED | OUTPATIENT
Start: 2018-09-25 | End: 2018-10-05

## 2018-09-25 RX ORDER — CEFUROXIME AXETIL 250 MG/1
250 TABLET ORAL EVERY 12 HOURS SCHEDULED
Qty: 20 TABLET | Refills: 0 | Status: SHIPPED | OUTPATIENT
Start: 2018-09-25 | End: 2018-10-05

## 2018-09-25 RX ORDER — PROMETHAZINE HYDROCHLORIDE AND CODEINE PHOSPHATE 6.25; 1 MG/5ML; MG/5ML
5 SYRUP ORAL EVERY 4 HOURS PRN
Qty: 118 ML | Refills: 0 | Status: SHIPPED | OUTPATIENT
Start: 2018-09-25 | End: 2018-09-25 | Stop reason: SDUPTHER

## 2018-09-25 NOTE — PATIENT INSTRUCTIONS
Problem List Items Addressed This Visit        Respiratory    Non-seasonal allergic rhinitis due to pollen - Primary     Suggest patient to switch to 1 of the following; generic Claritin 10 mg generic Zyrtec 10 mg or generic Allegra 180 mg which are all 24 hr nondrowsy allergy medications  Acute upper respiratory infection     Antibiotic as directed  Increase fluids and trial generic Mucinex D as directed            Relevant Medications    cefuroxime (CEFTIN) 250 mg tablet    promethazine-codeine (PHENERGAN WITH CODEINE) 6 25-10 mg/5 mL syrup

## 2018-09-25 NOTE — PROGRESS NOTES
Assessment/Plan:    Non-seasonal allergic rhinitis due to pollen  Suggest patient to switch to 1 of the following; generic Claritin 10 mg generic Zyrtec 10 mg or generic Allegra 180 mg which are all 24 hr nondrowsy allergy medications  Acute upper respiratory infection  Antibiotic as directed  Increase fluids and trial generic Mucinex D as directed  Diagnoses and all orders for this visit:    Non-seasonal allergic rhinitis due to pollen    Acute upper respiratory infection  -     cefuroxime (CEFTIN) 250 mg tablet; Take 1 tablet (250 mg total) by mouth every 12 (twelve) hours for 10 days  -     Discontinue: promethazine-codeine (PHENERGAN WITH CODEINE) 6 25-10 mg/5 mL syrup; Take 5 mL by mouth every 4 (four) hours as needed for cough for up to 10 days  -     promethazine-codeine (PHENERGAN WITH CODEINE) 6 25-10 mg/5 mL syrup; Take 5 mL by mouth every 4 (four) hours as needed for cough for up to 10 days    Other orders  -     Diphenhydramine-Phenylephrine ([facility] ALLERGY-D PO); Take by mouth          Subjective:   CC: c/o sore throat, headaches and ear pain x 1 day  lencho     Patient ID: Moris Lin is a 61 y o  female  Patient here today because she is prone to sinus infections starting from normal cold  She has been battling lot of head congestion bilateral ear pain frontal headaches postnasal drip sore throat colored nasal mucus  She does have a cough but she is not bringing up any mucus with that at this time  She has been taking her normal allergy medication more frequently since the symptoms started  No nausea vomiting or diarrhea  No one else she is around either work or home have similar symptoms at this time  The following portions of the patient's history were reviewed and updated as appropriate: allergies, current medications, past family history, past medical history, past social history, past surgical history and problem list     Review of Systems   Constitutional: Negative  HENT: Positive for congestion, ear pain, postnasal drip, rhinorrhea, sinus pressure and sore throat  Eyes: Negative  Respiratory: Positive for cough  Cardiovascular: Negative  Gastrointestinal: Negative  Endocrine: Negative  Genitourinary: Negative  Musculoskeletal: Negative  Skin: Negative  Allergic/Immunologic: Negative  Neurological: Positive for headaches  Hematological: Negative  Psychiatric/Behavioral: Negative  Objective:      Vitals:    09/25/18 1317   BP: 112/64   BP Location: Left arm   Patient Position: Sitting   Pulse: 76   Temp: 97 9 °F (36 6 °C)   TempSrc: Tympanic   Weight: 67 8 kg (149 lb 6 4 oz)            Physical Exam   Constitutional: She is oriented to person, place, and time  She appears well-developed and well-nourished  No distress  HENT:   Head: Normocephalic and atraumatic  Right Ear: Hearing, external ear and ear canal normal  Tympanic membrane is injected  Left Ear: Hearing, tympanic membrane, external ear and ear canal normal    Nose: Mucosal edema and rhinorrhea present  Mouth/Throat: Posterior oropharyngeal edema and posterior oropharyngeal erythema present  No oropharyngeal exudate  Eyes: Conjunctivae are normal  Right eye exhibits no discharge  Left eye exhibits no discharge  Neck: Neck supple  Carotid bruit is not present  Cardiovascular: Normal rate, regular rhythm and normal heart sounds  Exam reveals no gallop and no friction rub  No murmur heard  Pulmonary/Chest: Effort normal and breath sounds normal  No respiratory distress  She has no wheezes  She has no rales  Neurological: She is alert and oriented to person, place, and time  Skin: Skin is warm and dry  She is not diaphoretic  Psychiatric: She has a normal mood and affect  Judgment normal    Nursing note and vitals reviewed

## 2018-09-25 NOTE — ASSESSMENT & PLAN NOTE
Suggest patient to switch to 1 of the following; generic Claritin 10 mg generic Zyrtec 10 mg or generic Allegra 180 mg which are all 24 hr nondrowsy allergy medications

## 2018-09-25 NOTE — LETTER
September 25, 2018     Patient: Katelyn Nazario   YOB: 1955   Date of Visit: 9/25/2018       To Whom it May Concern:    Jazzmine Smith is under my professional care  She was seen in my office on 9/25/2018  She may return to work on 9/28/18  Acute Upper Respiratory Infection       If you have any questions or concerns, please don't hesitate to call           Sincerely,          Yahaira Lloyd PA-C        CC: No Recipients

## 2018-10-02 ENCOUNTER — ANNUAL EXAM (OUTPATIENT)
Dept: OBGYN CLINIC | Facility: CLINIC | Age: 63
End: 2018-10-02
Payer: COMMERCIAL

## 2018-10-02 VITALS
SYSTOLIC BLOOD PRESSURE: 128 MMHG | DIASTOLIC BLOOD PRESSURE: 78 MMHG | HEIGHT: 59 IN | WEIGHT: 148.6 LBS | BODY MASS INDEX: 29.96 KG/M2

## 2018-10-02 DIAGNOSIS — Z12.4 SCREENING FOR CERVICAL CANCER: ICD-10-CM

## 2018-10-02 DIAGNOSIS — Z12.31 ENCOUNTER FOR SCREENING MAMMOGRAM FOR BREAST CANCER: ICD-10-CM

## 2018-10-02 DIAGNOSIS — Z01.419 ENCOUNTER FOR ANNUAL ROUTINE GYNECOLOGICAL EXAMINATION: Primary | ICD-10-CM

## 2018-10-02 DIAGNOSIS — Z11.51 SCREENING FOR HPV (HUMAN PAPILLOMAVIRUS): ICD-10-CM

## 2018-10-02 PROCEDURE — 87624 HPV HI-RISK TYP POOLED RSLT: CPT | Performed by: OBSTETRICS & GYNECOLOGY

## 2018-10-02 PROCEDURE — G0124 SCREEN C/V THIN LAYER BY MD: HCPCS | Performed by: PATHOLOGY

## 2018-10-02 PROCEDURE — S0612 ANNUAL GYNECOLOGICAL EXAMINA: HCPCS | Performed by: OBSTETRICS & GYNECOLOGY

## 2018-10-02 PROCEDURE — G0145 SCR C/V CYTO,THINLAYER,RESCR: HCPCS | Performed by: PATHOLOGY

## 2018-10-02 NOTE — PROGRESS NOTES
Assessment/Plan: Thrombosed hemorrhoid-due to the size of this, I recommended that she see her GI doctor  She may need to see a colon rectal surgeon  She is agreeable  Up to date with colonoscopy    Reviewed her mammogram with her and prescription was given for November, discussed self-breast exam     Pap and HPV done today    Congratulated on her weight loss, we discussed regular exercise  No problem-specific Assessment & Plan notes found for this encounter  Diagnoses and all orders for this visit:    Encounter for annual routine gynecological examination  -     Liquid-based pap, screening  -     HPV High Risk; Future  -     HPV High Risk    Encounter for screening mammogram for breast cancer  -     Mammo screening bilateral w cad; Future    Screening for HPV (human papillomavirus)  -     Liquid-based pap, screening  -     HPV High Risk; Future  -     HPV High Risk    Screening for cervical cancer  -     Liquid-based pap, screening  -     HPV High Risk; Future  -     HPV High Risk          Subjective:      Patient ID: Celina German is a 61 y o  female  Patient here for yearly  She has no gyn complaints  This past year, she had bariatric surgery and has lost approximately 50 lb  She is doing well and is happy with her decision  She does feel that in the past month or so, she has a hemorrhoid that is bothersome to her  Normal pap and negative HPV 2013  Mammogram is due in November, she does not have dense breast tissue  The following portions of the patient's history were reviewed and updated as appropriate: allergies, current medications, past family history, past medical history, past social history, past surgical history and problem list     Review of Systems   Constitutional: Negative  HENT: Negative  Eyes: Negative  Respiratory: Negative  Cardiovascular: Negative  Gastrointestinal: Negative  Endocrine: Negative  Genitourinary: Negative  Musculoskeletal: Negative  Skin: Negative  Allergic/Immunologic: Negative  Neurological: Negative  Hematological: Negative  Psychiatric/Behavioral: Negative  Objective:      /78 (BP Location: Left arm, Patient Position: Sitting, Cuff Size: Standard)   Ht 4' 10 5" (1 486 m)   Wt 67 4 kg (148 lb 9 6 oz)   LMP  (LMP Unknown)   BMI 30 53 kg/m²          Physical Exam   Constitutional: She appears well-developed  Neck: No tracheal deviation present  No thyromegaly present  Cardiovascular: Normal rate and regular rhythm  Pulmonary/Chest: Effort normal and breath sounds normal  Right breast exhibits no inverted nipple, no mass, no nipple discharge, no skin change and no tenderness  Left breast exhibits no inverted nipple, no mass, no nipple discharge, no skin change and no tenderness  Breasts are symmetrical    Examined seated and supine   Abdominal: Soft  She exhibits no distension and no mass  There is no tenderness  Genitourinary: Rectum normal, vagina normal and uterus normal  No labial fusion  There is no rash, tenderness, lesion or injury on the right labia  There is no rash, tenderness, lesion or injury on the left labia  Cervix exhibits no motion tenderness, no discharge and no friability  Right adnexum displays no mass, no tenderness and no fullness  Left adnexum displays no mass, no tenderness and no fullness  Genitourinary Comments: Large external hemorrhoid, appears to be thrombosed although it is not tender to palpation  Approximately 2 5 cm   Vitals reviewed

## 2018-10-04 LAB
HPV HR 12 DNA CVX QL NAA+PROBE: NEGATIVE
HPV16 DNA CVX QL NAA+PROBE: NEGATIVE
HPV18 DNA CVX QL NAA+PROBE: NEGATIVE

## 2018-10-08 LAB
LAB AP GYN PRIMARY INTERPRETATION: NORMAL
Lab: NORMAL
PATH INTERP SPEC-IMP: NORMAL

## 2018-10-09 ENCOUNTER — OFFICE VISIT (OUTPATIENT)
Dept: BARIATRICS | Facility: CLINIC | Age: 63
End: 2018-10-09
Payer: COMMERCIAL

## 2018-10-09 VITALS
SYSTOLIC BLOOD PRESSURE: 116 MMHG | HEIGHT: 58 IN | TEMPERATURE: 98 F | DIASTOLIC BLOOD PRESSURE: 72 MMHG | BODY MASS INDEX: 31.38 KG/M2 | WEIGHT: 149.5 LBS | HEART RATE: 74 BPM

## 2018-10-09 DIAGNOSIS — K91.2 POSTSURGICAL MALABSORPTION: ICD-10-CM

## 2018-10-09 DIAGNOSIS — Z98.84 BARIATRIC SURGERY STATUS: Primary | ICD-10-CM

## 2018-10-09 DIAGNOSIS — K21.9 GERD WITHOUT ESOPHAGITIS: ICD-10-CM

## 2018-10-09 DIAGNOSIS — G47.33 OSA (OBSTRUCTIVE SLEEP APNEA): ICD-10-CM

## 2018-10-09 PROCEDURE — 99214 OFFICE O/P EST MOD 30 MIN: CPT | Performed by: PHYSICIAN ASSISTANT

## 2018-10-09 RX ORDER — FAMOTIDINE 20 MG/1
20 TABLET, FILM COATED ORAL 2 TIMES DAILY
Qty: 180 TABLET | Refills: 2 | Status: SHIPPED | OUTPATIENT
Start: 2018-10-09 | End: 2019-07-12

## 2018-10-09 NOTE — PATIENT INSTRUCTIONS
Follow-up in 6 months  We kindly ask that your arrive 15 minutes before your scheduled appointment time with your provider to allow our staff to room you, get your vital signs and update your chart  We thank you for your patience at your visit  Follow diet as discussed  Get lab work done prior to next office visit  It is recommended to check with your insurance BEFORE getting labs done to make sure they are covered by your policy  Make sure to HOLD any multivitamins that may contain biotin and any biotin supplements FOR 5 DAYS before any labs since it can affect the results  Follow vitamin  and mineral recommendations as reviewed with you  Exercise as tolerated    Call our office if you have any problems with abdominal pain especially associated with fever, chills, nausea, vomiting or any other concerns  All  Post-bariatric surgery patients should be aware that very small quantities of any alcohol  can cause impairment and it is very possible not to feel the effect  The effect can be in the system for several hours  It is also a stomach irritant  It is advised to AVOID alcohol, Nonsteroidal antiinflammatory drugs (NSAIDS) and nicotine of all forms   Any of these can cause stomach irritation/pain            Try to taper off pantoprazole and take the famotidine twice a day instead-can taper off the famotidine if no smoke exposure

## 2018-10-09 NOTE — ASSESSMENT & PLAN NOTE
-s/p Vertical Sleeve Gastrectomy with paraesophageal hernia repair by  Dr Bree Jimenez on 3/19/2018  Overall doing Well  Initial: 199 1 lb  Current:149 5 lb  EWL:62% which is above average for this time frame  Stan[place]: Current  Current BMI is Body mass index is 31 25 kg/m²      Tolerating a regular diet-yes  Eating at least 60 grams of protein per day-yes  Following 30/60 minute rule with liquids-yes  Drinking at least 64 ounces of fluid per day-yes  Drinking carbonated beverages-no  Sufficient exercise-yes  Using NSAIDs regularly-no  Using nicotine-no/but has some second hand smoke exposure-advised on effect and encouraged to avoid/limit exposure as possible  Using alcohol-no

## 2018-10-09 NOTE — PROGRESS NOTES
Assessment/Plan:    Bariatric surgery status  -s/p Vertical Sleeve Gastrectomy with paraesophageal hernia repair by  Dr Lisha Moses on 3/19/2018  Overall doing Well  Initial: 199 1 lb  Current:149 5 lb  EWL:62% which is above average for this time frame  Stan[place]: Current  Current BMI is Body mass index is 31 25 kg/m²  Tolerating a regular diet-yes  Eating at least 60 grams of protein per day-yes  Following 30/60 minute rule with liquids-yes  Drinking at least 64 ounces of fluid per day-yes  Drinking carbonated beverages-no  Sufficient exercise-yes  Using NSAIDs regularly-no  Using nicotine-no/but has some second hand smoke exposure-advised on effect and encouraged to avoid/limit exposure as possible  Using alcohol-no      Postsurgical malabsorption  Malabsorption- patient is at risk for malabsorption of vitamins/minerals secondary to malabsorption from her procedure and restriction of intakes  Reviewed current supplements and advised on same    She is taking 2 bariatric advantage chews and 2 viactiv chews-she wants to switch vitamins-will try [employer] with 18 mg of iron and advised on 2 1/2 viactiv chews/day    GERD without esophagitis  Notes a couple months ago her pcp decreased her ppi to 20 mg per day-she does have some second-hand smoke exposure at home    Advised on taper off ppi to h2 blocker and off if tolerated but if she has smoke exposure to take it twice that day    PETER (obstructive sleep apnea)  Is wearing Cpap  Reminded  that we recommend continuing to use Cpap for up to one year post-op unless otherwise advised by sleep medicine as this may have a beneficial effect on long-term weight loss         Diagnoses and all orders for this visit:    Bariatric surgery status  -     CBC and Platelet; Future  -     Copper Level; Future  -     Ferritin; Future  -     Folate; Future  -     Iron Saturation %; Future  -     PTH, intact;  Future  -     Vitamin A; Future  -     Vitamin B1, whole blood; Future  -     Vitamin B12; Future  -     Zinc; Future  -     famotidine (PEPCID) 20 mg tablet; Take 1 tablet (20 mg total) by mouth 2 (two) times a day    Postsurgical malabsorption  -     CBC and Platelet; Future  -     Copper Level; Future  -     Ferritin; Future  -     Folate; Future  -     Iron Saturation %; Future  -     PTH, intact; Future  -     Vitamin A; Future  -     Vitamin B1, whole blood; Future  -     Vitamin B12; Future  -     Zinc; Future    GERD without esophagitis  -     famotidine (PEPCID) 20 mg tablet; Take 1 tablet (20 mg total) by mouth 2 (two) times a day    PETER (obstructive sleep apnea)          Subjective:      Patient ID: Pablo Golden is a 61 y o  female  She is here in routine follow-up   Tolerating a regular diet and exercising  She is taking bariatric supplements   Has no complaints today        The following portions of the patient's history were reviewed and updated as appropriate: allergies, current medications, past family history, past medical history, past social history, past surgical history and problem list     Review of Systems   Constitutional: Negative for chills and fever  Unexpected weight change: planned weight loss  Respiratory: Negative for shortness of breath and wheezing  Cardiovascular: Negative for chest pain and palpitations  Gastrointestinal: Negative for abdominal pain, constipation, diarrhea, nausea and vomiting  Psychiatric/Behavioral: Suicidal ideas: no complait of anxiety or depression  Objective:      /72 (BP Location: Left arm, Patient Position: Sitting, Cuff Size: Adult)   Pulse 74   Temp 98 °F (36 7 °C) (Tympanic)   Ht 4' 10" (1 473 m)   Wt 67 8 kg (149 lb 8 oz)   LMP  (LMP Unknown)   BMI 31 25 kg/m²          Physical Exam   Constitutional: She is oriented to person, place, and time  She appears well-developed and well-nourished     HENT:   Mouth/Throat: Oropharynx is clear and moist    Eyes: Conjunctivae are normal  No scleral icterus  Cardiovascular: Normal rate, regular rhythm and normal heart sounds  Pulmonary/Chest: Effort normal and breath sounds normal    Abdominal: Soft  There is no tenderness  No incisional hernias appreciated   Musculoskeletal:   Normal gait   Neurological: She is alert and oriented to person, place, and time  Psychiatric: She has a normal mood and affect  Her behavior is normal  Judgment and thought content normal    Nursing note and vitals reviewed  GOALS: Continued weight loss with good nutrition intakes    Normal vitamin and mineral levels  Exercise as tolerated    BARRIERS: none identified

## 2018-10-09 NOTE — ASSESSMENT & PLAN NOTE
Notes a couple months ago her pcp decreased her ppi to 20 mg per day-she does have some second-hand smoke exposure at home    Advised on taper off ppi to h2 blocker and off if tolerated but if she has smoke exposure to take it twice that day

## 2018-10-09 NOTE — ASSESSMENT & PLAN NOTE
Is wearing Cpap   Reminded  that we recommend continuing to use Cpap for up to one year post-op unless otherwise advised by sleep medicine as this may have a beneficial effect on long-term weight loss

## 2018-10-09 NOTE — ASSESSMENT & PLAN NOTE
Malabsorption- patient is at risk for malabsorption of vitamins/minerals secondary to malabsorption from her procedure and restriction of intakes  Reviewed current supplements and advised on same    She is taking 2 bariatric advantage chews and 2 viactiv chews-she wants to switch vitamins-will try [employer] [facility] with 18 mg of iron and advised on 2 1/2 viactiv chews/day

## 2018-10-10 ENCOUNTER — TELEPHONE (OUTPATIENT)
Dept: OBGYN CLINIC | Facility: CLINIC | Age: 63
End: 2018-10-10

## 2018-10-10 NOTE — TELEPHONE ENCOUNTER
----- Message from Adrianne Collins DO sent at 10/10/2018  9:40 AM EDT -----  ASCUS with negative HPV, will repeat in 1 year

## 2018-12-12 ENCOUNTER — OFFICE VISIT (OUTPATIENT)
Dept: FAMILY MEDICINE CLINIC | Facility: CLINIC | Age: 63
End: 2018-12-12
Payer: COMMERCIAL

## 2018-12-12 VITALS
HEART RATE: 72 BPM | WEIGHT: 146.6 LBS | DIASTOLIC BLOOD PRESSURE: 60 MMHG | HEIGHT: 58 IN | SYSTOLIC BLOOD PRESSURE: 112 MMHG | TEMPERATURE: 98.4 F | BODY MASS INDEX: 30.77 KG/M2

## 2018-12-12 DIAGNOSIS — J01.10 ACUTE NON-RECURRENT FRONTAL SINUSITIS: Primary | ICD-10-CM

## 2018-12-12 DIAGNOSIS — J30.1 NON-SEASONAL ALLERGIC RHINITIS DUE TO POLLEN: ICD-10-CM

## 2018-12-12 DIAGNOSIS — R05.9 COUGH: ICD-10-CM

## 2018-12-12 DIAGNOSIS — M79.7 FIBROMYALGIA SYNDROME: ICD-10-CM

## 2018-12-12 DIAGNOSIS — F51.01 PRIMARY INSOMNIA: ICD-10-CM

## 2018-12-12 PROCEDURE — 99214 OFFICE O/P EST MOD 30 MIN: CPT | Performed by: FAMILY MEDICINE

## 2018-12-12 PROCEDURE — 3008F BODY MASS INDEX DOCD: CPT | Performed by: FAMILY MEDICINE

## 2018-12-12 RX ORDER — AZITHROMYCIN 250 MG/1
TABLET, FILM COATED ORAL
Qty: 6 TABLET | Refills: 0 | Status: SHIPPED | OUTPATIENT
Start: 2018-12-12 | End: 2018-12-16

## 2018-12-12 RX ORDER — PROMETHAZINE HYDROCHLORIDE AND CODEINE PHOSPHATE 6.25; 1 MG/5ML; MG/5ML
5 SYRUP ORAL EVERY 4 HOURS PRN
Qty: 120 ML | Refills: 0 | Status: SHIPPED | OUTPATIENT
Start: 2018-12-12 | End: 2019-01-02 | Stop reason: SDUPTHER

## 2018-12-12 NOTE — PROGRESS NOTES
Assessment/Plan:    25-year-old white female with a history of fibromyalgia, allergic rhinitis and insomnia presents with a one-week history of worsening nasal congestion, cough and generalized achiness  Congestion is dark yellow and thick  1   Acute sinusitis - Zithromax  Initiate nasal saline irrigation  2   Cough - Phenergan with codeine at nighttime  No refills  3  Seasonal allergic rhinitis - she may continue her over-the-counter decongestant  4   Fibromyalgia - symptoms well controlled on gabapentin and Paxil  5   Primary insomnia - continue trazodone  Notify me if symptoms worsen or persist         Subjective: pt is here for a productive cough since Sunday  She also has a headache and post nasal drip~cd     Patient ID: Pablo Golden is a 61 y o  female  4 days ago sore throat and cough  Seasonal allergies  She was [place] [employer] [place]  Dark yellow mucus  Felt very bad  No documented fever  Cough keeping her up at night  The following portions of the patient's history were reviewed and updated as appropriate: allergies, current medications, past family history, past medical history, past social history, past surgical history and problem list     Review of Systems   Constitutional:        See HPI   HENT: Positive for congestion and sinus pressure  Negative for ear pain, mouth sores and trouble swallowing  See HPI  Eyes: Negative for discharge, redness and itching  Respiratory: Positive for cough  Negative for apnea, chest tightness, shortness of breath, wheezing and stridor  See HPI  Cardiovascular: Negative for chest pain, palpitations and leg swelling  Gastrointestinal: Negative for abdominal distention, abdominal pain, blood in stool, constipation, diarrhea, nausea and vomiting  Endocrine: Negative for cold intolerance and heat intolerance  Genitourinary: Negative for difficulty urinating, dysuria, flank pain and urgency  Musculoskeletal: Negative for arthralgias and myalgias  Skin: Negative for rash  Neurological: Negative for dizziness, seizures, syncope, speech difficulty, weakness, light-headedness, numbness and headaches  Hematological: Negative for adenopathy  Psychiatric/Behavioral: Negative for agitation, behavioral problems, confusion and sleep disturbance  The patient is not nervous/anxious  Objective:  Vitals:    12/12/18 1053   BP: 112/60   BP Location: Left arm   Patient Position: Sitting   Cuff Size: Large   Pulse: 72   Temp: 98 4 °F (36 9 °C)   TempSrc: Oral   Weight: 66 5 kg (146 lb 9 6 oz)   Height: 4' 10" (1 473 m)                Physical Exam   Constitutional: She is oriented to person, place, and time  She appears well-developed and well-nourished  No distress  HENT:   Head: Normocephalic and atraumatic  Right Ear: External ear normal    Left Ear: External ear normal    Both TMs have decreased light reflex and no landmarks  Inferior turbinates are swollen and boggy but not shot  There is a yellowish postnasal drip noted in the posterior oropharynx  Eyes: Pupils are equal, round, and reactive to light  Conjunctivae and EOM are normal  No scleral icterus  Neck: Normal range of motion  Neck supple  Cardiovascular: Normal rate, regular rhythm, normal heart sounds and intact distal pulses  Exam reveals no gallop and no friction rub  No murmur heard  No carotid bruits appreciated  Pulmonary/Chest: Effort normal  No respiratory distress  She has no wheezes  She has no rales  She exhibits no tenderness  Musculoskeletal: Normal range of motion  She exhibits no edema, tenderness or deformity  Lymphadenopathy:     She has no cervical adenopathy  Neurological: She is alert and oriented to person, place, and time  She has normal reflexes  Skin: Skin is warm and dry  She is not diaphoretic  Psychiatric: She has a normal mood and affect   Her behavior is normal  Judgment and thought content normal    Nursing note and vitals reviewed

## 2018-12-13 ENCOUNTER — HOSPITAL ENCOUNTER (OUTPATIENT)
Dept: MAMMOGRAPHY | Facility: MEDICAL CENTER | Age: 63
Discharge: HOME | End: 2018-12-13
Payer: COMMERCIAL

## 2018-12-13 VITALS — BODY MASS INDEX: 29.89 KG/M2 | WEIGHT: 142.38 LBS | HEIGHT: 58 IN

## 2018-12-13 DIAGNOSIS — Z12.31 ENCOUNTER FOR SCREENING MAMMOGRAM FOR BREAST CANCER: ICD-10-CM

## 2018-12-13 PROCEDURE — 77067 SCR MAMMO BI INCL CAD: CPT

## 2018-12-20 ENCOUNTER — DOCUMENTATION (OUTPATIENT)
Dept: BARIATRICS | Facility: CLINIC | Age: 63
End: 2018-12-20

## 2018-12-20 NOTE — PROGRESS NOTES
Weight Management Nutrition Class     Diagnosis: Obesity    Bariatric Surgeon: Dr Lasandra Lefort    Surgery: Vertical Sleeve Gastrectomy    Class: 9 month post op note    Topics discussed today include: Attended 9 month follow up meeting  Addressed both behavioral and dietary issues  Group discussion included the impact of tobacco use, alcohol use, psychiatric medications, relationships, body image and self esteem issues as it pertains to the weight loss  surgery patient  During group discussion, reviewed vitamin recommendations, protein recommendations, portion sizes, balancing diet to include healthy carbohydrates,  importance of exercise, and the bariatric rules for success  Overall pleased with weight loss and improved quality of life  Patient was able to verbalize basic diet (protein, fluid, vitamin and mineral) recommendations and possible nutrition-related complications   Yes

## 2019-01-02 ENCOUNTER — TELEPHONE (OUTPATIENT)
Dept: FAMILY MEDICINE CLINIC | Facility: CLINIC | Age: 64
End: 2019-01-02

## 2019-01-02 DIAGNOSIS — J01.10 ACUTE NON-RECURRENT FRONTAL SINUSITIS: ICD-10-CM

## 2019-01-02 DIAGNOSIS — F41.9 ANXIETY: ICD-10-CM

## 2019-01-02 DIAGNOSIS — R05.9 COUGH: ICD-10-CM

## 2019-01-02 RX ORDER — PROMETHAZINE HYDROCHLORIDE AND CODEINE PHOSPHATE 6.25; 1 MG/5ML; MG/5ML
5 SYRUP ORAL EVERY 4 HOURS PRN
Qty: 120 ML | Refills: 0 | Status: SHIPPED | OUTPATIENT
Start: 2019-01-02 | End: 2019-01-23

## 2019-01-02 NOTE — TELEPHONE ENCOUNTER
Izetta Clifton called in,she saw CB on the 12th and she gave her cough medicine  She is currently out and still coughing  She wanted to know if you would be able to refill it for her since DR Avis Brunner is not in  Please advise  [facility] uses [facility] [address] in [place]  Thank you

## 2019-01-03 ENCOUNTER — IMMUNIZATION (OUTPATIENT)
Dept: FAMILY MEDICINE CLINIC | Facility: CLINIC | Age: 64
End: 2019-01-03
Payer: COMMERCIAL

## 2019-01-03 DIAGNOSIS — Z23 NEED FOR INFLUENZA VACCINATION: Primary | ICD-10-CM

## 2019-01-03 PROCEDURE — 90471 IMMUNIZATION ADMIN: CPT

## 2019-01-03 PROCEDURE — 90682 RIV4 VACC RECOMBINANT DNA IM: CPT

## 2019-01-03 RX ORDER — PAROXETINE 30 MG/1
30 TABLET, FILM COATED ORAL DAILY
Qty: 90 TABLET | Refills: 1 | Status: SHIPPED | OUTPATIENT
Start: 2019-01-03 | End: 2019-07-24 | Stop reason: SDUPTHER

## 2019-01-16 ENCOUNTER — APPOINTMENT (OUTPATIENT)
Dept: LAB | Facility: CLINIC | Age: 64
End: 2019-01-16
Payer: COMMERCIAL

## 2019-01-16 DIAGNOSIS — R73.9 HYPERGLYCEMIA: ICD-10-CM

## 2019-01-16 DIAGNOSIS — E78.2 MIXED HYPERLIPIDEMIA: ICD-10-CM

## 2019-01-16 DIAGNOSIS — E55.9 VITAMIN D DEFICIENCY: ICD-10-CM

## 2019-01-16 LAB
25(OH)D3 SERPL-MCNC: 50.6 NG/ML (ref 30–100)
ALBUMIN SERPL BCP-MCNC: 3.6 G/DL (ref 3.5–5)
ALP SERPL-CCNC: 71 U/L (ref 46–116)
ALT SERPL W P-5'-P-CCNC: 15 U/L (ref 12–78)
ANION GAP SERPL CALCULATED.3IONS-SCNC: 4 MMOL/L (ref 4–13)
AST SERPL W P-5'-P-CCNC: 8 U/L (ref 5–45)
BILIRUB SERPL-MCNC: 0.5 MG/DL (ref 0.2–1)
BUN SERPL-MCNC: 11 MG/DL (ref 5–25)
CALCIUM SERPL-MCNC: 8.9 MG/DL (ref 8.3–10.1)
CHLORIDE SERPL-SCNC: 104 MMOL/L (ref 100–108)
CHOLEST SERPL-MCNC: 208 MG/DL (ref 50–200)
CO2 SERPL-SCNC: 28 MMOL/L (ref 21–32)
CREAT SERPL-MCNC: 0.45 MG/DL (ref 0.6–1.3)
EST. AVERAGE GLUCOSE BLD GHB EST-MCNC: 108 MG/DL
GFR SERPL CREATININE-BSD FRML MDRD: 107 ML/MIN/1.73SQ M
GLUCOSE P FAST SERPL-MCNC: 85 MG/DL (ref 65–99)
HBA1C MFR BLD: 5.4 % (ref 4.2–6.3)
HDLC SERPL-MCNC: 48 MG/DL (ref 40–60)
LDLC SERPL CALC-MCNC: 139 MG/DL (ref 0–100)
POTASSIUM SERPL-SCNC: 3.7 MMOL/L (ref 3.5–5.3)
PROT SERPL-MCNC: 6.8 G/DL (ref 6.4–8.2)
SODIUM SERPL-SCNC: 136 MMOL/L (ref 136–145)
TRIGL SERPL-MCNC: 107 MG/DL

## 2019-01-16 PROCEDURE — 83036 HEMOGLOBIN GLYCOSYLATED A1C: CPT

## 2019-01-16 PROCEDURE — 80061 LIPID PANEL: CPT

## 2019-01-16 PROCEDURE — 82306 VITAMIN D 25 HYDROXY: CPT

## 2019-01-16 PROCEDURE — 36415 COLL VENOUS BLD VENIPUNCTURE: CPT

## 2019-01-16 PROCEDURE — 80053 COMPREHEN METABOLIC PANEL: CPT

## 2019-01-22 PROBLEM — J06.9 ACUTE UPPER RESPIRATORY INFECTION: Status: RESOLVED | Noted: 2018-09-25 | Resolved: 2019-01-22

## 2019-01-22 NOTE — ASSESSMENT & PLAN NOTE
Patient off statin (lipitor 10 mg) since gastric bypass and LDL is now up again at 139 and triglycerides good 107  HDL has elevated by 10 points since the last check  Trial zetia generic 10 mg once daily

## 2019-01-23 ENCOUNTER — OFFICE VISIT (OUTPATIENT)
Dept: FAMILY MEDICINE CLINIC | Facility: CLINIC | Age: 64
End: 2019-01-23
Payer: COMMERCIAL

## 2019-01-23 VITALS
WEIGHT: 145.4 LBS | SYSTOLIC BLOOD PRESSURE: 118 MMHG | DIASTOLIC BLOOD PRESSURE: 60 MMHG | HEART RATE: 80 BPM | BODY MASS INDEX: 30.52 KG/M2 | HEIGHT: 58 IN

## 2019-01-23 DIAGNOSIS — F51.01 PRIMARY INSOMNIA: ICD-10-CM

## 2019-01-23 DIAGNOSIS — F33.41 RECURRENT MAJOR DEPRESSIVE DISORDER, IN PARTIAL REMISSION (HCC): ICD-10-CM

## 2019-01-23 DIAGNOSIS — R73.9 HYPERGLYCEMIA: ICD-10-CM

## 2019-01-23 DIAGNOSIS — G25.81 RESTLESS LEG SYNDROME: ICD-10-CM

## 2019-01-23 DIAGNOSIS — Z98.84 BARIATRIC SURGERY STATUS: ICD-10-CM

## 2019-01-23 DIAGNOSIS — K21.9 GERD WITHOUT ESOPHAGITIS: ICD-10-CM

## 2019-01-23 DIAGNOSIS — E78.2 MIXED HYPERLIPIDEMIA: Primary | ICD-10-CM

## 2019-01-23 DIAGNOSIS — E55.9 VITAMIN D DEFICIENCY: ICD-10-CM

## 2019-01-23 PROCEDURE — 99214 OFFICE O/P EST MOD 30 MIN: CPT | Performed by: PHYSICIAN ASSISTANT

## 2019-01-23 PROCEDURE — 3008F BODY MASS INDEX DOCD: CPT | Performed by: PHYSICIAN ASSISTANT

## 2019-01-23 RX ORDER — CHOLECALCIFEROL (VITAMIN D3) 25 MCG
4000 CAPSULE ORAL DAILY
Qty: 30 CAPSULE | Refills: 1
Start: 2019-01-23

## 2019-01-23 RX ORDER — EZETIMIBE 10 MG/1
10 TABLET ORAL DAILY
Qty: 90 TABLET | Refills: 3 | Status: SHIPPED | OUTPATIENT
Start: 2019-01-23 | End: 2020-01-06 | Stop reason: SDUPTHER

## 2019-01-23 NOTE — PATIENT INSTRUCTIONS
Problem List Items Addressed This Visit        Digestive    GERD without esophagitis       Other    Mixed hyperlipidemia - Primary     Patient off statin (lipitor 10 mg) since gastric bypass and LDL is now up again at 139 and triglycerides good 107  HDL has elevated by 10 points since the last check  Trial zetia generic 10 mg once daily  Relevant Medications    ezetimibe (ZETIA) 10 mg tablet    Other Relevant Orders    Lipid Panel with Direct LDL reflex    Comprehensive metabolic panel    Vitamin D deficiency     Stable on 4,000 IU daily  Relevant Medications    Cholecalciferol (VITAMIN D-3) 1000 units CAPS    Restless leg syndrome    Insomnia    Hyperglycemia     Very stable with a hemoglobin A1c of 5 4 to fasting glucose of 85  Recheck once yearly           Recurrent major depressive disorder, in partial remission [facility])    Bariatric surgery status

## 2019-01-23 NOTE — PROGRESS NOTES
Assessment/Plan:    Hyperglycemia  Very stable with a hemoglobin A1c of 5 4 to fasting glucose of 85  Recheck once yearly  Mixed hyperlipidemia  Patient off statin (lipitor 10 mg) since gastric bypass and LDL is now up again at 139 and triglycerides good 107  HDL has elevated by 10 points since the last check  Trial zetia generic 10 mg once daily  Vitamin D deficiency  Stable on 4,000 IU daily  GERD without esophagitis  Stable  Restless leg syndrome  Stable continue gabapentin  Recurrent major depressive disorder, in partial remission (HCC)  Stable on Paxil  Refills not needed at this time  Diagnoses and all orders for this visit:    Mixed hyperlipidemia  -     ezetimibe (ZETIA) 10 mg tablet; Take 1 tablet (10 mg total) by mouth daily  -     Lipid Panel with Direct LDL reflex; Future  -     Comprehensive metabolic panel; Future    Hyperglycemia    Recurrent major depressive disorder, in partial remission (HCC)    GERD without esophagitis    Restless leg syndrome    Bariatric surgery status    Primary insomnia    Vitamin D deficiency  -     Cholecalciferol (VITAMIN D-3) 1000 units CAPS; Take 4 capsules (4,000 Units total) by mouth daily          Subjective:   CC; 6 month follow up for chronic conditions and to review blood work  Andrea Bojorquez     Patient ID: Adele Van is a 61 y o  female  Adele Van is here for chronic conditions f/u including the diagnosis of Mixed hyperlipidemia  (primary encounter diagnosis)  Hyperglycemia  Recurrent major depressive disorder, in partial remission (hcc)  Gerd without esophagitis  Restless leg syndrome  Bariatric surgery status   Pt  states they are taking all medications as directed without complaints or side effects   Pt  had labs done prior to today's visit which included Recent Results (from the past 672 hour(s))  -Lipid Panel with Direct LDL reflex  Collection Time: 01/16/19  9:50 AM       Result                      Value             Ref Range           Cholesterol                 208 (H)           50 - 200 mg/*       Triglycerides               107               <=150 mg/dL         HDL, Direct                 48                40 - 60 mg/dL       LDL Calculated              139 (H)           0 - 100 mg/dL  -Hemoglobin A1C  Collection Time: 01/16/19  9:50 AM       Result                      Value             Ref Range           Hemoglobin A1C              5 4               4 2 - 6 3 %         EAG                         108               mg/dl          -Comprehensive metabolic panel  Collection Time: 01/16/19  9:50 AM       Result                      Value             Ref Range           Sodium                      136               136 - 145 mm*       Potassium                   3 7               3 5 - 5 3 mm*       Chloride                    104               100 - 108 mm*       CO2                         28                21 - 32 mmol*       ANION GAP                   4                 4 - 13 mmol/L       BUN                         11                5 - 25 mg/dL        Creatinine                  0 45 (L)          0 60 - 1 30 *       Glucose, Fasting            85                65 - 99 mg/dL       Calcium                     8 9               8 3 - 10 1 m*       AST                         8                 5 - 45 U/L          ALT                         15                12 - 78 U/L         Alkaline Phosphatase        71                46 - 116 U/L        Total Protein               6 8               6 4 - 8 2 g/*       Albumin                     3 6               3 5 - 5 0 g/*       Total Bilirubin             0 50              0 20 - 1 00 *       eGFR                        107               ml/min/1 73s*  -Vitamin D 25 hydroxy  Collection Time: 01/16/19  9:50 AM       Result                      Value             Ref Range           Vit D, 25-Hydroxy           50 6              30 0 - 100 0*    Pt is s/p bariatric surgery in March 2018 and has lost a total of 60 pounds  The following portions of the patient's history were reviewed and updated as appropriate: allergies, current medications, past family history, past medical history, past social history, past surgical history and problem list     Review of Systems   Constitutional: Negative  HENT: Negative  Eyes: Negative  Respiratory: Negative  Cardiovascular: Negative  Gastrointestinal: Negative  Endocrine: Negative  Genitourinary: Negative  Musculoskeletal: Negative  Skin: Negative  Allergic/Immunologic: Negative  Neurological: Negative  Hematological: Negative  Psychiatric/Behavioral: Negative  Objective:      Vitals:    01/23/19 1057   BP: 118/60   BP Location: Left arm   Patient Position: Sitting   Pulse: 80   Weight: 66 kg (145 lb 6 4 oz)   Height: 4' 10" (1 473 m)            Physical Exam   Constitutional: She is oriented to person, place, and time  She appears well-developed and well-nourished  No distress  HENT:   Head: Normocephalic and atraumatic  Eyes: Conjunctivae are normal  Right eye exhibits no discharge  Left eye exhibits no discharge  Neck: Neck supple  Carotid bruit is not present  Cardiovascular: Normal rate, regular rhythm and normal heart sounds  Exam reveals no gallop and no friction rub  No murmur heard  Pulmonary/Chest: Effort normal and breath sounds normal  No respiratory distress  She has no wheezes  She has no rales  Neurological: She is alert and oriented to person, place, and time  Skin: Skin is warm and dry  She is not diaphoretic  Psychiatric: She has a normal mood and affect  Judgment normal    Nursing note and vitals reviewed

## 2019-03-28 ENCOUNTER — APPOINTMENT (OUTPATIENT)
Dept: LAB | Facility: CLINIC | Age: 64
End: 2019-03-28
Payer: COMMERCIAL

## 2019-03-28 DIAGNOSIS — K91.2 POSTSURGICAL MALABSORPTION: ICD-10-CM

## 2019-03-28 DIAGNOSIS — Z98.84 BARIATRIC SURGERY STATUS: ICD-10-CM

## 2019-03-28 DIAGNOSIS — E78.2 MIXED HYPERLIPIDEMIA: ICD-10-CM

## 2019-03-28 LAB
ALBUMIN SERPL BCP-MCNC: 3.7 G/DL (ref 3.5–5)
ALP SERPL-CCNC: 75 U/L (ref 46–116)
ALT SERPL W P-5'-P-CCNC: 17 U/L (ref 12–78)
ANION GAP SERPL CALCULATED.3IONS-SCNC: 4 MMOL/L (ref 4–13)
AST SERPL W P-5'-P-CCNC: 7 U/L (ref 5–45)
BILIRUB SERPL-MCNC: 0.48 MG/DL (ref 0.2–1)
BUN SERPL-MCNC: 11 MG/DL (ref 5–25)
CALCIUM SERPL-MCNC: 8.7 MG/DL (ref 8.3–10.1)
CHLORIDE SERPL-SCNC: 103 MMOL/L (ref 100–108)
CHOLEST SERPL-MCNC: 170 MG/DL (ref 50–200)
CO2 SERPL-SCNC: 29 MMOL/L (ref 21–32)
CREAT SERPL-MCNC: 0.5 MG/DL (ref 0.6–1.3)
ERYTHROCYTE [DISTWIDTH] IN BLOOD BY AUTOMATED COUNT: 12.2 % (ref 11.6–15.1)
FERRITIN SERPL-MCNC: 34 NG/ML (ref 8–388)
FOLATE SERPL-MCNC: >20 NG/ML (ref 3.1–17.5)
GFR SERPL CREATININE-BSD FRML MDRD: 103 ML/MIN/1.73SQ M
GLUCOSE P FAST SERPL-MCNC: 91 MG/DL (ref 65–99)
HCT VFR BLD AUTO: 37.5 % (ref 34.8–46.1)
HDLC SERPL-MCNC: 49 MG/DL (ref 40–60)
HGB BLD-MCNC: 12.3 G/DL (ref 11.5–15.4)
IRON SATN MFR SERPL: 42 %
IRON SERPL-MCNC: 109 UG/DL (ref 50–170)
LDLC SERPL CALC-MCNC: 103 MG/DL (ref 0–100)
MCH RBC QN AUTO: 29.5 PG (ref 26.8–34.3)
MCHC RBC AUTO-ENTMCNC: 32.8 G/DL (ref 31.4–37.4)
MCV RBC AUTO: 90 FL (ref 82–98)
PLATELET # BLD AUTO: 230 THOUSANDS/UL (ref 149–390)
PMV BLD AUTO: 10.9 FL (ref 8.9–12.7)
POTASSIUM SERPL-SCNC: 4 MMOL/L (ref 3.5–5.3)
PROT SERPL-MCNC: 7 G/DL (ref 6.4–8.2)
PTH-INTACT SERPL-MCNC: 37.2 PG/ML (ref 18.4–80.1)
RBC # BLD AUTO: 4.17 MILLION/UL (ref 3.81–5.12)
SODIUM SERPL-SCNC: 136 MMOL/L (ref 136–145)
TIBC SERPL-MCNC: 262 UG/DL (ref 250–450)
TRIGL SERPL-MCNC: 92 MG/DL
VIT B12 SERPL-MCNC: 1279 PG/ML (ref 100–900)
WBC # BLD AUTO: 5.91 THOUSAND/UL (ref 4.31–10.16)

## 2019-03-28 PROCEDURE — 83550 IRON BINDING TEST: CPT

## 2019-03-28 PROCEDURE — 82607 VITAMIN B-12: CPT

## 2019-03-28 PROCEDURE — 80061 LIPID PANEL: CPT

## 2019-03-28 PROCEDURE — 36415 COLL VENOUS BLD VENIPUNCTURE: CPT

## 2019-03-28 PROCEDURE — 84590 ASSAY OF VITAMIN A: CPT

## 2019-03-28 PROCEDURE — 83970 ASSAY OF PARATHORMONE: CPT

## 2019-03-28 PROCEDURE — 82728 ASSAY OF FERRITIN: CPT

## 2019-03-28 PROCEDURE — 84425 ASSAY OF VITAMIN B-1: CPT

## 2019-03-28 PROCEDURE — 85027 COMPLETE CBC AUTOMATED: CPT

## 2019-03-28 PROCEDURE — 82525 ASSAY OF COPPER: CPT

## 2019-03-28 PROCEDURE — 83540 ASSAY OF IRON: CPT

## 2019-03-28 PROCEDURE — 82746 ASSAY OF FOLIC ACID SERUM: CPT

## 2019-03-28 PROCEDURE — 84630 ASSAY OF ZINC: CPT

## 2019-03-28 PROCEDURE — 80053 COMPREHEN METABOLIC PANEL: CPT

## 2019-03-30 LAB
COPPER SERPL-MCNC: 105 UG/DL (ref 72–166)
ZINC SERPL-MCNC: 97 UG/DL (ref 56–134)

## 2019-03-31 LAB — VIT A SERPL-MCNC: 45.2 UG/DL (ref 22–69.5)

## 2019-04-01 LAB — VIT B1 BLD-SCNC: 110.2 NMOL/L (ref 66.5–200)

## 2019-04-02 DIAGNOSIS — E78.2 MIXED HYPERLIPIDEMIA: Primary | ICD-10-CM

## 2019-04-09 ENCOUNTER — OFFICE VISIT (OUTPATIENT)
Dept: BARIATRICS | Facility: CLINIC | Age: 64
End: 2019-04-09
Payer: COMMERCIAL

## 2019-04-09 VITALS
HEIGHT: 58 IN | SYSTOLIC BLOOD PRESSURE: 114 MMHG | HEART RATE: 72 BPM | TEMPERATURE: 97 F | DIASTOLIC BLOOD PRESSURE: 66 MMHG | BODY MASS INDEX: 31.07 KG/M2 | WEIGHT: 148 LBS

## 2019-04-09 DIAGNOSIS — K21.9 GERD WITHOUT ESOPHAGITIS: ICD-10-CM

## 2019-04-09 DIAGNOSIS — G47.33 OSA (OBSTRUCTIVE SLEEP APNEA): ICD-10-CM

## 2019-04-09 DIAGNOSIS — Z98.84 BARIATRIC SURGERY STATUS: Primary | ICD-10-CM

## 2019-04-09 DIAGNOSIS — E78.5 MILD HYPERLIPIDEMIA: ICD-10-CM

## 2019-04-09 DIAGNOSIS — K91.2 POSTSURGICAL MALABSORPTION: ICD-10-CM

## 2019-04-09 PROBLEM — E66.9 OBESITY, CLASS I, BMI 30-34.9: Status: ACTIVE | Noted: 2019-04-09

## 2019-04-09 PROBLEM — E66.811 OBESITY, CLASS I, BMI 30-34.9: Status: ACTIVE | Noted: 2019-04-09

## 2019-04-09 PROCEDURE — 99214 OFFICE O/P EST MOD 30 MIN: CPT | Performed by: PHYSICIAN ASSISTANT

## 2019-05-03 ENCOUNTER — TELEPHONE (OUTPATIENT)
Dept: GASTROENTEROLOGY | Facility: AMBULARY SURGERY CENTER | Age: 64
End: 2019-05-03

## 2019-05-07 ENCOUNTER — PREP FOR PROCEDURE (OUTPATIENT)
Dept: GASTROENTEROLOGY | Facility: MEDICAL CENTER | Age: 64
End: 2019-05-07

## 2019-05-07 DIAGNOSIS — K31.7 GASTRIC POLYPS: Primary | ICD-10-CM

## 2019-05-21 ENCOUNTER — OFFICE VISIT (OUTPATIENT)
Dept: FAMILY MEDICINE CLINIC | Facility: CLINIC | Age: 64
End: 2019-05-21
Payer: COMMERCIAL

## 2019-05-21 VITALS
HEIGHT: 58 IN | HEART RATE: 72 BPM | DIASTOLIC BLOOD PRESSURE: 58 MMHG | BODY MASS INDEX: 31.61 KG/M2 | SYSTOLIC BLOOD PRESSURE: 110 MMHG | WEIGHT: 150.6 LBS | TEMPERATURE: 97.9 F

## 2019-05-21 DIAGNOSIS — J06.9 ACUTE UPPER RESPIRATORY INFECTION: Primary | ICD-10-CM

## 2019-05-21 DIAGNOSIS — J01.10 ACUTE NON-RECURRENT FRONTAL SINUSITIS: ICD-10-CM

## 2019-05-21 DIAGNOSIS — R05.9 COUGH: ICD-10-CM

## 2019-05-21 DIAGNOSIS — L03.211 CELLULITIS OF FACE: ICD-10-CM

## 2019-05-21 PROCEDURE — 99214 OFFICE O/P EST MOD 30 MIN: CPT | Performed by: PHYSICIAN ASSISTANT

## 2019-05-21 PROCEDURE — 3008F BODY MASS INDEX DOCD: CPT | Performed by: PHYSICIAN ASSISTANT

## 2019-05-21 PROCEDURE — 1036F TOBACCO NON-USER: CPT | Performed by: PHYSICIAN ASSISTANT

## 2019-05-21 RX ORDER — PROMETHAZINE HYDROCHLORIDE AND CODEINE PHOSPHATE 6.25; 1 MG/5ML; MG/5ML
5 SYRUP ORAL EVERY 4 HOURS PRN
Qty: 120 ML | Refills: 0 | Status: SHIPPED | OUTPATIENT
Start: 2019-05-21 | End: 2019-07-12

## 2019-05-21 RX ORDER — CEFUROXIME AXETIL 250 MG/1
250 TABLET ORAL EVERY 12 HOURS SCHEDULED
Qty: 20 TABLET | Refills: 0 | Status: SHIPPED | OUTPATIENT
Start: 2019-05-21 | End: 2019-05-31

## 2019-07-11 ENCOUNTER — ANESTHESIA EVENT (OUTPATIENT)
Dept: GASTROENTEROLOGY | Facility: HOSPITAL | Age: 64
End: 2019-07-11

## 2019-07-12 ENCOUNTER — HOSPITAL ENCOUNTER (OUTPATIENT)
Dept: GASTROENTEROLOGY | Facility: HOSPITAL | Age: 64
Setting detail: OUTPATIENT SURGERY
Discharge: HOME | End: 2019-07-12
Attending: INTERNAL MEDICINE | Admitting: INTERNAL MEDICINE
Payer: COMMERCIAL

## 2019-07-12 ENCOUNTER — ANESTHESIA (OUTPATIENT)
Dept: GASTROENTEROLOGY | Facility: HOSPITAL | Age: 64
End: 2019-07-12

## 2019-07-12 VITALS
OXYGEN SATURATION: 96 % | DIASTOLIC BLOOD PRESSURE: 80 MMHG | TEMPERATURE: 98.4 F | WEIGHT: 145 LBS | HEIGHT: 59 IN | SYSTOLIC BLOOD PRESSURE: 125 MMHG | HEART RATE: 75 BPM | BODY MASS INDEX: 29.23 KG/M2 | RESPIRATION RATE: 18 BRPM

## 2019-07-12 DIAGNOSIS — K31.7 GASTRIC POLYPS: ICD-10-CM

## 2019-07-12 PROCEDURE — 43235 EGD DIAGNOSTIC BRUSH WASH: CPT | Performed by: INTERNAL MEDICINE

## 2019-07-12 RX ORDER — PANTOPRAZOLE SODIUM 20 MG/1
20 TABLET, DELAYED RELEASE ORAL DAILY
COMMUNITY
End: 2019-10-25 | Stop reason: SDUPTHER

## 2019-07-12 RX ORDER — LIDOCAINE HYDROCHLORIDE 10 MG/ML
INJECTION, SOLUTION INFILTRATION; PERINEURAL AS NEEDED
Status: DISCONTINUED | OUTPATIENT
Start: 2019-07-12 | End: 2019-07-12 | Stop reason: SURG

## 2019-07-12 RX ORDER — PROPOFOL 10 MG/ML
INJECTION, EMULSION INTRAVENOUS AS NEEDED
Status: DISCONTINUED | OUTPATIENT
Start: 2019-07-12 | End: 2019-07-12 | Stop reason: SURG

## 2019-07-12 RX ORDER — SODIUM CHLORIDE 9 MG/ML
100 INJECTION, SOLUTION INTRAVENOUS CONTINUOUS
Status: DISCONTINUED | OUTPATIENT
Start: 2019-07-12 | End: 2019-07-16 | Stop reason: HOSPADM

## 2019-07-12 RX ADMIN — SODIUM CHLORIDE 100 ML/HR: 0.9 INJECTION, SOLUTION INTRAVENOUS at 07:17

## 2019-07-12 RX ADMIN — PROPOFOL 30 MG: 10 INJECTION, EMULSION INTRAVENOUS at 08:16

## 2019-07-12 RX ADMIN — PROPOFOL 60 MG: 10 INJECTION, EMULSION INTRAVENOUS at 08:11

## 2019-07-12 RX ADMIN — LIDOCAINE HYDROCHLORIDE 100 MG: 10 INJECTION, SOLUTION INFILTRATION; PERINEURAL at 08:05

## 2019-07-12 RX ADMIN — PROPOFOL 150 MG: 10 INJECTION, EMULSION INTRAVENOUS at 08:05

## 2019-07-12 NOTE — ANESTHESIA POSTPROCEDURE EVALUATION
Post-Op Assessment Note    CV Status:  Stable  Pain Score: 0    Pain management: adequate     Mental Status:  Alert and awake   Hydration Status:  Euvolemic   PONV Controlled:  Controlled   Airway Patency:  Patent   Post Op Vitals Reviewed: Yes      Staff: CRNA           BP   137/64   Temp      Pulse  75   Resp   20   SpO2   98

## 2019-07-12 NOTE — H&P
History and Physical -  [facility]  Moris Lin 61 y o  female MRN: [id number]    HPI: Moris Lin is a 61y o  year old female who presents with history of duodenal polyp  Status post EMR last year  Doing well at this time  Review of Systems    Historical Information   Past Medical History:   Diagnosis Date    Allergic rhinitis     Bariatric surgery status     Basal cell carcinoma     skin CA removed from nose    Bowel habit changes     Cataract     starting with a catract    Cellulitis of left lower extremity     last assessed 06/29/2016    Chronic GERD     Closed displaced fracture of greater tuberosity of humerus     last assessed 05/31/2016    CPAP (continuous positive airway pressure) dependence     Fatty liver     Fibromyalgia     Fibromyalgia, primary     Hiatal hernia     High cholesterol     Insomnia     Morbid obesity (HCC)     Osteoarthritis     Palpitations     pt denies a fib    PONV (postoperative nausea and vomiting)     Postgastrectomy malabsorption     Recurrent pregnancy loss, antepartum condition or complication     Restless leg     Sicca syndrome (HCC)     Sleep apnea     Superficial phlebitis and thrombophlebitis of left lower extremity     last assessed 06/08/2016    Thyroid nodule     nodule on thyroid    Urinary tract infection     Varicose veins of left leg with edema     Wears glasses      Past Surgical History:   Procedure Laterality Date    BLADDER SURGERY      sling    CHOLECYSTECTOMY  1998    COLONOSCOPY      CYST REMOVAL      thigh    DILATION AND CURETTAGE OF UTERUS      ESOPHAGOGASTRODUODENOSCOPY      ESOPHAGOGASTRODUODENOSCOPY N/A 12/21/2017    Procedure: ESOPHAGOGASTRODUODENOSCOPY (EGD) with biopsy and polypectomy;  Surgeon: Sharlene Stanley MD;  Location: [facility];   Service: Gastroenterology    HARDWARE REMOVAL      right shoulder    JOINT REPLACEMENT      both knees    KNEE ARTHROSCOPY      ORIF HUMERAL SHAFT FRACTURE Right     MT ENDOVENOUS LASER, 1ST VEIN Left 4/29/2016    Procedure: GREATER SAPHENOUS VEIN EVLT ;  Surgeon: Jeancarlos Russell DO;  [facility];  Service: Vascular    MT ESOPHAGOGASTRODUODENOSCOPY TRANSORAL DIAGNOSTIC N/A 1/11/2018    Procedure: egd w/ emr ;  Surgeon: Sheryle Congress, MD;  Location: [facility]; Service: Gastroenterology    MT ESOPHAGOGASTRODUODENOSCOPY TRANSORAL DIAGNOSTIC N/A 6/28/2018    Procedure: ESOPHAGOGASTRODUODENOSCOPY (EGD); Surgeon: Sheryle Congress, MD;  [facility];   Service: Gastroenterology    MT LAP, ESA RESTRICT PROC, LONGITUDINAL GASTRECTOMY N/A 3/19/2018    Procedure: GASTRECTOMY SLEEVE LAPAROSCOPIC AND INTRAOPERATIVE EGD;  Surgeon: Dharmesh Patel MD;  [facility];  Service: [address] [place] [address] TO  Left 4/29/2016    Procedure: AND STAB PHLEBECTOMIES ;  Surgeon: Jeancarlos Russell DO;  [facility];  Service: Vascular    REDUCTION MAMMAPLASTY Bilateral 2011    Reduction    SKIN CANCER EXCISION      TOTAL KNEE ARTHROPLASTY Bilateral      Social History   Social History     Substance and Sexual Activity   Alcohol Use No     Social History     Substance and Sexual Activity   Drug Use No     Social History     Tobacco Use   Smoking Status Never Smoker   Smokeless Tobacco Never Used   Tobacco Comment    teenage years      Family History   Problem Relation Age of Onset    Heart disease Mother     Alzheimer's disease Mother     Depression Mother     Anxiety disorder Mother     Coronary artery disease Mother     Osteoporosis Mother     Arthritis Father     Osteoarthritis Father     Prostate cancer Father         age on onset unknown    Uterine cancer Sister 28    Cardiomyopathy Sister         congestive       Meds/Allergies       (Not in a hospital admission)    Allergies   Allergen Reactions    Ketorolac Itching    Percocet [Oxycodone-Acetaminophen] Itching    Flurbiprofen      NSAIDS    Ketorolac Tromethamine Itching    Pneumovax [Pneumococcal Polysaccharide Vaccine] Swelling and Rash       Objective     /65   Pulse 75   Temp 98 4 °F (36 9 °C) (Oral)   Resp 16   Ht 4' 10 5" (1 486 m)   Wt 65 8 kg (145 lb)   LMP  (LMP Unknown)   SpO2 96%   BMI 29 79 kg/m²       PHYSICAL EXAM    Gen: NAD  CV: RRR  CHEST: Clear  ABD: soft, NT/ND  EXT: no edema  Neuro: AAO      ASSESSMENT/PLAN:  This is a 61y o  year old female here for EGD for follow-up of a duodenal polyp which was removed last year      PLAN:   Procedure:  EGD

## 2019-07-12 NOTE — ANESTHESIA PREPROCEDURE EVALUATION
Review of Systems/Medical History  Patient summary reviewed  Chart reviewed  History of anesthetic complications PONV    Cardiovascular  EKG reviewed, Hyperlipidemia,    Pulmonary  Sleep apnea (stopped using CPAP after 60 lb weight loss after gastric sleeve) ,        GI/Hepatic    GERD well controlled, Liver disease (Fatty liver ) ,  Hiatal hernia, Bariatric surgery (s/p gastric sleeve 2018),        Negative  ROS        Endo/Other  History of thyroid disease , hypothyroidism,      GYN       Hematology  Negative hematology ROS      Musculoskeletal  Osteoarthritis,   Comment: Fibromyalgia Arthritis     Neurology  Negative neurology ROS      Psychology   Negative psychology ROS              Physical Exam    Airway    Mallampati score: II  TM Distance: >3 FB  Neck ROM: full     Dental   No notable dental hx     Cardiovascular  Rhythm: regular, Rate: normal, Cardiovascular exam normal    Pulmonary  Pulmonary exam normal Breath sounds clear to auscultation,     Other Findings        Anesthesia Plan  ASA Score- 2     Anesthesia Type- IV sedation with anesthesia with ASA Monitors  Additional Monitors:   Airway Plan:         Plan Factors-    Induction- intravenous  Postoperative Plan-     Informed Consent- Anesthetic plan and risks discussed with patient and spouse  I personally reviewed this patient with the CRNA  Discussed and agreed on the Anesthesia Plan with the CRNA         NPO and allergies verified  Plan:  IV sedation/MAC; GA as backup    Risks and benefits discussed with patient including possibility of recall under sedation  Questions answered  Patient consented

## 2019-07-24 DIAGNOSIS — F41.9 ANXIETY: ICD-10-CM

## 2019-07-24 RX ORDER — PAROXETINE 30 MG/1
TABLET, FILM COATED ORAL
Qty: 90 TABLET | Refills: 1 | Status: SHIPPED | OUTPATIENT
Start: 2019-07-24 | End: 2020-03-24 | Stop reason: SDUPTHER

## 2019-07-25 ENCOUNTER — TELEPHONE (OUTPATIENT)
Dept: FAMILY MEDICINE CLINIC | Facility: CLINIC | Age: 64
End: 2019-07-25

## 2019-07-25 NOTE — LETTER
July 26, 2019     [address] [place] [address]    Patient: Shaina Ram   YOB: 1955   Date of Visit: 7/25/2019        To whom it May concern,    This letter is to certify that Rosas Lancaster is a patient under our care in this office  She has been treated for fibromyalgia for several years  She may have significant episodes of pain which make [employer] duty difficult for her  Please consider her exclusion  Sincerely,        Joseph Arellano PA-C        CC: No Recipients  Yolanda Pineda  7/25/2019  3:01 PM  Signed  Patient needs a letter to send to the court house for jury duty that she is being treated for Fibromyalgia  When she filled out her paper work she put that down and now they want a letter from her PCP documenting it  Please call her when it is done

## 2019-07-25 NOTE — TELEPHONE ENCOUNTER
Patient needs a letter to send to the court house for jury duty that she is being treated for Fibromyalgia  When she filled out her paper work she put that down and now they want a letter from her PCP documenting it  Please call her when it is done

## 2019-07-31 ENCOUNTER — APPOINTMENT (OUTPATIENT)
Dept: RADIOLOGY | Facility: MEDICAL CENTER | Age: 64
End: 2019-07-31
Payer: COMMERCIAL

## 2019-07-31 ENCOUNTER — OFFICE VISIT (OUTPATIENT)
Dept: FAMILY MEDICINE CLINIC | Facility: CLINIC | Age: 64
End: 2019-07-31
Payer: COMMERCIAL

## 2019-07-31 VITALS
HEIGHT: 59 IN | WEIGHT: 150.2 LBS | BODY MASS INDEX: 30.28 KG/M2 | HEART RATE: 72 BPM | DIASTOLIC BLOOD PRESSURE: 70 MMHG | SYSTOLIC BLOOD PRESSURE: 132 MMHG

## 2019-07-31 DIAGNOSIS — R07.89 RIGHT-SIDED CHEST WALL PAIN: ICD-10-CM
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
## 2019-07-31 DIAGNOSIS — M25.561 ACUTE PAIN OF RIGHT KNEE: ICD-10-CM

## 2019-07-31 PROCEDURE — 3008F BODY MASS INDEX DOCD: CPT | Performed by: PHYSICIAN ASSISTANT

## 2019-07-31 PROCEDURE — 99214 OFFICE O/P EST MOD 30 MIN: CPT | Performed by: PHYSICIAN ASSISTANT

## 2019-07-31 PROCEDURE — 71101 X-RAY EXAM UNILAT RIBS/CHEST: CPT

## 2019-07-31 RX ORDER — ACETAMINOPHEN AND CODEINE PHOSPHATE 300; 30 MG/1; MG/1
1 TABLET ORAL EVERY 4 HOURS PRN
Qty: 30 TABLET | Refills: 0 | Status: SHIPPED | OUTPATIENT
Start: 2019-07-31 | End: 2020-07-10 | Stop reason: SDUPTHER

## 2019-07-31 NOTE — PROGRESS NOTES
Assessment and Plan:  Patient Instructions   1  Fall -patient has some bruising of the right knee, right chest wall, and shoulders  Her greatest pain is at the right chest wall and we will order rib study to rule out displaced fracture  She likely has a rib contusion and recommend rest and supportive care  She will be given Tylenol with codeine to be used 1-2 tablets at bedtime as necessary  She may use Tylenol extra-strength 4 tablets during the daytime safely  Follow up with x-ray results as necessary  2  Right chest wall pain-treatment as above  3  Right knee pain-patient had some abrasion which appears clean without sign of infection  There is some ecchymosis and contusion of the knee present  Otherwise the knee seems to be tracking well and without crepitus  Continue monitoring  4  Shoulder pain-likely contusion/tendinitis  Continue rest and supportive care at this point  If symptoms persist consider physical therapy  Diagnoses and all orders for this visit:    Fall, initial encounter    Acute pain of right knee    Right-sided chest wall pain  -     XR ribs 2 vw right; Future  -     acetaminophen-codeine (TYLENOL #3) 300-30 mg per tablet; Take 1 tablet by mouth every 4 (four) hours as needed for moderate pain              Subjective:      Patient ID: Joseline Haile is a 59 y o  female  CC:    Chief Complaint   Patient presents with    Fall     c/o fall yesterday  Pt  having pain in right knee, right rib area and shoulder areas  HPI:    HPI:  This is a 57-year-old female who presents to the office after having a fall while at an amusement park yesterday  She tripped on her grandson and felt predominantly on her right side  She had some scrapes and bruising on her right knee and has the greatest amount of pain in her right chest wall  She feels a specifically over her anterior lateral rib at the 5th to 6th level  She has not had any ecchymosis or bruising here yet    She has also had some pain in the shoulders but still has a full range of motion  She just feels as though they are sore  She has been trying some Tylenol at home with a little benefit  The following portions of the patient's history were reviewed and updated as appropriate: allergies, current medications, past family history, past medical history, past social history, past surgical history and problem list       Review of Systems   Constitutional: Negative for chills and fever  HENT: Negative for congestion  Respiratory: Negative for chest tightness and shortness of breath  Cardiovascular: Negative for chest pain and palpitations  Gastrointestinal: Negative for abdominal pain, diarrhea and vomiting  Musculoskeletal: Positive for arthralgias and myalgias  Skin: Negative for rash  Neurological: Negative for dizziness  Data to review:       Objective:    Vitals:    07/31/19 1231   BP: 132/70   BP Location: Left arm   Patient Position: Sitting   Pulse: 72   Weight: 68 1 kg (150 lb 3 2 oz)   Height: 4' 10 5" (1 486 m)        Physical Exam   Constitutional: She is oriented to person, place, and time  She appears well-developed and well-nourished  No distress  HENT:   Head: Normocephalic and atraumatic  Eyes: Pupils are equal, round, and reactive to light  Conjunctivae are normal    Cardiovascular: Normal rate, normal heart sounds and intact distal pulses  No murmur heard  Pulmonary/Chest: Effort normal  No respiratory distress  She has no wheezes  Musculoskeletal:   Full range of motion with the right knee is present  There is no crepitus  The seems to track normally  Ligaments are firm  There is some ecchymosis over the distal knee cap and abrasion of the skin  This appears clean and dry without purulence  There is no sign of erythema or proximal streaking of erythema present  There is some tenderness over the right anterior lateral 6 rib    There is no bruising or ecchymosis of the skin visible  Full range of the shoulders bilaterally  Neurological: She is alert and oriented to person, place, and time  Nursing note and vitals reviewed

## 2019-07-31 NOTE — PATIENT INSTRUCTIONS
1  Fall -patient has some bruising of the right knee, right chest wall, and shoulders  Her greatest pain is at the right chest wall and we will order rib study to rule out displaced fracture  She likely has a rib contusion and recommend rest and supportive care  She will be given Tylenol with codeine to be used 1-2 tablets at bedtime as necessary  She may use Tylenol extra-strength 4 tablets during the daytime safely  Follow up with x-ray results as necessary  2  Right chest wall pain-treatment as above  3  Right knee pain-patient had some abrasion which appears clean without sign of infection  There is some ecchymosis and contusion of the knee present  Otherwise the knee seems to be tracking well and without crepitus  Continue monitoring  4  Shoulder pain-likely contusion/tendinitis  Continue rest and supportive care at this point  If symptoms persist consider physical therapy

## 2019-08-02 ENCOUNTER — OFFICE VISIT (OUTPATIENT)
Dept: SLEEP CENTER | Facility: CLINIC | Age: 64
End: 2019-08-02
Payer: COMMERCIAL

## 2019-08-02 VITALS
DIASTOLIC BLOOD PRESSURE: 60 MMHG | HEART RATE: 70 BPM | BODY MASS INDEX: 30.04 KG/M2 | HEIGHT: 59 IN | WEIGHT: 149 LBS | SYSTOLIC BLOOD PRESSURE: 110 MMHG

## 2019-08-02 DIAGNOSIS — J31.0 CHRONIC RHINITIS: ICD-10-CM

## 2019-08-02 DIAGNOSIS — G47.33 OSA (OBSTRUCTIVE SLEEP APNEA): Primary | ICD-10-CM

## 2019-08-02 DIAGNOSIS — G47.00 INSOMNIA, UNSPECIFIED TYPE: ICD-10-CM

## 2019-08-02 DIAGNOSIS — G25.81 RESTLESS LEG SYNDROME: ICD-10-CM

## 2019-08-02 DIAGNOSIS — E66.9 OBESITY (BMI 30-39.9): ICD-10-CM

## 2019-08-02 DIAGNOSIS — M79.7 FIBROMYALGIA SYNDROME: ICD-10-CM

## 2019-08-02 PROCEDURE — 99214 OFFICE O/P EST MOD 30 MIN: CPT | Performed by: INTERNAL MEDICINE

## 2019-08-02 NOTE — PROGRESS NOTES
Follow-Up Note - Chelsey  59 y o  female  BPA:[birth year]  U:[id number]    CC: I saw this patient for follow-up in clinic today for her Sleep Disordered Breathing, Coexisting Sleep and Medical Problems  The patient had both diagnostic and therapeutic sleep studies in 2014: The diagnostic study confirmed moderate obstructive sleep apnea:  AHI 18/hour considerably  higher during stage REM  Intermittent snoring of moderate intensity was noted  Minimum oxygen saturation 70 %and 1 8% of total sleep time was spent with saturations less than 90%  During the subsequent therapeutic study, sleep disordered breathing was sufficiently remediated with PAP at 8 cm H2O  PFSH, Problem List, Medications & Allergies were reviewed in EMR  Interval changes: none reported  She  has a past medical history of Allergic rhinitis, Bariatric surgery status, Basal cell carcinoma, Bowel habit changes, Cataract, Cellulitis of left lower extremity, Chronic GERD, Closed displaced fracture of greater tuberosity of humerus, CPAP (continuous positive airway pressure) dependence, Fatty liver, Fibromyalgia, Fibromyalgia, primary, Hiatal hernia, High cholesterol, Insomnia, Morbid obesity (HCC), Osteoarthritis, Palpitations, PONV (postoperative nausea and vomiting), Postgastrectomy malabsorption, Recurrent pregnancy loss, antepartum condition or complication, Restless leg, Sicca syndrome ([address] ), Sleep apnea, Superficial phlebitis and thrombophlebitis of left lower extremity, Thyroid nodule, Urinary tract infection, Varicose veins of left leg with edema, and Wears glasses  She has a current medication list which includes the following prescription(s): acetaminophen, acetaminophen-codeine, vitamin d-3, diphenhydramine-phenylephrine, ezetimibe, gabapentin, multivitamin, pantoprazole, paroxetine, and trazodone  ROS: Reviewed (see attached)  Significant for approximately 60 lb weight loss since bariatric surgery in March of 2018 and weight is now stable  She is on Paxil and gabapentin for fibromyalgia and symptoms are controlled     DATA REVIEWED:  No data was available, but she reports regular use of the device for > 4hours/night, until a few months ago when she developed a a contusion over the nasal bridge  SUBJECTIVE: Regarding use of PAP, Fátima Show reports:   · She is experiencing some adverse effects: dry mouth  · She reports less snoring of CPAP and is not aware of breathing difficulties during sleep  · She is no longer experiencing restless leg symptoms  Sleep Routine: She reports getting 8-9 hrs sleep and uses trazodone as a sleep aid  ; she has no difficulty initiating or maintaining sleep   She awakens spontaneously and feels refreshed  She denied excessive drowsiness   She rated herself at Total score: 1 /24 on the [place] sleepiness scale  Habits: reports that she has never smoked  She has never used smokeless tobacco ,  reports that she does not drink alcohol ,  reports that she does not use drugs  , Caffeine use: very little , Exercise routine: regular    OBJECTIVE: /60   Pulse 70   Ht 4' 10 5" (1 486 m)   Wt 67 6 kg (149 lb)   LMP  (LMP Unknown)   BMI 30 61 kg/m²    Constitutional: Patient is well groomed; well appearing  Skin/Extrem: warm & dry; col & hydration normal; no edema  Psych: cooperativeand in no distress  Normal mood, affect & thought   CNS: Alert, orientated, clear & coherent speech  H&N: EOMI; NC/AT:no facial pressure marks, no rashes  Neck Circumference: 15 cm  ENMT Mucus membranes normal Nasal airway:patent  Oral airway: crowded  Resp:effort is normal CVS: RRR ABD:truncal obesity MSK:Gait normal     ASSESSMENT: Primary Sleep/Secondary(to Medical or Psych conditions) & comorbidities   1  PETER (obstructive sleep apnea)     2  Restless leg syndrome      Improved   3  Fibromyalgia syndrome      Symptoms Controlled   4  Insomnia, unspecified type     5   Obesity (BMI 30-39 9)     6  Chronic rhinitis       PLAN:  1  I reviewed results of the Sleep studies with the patient  2   With respect to above conditions, I counseled on pathophysiology, diagnosis, treatment options, risks and benefits; interrelationship and effects on symptoms and comorbidities; risks of no treatment; costs and insurance aspects  3  Re-evaluation is warranted and a split study to be scheduled          4  Cognitive behavioral therapy was initiated, Sleep Hygiene and behavioral techniques to manage Insomnia were discussed  5  I advised avoiding this [facility] brand allergy D  Nasal symptoms may improve with regular nasal saline rinse followed by topical nasal steroid if necessary  6  No medication is needed for the restless leg symptoms  7  She continues with efforts at weight reduction and would like to lose further 10 lb       8  Follow-up is advised in after the studies to monitor progress, compliance and to adjust therapy  Thank you for allowing me to participate in the care of this patient      Sincerely,    Authenticated electronically by Shravan Ayers MD on 98/20/77   Board Certified Specialist

## 2019-08-02 NOTE — PROGRESS NOTES
Review of Systems      Genitourinary none   Cardiology none   Gastrointestinal none   Neurology none   Constitutional fatigue   Integumentary none   Psychiatry none   Musculoskeletal joint pain and muscle aches   Pulmonary difficulty breathing when lying flat    ENT none   Endocrine none   Hematological none

## 2019-08-03 ENCOUNTER — TELEPHONE (OUTPATIENT)
Dept: FAMILY MEDICINE CLINIC | Facility: CLINIC | Age: 64
End: 2019-08-03

## 2019-08-03 DIAGNOSIS — R07.89 RIGHT-SIDED CHEST WALL PAIN: Primary | ICD-10-CM

## 2019-08-03 RX ORDER — TIZANIDINE 4 MG/1
4 TABLET ORAL EVERY 8 HOURS PRN
Qty: 30 TABLET | Refills: 0 | Status: SHIPPED | OUTPATIENT
Start: 2019-08-03 | End: 2020-07-10 | Stop reason: SDUPTHER

## 2019-08-03 NOTE — TELEPHONE ENCOUNTER
Pt was in last week due to a fall and has bruised ribs, she is now experiencing muscle spasms in that area and would like to know if you would be willing to send in some type of muscle relaxer for her   [facility]

## 2019-08-09 ENCOUNTER — APPOINTMENT (OUTPATIENT)
Dept: LAB | Facility: CLINIC | Age: 64
End: 2019-08-09
Payer: COMMERCIAL

## 2019-08-09 DIAGNOSIS — E78.2 MIXED HYPERLIPIDEMIA: ICD-10-CM

## 2019-08-09 LAB
ALBUMIN SERPL BCP-MCNC: 3.9 G/DL (ref 3.5–5)
ALP SERPL-CCNC: 98 U/L (ref 46–116)
ALT SERPL W P-5'-P-CCNC: 52 U/L (ref 12–78)
ANION GAP SERPL CALCULATED.3IONS-SCNC: 6 MMOL/L (ref 4–13)
AST SERPL W P-5'-P-CCNC: 13 U/L (ref 5–45)
BILIRUB SERPL-MCNC: 0.6 MG/DL (ref 0.2–1)
BUN SERPL-MCNC: 12 MG/DL (ref 5–25)
CALCIUM SERPL-MCNC: 9.3 MG/DL (ref 8.3–10.1)
CHLORIDE SERPL-SCNC: 108 MMOL/L (ref 100–108)
CHOLEST SERPL-MCNC: 168 MG/DL (ref 50–200)
CO2 SERPL-SCNC: 28 MMOL/L (ref 21–32)
CREAT SERPL-MCNC: 0.46 MG/DL (ref 0.6–1.3)
GFR SERPL CREATININE-BSD FRML MDRD: 105 ML/MIN/1.73SQ M
GLUCOSE P FAST SERPL-MCNC: 88 MG/DL (ref 65–99)
HDLC SERPL-MCNC: 49 MG/DL (ref 40–60)
LDLC SERPL CALC-MCNC: 99 MG/DL (ref 0–100)
POTASSIUM SERPL-SCNC: 3.6 MMOL/L (ref 3.5–5.3)
PROT SERPL-MCNC: 6.9 G/DL (ref 6.4–8.2)
SODIUM SERPL-SCNC: 142 MMOL/L (ref 136–145)
TRIGL SERPL-MCNC: 101 MG/DL

## 2019-08-09 PROCEDURE — 80053 COMPREHEN METABOLIC PANEL: CPT

## 2019-08-09 PROCEDURE — 80061 LIPID PANEL: CPT

## 2019-08-09 PROCEDURE — 36415 COLL VENOUS BLD VENIPUNCTURE: CPT

## 2019-08-15 ENCOUNTER — OFFICE VISIT (OUTPATIENT)
Dept: FAMILY MEDICINE CLINIC | Facility: CLINIC | Age: 64
End: 2019-08-15
Payer: COMMERCIAL

## 2019-08-15 VITALS
DIASTOLIC BLOOD PRESSURE: 82 MMHG | WEIGHT: 149.4 LBS | HEIGHT: 59 IN | SYSTOLIC BLOOD PRESSURE: 112 MMHG | HEART RATE: 82 BPM | BODY MASS INDEX: 30.12 KG/M2

## 2019-08-15 DIAGNOSIS — Z23 ENCOUNTER FOR IMMUNIZATION: Primary | ICD-10-CM

## 2019-08-15 DIAGNOSIS — F51.01 PRIMARY INSOMNIA: ICD-10-CM

## 2019-08-15 DIAGNOSIS — K21.9 GERD WITHOUT ESOPHAGITIS: ICD-10-CM

## 2019-08-15 DIAGNOSIS — E78.5 MILD HYPERLIPIDEMIA: ICD-10-CM

## 2019-08-15 DIAGNOSIS — E04.1 THYROID NODULE: ICD-10-CM

## 2019-08-15 DIAGNOSIS — R07.89 RIGHT-SIDED CHEST WALL PAIN: ICD-10-CM

## 2019-08-15 DIAGNOSIS — F33.41 RECURRENT MAJOR DEPRESSIVE DISORDER, IN PARTIAL REMISSION (HCC): ICD-10-CM

## 2019-08-15 DIAGNOSIS — M79.7 FIBROMYALGIA SYNDROME: ICD-10-CM

## 2019-08-15 DIAGNOSIS — G25.81 RESTLESS LEG SYNDROME: ICD-10-CM

## 2019-08-15 PROBLEM — J06.9 ACUTE UPPER RESPIRATORY INFECTION: Status: RESOLVED | Noted: 2018-09-25 | Resolved: 2019-08-15

## 2019-08-15 PROCEDURE — 1036F TOBACCO NON-USER: CPT | Performed by: PHYSICIAN ASSISTANT

## 2019-08-15 PROCEDURE — 99214 OFFICE O/P EST MOD 30 MIN: CPT | Performed by: PHYSICIAN ASSISTANT

## 2019-08-15 NOTE — ASSESSMENT & PLAN NOTE
Pt fell about 2 weeks ago and is still having pain in her R ribs  Continues to take pain med at bedtime and as needed muscle relaxer

## 2019-08-15 NOTE — PROGRESS NOTES
Assessment and Plan:    Problem List Items Addressed This Visit        Digestive    GERD without esophagitis     Patient symptoms stable on daily PPI  I will have her try to cut down to every other day instead to see if we can stay asymptomatic with less medication  Endocrine    Thyroid nodule     Ultrasound done July of 2018 within normal limits with no nodules  No further imaging is required at this time  Other    Mild hyperlipidemia       Very stable in LDL of 99 and triglyceride of 101  Continue Zetia and recheck in 6 months  Relevant Orders    Lipid Panel with Direct LDL reflex    Comprehensive metabolic panel    Restless leg syndrome    Insomnia    Fibromyalgia syndrome    Recurrent major depressive disorder, in partial remission (HCC)    Right-sided chest wall pain     Pt fell about 2 weeks ago and is still having pain in her R ribs  Continues to take pain med at bedtime and as needed muscle relaxer  Other Visit Diagnoses     Encounter for immunization    -  Primary    Relevant Orders    PNEUMOCOCCAL CONJUGATE VACCINE 13-VALENT GREATER THAN 6 MONTHS                 Diagnoses and all orders for this visit:    Encounter for immunization  -     PNEUMOCOCCAL CONJUGATE VACCINE 13-VALENT GREATER THAN 6 MONTHS    Mild hyperlipidemia  -     Lipid Panel with Direct LDL reflex; Future  -     Comprehensive metabolic panel; Future    GERD without esophagitis    Recurrent major depressive disorder, in partial remission (HCC)    Primary insomnia    Restless leg syndrome    Fibromyalgia syndrome    Thyroid nodule    Right-sided chest wall pain              Subjective:      Patient ID: Noemi Gerber is a 59 y o  female      CC:    Chief Complaint   Patient presents with    Hypothyroidism     pt also has blood work to review~cd       HPI:      Noemi Gerber is here for chronic conditions f/u including the diagnosis of Mild hyperlipidemia  Darlyn ignacio esophagitis  Recurrent major depressive disorder, in partial remission (hcc)  Primary insomnia  Restless leg syndrome  Fibromyalgia syndrome  Encounter for immunization  (primary encounter diagnosis)   Pt  states they are taking all medications as directed without complaints or side effects   Pt  had labs done prior to today's visit which included Recent Results (from the past 672 hour(s))  -Lipid Panel with Direct LDL reflex  Collection Time: 08/09/19  8:11 AM       Result                      Value             Ref Range           Cholesterol                 168               50 - 200 mg/*       Triglycerides               101               <=150 mg/dL         HDL, Direct                 49                40 - 60 mg/dL       LDL Calculated              99                0 - 100 mg/dL  -Comprehensive metabolic panel  Collection Time: 08/09/19  8:11 AM       Result                      Value             Ref Range           Sodium                      142               136 - 145 mm*       Potassium                   3 6               3 5 - 5 3 mm*       Chloride                    108               100 - 108 mm*       CO2                         28                21 - 32 mmol*       ANION GAP                   6                 4 - 13 mmol/L       BUN                         12                5 - 25 mg/dL        Creatinine                  0 46 (L)          0 60 - 1 30 *       Glucose, Fasting            88                65 - 99 mg/dL       Calcium                     9 3               8 3 - 10 1 m*       AST                         13                5 - 45 U/L          ALT                         52                12 - 78 U/L         Alkaline Phosphatase        98                46 - 116 U/L        Total Protein               6 9               6 4 - 8 2 g/*       Albumin                     3 9               3 5 - 5 0 g/*       Total Bilirubin             0 60              0 20 - 1 00 *       eGFR 105               ml/min/1 73s*      Pt  Is still c/o rib pain from her fall 2 weeks ago  The following portions of the patient's history were reviewed and updated as appropriate: allergies, current medications, past family history, past medical history, past social history, past surgical history and problem list       Review of Systems   Constitutional: Negative  HENT: Negative  Eyes: Negative  Respiratory: Negative  Cardiovascular: Negative  Gastrointestinal: Negative  Endocrine: Negative  Genitourinary: Negative  Musculoskeletal: Negative  Skin: Negative  Allergic/Immunologic: Negative  Neurological: Negative  Hematological: Negative  Psychiatric/Behavioral: Negative  Data to review:       Objective:    Vitals:    08/15/19 1133   BP: 112/82   BP Location: Left arm   Patient Position: Sitting   Cuff Size: Large   Pulse: 82   Weight: 67 8 kg (149 lb 6 4 oz)   Height: 4' 10 5" (1 486 m)        Physical Exam   Constitutional: She is oriented to person, place, and time  She appears well-developed and well-nourished  No distress  HENT:   Head: Normocephalic and atraumatic  Eyes: Conjunctivae are normal  Right eye exhibits no discharge  Left eye exhibits no discharge  Neck: Neck supple  Carotid bruit is not present  Cardiovascular: Normal rate, regular rhythm and normal heart sounds  Exam reveals no gallop and no friction rub  No murmur heard  Pulmonary/Chest: Effort normal and breath sounds normal  No respiratory distress  She has no wheezes  She has no rales  Neurological: She is alert and oriented to person, place, and time  Skin: Skin is warm and dry  She is not diaphoretic  Psychiatric: She has a normal mood and affect  Judgment normal    Nursing note and vitals reviewed  BMI Counseling: Body mass index is 30 69 kg/m²  Discussed the patient's BMI with her  The BMI is above average   BMI counseling and education was provided to the patient  Nutrition recommendations include reducing portion sizes, decreasing overall calorie intake, 3-5 servings of fruits/vegetables daily, reducing fast food intake, consuming healthier snacks, decreasing soda and/or juice intake, moderation in carbohydrate intake, increasing intake of lean protein, reducing intake of saturated fat and trans fat and reducing intake of cholesterol

## 2019-08-15 NOTE — PATIENT INSTRUCTIONS
Problem List Items Addressed This Visit        Digestive    GERD without esophagitis     Patient symptoms stable on daily PPI  I will have her try to cut down to every other day instead to see if we can stay asymptomatic with less medication  Endocrine    Thyroid nodule     Ultrasound done July of 2018 within normal limits with no nodules  No further imaging is required at this time  Other    Mild hyperlipidemia       Very stable in LDL of 99 and triglyceride of 101  Continue Zetia and recheck in 6 months  Relevant Orders    Lipid Panel with Direct LDL reflex    Comprehensive metabolic panel    Restless leg syndrome    Insomnia    Fibromyalgia syndrome    Recurrent major depressive disorder, in partial remission (HCC)    Right-sided chest wall pain     Pt fell about 2 weeks ago and is still having pain in her R ribs  Continues to take pain med at bedtime and as needed muscle relaxer              Other Visit Diagnoses     Encounter for immunization    -  Primary    Relevant Orders    PNEUMOCOCCAL CONJUGATE VACCINE 13-VALENT GREATER THAN 6 MONTHS

## 2019-08-15 NOTE — ASSESSMENT & PLAN NOTE
Patient symptoms stable on daily PPI  I will have her try to cut down to every other day instead to see if we can stay asymptomatic with less medication

## 2019-10-17 ENCOUNTER — IMMUNIZATIONS (OUTPATIENT)
Dept: FAMILY MEDICINE CLINIC | Facility: CLINIC | Age: 64
End: 2019-10-17
Payer: COMMERCIAL

## 2019-10-17 DIAGNOSIS — Z23 ENCOUNTER FOR IMMUNIZATION: Primary | ICD-10-CM

## 2019-10-17 PROCEDURE — 90682 RIV4 VACC RECOMBINANT DNA IM: CPT

## 2019-10-17 PROCEDURE — 90471 IMMUNIZATION ADMIN: CPT

## 2019-10-18 ENCOUNTER — ANNUAL EXAM (OUTPATIENT)
Dept: OBGYN CLINIC | Facility: CLINIC | Age: 64
End: 2019-10-18
Payer: COMMERCIAL

## 2019-10-18 VITALS
HEIGHT: 59 IN | SYSTOLIC BLOOD PRESSURE: 126 MMHG | BODY MASS INDEX: 30.04 KG/M2 | DIASTOLIC BLOOD PRESSURE: 82 MMHG | WEIGHT: 149 LBS

## 2019-10-18 DIAGNOSIS — Z01.419 ENCOUNTER FOR ANNUAL ROUTINE GYNECOLOGICAL EXAMINATION: ICD-10-CM

## 2019-10-18 DIAGNOSIS — R87.610 ASCUS OF CERVIX WITH NEGATIVE HIGH RISK HPV: Primary | ICD-10-CM

## 2019-10-18 DIAGNOSIS — Z12.31 ENCOUNTER FOR SCREENING MAMMOGRAM FOR BREAST CANCER: ICD-10-CM

## 2019-10-18 PROCEDURE — G0145 SCR C/V CYTO,THINLAYER,RESCR: HCPCS | Performed by: OBSTETRICS & GYNECOLOGY

## 2019-10-18 PROCEDURE — S0612 ANNUAL GYNECOLOGICAL EXAMINA: HCPCS | Performed by: OBSTETRICS & GYNECOLOGY

## 2019-10-18 NOTE — PROGRESS NOTES
Assessment/Plan:    ASCUS, negative HPV-Pap with reflex done today    mammogram reviewed with her including breast density  RX given for December     Discussed self breast exams    colon cancer screening-colonoscopy is up to date, she is due again at age 79    discussed preventive care, regular exercise and a healthy diet      No problem-specific Assessment & Plan notes found for this encounter  Diagnoses and all orders for this visit:    ASCUS of cervix with negative high risk HPV  -     Liquid-based pap, screening    Encounter for annual routine gynecological examination    Encounter for screening mammogram for breast cancer  -     Mammo screening bilateral w 3d & cad; Future          Subjective:      Patient ID: Zofia Cloud is a 59 y o  female  Patient here for yearly  Last year, she had ASCUS with negative HPV  Her hemorrhoid is bothersome at times, uses OTC hemorrhoid cream   No gyn complaints  Normal mammogram in December 2018  She is doing a good job a keeping the weight off that she lost after her bariatric surgery  The following portions of the patient's history were reviewed and updated as appropriate: allergies, current medications, past family history, past medical history, past social history, past surgical history and problem list     Review of Systems   Constitutional: Negative  Gastrointestinal: Negative  Genitourinary: Negative  Objective:      /82 (BP Location: Left arm, Patient Position: Sitting, Cuff Size: Standard)   Ht 4' 10 5" (1 486 m)   Wt 67 6 kg (149 lb)   LMP  (LMP Unknown)   BMI 30 61 kg/m²          Physical Exam   Constitutional: She appears well-developed  Neck: No tracheal deviation present  No thyromegaly present  Cardiovascular: Normal rate and regular rhythm  Pulmonary/Chest: Effort normal and breath sounds normal  Right breast exhibits no inverted nipple, no mass, no nipple discharge, no skin change and no tenderness   Left breast exhibits no inverted nipple, no mass, no nipple discharge, no skin change and no tenderness  Breasts are symmetrical    Examined seated and supine   Abdominal: Soft  She exhibits no distension and no mass  There is no tenderness  Genitourinary: Rectum normal, vagina normal and uterus normal  No labial fusion  There is no rash, tenderness, lesion or injury on the right labia  There is no rash, tenderness, lesion or injury on the left labia  Cervix exhibits no motion tenderness, no discharge and no friability  Right adnexum displays no mass, no tenderness and no fullness  Left adnexum displays no mass, no tenderness and no fullness  Vitals reviewed

## 2019-10-23 LAB
LAB AP GYN PRIMARY INTERPRETATION: NORMAL
Lab: NORMAL

## 2019-10-25 DIAGNOSIS — K21.9 GERD WITHOUT ESOPHAGITIS: Primary | ICD-10-CM

## 2019-10-25 RX ORDER — PANTOPRAZOLE SODIUM 20 MG/1
20 TABLET, DELAYED RELEASE ORAL DAILY
Qty: 90 TABLET | Refills: 1 | Status: SHIPPED | OUTPATIENT
Start: 2019-10-25 | End: 2020-01-07 | Stop reason: SDUPTHER

## 2019-11-05 ENCOUNTER — HOSPITAL ENCOUNTER (OUTPATIENT)
Dept: SLEEP CENTER | Facility: CLINIC | Age: 64
Discharge: HOME | End: 2019-11-05
Payer: COMMERCIAL

## 2019-11-05 DIAGNOSIS — G47.33 OSA (OBSTRUCTIVE SLEEP APNEA): ICD-10-CM

## 2019-11-05 PROCEDURE — 95810 POLYSOM 6/> YRS 4/> PARAM: CPT

## 2019-11-06 NOTE — PROGRESS NOTES
Sleep Study Documentation    Pre-Sleep Study       Sleep testing procedure explained to patient:YES    Patient napped prior to study:NO    Caffeine:Dayshift worker after 12PM   Caffeine use:YES- soda  12 ounces and chocolate  6 ounces    Alcohol:Dayshift workers after 5PM: Alcohol use:NO    Typical day for patient:YES       Study Documentation    Sleep Study Indications: history of PETER, stopped using CPAP after significant weight loss, mood disturbance, fibromyalgia, insomnia, restless leg syndrome    Sleep Study: Diagnostic   Snore: Moderate  Supplemental O2: no    Minimum SaO2 90  Baseline SaO2 96        EKG abnormalities: no     EEG abnormalities: no    Sleep Study Recorded < 2 hours: N/A    Sleep Study Recorded > 2 hours but incomplete study: N/A    Sleep Study Recorded 6 hours but no sleep obtained: NO        Post-Sleep Study    Medication used at bedtime or during sleep study:YES prescription sleep aid    Patient reports time it took to fall asleep:less than 20 minutes    Patient reports waking up during study:1 to 2 times  Patient reports returning to sleep in 10 to 30 minutes  Patient reports sleeping 6 to 8 hours with dreaming  Patient reports sleep during study:typical    Patient rated sleepiness: Somewhat sleepy or tired    PAP treatment:no

## 2019-11-12 ENCOUNTER — TELEPHONE (OUTPATIENT)
Dept: SLEEP CENTER | Facility: CLINIC | Age: 64
End: 2019-11-12

## 2019-11-12 NOTE — TELEPHONE ENCOUNTER
Left message for patient to call office  Sleep study reveals mild PETER, need follow up with Dr Drake Bob to discuss

## 2019-11-13 NOTE — TELEPHONE ENCOUNTER
I spoke with patient, scheduled follow up 12/6/19 at 2 pm with Dr Stephanie Villanueva in [facility]

## 2020-01-06 DIAGNOSIS — E78.2 MIXED HYPERLIPIDEMIA: ICD-10-CM

## 2020-01-06 RX ORDER — EZETIMIBE 10 MG/1
10 TABLET ORAL DAILY
Qty: 90 TABLET | Refills: 3 | Status: SHIPPED | OUTPATIENT
Start: 2020-01-06 | End: 2020-12-30

## 2020-01-07 ENCOUNTER — HOSPITAL ENCOUNTER (OUTPATIENT)
Dept: MAMMOGRAPHY | Facility: MEDICAL CENTER | Age: 65
Discharge: HOME | End: 2020-01-07
Payer: COMMERCIAL

## 2020-01-07 VITALS — WEIGHT: 146 LBS | BODY MASS INDEX: 29.43 KG/M2 | HEIGHT: 59 IN

## 2020-01-07 DIAGNOSIS — Z12.31 ENCOUNTER FOR SCREENING MAMMOGRAM FOR BREAST CANCER: ICD-10-CM

## 2020-01-07 DIAGNOSIS — K21.9 GERD WITHOUT ESOPHAGITIS: ICD-10-CM

## 2020-01-07 PROCEDURE — 77063 BREAST TOMOSYNTHESIS BI: CPT

## 2020-01-07 PROCEDURE — 77067 SCR MAMMO BI INCL CAD: CPT

## 2020-01-07 RX ORDER — PANTOPRAZOLE SODIUM 20 MG/1
20 TABLET, DELAYED RELEASE ORAL DAILY
Qty: 90 TABLET | Refills: 1 | Status: SHIPPED | OUTPATIENT
Start: 2020-01-07 | End: 2020-08-05

## 2020-01-09 ENCOUNTER — TELEPHONE (OUTPATIENT)
Dept: BARIATRICS | Facility: CLINIC | Age: 65
End: 2020-01-09

## 2020-01-09 DIAGNOSIS — Z98.890 STATUS POST DIGESTIVE SYSTEM SURGERY: Primary | ICD-10-CM

## 2020-01-09 DIAGNOSIS — K91.2 POSTSURGICAL MALABSORPTION: ICD-10-CM

## 2020-01-09 NOTE — TELEPHONE ENCOUNTER
Pt  has an appointment April 9th she believes and was wondering if she can have her blood work ordered prior to her appointment  She has an appointment with her pcp on Feb 19, and he will be ordering labs as well  She would  Like to have them done together as to avoid getting a bill

## 2020-02-24 ENCOUNTER — TELEPHONE (OUTPATIENT)
Dept: SLEEP CENTER | Facility: CLINIC | Age: 65
End: 2020-02-24

## 2020-02-24 NOTE — TELEPHONE ENCOUNTER
2/24 PER BING AZEVEDO  @ [employer] & PAYER RELATIONS & PER [employer] REP [employer] IS DEFINITELY NON-PAR W/ INSUR  LM FOR PT ADVISING HER OF THIS AND IF SEEN WOULD BE SELF PAY    called pt l/m  [employer] is non par with insurance  CR  2/20/20 2/19 EMAILED Eddi Potter - NON-PAR W/ INS

## 2020-03-24 DIAGNOSIS — F41.9 ANXIETY: ICD-10-CM

## 2020-03-24 RX ORDER — PAROXETINE 30 MG/1
30 TABLET, FILM COATED ORAL DAILY
Qty: 90 TABLET | Refills: 1 | Status: SHIPPED | OUTPATIENT
Start: 2020-03-24 | End: 2020-10-05

## 2020-07-01 ENCOUNTER — APPOINTMENT (OUTPATIENT)
Dept: LAB | Facility: CLINIC | Age: 65
End: 2020-07-01
Payer: COMMERCIAL

## 2020-07-01 DIAGNOSIS — E78.5 MILD HYPERLIPIDEMIA: ICD-10-CM

## 2020-07-01 DIAGNOSIS — Z98.890 STATUS POST DIGESTIVE SYSTEM SURGERY: ICD-10-CM

## 2020-07-01 DIAGNOSIS — K91.2 POSTSURGICAL MALABSORPTION: ICD-10-CM

## 2020-07-01 LAB
25(OH)D3 SERPL-MCNC: 48.2 NG/ML (ref 30–100)
ALBUMIN SERPL BCP-MCNC: 3.8 G/DL (ref 3.5–5)
ALP SERPL-CCNC: 71 U/L (ref 46–116)
ALT SERPL W P-5'-P-CCNC: 17 U/L (ref 12–78)
ANION GAP SERPL CALCULATED.3IONS-SCNC: 4 MMOL/L (ref 4–13)
AST SERPL W P-5'-P-CCNC: 12 U/L (ref 5–45)
BILIRUB SERPL-MCNC: 0.6 MG/DL (ref 0.2–1)
BUN SERPL-MCNC: 11 MG/DL (ref 5–25)
CALCIUM SERPL-MCNC: 9.4 MG/DL (ref 8.3–10.1)
CHLORIDE SERPL-SCNC: 106 MMOL/L (ref 100–108)
CHOLEST SERPL-MCNC: 208 MG/DL (ref 50–200)
CO2 SERPL-SCNC: 27 MMOL/L (ref 21–32)
CREAT SERPL-MCNC: 0.56 MG/DL (ref 0.6–1.3)
ERYTHROCYTE [DISTWIDTH] IN BLOOD BY AUTOMATED COUNT: 12.2 % (ref 11.6–15.1)
FERRITIN SERPL-MCNC: 44 NG/ML (ref 8–388)
FOLATE SERPL-MCNC: 10.3 NG/ML (ref 3.1–17.5)
GFR SERPL CREATININE-BSD FRML MDRD: 99 ML/MIN/1.73SQ M
GLUCOSE P FAST SERPL-MCNC: 79 MG/DL (ref 65–99)
HCT VFR BLD AUTO: 41.2 % (ref 34.8–46.1)
HDLC SERPL-MCNC: 51 MG/DL
HGB BLD-MCNC: 13.3 G/DL (ref 11.5–15.4)
IRON SATN MFR SERPL: 54 %
IRON SERPL-MCNC: 147 UG/DL (ref 50–170)
LDLC SERPL CALC-MCNC: 137 MG/DL (ref 0–100)
MCH RBC QN AUTO: 29.2 PG (ref 26.8–34.3)
MCHC RBC AUTO-ENTMCNC: 32.3 G/DL (ref 31.4–37.4)
MCV RBC AUTO: 91 FL (ref 82–98)
PLATELET # BLD AUTO: 264 THOUSANDS/UL (ref 149–390)
PMV BLD AUTO: 11.1 FL (ref 8.9–12.7)
POTASSIUM SERPL-SCNC: 4.2 MMOL/L (ref 3.5–5.3)
PROT SERPL-MCNC: 7.2 G/DL (ref 6.4–8.2)
PTH-INTACT SERPL-MCNC: 58.3 PG/ML (ref 18.4–80.1)
RBC # BLD AUTO: 4.55 MILLION/UL (ref 3.81–5.12)
SODIUM SERPL-SCNC: 137 MMOL/L (ref 136–145)
TIBC SERPL-MCNC: 274 UG/DL (ref 250–450)
TRIGL SERPL-MCNC: 100 MG/DL
VIT B12 SERPL-MCNC: 548 PG/ML (ref 100–900)
WBC # BLD AUTO: 5.9 THOUSAND/UL (ref 4.31–10.16)

## 2020-07-01 PROCEDURE — 36415 COLL VENOUS BLD VENIPUNCTURE: CPT

## 2020-07-01 PROCEDURE — 82607 VITAMIN B-12: CPT

## 2020-07-01 PROCEDURE — 80061 LIPID PANEL: CPT

## 2020-07-01 PROCEDURE — 83550 IRON BINDING TEST: CPT

## 2020-07-01 PROCEDURE — 84630 ASSAY OF ZINC: CPT

## 2020-07-01 PROCEDURE — 85027 COMPLETE CBC AUTOMATED: CPT

## 2020-07-01 PROCEDURE — 82746 ASSAY OF FOLIC ACID SERUM: CPT

## 2020-07-01 PROCEDURE — 82306 VITAMIN D 25 HYDROXY: CPT

## 2020-07-01 PROCEDURE — 84590 ASSAY OF VITAMIN A: CPT

## 2020-07-01 PROCEDURE — 83970 ASSAY OF PARATHORMONE: CPT

## 2020-07-01 PROCEDURE — 82728 ASSAY OF FERRITIN: CPT

## 2020-07-01 PROCEDURE — 83540 ASSAY OF IRON: CPT

## 2020-07-01 PROCEDURE — 84425 ASSAY OF VITAMIN B-1: CPT

## 2020-07-01 PROCEDURE — 80053 COMPREHEN METABOLIC PANEL: CPT

## 2020-07-05 LAB — ZINC SERPL-MCNC: 84 UG/DL (ref 56–134)

## 2020-07-06 LAB
VIT A SERPL-MCNC: 40.8 UG/DL (ref 22–69.5)
VIT B1 BLD-SCNC: 113.2 NMOL/L (ref 66.5–200)

## 2020-07-10 ENCOUNTER — OFFICE VISIT (OUTPATIENT)
Dept: FAMILY MEDICINE CLINIC | Facility: CLINIC | Age: 65
End: 2020-07-10
Payer: COMMERCIAL

## 2020-07-10 VITALS
DIASTOLIC BLOOD PRESSURE: 72 MMHG | TEMPERATURE: 97.3 F | BODY MASS INDEX: 31.04 KG/M2 | HEIGHT: 59 IN | HEART RATE: 80 BPM | WEIGHT: 154 LBS | SYSTOLIC BLOOD PRESSURE: 110 MMHG

## 2020-07-10 DIAGNOSIS — E05.90 HYPERTHYROIDISM: ICD-10-CM

## 2020-07-10 DIAGNOSIS — F51.01 PRIMARY INSOMNIA: ICD-10-CM

## 2020-07-10 DIAGNOSIS — G25.81 RESTLESS LEG SYNDROME: ICD-10-CM

## 2020-07-10 DIAGNOSIS — K91.2 POSTSURGICAL MALABSORPTION: ICD-10-CM

## 2020-07-10 DIAGNOSIS — M79.7 FIBROMYALGIA SYNDROME: ICD-10-CM

## 2020-07-10 DIAGNOSIS — E78.5 MILD HYPERLIPIDEMIA: Primary | ICD-10-CM

## 2020-07-10 DIAGNOSIS — K21.9 GERD WITHOUT ESOPHAGITIS: ICD-10-CM

## 2020-07-10 DIAGNOSIS — F33.41 RECURRENT MAJOR DEPRESSIVE DISORDER, IN PARTIAL REMISSION (HCC): ICD-10-CM

## 2020-07-10 PROCEDURE — 99214 OFFICE O/P EST MOD 30 MIN: CPT | Performed by: PHYSICIAN ASSISTANT

## 2020-07-10 PROCEDURE — 1036F TOBACCO NON-USER: CPT | Performed by: PHYSICIAN ASSISTANT

## 2020-07-10 PROCEDURE — 3008F BODY MASS INDEX DOCD: CPT | Performed by: PHYSICIAN ASSISTANT

## 2020-07-10 NOTE — PATIENT INSTRUCTIONS
Problem List Items Addressed This Visit        Digestive    GERD without esophagitis    Postsurgical malabsorption     CBC, Vit D and all other vit levels normal              Endocrine    Hyperthyroidism     PTH normal              Other    Mild hyperlipidemia - Primary     LDL did elevate to 137 from 99 due to the fact pt with Covid has not been going to the gym and eating more while out of work  No change in meds and recheck with next labs  Relevant Orders    Lipid Panel with Direct LDL reflex    Comprehensive metabolic panel    Restless leg syndrome     Stable on gabapentin  Insomnia     Stable on trazodone  Fibromyalgia syndrome     Stable on gabapentin            Recurrent major depressive disorder, in partial remission ([facility] [address] )

## 2020-07-10 NOTE — PROGRESS NOTES
Assessment and Plan:    Problem List Items Addressed This Visit        Digestive    GERD without esophagitis    Postsurgical malabsorption     CBC, Vit D and all other vit levels normal              Endocrine    Hyperthyroidism     PTH normal              Other    Mild hyperlipidemia - Primary     LDL did elevate to 137 from 99 due to the fact pt with Covid has not been going to the gym and eating more while out of work  No change in meds and recheck with next labs  Relevant Orders    Lipid Panel with Direct LDL reflex    Comprehensive metabolic panel    Restless leg syndrome     Stable on gabapentin  Insomnia     Stable on trazodone  Fibromyalgia syndrome     Stable on gabapentin  Recurrent major depressive disorder, in partial remission ([facility] [address] )                 Diagnoses and all orders for this visit:    Mild hyperlipidemia  -     Lipid Panel with Direct LDL reflex; Future  -     Comprehensive metabolic panel; Future    Primary insomnia    Recurrent major depressive disorder, in partial remission ([facility])    GERD without esophagitis    Restless leg syndrome    Fibromyalgia syndrome    Postsurgical malabsorption    Hyperthyroidism              Subjective:      Patient ID: Ariana Norris is a 59 y o  female  CC:    Chief Complaint   Patient presents with    GERD    Hyperlipidemia     Review BW results  Pt states there are no other concerns to address at this time  -  lsh    Irritable Bowel Syndrome       HPI:      Ariana Norris is here for chronic conditions f/u including the diagnosis of No diagnosis found    Pt  states they are taking all medications as directed without complaints or side effects   Pt  had labs done prior to today's visit which included Recent Results (from the past 672 hour(s))  -Lipid Panel with Direct LDL reflex  Collection Time: 07/01/20 10:27 AM       Result                      Value             Ref Range           Cholesterol 208 (H)           50 - 200 mg/*       Triglycerides               100               <=150 mg/dL         HDL, Direct                 51                >=40 mg/dL          LDL Calculated              137 (H)           0 - 100 mg/dL  -Comprehensive metabolic panel  Collection Time: 07/01/20 10:27 AM       Result                      Value             Ref Range           Sodium                      137               136 - 145 mm*       Potassium                   4 2               3 5 - 5 3 mm*       Chloride                    106               100 - 108 mm*       CO2                         27                21 - 32 mmol*       ANION GAP                   4                 4 - 13 mmol/L       BUN                         11                5 - 25 mg/dL        Creatinine                  0 56 (L)          0 60 - 1 30 *       Glucose, Fasting            79                65 - 99 mg/dL       Calcium                     9 4               8 3 - 10 1 m*       AST                         12                5 - 45 U/L          ALT                         17                12 - 78 U/L         Alkaline Phosphatase        71                46 - 116 U/L        Total Protein               7 2               6 4 - 8 2 g/*       Albumin                     3 8               3 5 - 5 0 g/*       Total Bilirubin             0 60              0 20 - 1 00 *       eGFR                        99                ml/min/1 73s*  -Ferritin  Collection Time: 07/01/20 10:27 AM       Result                      Value             Ref Range           Ferritin                    44                8 - 388 ng/mL  -Folate  Collection Time: 07/01/20 10:27 AM       Result                      Value             Ref Range           Folate                      10 3              3 1 - 17 5 n*  -Iron Saturation %  Collection Time: 07/01/20 10:27 AM       Result                      Value             Ref Range           Iron Saturation             54 %                   TIBC                        274               250 - 450 ug*       Iron                        147               50 - 170 ug/*  -PTH, intact  Collection Time: 07/01/20 10:27 AM       Result                      Value             Ref Range           PTH                         58 3              18 4 - 80 1 *  -Vitamin A  Collection Time: 07/01/20 10:27 AM       Result                      Value             Ref Range           Vitamin A                   40 8              22 0 - 69 5 *  -Vitamin B1, whole blood  Collection Time: 07/01/20 10:27 AM       Result                      Value             Ref Range           Vitamin B1, Whole Blood     113 2             66 5 - 200 0*  -Vitamin B12  Collection Time: 07/01/20 10:27 AM       Result                      Value             Ref Range           Vitamin B-12                548               100 - 900 pg*  -Vitamin D 25 hydroxy  Collection Time: 07/01/20 10:27 AM       Result                      Value             Ref Range           Vit D, 25-Hydroxy           48 2              30 0 - 100 0*  -Zinc  Collection Time: 07/01/20 10:27 AM       Result                      Value             Ref Range           Zinc                        84                56 - 134 ug/*  -CBC and Platelet  Collection Time: 07/01/20 10:28 AM       Result                      Value             Ref Range           WBC                         5 90              4 31 - 10 16*       RBC                         4 55              3 81 - 5 12 *       Hemoglobin                  13 3              11 5 - 15 4 *       Hematocrit                  41 2              34 8 - 46 1 %       MCV                         91                82 - 98 fL          MCH                         29 2              26 8 - 34 3 *       MCHC                        32 3              31 4 - 37 4 *       RDW                         12 2              11 6 - 15 1 %       Platelets                   264 149 - 46 Th*       MPV                         11 1              8 9 - 12 7 fL        The following portions of the patient's history were reviewed and updated as appropriate: allergies, current medications, past family history, past medical history, past social history, past surgical history and problem list       Review of Systems   Constitutional: Negative  HENT: Negative  Eyes: Negative  Respiratory: Negative  Cardiovascular: Negative  Gastrointestinal: Negative  Endocrine: Negative  Genitourinary: Negative  Musculoskeletal: Negative  Skin: Negative  Allergic/Immunologic: Negative  Neurological: Negative  Hematological: Negative  Psychiatric/Behavioral: Negative  Data to review:       Objective:    Vitals:    07/10/20 1122   BP: 110/72   BP Location: Left arm   Patient Position: Sitting   Cuff Size: Large   Pulse: 80   Temp: (!) 97 3 °F (36 3 °C)   TempSrc: Temporal   Weight: 69 9 kg (154 lb)   Height: 4' 10 5" (1 486 m)        Physical Exam   Constitutional: She is oriented to person, place, and time  She appears well-developed and well-nourished  No distress  HENT:   Head: Normocephalic and atraumatic  Eyes: Conjunctivae are normal  Right eye exhibits no discharge  Left eye exhibits no discharge  Neck: Neck supple  Carotid bruit is not present  Cardiovascular: Normal rate, regular rhythm and normal heart sounds  Exam reveals no gallop and no friction rub  No murmur heard  Pulmonary/Chest: Effort normal and breath sounds normal  No respiratory distress  She has no wheezes  She has no rales  Neurological: She is alert and oriented to person, place, and time  Skin: Skin is warm and dry  She is not diaphoretic  Psychiatric: She has a normal mood and affect  Judgment normal    Nursing note and vitals reviewed

## 2020-07-10 NOTE — ASSESSMENT & PLAN NOTE
LDL did elevate to 137 from 99 due to the fact pt with Covid has not been going to the gym and eating more while out of work  No change in meds and recheck with next labs

## 2020-08-03 ENCOUNTER — OFFICE VISIT (OUTPATIENT)
Dept: BARIATRICS | Facility: CLINIC | Age: 65
End: 2020-08-03
Payer: COMMERCIAL

## 2020-08-03 VITALS
SYSTOLIC BLOOD PRESSURE: 138 MMHG | WEIGHT: 156.5 LBS | HEIGHT: 58 IN | HEART RATE: 82 BPM | BODY MASS INDEX: 32.85 KG/M2 | TEMPERATURE: 98.2 F | DIASTOLIC BLOOD PRESSURE: 82 MMHG

## 2020-08-03 DIAGNOSIS — Z09 FOLLOW UP: Primary | ICD-10-CM

## 2020-08-03 DIAGNOSIS — K91.2 POSTSURGICAL MALABSORPTION: ICD-10-CM

## 2020-08-03 DIAGNOSIS — G47.33 OSA (OBSTRUCTIVE SLEEP APNEA): ICD-10-CM

## 2020-08-03 DIAGNOSIS — Z98.84 BARIATRIC SURGERY STATUS: ICD-10-CM

## 2020-08-03 PROCEDURE — 4040F PNEUMOC VAC/ADMIN/RCVD: CPT | Performed by: PHYSICIAN ASSISTANT

## 2020-08-03 PROCEDURE — 3008F BODY MASS INDEX DOCD: CPT | Performed by: PHYSICIAN ASSISTANT

## 2020-08-03 PROCEDURE — 99213 OFFICE O/P EST LOW 20 MIN: CPT | Performed by: PHYSICIAN ASSISTANT

## 2020-08-03 PROCEDURE — 1036F TOBACCO NON-USER: CPT | Performed by: PHYSICIAN ASSISTANT

## 2020-08-03 NOTE — PROGRESS NOTES
Assessment/Plan:     Patient ID: Alba Cosby is a 72 y o  female  Bariatric Surgery Status    -s/p Vertical Sleeve Gastrectomy with paraesophageal hernia repair by  Dr Demetrius Pryor on 3/19/2018  Presents to the office today for annual exam     · Continued/Maintain healthy weight loss with good nutrition intakes  · Adequate hydration with at least 64oz  fluid intake  · Follow diet as discussed  · Follow vitamin and mineral recommendations as reviewed with you  · Exercise as tolerated  · Colonoscopy referral made: no    · Follow-up in 1 year  We kindly ask that your arrive 15 minutes before your scheduled appointment time with your provider to allow our staff to room you, get your vital signs and update your chart  · Get lab work done prior to annual visit  Please call the office if you need a script  It is recommended to check with your insurance BEFORE getting labs done to make sure they are covered by your policy  · Call our office if you have any problems with abdominal pain especially associated with fever, chills, nausea, vomiting or any other concerns  · All  Post-bariatric surgery patients should be aware that very small quantities of any alcohol can cause impairment and it is very possible not to feel the effect  The effect can be in the system for several hours  It is also a stomach irritant  · It is advised to AVOID alcohol, Nonsteroidal antiinflammatory drugs (NSAIDS) and nicotine of all forms   Any of these can cause stomach irritation/pain  · Discussed the effects of alcohol on a bariatric patient and the increased impairment risk  · Keep up the good work!      Postsurgical Malabsorption   -At risk for malabsorption of vitamins/minerals secondary to malabsorption and restriction of intake from bariatric surgery  -Currently taking adequate postop bariatric surgery vitamin supplementation  -Last set of bariatric labs completed on 7/2020 and showed labs are within acceptable range  -Next set of bariatric labs ordered for approximately 1 year  -Patient received education about the importance of adhering to a lifelong supplementation regimen to avoid vitamin/mineral deficiencies     PETER  - pending repeat sleep study; not currently wearing CPAP      Diagnoses and all orders for this visit:    Follow up    Bariatric surgery status    Postsurgical malabsorption    PETER (obstructive sleep apnea)         Subjective:      Patient ID: Mey Gray is a 72 y o  female  -s/p Vertical Sleeve Gastrectomy with paraesophageal hernia repair by  Dr Bertin Anderson on 3/19/2018 Presents to the office today for routine follow up  Tolerating diet without issues; denies N/V, dysphagia, reflux  Overall doing Well  Initial:199 lbs   Current:156 5  EWL: (Weight loss is ahead of schedule at this post surgical period )  Stanley: 148 lbs   Current BMI is Body mass index is 32 71 kg/m²  · Tolerating a regular diet-yes  · Eating at least 60 grams of protein per day-yes  · Following 30/60 minute rule with liquids-yes  · Drinking at least 64 ounces of fluid per day-no  · Drinking carbonated beverages-few sips of pepsi daily   · Sufficient exercise-no  · Using NSAIDs regularly-no  · Using nicotine-no  · Using alcohol-socially   · Supplements: bariatric mvi + calcium chews     · EWL is 53%, which places the patient ahead of schedule for expected post surgical weight loss at this time  The following portions of the patient's history were reviewed and updated as appropriate: allergies, current medications, past family history, past medical history, past social history, past surgical history and problem list     Review of Systems   Constitutional: Negative  HENT: Negative  Respiratory: Negative  Cardiovascular: Negative  Gastrointestinal: Negative  Musculoskeletal: Negative  Skin: Negative  Neurological: Negative      Psychiatric/Behavioral:        Reported some depression related to covid and quarantine but is improving and feeling better         Objective:    /82   Pulse 82   Temp 98 2 °F (36 8 °C)   Ht 4' 10"   Wt 71 kg (156 lb 8 oz)   LMP  (LMP Unknown)   BMI 32 71 kg/m²      Physical Exam   Constitutional: She is oriented to person, place, and time  HENT:   Head: Normocephalic and atraumatic  Neck: Normal range of motion  Cardiovascular: Normal rate  Pulmonary/Chest: Effort normal    Abdominal: Soft  Normal appearance  Neurological: She is alert and oriented to person, place, and time  Skin: Skin is warm and dry  Psychiatric: Mood normal    Nursing note and vitals reviewed

## 2020-08-05 DIAGNOSIS — K21.9 GERD WITHOUT ESOPHAGITIS: ICD-10-CM

## 2020-08-05 RX ORDER — PANTOPRAZOLE SODIUM 20 MG/1
TABLET, DELAYED RELEASE ORAL
Qty: 90 TABLET | Refills: 0 | Status: SHIPPED | OUTPATIENT
Start: 2020-08-05 | End: 2020-11-03

## 2020-08-14 ENCOUNTER — OFFICE VISIT (OUTPATIENT)
Dept: SLEEP CENTER | Facility: CLINIC | Age: 65
End: 2020-08-14
Payer: COMMERCIAL

## 2020-08-14 VITALS
WEIGHT: 157.2 LBS | HEART RATE: 85 BPM | BODY MASS INDEX: 31.69 KG/M2 | DIASTOLIC BLOOD PRESSURE: 66 MMHG | OXYGEN SATURATION: 97 % | SYSTOLIC BLOOD PRESSURE: 138 MMHG | HEIGHT: 59 IN

## 2020-08-14 DIAGNOSIS — G47.33 OSA (OBSTRUCTIVE SLEEP APNEA): Primary | ICD-10-CM

## 2020-08-14 DIAGNOSIS — M79.7 FIBROMYALGIA SYNDROME: ICD-10-CM

## 2020-08-14 DIAGNOSIS — G47.00 INSOMNIA, UNSPECIFIED TYPE: ICD-10-CM

## 2020-08-14 DIAGNOSIS — J31.0 CHRONIC RHINITIS: ICD-10-CM

## 2020-08-14 DIAGNOSIS — E66.9 OBESITY (BMI 30-39.9): ICD-10-CM

## 2020-08-14 DIAGNOSIS — G25.81 RESTLESS LEG SYNDROME: ICD-10-CM

## 2020-08-14 PROCEDURE — 4040F PNEUMOC VAC/ADMIN/RCVD: CPT | Performed by: INTERNAL MEDICINE

## 2020-08-14 PROCEDURE — 3008F BODY MASS INDEX DOCD: CPT | Performed by: INTERNAL MEDICINE

## 2020-08-14 PROCEDURE — 1036F TOBACCO NON-USER: CPT | Performed by: INTERNAL MEDICINE

## 2020-08-14 PROCEDURE — 99214 OFFICE O/P EST MOD 30 MIN: CPT | Performed by: INTERNAL MEDICINE

## 2020-08-14 NOTE — PROGRESS NOTES
Follow-up Note - Chelsey  72 y o  female  KOS:[birth year]  BYF:[id number]    I saw Kalie Lancaster in sleep clinic today for her sleep complaints & comorbidities  Patient had a diagnostic sleep study in November of 2019 (following significant weight reduction after bariatric surgery) and is here to review results and to initiate therapy  The study demonstrated  : AHI of 7 8 per hour, considerably  during stage REM at 39 per hour  Intermittent snoring of moderate intensity was noted  There were also 24 respiratory effort-related arousals resulting in an RDI of 11 6 per hour Minimum oxygen saturation was 89 %     The patient had both diagnostic and therapeutic sleep studies in 2014: The diagnostic study confirmed moderate obstructive sleep apnea:  AHI 18/hour considerably  higher during stage REM  Intermittent snoring of moderate intensity was noted  Minimum oxygen saturation 70 %and 1 8% of total sleep time was spent with saturations less than 90%  During the subsequent therapeutic study, sleep disordered breathing was sufficiently remediated with PAP at 8 cm H2O  PFSH, Problem List, Medications & Allergies were reviewed in EMR  Interval changes: none reported     She  has a past medical history of Allergic rhinitis, Bariatric surgery status, Basal cell carcinoma, Bowel habit changes, Cataract, Cellulitis of left lower extremity, Chronic GERD, Closed displaced fracture of greater tuberosity of humerus, CPAP (continuous positive airway pressure) dependence, Fatty liver, Fibromyalgia, Fibromyalgia, primary, Hiatal hernia, High cholesterol, Insomnia, Morbid obesity (HCC), Osteoarthritis, Palpitations, PONV (postoperative nausea and vomiting), Postgastrectomy malabsorption, Recurrent pregnancy loss, antepartum condition or complication, Restless leg, Sicca syndrome ([address] ), Sleep apnea, Superficial phlebitis and thrombophlebitis of left lower extremity, Thyroid nodule, Urinary tract infection, Varicose veins of left leg with edema, and Wears glasses  She has a current medication list which includes the following prescription(s): acetaminophen, vitamin d-3, diphenhydramine-phenylephrine, ezetimibe, gabapentin, multivitamin, pantoprazole, paroxetine, and trazodone  ROS: constitutional, psychiatric, ENT, respiratory,CVS, GI, UGS, CNS, MSK, integumentary, endocrine, hematological reviewed  Significant for some weight gain since COVID-19  She is on treatment for nasal allergies  She reports headaches related to excess time on the computer  HPI:   reports snoring  She is not aware of breathing difficulties during sleep  Restless leg symptoms are not disturbing sleep  Fibromyalgia symptoms are controlled  Sleep Routine:  She is satisfied with sleep  She is getting about 8-1/2 hours with trazodone as a sleep aid  She reports no difficulty initiating or maintaining sleep  She awakens spontaneously feeling refreshed  [She denied excessive drowsiness and rated herself at Total score: 2 /24 on the [place] sleepiness scale ]        Habits:  reports that she has never smoked  She has never used smokeless tobacco  She reports current alcohol use  She reports that she does not use drugs  EXAM: /66   Pulse 85   Ht 4' 10 5" (1 486 m)   Wt 71 3 kg (157 lb 3 2 oz)   LMP  (LMP Unknown)   SpO2 97%   BMI 32 30 kg/m²     Patient is well groomed; well appearing  Skin/Extrem: warm & dry; col & hydration normal; no edema  Psych: cooperativeand in no distress  Mental state appears normal   CNS: Alert, orientated, clear & coherent speech  H&N: EOMI; NC/AT:no facial pressure marks, no rashes  Neck Circumference: 14 5 cm  ENMT Mucosa appearsnormal Nasal airway:patent  Oral airway:  crowded  Resp:effort is normal CVS: RRR ABD: truncal obesity MSK:Gait normal     IMPRESSION: Primary Sleep/Secondary(to Medical or Psych conditions) & comorbidities   1   PETER (obstructive sleep apnea)     2  Insomnia, unspecified type     3  Restless leg syndrome     4  Fibromyalgia syndrome     5  Chronic rhinitis     6  Obesity (BMI 30-39  9)       PLAN:    1  I reviewed results of the Sleep study with the patient  2  With respect to above conditions, I  counseled on pathophysiology, diagnosis, treatment options, risks and benefits; inter-relationship and effects on symptoms and comorbidities; risks of no treatment; costs and insurance aspects  3  Patient declined positive airway pressure therapy  4  Indicates understanding risks of untreated obstructive sleep apnea, including stroke and sudden cardiac death  Risk is very low in her case  5  I recommended adjunctive weight reduction, positional therapy and ensuring adequate treatment of allergies  If snoring persists, a mandibular advancement device may be considered  6   She is on gabapentin for fibromyalgia with benefit and appears to be controlling restless leg symptoms  7   Patient was invited to return if  interested in attempting positive airway pressure therapy  Thank you for allowing me to participate in the care of this patient  I will keep you apprised of developments      Sincerely,    Authenticated electronically by Fransisco Turner MD on 96/19/31   Board Certified Specialist

## 2020-08-14 NOTE — PROGRESS NOTES
Review of Systems      Genitourinary post menopausal (no peroids)   Cardiology none   Gastrointestinal none   Neurology frequent headaches   Constitutional fatigue   Integumentary none   Psychiatry none   Musculoskeletal joint pain   Pulmonary snoring   ENT none   Endocrine none   Hematological none

## 2020-08-14 NOTE — PATIENT INSTRUCTIONS

## 2020-08-20 PROBLEM — Z96.651 HISTORY OF TOTAL RIGHT KNEE REPLACEMENT: Status: ACTIVE | Noted: 2020-08-20

## 2020-08-20 PROBLEM — Z96.652 HISTORY OF TOTAL KNEE REPLACEMENT, LEFT: Status: ACTIVE | Noted: 2020-08-20

## 2020-08-20 PROBLEM — Z00.00 WELCOME TO MEDICARE PREVENTIVE VISIT: Status: ACTIVE | Noted: 2020-08-20

## 2020-08-21 ENCOUNTER — OFFICE VISIT (OUTPATIENT)
Dept: FAMILY MEDICINE CLINIC | Facility: CLINIC | Age: 65
End: 2020-08-21
Payer: COMMERCIAL

## 2020-08-21 VITALS
BODY MASS INDEX: 31.93 KG/M2 | WEIGHT: 158.4 LBS | TEMPERATURE: 98.4 F | SYSTOLIC BLOOD PRESSURE: 112 MMHG | HEART RATE: 80 BPM | RESPIRATION RATE: 18 BRPM | DIASTOLIC BLOOD PRESSURE: 62 MMHG | HEIGHT: 59 IN

## 2020-08-21 DIAGNOSIS — Z00.00 WELCOME TO MEDICARE PREVENTIVE VISIT: Primary | ICD-10-CM

## 2020-08-21 PROCEDURE — 3725F SCREEN DEPRESSION PERFORMED: CPT | Performed by: PHYSICIAN ASSISTANT

## 2020-08-21 PROCEDURE — G0402 INITIAL PREVENTIVE EXAM: HCPCS | Performed by: PHYSICIAN ASSISTANT

## 2020-08-21 PROCEDURE — 3008F BODY MASS INDEX DOCD: CPT | Performed by: PHYSICIAN ASSISTANT

## 2020-08-21 PROCEDURE — 4040F PNEUMOC VAC/ADMIN/RCVD: CPT | Performed by: PHYSICIAN ASSISTANT

## 2020-08-21 PROCEDURE — 1036F TOBACCO NON-USER: CPT | Performed by: PHYSICIAN ASSISTANT

## 2020-08-21 NOTE — PATIENT INSTRUCTIONS
Problem List Items Addressed This Visit        Other    Welcome to Medicare preventive visit - Primary     Blue pack in 5 wishes given  Recommend shingles vaccine at the pharmacy  Pneumonia vaccines up-to-date  DEXA up-to-date  EKG was already performed in 2017

## 2020-08-21 NOTE — ASSESSMENT & PLAN NOTE
Blue pack in 5 wishes given  Recommend shingles vaccine at the pharmacy  Pneumonia vaccines up-to-date as patient had reaction to the Prevnar 13 and had Pneumovax  DEXA up-to-date  EKG was already performed in 2017  All screening blood work is up-to-date  Patient up-to-date with dentist and eye exams

## 2020-08-21 NOTE — PROGRESS NOTES
Assessment and Plan:     Problem List Items Addressed This Visit        Other    Welcome to Medicare preventive visit - Primary     Blue pack in 5 wishes given  Recommend shingles vaccine at the pharmacy  Pneumonia vaccines up-to-date as patient had reaction to the Prevnar 13 and had Pneumovax  DEXA up-to-date  EKG was already performed in 2017  All screening blood work is up-to-date  Patient up-to-date with dentist and eye exams  BMI Counseling: Body mass index is 32 54 kg/m²  The BMI is above normal  Nutrition recommendations include decreasing portion sizes, encouraging healthy choices of fruits and vegetables, decreasing fast food intake, consuming healthier snacks, limiting drinks that contain sugar, moderation in carbohydrate intake, increasing intake of lean protein, reducing intake of saturated and trans fat and reducing intake of cholesterol  Exercise recommendations include exercising 3-5 times per week  No pharmacotherapy was ordered  Preventive health issues were discussed with patient, and age appropriate screening tests were ordered as noted in patient's After Visit Summary  Personalized health advice and appropriate referrals for health education or preventive services given if needed, as noted in patient's After Visit Summary       History of Present Illness:     Patient presents for Medicare Annual Wellness visit    Patient Care Team:  Angelito Grimm PA-C as PCP - Sharee Franklin, MD Durenda Nakayama, PA-C Farley Reining, DO Tino Backer, PA-C Gaetano Klinefelter, MD Zettie Been, MD Morton Pollen, AMBREEN Cid MD     Problem List:     Patient Active Problem List   Diagnosis    Varicose veins of left leg with edema    PETER (obstructive sleep apnea)    Mild hyperlipidemia    Adenomatous duodenal polyp    Non-seasonal allergic rhinitis due to pollen    Carpal tunnel syndrome    Vulvar lesion    Thyroid nodule    Restless leg syndrome    Myotonic dystrophy ([address]    Osteoarthritis of knee    Painful orthopaedic hardware ([address]    Menopause    Irritable bowel syndrome with diarrhea    Insomnia    Hyperthyroidism    GERD without esophagitis    Gastric polyps    Fibromyalgia syndrome    Edema    Recurrent major depressive disorder, in partial remission (HCC)    Endometrial thickening on ultra sound    Bariatric surgery status    Postsurgical malabsorption    PLMD (periodic limb movement disorder)    Obesity, Class I, BMI 30-34 9    Cellulitis of face    Right-sided chest wall pain    Welcome to Medicare preventive visit    History of total knee replacement, left    History of total right knee replacement      Past Medical and Surgical History:     Past Medical History:   Diagnosis Date    Allergic rhinitis     Bariatric surgery status     Basal cell carcinoma     skin CA removed from nose    Bowel habit changes     Cataract     starting with a catract    Cellulitis of left lower extremity     last assessed 06/29/2016    Chronic GERD     Closed displaced fracture of greater tuberosity of humerus     last assessed 05/31/2016    CPAP (continuous positive airway pressure) dependence     Fatty liver     Fibromyalgia     Fibromyalgia, primary     Hiatal hernia     High cholesterol     Insomnia     Morbid obesity (HCC)     Osteoarthritis     Palpitations     pt denies a fib    PONV (postoperative nausea and vomiting)     Postgastrectomy malabsorption     Recurrent pregnancy loss, antepartum condition or complication     Restless leg     Sicca syndrome (HCC)     Sleep apnea     Superficial phlebitis and thrombophlebitis of left lower extremity     last assessed 06/08/2016    Thyroid nodule     nodule on thyroid    Urinary tract infection     Varicose veins of left leg with edema     Wears glasses      Past Surgical History:   Procedure Laterality Date    BLADDER SURGERY      sling    CHOLECYSTECTOMY  1998    COLONOSCOPY      CYST REMOVAL      thigh    DILATION AND CURETTAGE OF UTERUS      ESOPHAGOGASTRODUODENOSCOPY      ESOPHAGOGASTRODUODENOSCOPY N/A 12/21/2017    Procedure: ESOPHAGOGASTRODUODENOSCOPY (EGD) with biopsy and polypectomy;  Surgeon: Manjeet Ramirez MD;  Location: [facility]; Service: Gastroenterology    HARDWARE REMOVAL      right shoulder    JOINT REPLACEMENT      both knees    KNEE ARTHROSCOPY      ORIF HUMERAL SHAFT FRACTURE Right     MD ENDOVENOUS LASER, 1ST VEIN Left 4/29/2016    Procedure: GREATER SAPHENOUS VEIN EVLT ;  Surgeon: Gemini Szymanski DO;  [facility];  Service: Vascular    MD ESOPHAGOGASTRODUODENOSCOPY TRANSORAL DIAGNOSTIC N/A 1/11/2018    Procedure: egd w/ emr ;  Surgeon: Ashley Houston MD;  [facility]; Service: Gastroenterology    MD ESOPHAGOGASTRODUODENOSCOPY TRANSORAL DIAGNOSTIC N/A 6/28/2018    Procedure: ESOPHAGOGASTRODUODENOSCOPY (EGD); Surgeon: Ashley Houston MD;  [facility];   Service: Gastroenterology    MD LAP, ESA RESTRICT PROC, LONGITUDINAL GASTRECTOMY N/A 3/19/2018    Procedure: GASTRECTOMY SLEEVE LAPAROSCOPIC AND INTRAOPERATIVE EGD;  Surgeon: Bruno Bullock MD;  [facility];  Service: [address] [place] [address] TO  Left 4/29/2016    Procedure: AND STAB PHLEBECTOMIES ;  Surgeon: Gemini Szymanski DO;  [facility];  Service: Vascular    REDUCTION MAMMAPLASTY Bilateral 2011    Reduction    SKIN CANCER EXCISION      TOTAL KNEE ARTHROPLASTY Bilateral       Family History:     Family History   Problem Relation Age of Onset    Heart disease Mother     Alzheimer's disease Mother     Depression Mother     Anxiety disorder Mother     Coronary artery disease Mother     Osteoporosis Mother     Arthritis Father     Osteoarthritis Father     Prostate cancer Father         age on onset unknown    Uterine cancer Sister 28    Cardiomyopathy Sister         congestive    No Known Problems Daughter     No Known Problems Maternal Grandmother     No Known Problems Maternal Grandfather     No Known Problems Paternal Grandmother     No Known Problems Paternal Grandfather     No Known Problems Maternal Aunt     No Known Problems Maternal Aunt       Social History:     E-Cigarette/Vaping    E-Cigarette Use Never User      E-Cigarette/Vaping Substances    Nicotine No     THC No     CBD No     Flavoring No     Other No     Unknown No      Social History     Socioeconomic History    Marital status: /Civil Union     Spouse name: None    Number of children: 2    Years of education: None    Highest education level: None   Occupational History    None   Social Needs    Financial resource strain: None    Food insecurity     Worry: None     Inability: None    Transportation needs     Medical: None     Non-medical: None   Tobacco Use    Smoking status: Never Smoker    Smokeless tobacco: Never Used    Tobacco comment: teenage years    Substance and Sexual Activity    Alcohol use: Yes     Frequency: Monthly or less     Comment: socially    Drug use: No    Sexual activity: Yes   Lifestyle    Physical activity     Days per week: None     Minutes per session: None    Stress: None   Relationships    Social connections     Talks on phone: None     Gets together: None     Attends Gnosticist service: None     Active member of club or organization: None     Attends meetings of clubs or organizations: None     Relationship status: None    Intimate partner violence     Fear of current or ex partner: None     Emotionally abused: None     Physically abused: None     Forced sexual activity: None   Other Topics Concern    None   Social History Narrative    Caffeine use    No caffeine use    [employer] affiliation [employer]    Uses safety equipment-seat belt      Medications and Allergies:     Current Outpatient Medications   Medication Sig Dispense Refill    acetaminophen (TYLENOL) 650 mg CR tablet Take 650 mg by mouth 2 (two) times a day      Cholecalciferol (VITAMIN D-3) 1000 units CAPS Take 4 capsules (4,000 Units total) by mouth daily 30 capsule 1    Diphenhydramine-Phenylephrine ([employer] ALLERGY-D PO) Take by mouth      ezetimibe (ZETIA) 10 mg tablet Take 1 tablet (10 mg total) by mouth daily 90 tablet 3    gabapentin (NEURONTIN) 600 MG tablet Take 600 mg by mouth daily    3    Multiple Vitamin (MULTIVITAMIN) capsule Take 1 capsule by mouth daily   pantoprazole (PROTONIX) 20 mg tablet TAKE ONE TABLET BY MOUTH EVERY DAY  90 tablet 0    PARoxetine (PAXIL) 30 mg tablet Take 1 tablet (30 mg total) by mouth daily 90 tablet 1    traZODone (DESYREL) 50 mg tablet Take 50 mg by mouth daily at bedtime  No current facility-administered medications for this visit        Allergies   Allergen Reactions    Ketorolac Itching    Percocet [Oxycodone-Acetaminophen] Itching    Flurbiprofen      NSAIDS    Ketorolac Tromethamine Itching    Pneumovax [Pneumococcal Polysaccharide Vaccine] Swelling and Rash      Immunizations:     Immunization History   Administered Date(s) Administered    INFLUENZA 11/03/2016, 10/17/2017    Influenza Quadrivalent Preservative Free 3 years and older IM 11/13/2014, 11/30/2015, 11/03/2016, 10/17/2017    Influenza, recombinant, quadrivalent,injectable, preservative free 01/03/2019, 10/17/2019    Pneumococcal Polysaccharide PPV23 11/03/2016      Health Maintenance:         Topic Date Due    Hepatitis C Screening  07/10/2021 (Originally 1955)    MAMMOGRAM  01/07/2021    DXA SCAN  07/29/2025         Topic Date Due    Influenza Vaccine  07/01/2020      Medicare Health Risk Assessment:     /62 (BP Location: Left arm, Patient Position: Sitting, Cuff Size: Standard)   Pulse 80   Temp 98 4 °F (36 9 °C)   Resp 18   Ht 4' 10 5" (1 486 m)   Wt 71 8 kg (158 lb 6 4 oz)   LMP  (LMP Unknown)   BMI 32 54 kg/m²      Tanvir Richter is here for her Welcome to [facility] visit  Last Medicare Wellness visit information reviewed, patient interviewed and updates made to the record today  Health Risk Assessment:   Patient rates overall health as very good  Patient feels that their physical health rating is same  Eyesight was rated as slightly worse  Hearing was rated as same  Patient feels that their emotional and mental health rating is same  Pain experienced in the last 7 days has been none  Patient states that she has experienced weight loss or gain in last 6 months  Depression Screening:   PHQ-2 Score: 0  PHQ-9 Score: 2      Fall Risk Screening: In the past year, patient has experienced: no history of falling in past year      Urinary Incontinence Screening:   Patient has leaked urine accidently in the last six months  Home Safety:  Patient does not have trouble with stairs inside or outside of their home  Patient has working smoke alarms and has working carbon monoxide detector  Home safety hazards include: none  Nutrition:   Current diet is Limited junk food and Unhealthy  Medications:   Patient is currently taking over-the-counter supplements  OTC medications include: see medication list  Patient is able to manage medications  Activities of Daily Living (ADLs)/Instrumental Activities of Daily Living (IADLs):   Walk and transfer into and out of bed and chair?: Yes  Dress and groom yourself?: Yes    Bathe or shower yourself?: Yes    Feed yourself? Yes  Do your laundry/housekeeping?: Yes  Manage your money, pay your bills and track your expenses?: Yes  Make your own meals?: Yes    Do your own shopping?: Yes    Previous Hospitalizations:   Any hospitalizations or ED visits within the last 12 months?: No      Advance Care Planning:   Living will: Yes    Durable POA for healthcare:  Yes    Advanced directive: Yes      Cognitive Screening:   Provider or family/friend/caregiver concerned regarding cognition?: No    PREVENTIVE SCREENINGS      Cardiovascular Screening:    General: Screening Not Indicated and History Lipid Disorder      Diabetes Screening:     General: Screening Current      Colorectal Cancer Screening:     General: Screening Current      Breast Cancer Screening:     General: Screening Current      Cervical Cancer Screening:    General: Screening Not Indicated      Osteoporosis Screening:    General: Screening Current      Abdominal Aortic Aneurysm (AAA) Screening:        General: Screening Not Indicated      Lung Cancer Screening:     General: Screening Not Indicated      Hepatitis C Screening:    General: Screening Current      Urvashi Dumont PA-C

## 2020-09-16 NOTE — ASSESSMENT & PLAN NOTE
Ultrasound done July of 2018 within normal limits with no nodules  No further imaging is required at this time  Not Applicable

## 2020-10-05 DIAGNOSIS — F41.9 ANXIETY: ICD-10-CM

## 2020-10-05 RX ORDER — PAROXETINE 30 MG/1
TABLET, FILM COATED ORAL
Qty: 90 TABLET | Refills: 0 | Status: SHIPPED | OUTPATIENT
Start: 2020-10-05 | End: 2020-12-30

## 2020-10-27 ENCOUNTER — CLINICAL SUPPORT (OUTPATIENT)
Dept: FAMILY MEDICINE CLINIC | Facility: CLINIC | Age: 65
End: 2020-10-27

## 2020-10-27 DIAGNOSIS — Z23 ENCOUNTER FOR IMMUNIZATION: ICD-10-CM

## 2020-10-27 PROCEDURE — G0008 ADMIN INFLUENZA VIRUS VAC: HCPCS

## 2020-10-27 PROCEDURE — 90686 IIV4 VACC NO PRSV 0.5 ML IM: CPT

## 2020-11-03 DIAGNOSIS — K21.9 GERD WITHOUT ESOPHAGITIS: ICD-10-CM

## 2020-11-03 RX ORDER — PANTOPRAZOLE SODIUM 20 MG/1
TABLET, DELAYED RELEASE ORAL
Qty: 90 TABLET | Refills: 0 | Status: SHIPPED | OUTPATIENT
Start: 2020-11-03 | End: 2020-12-30

## 2020-12-04 ENCOUNTER — ANNUAL EXAM (OUTPATIENT)
Dept: OBGYN CLINIC | Facility: CLINIC | Age: 65
End: 2020-12-04
Payer: COMMERCIAL

## 2020-12-04 DIAGNOSIS — Z12.31 ENCOUNTER FOR SCREENING MAMMOGRAM FOR BREAST CANCER: ICD-10-CM

## 2020-12-04 DIAGNOSIS — Z01.419 ENCOUNTER FOR ANNUAL ROUTINE GYNECOLOGICAL EXAMINATION: Primary | ICD-10-CM

## 2020-12-04 PROCEDURE — G0101 CA SCREEN;PELVIC/BREAST EXAM: HCPCS | Performed by: OBSTETRICS & GYNECOLOGY

## 2020-12-04 RX ORDER — CYCLOSPORINE 0.5 MG/ML
EMULSION OPHTHALMIC
COMMUNITY
Start: 2020-09-09

## 2020-12-30 DIAGNOSIS — F41.9 ANXIETY: ICD-10-CM

## 2020-12-30 DIAGNOSIS — E78.2 MIXED HYPERLIPIDEMIA: ICD-10-CM

## 2020-12-30 DIAGNOSIS — K21.9 GERD WITHOUT ESOPHAGITIS: ICD-10-CM

## 2020-12-30 RX ORDER — EZETIMIBE 10 MG/1
TABLET ORAL
Qty: 90 TABLET | Refills: 0 | Status: SHIPPED | OUTPATIENT
Start: 2020-12-30 | End: 2021-03-30

## 2020-12-30 RX ORDER — PANTOPRAZOLE SODIUM 20 MG/1
TABLET, DELAYED RELEASE ORAL
Qty: 90 TABLET | Refills: 0 | Status: SHIPPED | OUTPATIENT
Start: 2020-12-30 | End: 2021-03-30

## 2020-12-30 RX ORDER — PAROXETINE 30 MG/1
TABLET, FILM COATED ORAL
Qty: 90 TABLET | Refills: 0 | Status: SHIPPED | OUTPATIENT
Start: 2020-12-30 | End: 2021-03-30

## 2021-01-01 NOTE — ANESTHESIA PREPROCEDURE EVALUATION
Review of Systems/Medical History  Patient summary reviewed  Chart reviewed  No history of anesthetic complications     Cardiovascular  Hyperlipidemia,    Pulmonary  Sleep apnea CPAP,        GI/Hepatic    GERD poorly controlled, Liver disease (fatty liver), ,  Hiatal hernia,             Endo/Other  History of thyroid disease , hypothyroidism,   Obesity  morbid obesity   GYN  Negative gynecology ROS          Hematology  Negative hematology ROS      Musculoskeletal    Arthritis     Neurology    Neuromuscular disease ,   Comment: Fibromyalgia  [facility]   Anxiety, Depression , being treated for depression,              Physical Exam    Airway    Mallampati score: III  TM Distance: >3 FB  Neck ROM: full     Dental   No notable dental hx     Cardiovascular  Rhythm: regular, Rate: normal, Cardiovascular exam normal    Pulmonary  Pulmonary exam normal Breath sounds clear to auscultation,     Other Findings        Anesthesia Plan  ASA Score- 3     Anesthesia Type- general with ASA Monitors  Additional Monitors:   Airway Plan: ETT  Plan Factors-Patient not instructed to abstain from smoking on day of procedure  Patient did not smoke on day of surgery  Induction- intravenous  Postoperative Plan- Plan for postoperative opioid use  Informed Consent- Anesthetic plan and risks discussed with patient 
- routine care

## 2021-01-12 ENCOUNTER — LAB (OUTPATIENT)
Dept: LAB | Facility: CLINIC | Age: 66
End: 2021-01-12
Payer: COMMERCIAL

## 2021-01-12 DIAGNOSIS — E78.5 MILD HYPERLIPIDEMIA: ICD-10-CM

## 2021-01-12 LAB
ALBUMIN SERPL BCP-MCNC: 3.6 G/DL (ref 3.5–5)
ALP SERPL-CCNC: 74 U/L (ref 46–116)
ALT SERPL W P-5'-P-CCNC: 17 U/L (ref 12–78)
ANION GAP SERPL CALCULATED.3IONS-SCNC: 4 MMOL/L (ref 4–13)
AST SERPL W P-5'-P-CCNC: 9 U/L (ref 5–45)
BILIRUB SERPL-MCNC: 0.48 MG/DL (ref 0.2–1)
BUN SERPL-MCNC: 10 MG/DL (ref 5–25)
CALCIUM SERPL-MCNC: 9.4 MG/DL (ref 8.3–10.1)
CHLORIDE SERPL-SCNC: 107 MMOL/L (ref 100–108)
CHOLEST SERPL-MCNC: 207 MG/DL (ref 50–200)
CO2 SERPL-SCNC: 27 MMOL/L (ref 21–32)
CREAT SERPL-MCNC: 0.62 MG/DL (ref 0.6–1.3)
GFR SERPL CREATININE-BSD FRML MDRD: 95 ML/MIN/1.73SQ M
GLUCOSE P FAST SERPL-MCNC: 99 MG/DL (ref 65–99)
HDLC SERPL-MCNC: 50 MG/DL
LDLC SERPL CALC-MCNC: 131 MG/DL (ref 0–100)
POTASSIUM SERPL-SCNC: 4.2 MMOL/L (ref 3.5–5.3)
PROT SERPL-MCNC: 7.3 G/DL (ref 6.4–8.2)
SODIUM SERPL-SCNC: 138 MMOL/L (ref 136–145)
TRIGL SERPL-MCNC: 132 MG/DL

## 2021-01-12 PROCEDURE — 36415 COLL VENOUS BLD VENIPUNCTURE: CPT

## 2021-01-12 PROCEDURE — 80061 LIPID PANEL: CPT

## 2021-01-12 PROCEDURE — 80053 COMPREHEN METABOLIC PANEL: CPT

## 2021-01-21 PROBLEM — Z00.00 WELCOME TO MEDICARE PREVENTIVE VISIT: Status: RESOLVED | Noted: 2020-08-20 | Resolved: 2021-01-21

## 2021-01-21 PROBLEM — L03.211 CELLULITIS OF FACE: Status: RESOLVED | Noted: 2019-05-21 | Resolved: 2021-01-21

## 2021-01-21 PROBLEM — I48.91 ATRIAL FIBRILLATION (HCC): Status: ACTIVE | Noted: 2021-01-21

## 2021-01-21 PROBLEM — F32.A DEPRESSION: Status: ACTIVE | Noted: 2021-01-21

## 2021-01-21 PROBLEM — S42.253A CLOSED FRACTURE OF GREATER TUBEROSITY OF HUMERUS: Status: ACTIVE | Noted: 2021-01-21

## 2021-01-21 NOTE — ASSESSMENT & PLAN NOTE
Patient Zetia keeping her LDL at 131 triglycerides  Continue recheck 6 months  Patient had LDL below 100 in the past year so she is to decrease her fat and cholesterol intake

## 2021-01-22 ENCOUNTER — TELEMEDICINE (OUTPATIENT)
Dept: FAMILY MEDICINE CLINIC | Facility: CLINIC | Age: 66
End: 2021-01-22
Payer: COMMERCIAL

## 2021-01-22 VITALS — HEIGHT: 59 IN | BODY MASS INDEX: 30.64 KG/M2 | WEIGHT: 152 LBS | TEMPERATURE: 97.5 F

## 2021-01-22 DIAGNOSIS — E05.90 HYPERTHYROIDISM: ICD-10-CM

## 2021-01-22 DIAGNOSIS — K91.2 POSTSURGICAL MALABSORPTION: ICD-10-CM

## 2021-01-22 DIAGNOSIS — Z85.828 HISTORY OF MALIGNANT NEOPLASM OF SKIN: ICD-10-CM

## 2021-01-22 DIAGNOSIS — G47.33 OSA (OBSTRUCTIVE SLEEP APNEA): ICD-10-CM

## 2021-01-22 DIAGNOSIS — G25.81 RESTLESS LEG SYNDROME: ICD-10-CM

## 2021-01-22 DIAGNOSIS — E78.5 MILD HYPERLIPIDEMIA: ICD-10-CM

## 2021-01-22 DIAGNOSIS — I48.91 ATRIAL FIBRILLATION, UNSPECIFIED TYPE (HCC): ICD-10-CM

## 2021-01-22 DIAGNOSIS — K21.9 GERD WITHOUT ESOPHAGITIS: Primary | ICD-10-CM

## 2021-01-22 DIAGNOSIS — F33.41 RECURRENT MAJOR DEPRESSIVE DISORDER, IN PARTIAL REMISSION (HCC): ICD-10-CM

## 2021-01-22 DIAGNOSIS — F51.01 PRIMARY INSOMNIA: ICD-10-CM

## 2021-01-22 PROCEDURE — 99214 OFFICE O/P EST MOD 30 MIN: CPT | Performed by: PHYSICIAN ASSISTANT

## 2021-01-22 PROCEDURE — 3008F BODY MASS INDEX DOCD: CPT | Performed by: PHYSICIAN ASSISTANT

## 2021-01-22 PROCEDURE — 1160F RVW MEDS BY RX/DR IN RCRD: CPT | Performed by: PHYSICIAN ASSISTANT

## 2021-01-22 PROCEDURE — 1036F TOBACCO NON-USER: CPT | Performed by: PHYSICIAN ASSISTANT

## 2021-01-22 NOTE — PATIENT INSTRUCTIONS
Mild hyperlipidemia  Patient Zetia keeping her LDL at 131 triglycerides  Continue recheck 6 months  Patient had LDL below 100 in the past year so she is to decrease her fat and cholesterol intake  History of malignant neoplasm of skin  Continue dermatology  Hyperthyroidism  Check TSH with next labs  Atrial fibrillation ([facility] )  This diagnosis is on pts chart however she denies having this  She is having some issues with intermittent heart fluttering at least a few times a week for about 10 min  I would like her to come into the office for EKG and consideration for Holter order with heart auscultation  Restless leg syndrome  Doing well with gabapentin  Insomnia  Doing well with trazodone  GERD without esophagitis  Doing well with daily PP I  Patient gets symptomatic with every other day use  Postsurgical malabsorption  Check vitamin-D with next labs  PETER (obstructive sleep apnea)  Continue CPAP use  Recurrent major depressive disorder, in partial remission ([facility] )  Very stable on Paxil no SI or HI

## 2021-01-22 NOTE — ASSESSMENT & PLAN NOTE
This diagnosis is on pts chart however she denies having this  She is having some issues with intermittent heart fluttering at least a few times a week for about 10 min  I would like her to come into the office for EKG and consideration for Holter order with heart auscultation

## 2021-01-22 NOTE — PROGRESS NOTES
Virtual Brief Visit    Assessment/Plan:    Problem List Items Addressed This Visit        Digestive    GERD without esophagitis - Primary     Doing well with daily PP I  Patient gets symptomatic with every other day use  Postsurgical malabsorption     Check vitamin-D with next labs  Relevant Orders    Vitamin D 25 hydroxy       Endocrine    Hyperthyroidism     Check TSH with next labs  Relevant Orders    TSH, 3rd generation with Free T4 reflex       Respiratory    PETER (obstructive sleep apnea)     Continue CPAP use  Cardiovascular and Mediastinum    Atrial fibrillation ([facility] [address] )     This diagnosis is on pts chart however she denies having this  She is having some issues with intermittent heart fluttering at least a few times a week for about 10 min  I would like her to come into the office for EKG and consideration for Holter order with heart auscultation  Other    Mild hyperlipidemia     Patient Zetia keeping her LDL at 131 triglycerides  Continue recheck 6 months  Patient had LDL below 100 in the past year so she is to decrease her fat and cholesterol intake  Relevant Orders    Lipid Panel with Direct LDL reflex    Comprehensive metabolic panel    Restless leg syndrome     Doing well with gabapentin  Insomnia     Doing well with trazodone  Recurrent major depressive disorder, in partial remission ([facility] [address] )     Very stable on Paxil no SI or HI  History of malignant neoplasm of skin     Continue dermatology  BMI Counseling: Body mass index is 31 23 kg/m²  The BMI is above normal  Nutrition recommendations include decreasing portion sizes, encouraging healthy choices of fruits and vegetables, decreasing fast food intake, consuming healthier snacks, limiting drinks that contain sugar, moderation in carbohydrate intake, increasing intake of lean protein, reducing intake of saturated and trans fat and reducing intake of cholesterol  Exercise recommendations include exercising 3-5 times per week  No pharmacotherapy was ordered  Reason for visit is   Chief Complaint   Patient presents with    GERD    Hyperlipidemia     Review BW results  Pt states there are no other concerns at this time  - [facility]    Depression    Virtual Brief Visit        Encounter provider Julianna Engle PA-C    Provider located at [address] [place] [address] PRIMARY CARE  [place] [address]    Recent Visits  No visits were found meeting these conditions  Showing recent visits within past 7 days and meeting all other requirements     Today's Visits  Date Type Provider Dept   01/22/21 [facility] Wilbur Ziegler PA-C [facility]   Showing today's visits and meeting all other requirements     Future Appointments  No visits were found meeting these conditions  Showing future appointments within next 150 days and meeting all other requirements        After connecting through telephone, the patient was identified by name and date of birth  Johnny Sorenson was informed that this is a telemedicine visit and that the visit is being conducted through telephone  My office door was closed  No one else was in the room  She acknowledged consent and understanding of privacy and security of the platform  The patient has agreed to participate and understands she can discontinue the visit at any time  Patient is aware this is a billable service  Subjective    Johnny Sorenson is a 72 y o  female pt      Johnny Sorenson is here for chronic conditions f/u including the diagnosis of Wendy Killian without esophagitis  (primary encounter diagnosis)  Atrial fibrillation, unspecified type (hcc)  Ismael (obstructive sleep apnea)  Mild hyperlipidemia  Recurrent major depressive disorder, in partial remission (hcc)  Primary insomnia  Restless leg syndrome  History of malignant neoplasm of skin  Hyperthyroidism  Postsurgical malabsorption   Pt  states they are taking all medications as directed without complaints or side effects   Pt  had labs done prior to today's visit which included Recent Results (from the past 672 hour(s))  -Lipid Panel with Direct LDL reflex  Collection Time: 01/12/21 10:16 AM       Result                      Value             Ref Range           Cholesterol                 207 (H)           50 - 200 mg/*       Triglycerides               132               <=150 mg/dL         HDL, Direct                 50                >=40 mg/dL          LDL Calculated              131 (H)           0 - 100 mg/dL  -Comprehensive metabolic panel  Collection Time: 01/12/21 10:16 AM       Result                      Value             Ref Range           Sodium                      138               136 - 145 mm*       Potassium                   4 2               3 5 - 5 3 mm*       Chloride                    107               100 - 108 mm*       CO2                         27                21 - 32 mmol*       ANION GAP                   4                 4 - 13 mmol/L       BUN                         10                5 - 25 mg/dL        Creatinine                  0 62              0 60 - 1 30 *       Glucose, Fasting            99                65 - 99 mg/dL       Calcium                     9 4               8 3 - 10 1 m*       AST                         9                 5 - 45 U/L          ALT                         17                12 - 78 U/L         Alkaline Phosphatase        74                46 - 116 U/L        Total Protein               7 3               6 4 - 8 2 g/*       Albumin                     3 6               3 5 - 5 0 g/*       Total Bilirubin             0 48              0 20 - 1 00 *       eGFR                        95                ml/min/1 73s*      Patient states that for the past few months 3 to 4 times a week she will have a couple minutes of fluttering in her chest mostly when sitting resting during the day or at night when waiting to go to sleep  She has no shortness breath or chest pain no problems with exertion  No nausea or lightheadedness  Past Medical History:   Diagnosis Date    Allergic rhinitis     Bariatric surgery status     Basal cell carcinoma     skin CA removed from nose    Bowel habit changes     Cataract     starting with a catract    Cellulitis of left lower extremity     last assessed 06/29/2016    Chronic GERD     Closed displaced fracture of greater tuberosity of humerus     last assessed 05/31/2016    CPAP (continuous positive airway pressure) dependence     Fatty liver     Fibromyalgia     Fibromyalgia, primary     Hiatal hernia     High cholesterol     Insomnia     Morbid obesity (HCC)     Osteoarthritis     Palpitations     pt denies a fib    PONV (postoperative nausea and vomiting)     Postgastrectomy malabsorption     Recurrent pregnancy loss, antepartum condition or complication     Restless leg     Sicca syndrome (HCC)     Sleep apnea     Superficial phlebitis and thrombophlebitis of left lower extremity     last assessed 06/08/2016    Thyroid nodule     nodule on thyroid    Urinary tract infection     Varicose veins of left leg with edema     Wears glasses        Past Surgical History:   Procedure Laterality Date    BLADDER SURGERY      sling    CHOLECYSTECTOMY  1998    COLONOSCOPY      CYST REMOVAL      thigh    DILATION AND CURETTAGE OF UTERUS      ESOPHAGOGASTRODUODENOSCOPY      ESOPHAGOGASTRODUODENOSCOPY N/A 12/21/2017    Procedure: ESOPHAGOGASTRODUODENOSCOPY (EGD) with biopsy and polypectomy;  Surgeon: Siva Sharma MD;  Location: [facility];   Service: Gastroenterology    HARDWARE REMOVAL      right shoulder    JOINT REPLACEMENT      both knees    KNEE ARTHROSCOPY      ORIF HUMERAL SHAFT FRACTURE Right     LA ENDOVENOUS LASER, 1ST VEIN Left 4/29/2016    Procedure: GREATER SAPHENOUS VEIN EVLT ;  Surgeon: Arthor Ahumada DO [place];  [facility];  Service: Vascular    VT ESOPHAGOGASTRODUODENOSCOPY TRANSORAL DIAGNOSTIC N/A 1/11/2018    Procedure: egd w/ emr ;  Surgeon: Macarena Begum MD;  [facility]; Service: Gastroenterology    VT ESOPHAGOGASTRODUODENOSCOPY TRANSORAL DIAGNOSTIC N/A 6/28/2018    Procedure: ESOPHAGOGASTRODUODENOSCOPY (EGD); Surgeon: Macarena Begum MD;  [facility]; Service: Gastroenterology    VT LAP, ESA RESTRICT PROC, LONGITUDINAL GASTRECTOMY N/A 3/19/2018    Procedure: GASTRECTOMY SLEEVE LAPAROSCOPIC AND INTRAOPERATIVE EGD;  Surgeon: Lennox Beebe MD;  Location[facility];  Service: [address] [place] [address] - TO  Left 4/29/2016    Procedure: AND STAB PHLEBECTOMIES ;  Surgeon: Dina Little DO;  [facility];  Service: Vascular    REDUCTION MAMMAPLASTY Bilateral 2011    Reduction    SKIN CANCER EXCISION      TOTAL KNEE ARTHROPLASTY Bilateral        Current Outpatient Medications   Medication Sig Dispense Refill    Cholecalciferol (VITAMIN D-3) 1000 units CAPS Take 4 capsules (4,000 Units total) by mouth daily 30 capsule 1    Diphenhydramine-Phenylephrine ([employer] ALLERGY-D PO) Take by mouth      ezetimibe (ZETIA) 10 mg tablet TAKE ONE TABLET BY MOUTH EVERY DAY  90 tablet 0    gabapentin (NEURONTIN) 600 MG tablet Take 600 mg by mouth daily    3    Multiple Vitamin (MULTIVITAMIN) capsule Take 1 capsule by mouth daily   pantoprazole (PROTONIX) 20 mg tablet TAKE ONE TABLET BY MOUTH EVERY DAY  90 tablet 0    PARoxetine (PAXIL) 30 mg tablet TAKE ONE TABLET BY MOUTH EVERY DAY  90 tablet 0    Restasis 0 05 % ophthalmic emulsion       traZODone (DESYREL) 50 mg tablet Take 50 mg by mouth daily at bedtime  No current facility-administered medications for this visit           Allergies   Allergen Reactions    Ketorolac Itching    Percocet [Oxycodone-Acetaminophen] Itching    Flurbiprofen      NSAIDS    Ketorolac Tromethamine Itching    Pneumovax [Pneumococcal Polysaccharide Vaccine] Swelling and Rash       Review of Systems   Constitutional: Negative  HENT: Negative  Eyes: Negative  Respiratory: Negative  Cardiovascular: Positive for palpitations  Gastrointestinal: Negative  Endocrine: Negative  Genitourinary: Negative  Musculoskeletal: Negative  Skin: Negative  Allergic/Immunologic: Negative  Neurological: Negative  Hematological: Negative  Psychiatric/Behavioral: Negative  Vitals:    01/22/21 1149   Temp: 97 5 °F (36 4 °C)   TempSrc: Temporal   Weight: 68 9 kg (152 lb)   Height: 4' 10 5" (1 486 m)         I spent 15 minutes directly with the patient during this visit    [place] acknowledges that she has consented to an online visit or consultation  She understands that the online visit is based solely on information provided by her, and that, in the absence of a face-to-face physical evaluation by the physician, the diagnosis she receives is both limited and provisional in terms of accuracy and completeness  This is not intended to replace a full medical face-to-face evaluation by the physician  Abundio Chew understands and accepts these terms

## 2021-02-12 ENCOUNTER — OFFICE VISIT (OUTPATIENT)
Dept: SLEEP CENTER | Facility: CLINIC | Age: 66
End: 2021-02-12
Payer: COMMERCIAL

## 2021-02-12 VITALS — BODY MASS INDEX: 31.23 KG/M2 | HEIGHT: 59 IN | SYSTOLIC BLOOD PRESSURE: 112 MMHG | DIASTOLIC BLOOD PRESSURE: 72 MMHG

## 2021-02-12 DIAGNOSIS — G47.33 OSA (OBSTRUCTIVE SLEEP APNEA): Primary | ICD-10-CM

## 2021-02-12 DIAGNOSIS — M79.7 FIBROMYALGIA SYNDROME: ICD-10-CM

## 2021-02-12 DIAGNOSIS — E66.9 OBESITY (BMI 30-39.9): ICD-10-CM

## 2021-02-12 DIAGNOSIS — J31.0 CHRONIC RHINITIS: ICD-10-CM

## 2021-02-12 DIAGNOSIS — G25.81 RESTLESS LEG SYNDROME: ICD-10-CM

## 2021-02-12 DIAGNOSIS — G47.8 NON-RESTORATIVE SLEEP: ICD-10-CM

## 2021-02-12 PROCEDURE — 99214 OFFICE O/P EST MOD 30 MIN: CPT | Performed by: INTERNAL MEDICINE

## 2021-02-12 NOTE — PATIENT INSTRUCTIONS
Nasal symptoms may [improve] with regular nasal saline rinse followed by topical nasal steroid if necessary  Nursing Support:  When: Monday through Friday 7A-5PM except holidays  Where: Our direct line is [phone]  If you are having a true emergency please call 911  In the event that the line is busy or it is after hours please leave a voice message and we will return your call  Please speak clearly, leaving your full name, birth date, best number to reach you and the reason for your call  Medication refills: We will need the name of the medication, the dosage, the ordering provider, whether you get a 30 or 90 day refill, and the pharmacy name and address  Medications will be ordered by the provider only  Nurses cannot call in prescriptions  Please allow 7 days for medication refills  Physician requested updates: If your provider requested that you call with an update after starting medication, please be ready to provide us the medication and dosage, what time you take your medication, the time you attempt to fall asleep, time you fall asleep, when you wake up, and what time you get out of bed  Sleep Study Results: We will contact you with sleep study results and/or next steps after the physician has reviewed your testing

## 2021-02-12 NOTE — PROGRESS NOTES
Follow-up Note - Chelsey  72 y o  female  YNV:4/18/1899  DSC:[id number]    I saw Latrice Clay in sleep clinic today for her sleep complaints & comorbidities  She returns because of ongoing symptoms and is asking about alternates to CPAP  A diagnostic study in November of 2019 demonstrated: AHI of 7 8 per hour, considerably  during stage REM at 39 per hour  Intermittent snoring of moderate intensity was noted  There were also 24 respiratory effort-related arousals resulting in an RDI of 11 6 per hour Minimum oxygen saturation was 89 %     Prior sleep studies in 2014: The diagnostic study confirmed moderate obstructive sleep apnea:  AHI 18/hour considerably  higher during stage REM  Intermittent snoring of moderate intensity was noted  Minimum oxygen saturation 70 %and 1 8% of total sleep time was spent with saturations less than 90%  During the subsequent therapeutic study, sleep disordered breathing was sufficiently remediated with PAP at 8 cm H2O  PFSH, Problem List, Medications & Allergies were reviewed in EMR  Interval changes: none reported  She  has a past medical history of Allergic rhinitis, Bariatric surgery status, Basal cell carcinoma, Bowel habit changes, Cataract, Cellulitis of left lower extremity, Chronic GERD, Closed displaced fracture of greater tuberosity of humerus, CPAP (continuous positive airway pressure) dependence, Fatty liver, Fibromyalgia, Fibromyalgia, primary, Hiatal hernia, High cholesterol, Insomnia, Morbid obesity (HCC), Osteoarthritis, Palpitations, PONV (postoperative nausea and vomiting), Postgastrectomy malabsorption, Recurrent pregnancy loss, antepartum condition or complication, Restless leg, Sicca syndrome ([address] ), Sleep apnea, Superficial phlebitis and thrombophlebitis of left lower extremity, Thyroid nodule, Urinary tract infection, Varicose veins of left leg with edema, and Wears glasses      She has a current medication list which includes the following prescription(s): vitamin d-3, diphenhydramine-phenylephrine, ezetimibe, gabapentin, multivitamin, pantoprazole, paroxetine, restasis, and trazodone  HPI:  She continues to snore and awakens herself with snoring  She has a deviated nasal septum and reports some congestion  She reported no respiratory symptoms  She has palpitations that typically occurs at night while sitting  She is due to have repeat EKG to check for atrial fibrillation  Restless leg symptoms occur infrequently and not disturbing sleep  She has episodic symptoms due to fibromyalgia for which she is on gabapentin and Paxil  Sleep Routine:  She is spending around 9 hours in bed and typically falls asleep in 10 minutes  Sleep is interrupted around 2 times a night because of nocturia and she is able to fall back asleep  She is using trazodone as a sleep aid  She awakens with the aid of an alarm and is not always refreshed  Excessive Daytime drowsiness denied but is tired during the day  She  rated herself at Total score: 3 /24 on the [place] sleepiness scale ]      Habits:  reports that she has never smoked  She has never used smokeless tobacco  She reports current alcohol use  She reports that she does not use drugs  ROS: as attached  EXAM: /72   Ht 4' 10 5" (1 486 m)   LMP  (LMP Unknown)   BMI 31 23 kg/m²   Patient is well groomed; well appearing  Psych: cooperativeand in no distress  Mental state appears normal   CNS: Alert, orientated, clear & coherent speech  H&N: EOMI; NC/AT:no facial pressure marks, no rashes  She has narrow nares     Base of tongue is at Mallampati class IV  Skin/Extrem: col & hydration normal; no edema  Physical findings are otherwise essentially unchanged from previous     IMPRESSION: Primary Sleep/Secondary(to Medical or Psych conditions) & comorbidities   1  PETER (obstructive sleep apnea)     2  Chronic rhinitis     3  Restless leg syndrome     4   Non-restorative sleep 5  Fibromyalgia syndrome     6  Obesity (BMI 30-39  9)         PLAN:    1  I reviewed results of the Sleep studies with the patient  2  With respect to above conditions, I counseled on pathophysiology, treatment options (that includes various surgical options), risks and benefits; inter-relationship and effects on symptoms and comorbidities; risks of no treatment; costs and insurance aspects  3  Nasal symptoms may improve with regular nasal saline rinse followed by topical nasal steroid if necessary  If symptoms persists, she could try breathe right strips or nasal cone  If symptoms continue, and ENT evaluation to be considered  4  A dental appliance in conjunction with the above is also an option  She is due to see her dentist and will discuss this with him  5  I also suggested Myofunctional therapy  6  No additional treatment is needed for the restless leg symptoms  7  She will call for follow-up as needed      Sincerely,    Authenticated electronically by Joshua Sotelo MD on 50/52/35   Board Certified Specialist

## 2021-02-12 NOTE — PROGRESS NOTES
Review of Systems      Genitourinary none   Cardiology palpitations/fluttering feeling in the chest   Gastrointestinal none   Neurology none   Constitutional none   Integumentary none   Psychiatry none   Musculoskeletal none   Pulmonary snoring   ENT none   Endocrine none   Hematological none

## 2021-03-03 ENCOUNTER — CLINICAL SUPPORT (OUTPATIENT)
Dept: FAMILY MEDICINE CLINIC | Facility: CLINIC | Age: 66
End: 2021-03-03
Payer: COMMERCIAL

## 2021-03-03 DIAGNOSIS — K21.9 GERD WITHOUT ESOPHAGITIS: Primary | ICD-10-CM

## 2021-03-03 PROCEDURE — 93000 ELECTROCARDIOGRAM COMPLETE: CPT

## 2021-03-05 ENCOUNTER — OFFICE VISIT (OUTPATIENT)
Dept: FAMILY MEDICINE CLINIC | Facility: CLINIC | Age: 66
End: 2021-03-05
Payer: COMMERCIAL

## 2021-03-05 VITALS
BODY MASS INDEX: 30.64 KG/M2 | HEIGHT: 59 IN | DIASTOLIC BLOOD PRESSURE: 72 MMHG | WEIGHT: 152 LBS | HEART RATE: 80 BPM | TEMPERATURE: 96.3 F | SYSTOLIC BLOOD PRESSURE: 112 MMHG

## 2021-03-05 DIAGNOSIS — E05.90 HYPERTHYROIDISM: ICD-10-CM

## 2021-03-05 DIAGNOSIS — I49.8 FLUTTERING HEART: Primary | ICD-10-CM

## 2021-03-05 PROCEDURE — 1160F RVW MEDS BY RX/DR IN RCRD: CPT | Performed by: PHYSICIAN ASSISTANT

## 2021-03-05 PROCEDURE — 99214 OFFICE O/P EST MOD 30 MIN: CPT | Performed by: PHYSICIAN ASSISTANT

## 2021-03-05 PROCEDURE — 1036F TOBACCO NON-USER: CPT | Performed by: PHYSICIAN ASSISTANT

## 2021-03-05 PROCEDURE — 3008F BODY MASS INDEX DOCD: CPT | Performed by: PHYSICIAN ASSISTANT

## 2021-03-05 RX ORDER — FLUTICASONE PROPIONATE 50 MCG
2 SPRAY, SUSPENSION (ML) NASAL DAILY
COMMUNITY

## 2021-03-05 NOTE — ASSESSMENT & PLAN NOTE
EKG WNL  Check Holter  If symptoms not caught on 24 hour holter will refer to cardio  Pt does have a significant family history of CAD in woman

## 2021-03-05 NOTE — PROGRESS NOTES
Assessment and Plan:    Problem List Items Addressed This Visit        Endocrine    Hyperthyroidism    Relevant Orders    Echo complete with contrast if indicated       Other    Fluttering heart - Primary     EKG WNL  Check Holter  If symptoms not caught on 24 hour holter will refer to cardio  Pt does have a significant family history of CAD in woman  Relevant Orders    Holter monitor - 24 hour    Echo complete with contrast if indicated                 Diagnoses and all orders for this visit:    Fluttering heart  -     Holter monitor - 24 hour; Future  -     Echo complete with contrast if indicated; Future    Hyperthyroidism  -     Echo complete with contrast if indicated; Future    Other orders  -     fluticasone (FLONASE) 50 mcg/act nasal spray; 2 sprays into each nostril daily              Subjective:      Patient ID: Sunni Gonzalez is a 72 y o  female  CC:    Chief Complaint   Patient presents with    Follow-up     Follow up to review EKG  mjs       HPI:    Pt is experiencing a fluttering or a weird feeling in her chest which is not a pain intermittently from 30 sec to a min  Pt may be sitting watching TV or at a desk or even walking  Never wakes at night  AM ok as symptoms start after up and going  Most it has happened in a day is 3-5 days  Some days she does not notice it  Crawling feeling  Does happen every day to some extent  Mother and sister both with heart diagnosis and CAD  Sister  age 59 of [place]  Mom had open heart, sister with valve replacement  Not tired, SOB, N/V, leg edema, sweating  Appetite normal        The following portions of the patient's history were reviewed and updated as appropriate: allergies, current medications, past family history, past medical history, past social history, past surgical history and problem list       Review of Systems   Constitutional: Negative  HENT: Negative  Eyes: Negative  Respiratory: Negative      Cardiovascular: Heart fluttering   Gastrointestinal: Negative  Endocrine: Negative  Genitourinary: Negative  Musculoskeletal: Negative  Skin: Negative  Allergic/Immunologic: Negative  Neurological: Negative  Hematological: Negative  Psychiatric/Behavioral: Negative  Data to review:       Objective:    Vitals:    03/05/21 0803   BP: 112/72   Pulse: 80   Temp: (!) 96 3 °F (35 7 °C)   Weight: 68 9 kg (152 lb)   Height: 4' 10 5" (1 486 m)        Physical Exam  Vitals signs and nursing note reviewed  Constitutional:       Appearance: Normal appearance  She is well-developed  HENT:      Head: Normocephalic and atraumatic  Eyes:      General: Lids are normal       Conjunctiva/sclera: Conjunctivae normal       Pupils: Pupils are equal, round, and reactive to light  Cardiovascular:      Rate and Rhythm: Normal rate and regular rhythm  Heart sounds: No murmur  Pulmonary:      Effort: Pulmonary effort is normal       Breath sounds: Normal breath sounds  Skin:     General: Skin is warm and dry  Neurological:      General: No focal deficit present  Mental Status: She is alert  Coordination: Coordination is intact  Psychiatric:         Mood and Affect: Mood normal          Behavior: Behavior normal  Behavior is cooperative  Thought Content:  Thought content normal          Judgment: Judgment normal

## 2021-03-05 NOTE — ASSESSMENT & PLAN NOTE
Again this diagnosis is in pts chart but she denies this diagnosis  Will continue with f/u for fluttering as ordered

## 2021-03-30 DIAGNOSIS — E78.2 MIXED HYPERLIPIDEMIA: ICD-10-CM

## 2021-03-30 DIAGNOSIS — F41.9 ANXIETY: ICD-10-CM

## 2021-03-30 DIAGNOSIS — K21.9 GERD WITHOUT ESOPHAGITIS: ICD-10-CM

## 2021-03-30 RX ORDER — EZETIMIBE 10 MG/1
TABLET ORAL
Qty: 90 TABLET | Refills: 3 | Status: SHIPPED | OUTPATIENT
Start: 2021-03-30 | End: 2021-06-25 | Stop reason: ALTCHOICE

## 2021-03-30 RX ORDER — PANTOPRAZOLE SODIUM 20 MG/1
TABLET, DELAYED RELEASE ORAL
Qty: 90 TABLET | Refills: 3 | Status: SHIPPED | OUTPATIENT
Start: 2021-03-30 | End: 2022-02-22 | Stop reason: SDUPTHER

## 2021-03-30 RX ORDER — PAROXETINE 30 MG/1
TABLET, FILM COATED ORAL
Qty: 90 TABLET | Refills: 3 | Status: SHIPPED | OUTPATIENT
Start: 2021-03-30 | End: 2022-02-22 | Stop reason: SDUPTHER

## 2021-04-08 DIAGNOSIS — Z23 ENCOUNTER FOR IMMUNIZATION: ICD-10-CM

## 2021-05-05 ENCOUNTER — HOSPITAL ENCOUNTER (OUTPATIENT)
Dept: NON INVASIVE DIAGNOSTICS | Facility: HOSPITAL | Age: 66
Discharge: HOME | End: 2021-05-05
Payer: COMMERCIAL

## 2021-05-05 DIAGNOSIS — I49.8 FLUTTERING HEART: ICD-10-CM

## 2021-05-05 DIAGNOSIS — E05.90 HYPERTHYROIDISM: ICD-10-CM

## 2021-05-05 PROCEDURE — 93226 XTRNL ECG REC<48 HR SCAN A/R: CPT

## 2021-05-05 PROCEDURE — 93306 TTE W/DOPPLER COMPLETE: CPT | Performed by: INTERNAL MEDICINE

## 2021-05-05 PROCEDURE — 93306 TTE W/DOPPLER COMPLETE: CPT

## 2021-05-05 PROCEDURE — 93225 XTRNL ECG REC<48 HRS REC: CPT

## 2021-05-11 DIAGNOSIS — Z82.49 FAMILY HISTORY OF EARLY CAD: ICD-10-CM

## 2021-05-11 DIAGNOSIS — I49.8 FLUTTERING HEART: Primary | ICD-10-CM

## 2021-05-11 PROCEDURE — 93227 XTRNL ECG REC<48 HR R&I: CPT | Performed by: INTERNAL MEDICINE

## 2021-05-11 NOTE — RESULT ENCOUNTER NOTE
Please let pt know that her holter report noted a correlation of her symptoms when she had a benign premature beat called a PVC  We al have them but not everyone can feel them  I would still like to refer pt to cardio due to her significant FH of CAD  Order was placed

## 2021-05-18 ENCOUNTER — HOSPITAL ENCOUNTER (OUTPATIENT)
Dept: MAMMOGRAPHY | Facility: MEDICAL CENTER | Age: 66
Discharge: HOME | End: 2021-05-18
Payer: COMMERCIAL

## 2021-05-18 VITALS — HEIGHT: 59 IN | WEIGHT: 152 LBS | BODY MASS INDEX: 30.64 KG/M2

## 2021-05-18 DIAGNOSIS — Z12.31 ENCOUNTER FOR SCREENING MAMMOGRAM FOR BREAST CANCER: ICD-10-CM

## 2021-05-18 PROCEDURE — 77067 SCR MAMMO BI INCL CAD: CPT

## 2021-05-18 PROCEDURE — 77063 BREAST TOMOSYNTHESIS BI: CPT

## 2021-06-23 PROBLEM — I49.3 PVC'S (PREMATURE VENTRICULAR CONTRACTIONS): Status: ACTIVE | Noted: 2021-01-21

## 2021-06-25 ENCOUNTER — OFFICE VISIT (OUTPATIENT)
Dept: CARDIOLOGY CLINIC | Facility: CLINIC | Age: 66
End: 2021-06-25
Payer: COMMERCIAL

## 2021-06-25 VITALS
HEIGHT: 58 IN | SYSTOLIC BLOOD PRESSURE: 118 MMHG | DIASTOLIC BLOOD PRESSURE: 72 MMHG | BODY MASS INDEX: 33.17 KG/M2 | WEIGHT: 158 LBS | HEART RATE: 72 BPM

## 2021-06-25 DIAGNOSIS — I49.8 FLUTTERING HEART: ICD-10-CM

## 2021-06-25 DIAGNOSIS — Z82.49 FAMILY HISTORY OF EARLY CAD: ICD-10-CM

## 2021-06-25 DIAGNOSIS — E78.5 MILD HYPERLIPIDEMIA: ICD-10-CM

## 2021-06-25 DIAGNOSIS — I49.3 PVC'S (PREMATURE VENTRICULAR CONTRACTIONS): Primary | ICD-10-CM

## 2021-06-25 PROCEDURE — 3008F BODY MASS INDEX DOCD: CPT | Performed by: INTERNAL MEDICINE

## 2021-06-25 PROCEDURE — 1036F TOBACCO NON-USER: CPT | Performed by: INTERNAL MEDICINE

## 2021-06-25 PROCEDURE — 93000 ELECTROCARDIOGRAM COMPLETE: CPT | Performed by: INTERNAL MEDICINE

## 2021-06-25 PROCEDURE — 1160F RVW MEDS BY RX/DR IN RCRD: CPT | Performed by: INTERNAL MEDICINE

## 2021-06-25 PROCEDURE — 99203 OFFICE O/P NEW LOW 30 MIN: CPT | Performed by: INTERNAL MEDICINE

## 2021-06-25 RX ORDER — ATORVASTATIN CALCIUM 10 MG/1
10 TABLET, FILM COATED ORAL DAILY
Qty: 90 TABLET | Refills: 3 | Status: SHIPPED | OUTPATIENT
Start: 2021-06-25 | End: 2022-06-06

## 2021-06-25 NOTE — PROGRESS NOTES
Cardiology Consultation     Jadon Hyman  [id number]  1955 [address] [address] [address]    Reason for visit:  Patient here for re-evaluation due to symptomatic PVCs  Also has a history of mild hyperlipidemia and is no longer on statin therapy  Also has a family history of premature CAD      1  PVC's (premature ventricular contractions)  POCT ECG   2  Mild hyperlipidemia  POCT ECG   3  Fluttering heart  Ambulatory referral to Cardiology    POCT ECG   4  Family history of early CAD  Ambulatory referral to Cardiology    POCT ECG     HPI:  The patient is a 71-year-old female who I had seen over 3 years ago for clearance for bariatric surgery  She has lost 40 lb  She denies chest pain or shortness of breath  She denies edema  She has not had dizziness  She has had palpitations that have become more noticeable over the past few months  She did have a Holter monitor which showed occasional PVCs with a 2 3% burden  She describes a fluttering which did correlate with the PVCs  She states these occur more when she is doing nothing especially in the afternoon      Patient Active Problem List   Diagnosis    Varicose veins of left leg with edema    PETER (obstructive sleep apnea)    Mild hyperlipidemia    Adenomatous duodenal polyp    Non-seasonal allergic rhinitis due to pollen    Carpal tunnel syndrome    Vulvar lesion    Thyroid nodule    Restless leg syndrome    Myotonic dystrophy ([address]    Osteoarthritis of knee    Painful orthopaedic hardware ([address]    Menopause    Irritable bowel syndrome with diarrhea    Insomnia    Hyperthyroidism    GERD without esophagitis    Gastric polyps    Fibromyalgia syndrome    Edema    Recurrent major depressive disorder, in partial remission (HCC)    Endometrial thickening on ultra sound    Bariatric surgery status    Postsurgical malabsorption    PLMD (periodic limb movement disorder)    Obesity, Class I, BMI 30-34 9    Right-sided chest wall pain    History of total knee replacement, left    History of total right knee replacement    PVC's (premature ventricular contractions)    Closed fracture of greater tuberosity of humerus    History of malignant neoplasm of skin    Depression    Fluttering heart     Past Medical History:   Diagnosis Date    Allergic rhinitis     Bariatric surgery status     Basal cell carcinoma     skin CA removed from nose    Bowel habit changes     Cataract     starting with a catract    Cellulitis of left lower extremity     last assessed 06/29/2016    Chronic GERD     Closed displaced fracture of greater tuberosity of humerus     last assessed 05/31/2016    CPAP (continuous positive airway pressure) dependence     Fatty liver     Fibromyalgia     Fibromyalgia, primary     Hiatal hernia     High cholesterol     Insomnia     Morbid obesity (HCC)     Osteoarthritis     Palpitations     pt denies a fib    PONV (postoperative nausea and vomiting)     Postgastrectomy malabsorption     Recurrent pregnancy loss, antepartum condition or complication     Restless leg     Sicca syndrome (HCC)     Sleep apnea     Superficial phlebitis and thrombophlebitis of left lower extremity     last assessed 06/08/2016    Thyroid nodule     nodule on thyroid    Urinary tract infection     Varicose veins of left leg with edema     Wears glasses      Social History     Socioeconomic History    Marital status: /Civil Union     Spouse name: Not on file    Number of children: 2    Years of education: Not on file    Highest education level: Not on file   Occupational History    Not on file   Tobacco Use    Smoking status: Never Smoker    Smokeless tobacco: Never Used    Tobacco comment: teenage years    Vaping Use    Vaping Use: Never used   Substance and Sexual Activity    Alcohol use: Yes     Comment: socially    Drug use: No    Sexual activity: Yes     Partners: Male     Birth control/protection: Post-menopausal   Other Topics Concern    Not on file   Social History Narrative    Caffeine use    No caffeine use    Mosque affiliation [employer]    Uses safety equipment-seat belt     Social Determinants of Health     Financial Resource Strain:     Difficulty of Paying Living Expenses:    Food Insecurity:     Worried About Running Out of Food in the Last Year:     [address] in the Last Year:    Transportation Needs:     Lack of Transportation (Medical):      Lack of Transportation (Non-Medical):    Physical Activity:     Days of Exercise per Week:     Minutes of Exercise per Session:    Stress:     Feeling of Stress :    Social Connections:     Frequency of Communication with Friends and Family:     Frequency of Social Gatherings with Friends and Family:     Attends Mosque Services:     Active Member of Clubs or Organizations:     Attends Club or Organization Meetings:     Marital Status:    Intimate Partner Violence:     Fear of Current or Ex-Partner:     Emotionally Abused:     Physically Abused:     Sexually Abused:       Family History   Problem Relation Age of Onset    Heart disease Mother     Alzheimer's disease Mother     Depression Mother     Anxiety disorder Mother     Coronary artery disease Mother     Osteoporosis Mother     Arthritis Father     Osteoarthritis Father     Prostate cancer Father         age on onset unknown    Uterine cancer Sister 28    Cardiomyopathy Sister         congestive    No Known Problems Daughter     No Known Problems Maternal Grandmother     No Known Problems Maternal Grandfather     No Known Problems Paternal Grandmother     No Known Problems Paternal Grandfather     No Known Problems Maternal Aunt     No Known Problems Maternal Aunt      Past Surgical History:   Procedure Laterality Date    BLADDER SURGERY      sling    CHOLECYSTECTOMY  1998    COLONOSCOPY      CYST REMOVAL      thigh    DILATION AND CURETTAGE OF UTERUS      ESOPHAGOGASTRODUODENOSCOPY      ESOPHAGOGASTRODUODENOSCOPY N/A 12/21/2017    Procedure: ESOPHAGOGASTRODUODENOSCOPY (EGD) with biopsy and polypectomy;  Surgeon: David Gilmore MD;  Location: [facility]; Service: Gastroenterology    HARDWARE REMOVAL      right shoulder    JOINT REPLACEMENT      both knees    KNEE ARTHROSCOPY      ORIF HUMERAL SHAFT FRACTURE Right     MN ENDOVENOUS LASER, 1ST VEIN Left 4/29/2016    Procedure: GREATER SAPHENOUS VEIN EVLT ;  Surgeon: Rebeca Amezcua DO;  [facility];  Service: Vascular    MN ESOPHAGOGASTRODUODENOSCOPY TRANSORAL DIAGNOSTIC N/A 1/11/2018    Procedure: egd w/ emr ;  Surgeon: Izabella Nayak MD;  [facility]; Service: Gastroenterology    MN ESOPHAGOGASTRODUODENOSCOPY TRANSORAL DIAGNOSTIC N/A 6/28/2018    Procedure: ESOPHAGOGASTRODUODENOSCOPY (EGD); Surgeon: Izabella Nayak MD;  [facility];   Service: Gastroenterology    MN LAP, ESA RESTRICT PROC, LONGITUDINAL GASTRECTOMY N/A 3/19/2018    Procedure: GASTRECTOMY SLEEVE LAPAROSCOPIC AND INTRAOPERATIVE EGD;  Surgeon: David Haynes MD;  [facility];  Service: [address] [place] [address] TO  Left 4/29/2016    Procedure: AND STAB PHLEBECTOMIES ;  Surgeon: Rebeca Amezcua DO;  [facility];  Service: Vascular    REDUCTION MAMMAPLASTY Bilateral 2011    Reduction    SKIN CANCER EXCISION      TOTAL KNEE ARTHROPLASTY Bilateral        Current Outpatient Medications:     Cholecalciferol (VITAMIN D-3) 1000 units CAPS, Take 4 capsules (4,000 Units total) by mouth daily (Patient taking differently: Take 3,000 Units by mouth daily ), Disp: 30 capsule, Rfl: 1    Diphenhydramine-Phenylephrine ([employer] ALLERGY-D PO), Take by mouth, Disp: , Rfl:     ezetimibe (ZETIA) 10 mg tablet, TAKE ONE TABLET BY MOUTH EVERY DAY , Disp: 90 tablet, Rfl: 3    fluticasone (FLONASE) 50 mcg/act nasal spray, 2 sprays into each nostril daily, Disp: , Rfl:     gabapentin (NEURONTIN) 600 MG tablet, Take 600 mg by mouth daily  , Disp: , Rfl: 3    Multiple Vitamin (MULTIVITAMIN) capsule, Take 1 capsule by mouth daily  , Disp: , Rfl:     pantoprazole (PROTONIX) 20 mg tablet, TAKE ONE TABLET BY MOUTH EVERY DAY , Disp: 90 tablet, Rfl: 3    PARoxetine (PAXIL) 30 mg tablet, TAKE ONE TABLET BY MOUTH EVERY DAY , Disp: 90 tablet, Rfl: 3    Restasis 0 05 % ophthalmic emulsion, , Disp: , Rfl:     traZODone (DESYREL) 50 mg tablet, Take 50 mg by mouth daily at bedtime  , Disp: , Rfl:   Allergies   Allergen Reactions    Ketorolac Itching    Percocet [Oxycodone-Acetaminophen] Itching    Flurbiprofen      NSAIDS    Ketorolac Tromethamine Itching    Pneumovax [Pneumococcal Polysaccharide Vaccine] Swelling and Rash     Vitals:    06/25/21 1350   BP: 118/72   Pulse: 72   Weight: 71 7 kg (158 lb)   Height: 4' 10" (1 473 m)         Review of Systems:  Review of Systems   Constitutional: Negative for activity change, appetite change, fatigue and unexpected weight change  Respiratory: Negative for cough, chest tightness, shortness of breath and wheezing  Cardiovascular: Positive for palpitations  Negative for chest pain and leg swelling  Gastrointestinal: Negative for abdominal pain, blood in stool, constipation and diarrhea  Genitourinary: Negative for dysuria, frequency and hematuria  Musculoskeletal: Positive for arthralgias  Negative for back pain  Neurological: Positive for headaches (occ)  Negative for dizziness and light-headedness  Physical Exam:  Vitals:    06/25/21 1350   BP: 118/72   Pulse: 72   Weight: 71 7 kg (158 lb)   Height: 4' 10" (1 473 m)       Physical Exam  Constitutional:       General: She is not in acute distress  Appearance: She is obese  She is not ill-appearing  HENT:      Head: Normocephalic and atraumatic        Mouth/Throat:      Mouth: Mucous membranes are moist       Pharynx: No oropharyngeal exudate or posterior oropharyngeal erythema  Eyes:      General: No scleral icterus  Conjunctiva/sclera: Conjunctivae normal    Cardiovascular:      Rate and Rhythm: Normal rate and regular rhythm  Occasional extrasystoles are present  Pulses: Normal pulses  Heart sounds: No murmur heard  No friction rub  No gallop  Pulmonary:      Breath sounds: Normal breath sounds  No wheezing, rhonchi or rales  Abdominal:      Palpations: Abdomen is soft  There is no hepatomegaly, splenomegaly or mass  Tenderness: There is no abdominal tenderness  Musculoskeletal:         General: No swelling  Neurological:      Mental Status: She is alert  Discussion/Summary:  1  PVCs  Modestly symptomatic  [place]  not that high  Echo is normal   Reassurance given to the patient that these are benign  Would not treat these with beta-blocker  They do not appear to disturb her sleep so I do not feel there is a role for anxiety lytics since they are not disturbing sleep  2  Fluttering  Correlates with PVCs  3  Mild hyperlipidemia  Ten year risk of myocardial infarction/stroke 5 1%  However this is on Zetia which may be making number somewhat better  In light of patient's premature history of CAD in her mother I would favor restarting atorvastatin 10 mg daily and stopping Zetia which by itself is a poor lipid lowering agent  She does not remember having any issues with atorvastatin in the past   Will repeat an LDL cholesterol and SGOT 60 weeks after initiating atorvastatin  4  Family history of CAD  Will order regular stress test at this time    Follow-up by phone regarding blood work and stress test         FU by phone re stress and blood work      Radene Sandifer, MD

## 2021-07-21 NOTE — ASSESSMENT & PLAN NOTE
Patient has follow-up on 03/29/2018  Cimzia Pregnancy And Lactation Text: This medication crosses the placenta but can be considered safe in certain situations. Cimzia may be excreted in breast milk.

## 2021-07-22 ENCOUNTER — HOSPITAL ENCOUNTER (OUTPATIENT)
Dept: NON INVASIVE DIAGNOSTICS | Facility: HOSPITAL | Age: 66
Discharge: HOME | End: 2021-07-22
Attending: INTERNAL MEDICINE
Payer: COMMERCIAL

## 2021-07-22 ENCOUNTER — RA CDI HCC (OUTPATIENT)
Dept: OTHER | Facility: HOSPITAL | Age: 66
End: 2021-07-22

## 2021-07-22 DIAGNOSIS — Z82.49 FAMILY HISTORY OF EARLY CAD: ICD-10-CM

## 2021-07-22 DIAGNOSIS — I49.3 PVC'S (PREMATURE VENTRICULAR CONTRACTIONS): ICD-10-CM

## 2021-07-22 PROCEDURE — 93018 CV STRESS TEST I&R ONLY: CPT | Performed by: INTERNAL MEDICINE

## 2021-07-22 PROCEDURE — 93017 CV STRESS TEST TRACING ONLY: CPT

## 2021-07-22 PROCEDURE — 93016 CV STRESS TEST SUPVJ ONLY: CPT | Performed by: INTERNAL MEDICINE

## 2021-07-22 NOTE — PROGRESS NOTES
[place] [address]  coding opportunities             Chart reviewed, (number of) suggestions sent to provider: 2     Problem listed updated   Provider Accepted, (number of) suggestions accepted: 2               Patients insurance company: [employer] (Medicare Advantage and Commercial)     Visit status: Patient canceled the appointment        [place] [address]  coding opportunities             Chart reviewed, (number of) suggestions sent to provider: 2     DX:   F33 41-Major depressive disorder, recurrent, in partial remission-paxil   G71 11-Myotonic muscular dystrophy       didn't send for afib as is not confirmed per Dr Flaco Navarro               Patients insurance company: [employer] ([employer])

## 2021-07-23 LAB
CHEST PAIN STATEMENT: NORMAL
MAX DIASTOLIC BP: 62 MMHG
MAX HEART RATE: 134 BPM
MAX PREDICTED HEART RATE: 155 BPM
MAX. SYSTOLIC BP: 164 MMHG
PROTOCOL NAME: NORMAL
TARGET HR FORMULA: NORMAL
TEST INDICATION: NORMAL
TIME IN EXERCISE PHASE: NORMAL

## 2021-07-26 ENCOUNTER — APPOINTMENT (OUTPATIENT)
Dept: LAB | Facility: CLINIC | Age: 66
End: 2021-07-26
Payer: COMMERCIAL

## 2021-07-26 DIAGNOSIS — E78.5 MILD HYPERLIPIDEMIA: ICD-10-CM

## 2021-07-26 DIAGNOSIS — K91.2 POSTSURGICAL MALABSORPTION: ICD-10-CM

## 2021-07-26 DIAGNOSIS — E05.90 HYPERTHYROIDISM: ICD-10-CM

## 2021-07-26 LAB
25(OH)D3 SERPL-MCNC: 51.7 NG/ML (ref 30–100)
ALBUMIN SERPL BCP-MCNC: 3.4 G/DL (ref 3.5–5)
ALP SERPL-CCNC: 75 U/L (ref 46–116)
ALT SERPL W P-5'-P-CCNC: 20 U/L (ref 12–78)
ANION GAP SERPL CALCULATED.3IONS-SCNC: 7 MMOL/L (ref 4–13)
AST SERPL W P-5'-P-CCNC: 6 U/L (ref 5–45)
BILIRUB SERPL-MCNC: 0.47 MG/DL (ref 0.2–1)
BUN SERPL-MCNC: 9 MG/DL (ref 5–25)
CALCIUM ALBUM COR SERPL-MCNC: 9.3 MG/DL (ref 8.3–10.1)
CALCIUM SERPL-MCNC: 8.8 MG/DL (ref 8.3–10.1)
CHLORIDE SERPL-SCNC: 107 MMOL/L (ref 100–108)
CHOLEST SERPL-MCNC: 168 MG/DL (ref 50–200)
CO2 SERPL-SCNC: 24 MMOL/L (ref 21–32)
CREAT SERPL-MCNC: 0.55 MG/DL (ref 0.6–1.3)
GFR SERPL CREATININE-BSD FRML MDRD: 99 ML/MIN/1.73SQ M
GLUCOSE P FAST SERPL-MCNC: 107 MG/DL (ref 65–99)
HDLC SERPL-MCNC: 50 MG/DL
LDLC SERPL CALC-MCNC: 95 MG/DL (ref 0–100)
POTASSIUM SERPL-SCNC: 4.1 MMOL/L (ref 3.5–5.3)
PROT SERPL-MCNC: 7 G/DL (ref 6.4–8.2)
SODIUM SERPL-SCNC: 138 MMOL/L (ref 136–145)
TRIGL SERPL-MCNC: 117 MG/DL
TSH SERPL DL<=0.05 MIU/L-ACNC: 0.55 UIU/ML (ref 0.36–3.74)

## 2021-07-26 PROCEDURE — 84443 ASSAY THYROID STIM HORMONE: CPT

## 2021-07-26 PROCEDURE — 80061 LIPID PANEL: CPT

## 2021-07-26 PROCEDURE — 36415 COLL VENOUS BLD VENIPUNCTURE: CPT

## 2021-07-26 PROCEDURE — 80053 COMPREHEN METABOLIC PANEL: CPT

## 2021-07-26 PROCEDURE — 82306 VITAMIN D 25 HYDROXY: CPT

## 2021-08-05 ENCOUNTER — RA CDI HCC (OUTPATIENT)
Dept: OTHER | Facility: HOSPITAL | Age: 66
End: 2021-08-05

## 2021-08-05 NOTE — PROGRESS NOTES
[place] [id number]  coding opportunities             Chart Reviewed * (Number of) Inbasket suggestions sent to Provider: 2        Provider Rejected Suggestions for: G71 11, possibly depression code            Patients insurance company: [employer] (Medicare Advantage and [employer])   dx  is on the bill, other dx sent ruled out   Visit status: Patient arrived for their scheduled appointment        [place] [address]  coding opportunities             Chart reviewed, (number of) suggestions sent to provider: 2        Provider Rejected Suggestions for: G71 11, possibly depression code            Patients insurance company: [employer])           [place] [address]  coding opportunities             Chart reviewed, (number of) suggestions sent to provider: 2   DX:   F33 41-Major depressive disorder, recurrent, in partial remission-paxil   G71 11-Myotonic muscular dystrophy    Response from Dr Wilfrido Bay:  I will need to look into the G71 1 diagnosis for this pt when I see her as I can not find more info on it in her chart       This RN responded back as it was sent to determine if it should be ruled out or is valid-sent a response back after reviewing chart and noting a neurology note on 4- by Dr Antonia Bauman that I finally found under the notes section as it's listed by specialty-in routine notes it just has her name and not neurology-relayed this information to Dr Wilfrido Bay as it looks to be ruled out and doesn't appear she is on neurontin any longer LM                          Patients insurance company: [employer])

## 2021-08-12 ENCOUNTER — OFFICE VISIT (OUTPATIENT)
Dept: FAMILY MEDICINE CLINIC | Facility: CLINIC | Age: 66
End: 2021-08-12
Payer: COMMERCIAL

## 2021-08-12 VITALS
SYSTOLIC BLOOD PRESSURE: 112 MMHG | HEIGHT: 58 IN | DIASTOLIC BLOOD PRESSURE: 76 MMHG | TEMPERATURE: 99.3 F | WEIGHT: 159 LBS | BODY MASS INDEX: 33.37 KG/M2 | HEART RATE: 92 BPM

## 2021-08-12 DIAGNOSIS — E78.5 MILD HYPERLIPIDEMIA: ICD-10-CM

## 2021-08-12 DIAGNOSIS — Z11.59 NEED FOR HEPATITIS C SCREENING TEST: ICD-10-CM

## 2021-08-12 DIAGNOSIS — Z98.84 BARIATRIC SURGERY STATUS: ICD-10-CM

## 2021-08-12 DIAGNOSIS — R73.9 HYPERGLYCEMIA: ICD-10-CM

## 2021-08-12 DIAGNOSIS — K21.9 GERD WITHOUT ESOPHAGITIS: ICD-10-CM

## 2021-08-12 DIAGNOSIS — F51.01 PRIMARY INSOMNIA: ICD-10-CM

## 2021-08-12 DIAGNOSIS — F33.41 RECURRENT MAJOR DEPRESSIVE DISORDER, IN PARTIAL REMISSION (HCC): ICD-10-CM

## 2021-08-12 DIAGNOSIS — Z85.828 HISTORY OF MALIGNANT NEOPLASM OF SKIN: ICD-10-CM

## 2021-08-12 DIAGNOSIS — E05.90 HYPERTHYROIDISM: Primary | ICD-10-CM

## 2021-08-12 DIAGNOSIS — E55.9 VITAMIN D DEFICIENCY: ICD-10-CM

## 2021-08-12 PROCEDURE — 1160F RVW MEDS BY RX/DR IN RCRD: CPT | Performed by: PHYSICIAN ASSISTANT

## 2021-08-12 PROCEDURE — 99214 OFFICE O/P EST MOD 30 MIN: CPT | Performed by: PHYSICIAN ASSISTANT

## 2021-08-12 PROCEDURE — 3725F SCREEN DEPRESSION PERFORMED: CPT | Performed by: PHYSICIAN ASSISTANT

## 2021-08-12 PROCEDURE — 1036F TOBACCO NON-USER: CPT | Performed by: PHYSICIAN ASSISTANT

## 2021-08-12 PROCEDURE — 3008F BODY MASS INDEX DOCD: CPT | Performed by: PHYSICIAN ASSISTANT

## 2021-08-12 PROCEDURE — 1101F PT FALLS ASSESS-DOCD LE1/YR: CPT | Performed by: PHYSICIAN ASSISTANT

## 2021-08-12 PROCEDURE — 3288F FALL RISK ASSESSMENT DOCD: CPT | Performed by: PHYSICIAN ASSISTANT

## 2021-08-12 NOTE — PROGRESS NOTES
Assessment and Plan:    Problem List Items Addressed This Visit        Digestive    GERD without esophagitis       Stable on daily PPI  Endocrine    Hyperthyroidism - Primary       TSH is within normal limits on no medication at this time  Relevant Orders    TSH, 3rd generation with Free T4 reflex       Other    Mild hyperlipidemia    Relevant Orders    Lipid Panel with Direct LDL reflex    Vitamin D deficiency     D level is great at 51  Continue current supp and check yearly  Insomnia       Stable on trazodone 50 mg at bedtime  Hyperglycemia       Fasting glucose slightly elevated 107  Recheck with next labs and also check A1c at that time  Decreased simple  Carbs such as breads pasta potatoes and rice  Relevant Orders    Hemoglobin A1C    Comprehensive metabolic panel    Recurrent major depressive disorder, in partial remission (HCC)       Stable on Paxil 30 mg daily  No SI or HI  Bariatric surgery status    Relevant Orders    Vitamin D 25 hydroxy    History of malignant neoplasm of skin       Continue with Dermatology  Other Visit Diagnoses     Need for hepatitis C screening test        Relevant Orders    Hepatitis C Antibody ([facility])                 Diagnoses and all orders for this visit:    Hyperthyroidism  -     TSH, 3rd generation with Free T4 reflex; Future    Mild hyperlipidemia  -     Lipid Panel with Direct LDL reflex; Future    Primary insomnia    GERD without esophagitis    History of malignant neoplasm of skin    Bariatric surgery status  -     Vitamin D 25 hydroxy; Future    Hyperglycemia  -     Hemoglobin A1C; Future  -     Comprehensive metabolic panel; Future    Recurrent major depressive disorder, in partial remission ([facility] [address] )    Need for hepatitis C screening test  -     Hepatitis C Antibody ([facility]); Future    Vitamin D deficiency              Subjective:      Patient ID: Ivory Arora is a 77 y o  female  CC:    Chief Complaint   Patient presents with    Follow-up     6 month f/u- discuss recent labs - offers no omplaints  HPI:      Moris Lin is here for chronic conditions f/u including the diagnosis of Hyperthyroidism  (primary encounter diagnosis)  Mild hyperlipidemia  Primary insomnia  Gerd without esophagitis  History of malignant neoplasm of skin  Bariatric surgery status  Hyperglycemia   Pt  states they are taking all medications as directed without complaints or side effects   Pt  had labs done prior to today's visit which included Recent Results (from the past 672 hour(s))  -Stress strip  Collection Time: 07/22/21  1:17 PM       Result                      Value             Ref Range           Protocol Name               ANDRZEJ                                 Time In Exercise Phase      00:06:22                              MAX  SYSTOLIC BP            826               mmHg                Max Diastolic Bp            62                mmHg                Max Heart Rate              134               BPM                 Max Predicted Heart Ra*     155               BPM                 Reason for Termination                                        Fatigue Leg discomfort        Test Indication                                               FAMILY HX CAD, PVC'S,PALPITATIONS       Target Hr Formular                                            (220 - Age)*85%       Arrhy During Ex                                                   ECG Interp Before Ex                                              ECG Interp during Ex                                              Ex Summary Comment                                                Chest Pain Statement        none                                  Overall Hr Response To*                                           Overall BP Response To*                                      -Lipid Panel with Direct LDL reflex  Collection Time: 07/26/21  9:39 AM Result                      Value             Ref Range           Cholesterol                 168               50 - 200 mg/*       Triglycerides               117               <=150 mg/dL         HDL, Direct                 50                >=40 mg/dL          LDL Calculated              95                0 - 100 mg/dL  -Comprehensive metabolic panel  Collection Time: 07/26/21  9:39 AM       Result                      Value             Ref Range           Sodium                      138               136 - 145 mm*       Potassium                   4 1               3 5 - 5 3 mm*       Chloride                    107               100 - 108 mm*       CO2                         24                21 - 32 mmol*       ANION GAP                   7                 4 - 13 mmol/L       BUN                         9                 5 - 25 mg/dL        Creatinine                  0 55 (L)          0 60 - 1 30 *       Glucose, Fasting            107 (H)           65 - 99 mg/dL       Calcium                     8 8               8 3 - 10 1 m*       Corrected Calcium           9 3               8 3 - 10 1 m*       AST                         6                 5 - 45 U/L          ALT                         20                12 - 78 U/L         Alkaline Phosphatase        75                46 - 116 U/L        Total Protein               7 0               6 4 - 8 2 g/*       Albumin                     3 4 (L)           3 5 - 5 0 g/*       Total Bilirubin             0 47              0 20 - 1 00 *       eGFR                        99                ml/min/1 73s*  -Vitamin D 25 hydroxy  Collection Time: 07/26/21  9:39 AM       Result                      Value             Ref Range           Vit D, 25-Hydroxy           51 7              30 0 - 100 0*  -TSH, 3rd generation with Free T4 reflex  Collection Time: 07/26/21  9:39 AM       Result                      Value             Ref Range           TSH 3RD GENERATON 0 552             0 358 - 3 74*        The following portions of the patient's history were reviewed and updated as appropriate: allergies, current medications, past family history, past medical history, past social history, past surgical history and problem list       Review of Systems   Constitutional: Negative  HENT: Negative  Eyes: Negative  Respiratory: Negative  Cardiovascular: Negative  Gastrointestinal: Negative  Endocrine: Negative  Genitourinary: Negative  Musculoskeletal: Negative  Skin: Negative  Allergic/Immunologic: Negative  Neurological: Negative  Hematological: Negative  Psychiatric/Behavioral: Negative  Data to review:       Objective:    Vitals:    08/12/21 1055   BP: 112/76   BP Location: Left arm   Patient Position: Sitting   Cuff Size: Adult   Pulse: 92   Temp: 99 3 °F (37 4 °C)   TempSrc: Temporal   Weight: 72 1 kg (159 lb)   Height: 4' 10" (1 473 m)        Physical Exam  Vitals and nursing note reviewed  Constitutional:       Appearance: Normal appearance  She is well-developed  HENT:      Head: Normocephalic and atraumatic  Eyes:      General: Lids are normal       Conjunctiva/sclera: Conjunctivae normal       Pupils: Pupils are equal, round, and reactive to light  Cardiovascular:      Rate and Rhythm: Normal rate and regular rhythm  Heart sounds: No murmur heard  Pulmonary:      Effort: Pulmonary effort is normal       Breath sounds: Normal breath sounds  Skin:     General: Skin is warm and dry  Neurological:      General: No focal deficit present  Mental Status: She is alert  Coordination: Coordination is intact  Psychiatric:         Mood and Affect: Mood normal          Behavior: Behavior normal  Behavior is cooperative  Thought Content:  Thought content normal          Judgment: Judgment normal

## 2021-08-12 NOTE — PATIENT INSTRUCTIONS
Problem List Items Addressed This Visit        Digestive    GERD without esophagitis       Stable on daily PPI  Endocrine    Hyperthyroidism - Primary       TSH is within normal limits on no medication at this time  Relevant Orders    TSH, 3rd generation with Free T4 reflex       Other    Mild hyperlipidemia    Relevant Orders    Lipid Panel with Direct LDL reflex    Vitamin D deficiency     D level is great at 51  Continue current supp and check yearly  Insomnia       Stable on trazodone 50 mg at bedtime  Hyperglycemia       Fasting glucose slightly elevated 107  Recheck with next labs and also check A1c at that time  Decreased simple  Carbs such as breads pasta potatoes and rice  Relevant Orders    Hemoglobin A1C    Comprehensive metabolic panel    Recurrent major depressive disorder, in partial remission (HCC)       Stable on Paxil 30 mg daily  No SI or HI  Bariatric surgery status    Relevant Orders    Vitamin D 25 hydroxy    History of malignant neoplasm of skin       Continue with Dermatology             Other Visit Diagnoses     Need for hepatitis C screening test        Relevant Orders    Hepatitis C Antibody ([facility])

## 2021-08-12 NOTE — ASSESSMENT & PLAN NOTE
Fasting glucose slightly elevated 107  Recheck with next labs and also check A1c at that time  Decreased simple  Carbs such as breads pasta potatoes and rice

## 2021-10-18 ENCOUNTER — OFFICE VISIT (OUTPATIENT)
Dept: BARIATRICS | Facility: CLINIC | Age: 66
End: 2021-10-18
Payer: COMMERCIAL

## 2021-10-18 VITALS
TEMPERATURE: 97.1 F | HEART RATE: 79 BPM | BODY MASS INDEX: 30.12 KG/M2 | DIASTOLIC BLOOD PRESSURE: 74 MMHG | SYSTOLIC BLOOD PRESSURE: 112 MMHG | WEIGHT: 143.5 LBS | HEIGHT: 58 IN

## 2021-10-18 DIAGNOSIS — Z85.828 HISTORY OF MALIGNANT NEOPLASM OF SKIN: ICD-10-CM

## 2021-10-18 DIAGNOSIS — Z48.815 ENCOUNTER FOR SURGICAL AFTERCARE FOLLOWING SURGERY OF DIGESTIVE SYSTEM: Primary | ICD-10-CM

## 2021-10-18 DIAGNOSIS — K91.2 POSTSURGICAL MALABSORPTION: ICD-10-CM

## 2021-10-18 DIAGNOSIS — Z98.84 BARIATRIC SURGERY STATUS: ICD-10-CM

## 2021-10-18 DIAGNOSIS — F32.A DEPRESSION: ICD-10-CM

## 2021-10-18 DIAGNOSIS — E78.5 MILD HYPERLIPIDEMIA: ICD-10-CM

## 2021-10-18 PROCEDURE — 1036F TOBACCO NON-USER: CPT | Performed by: PHYSICIAN ASSISTANT

## 2021-10-18 PROCEDURE — 1160F RVW MEDS BY RX/DR IN RCRD: CPT | Performed by: PHYSICIAN ASSISTANT

## 2021-10-18 PROCEDURE — 3008F BODY MASS INDEX DOCD: CPT | Performed by: PHYSICIAN ASSISTANT

## 2021-10-18 PROCEDURE — 99214 OFFICE O/P EST MOD 30 MIN: CPT | Performed by: PHYSICIAN ASSISTANT

## 2021-10-18 RX ORDER — CEPHALEXIN 500 MG/1
CAPSULE ORAL
COMMUNITY
Start: 2021-08-20 | End: 2022-08-02

## 2021-12-07 ENCOUNTER — CLINICAL SUPPORT (OUTPATIENT)
Dept: FAMILY MEDICINE CLINIC | Facility: CLINIC | Age: 66
End: 2021-12-07
Payer: COMMERCIAL

## 2021-12-07 DIAGNOSIS — Z23 NEED FOR INFLUENZA VACCINATION: Primary | ICD-10-CM

## 2021-12-07 PROCEDURE — 90662 IIV NO PRSV INCREASED AG IM: CPT

## 2021-12-07 PROCEDURE — G0008 ADMIN INFLUENZA VIRUS VAC: HCPCS

## 2022-01-13 ENCOUNTER — OFFICE VISIT (OUTPATIENT)
Dept: OBGYN CLINIC | Facility: CLINIC | Age: 67
End: 2022-01-13
Payer: COMMERCIAL

## 2022-01-13 VITALS
SYSTOLIC BLOOD PRESSURE: 126 MMHG | DIASTOLIC BLOOD PRESSURE: 70 MMHG | WEIGHT: 126.6 LBS | HEIGHT: 58 IN | BODY MASS INDEX: 26.57 KG/M2

## 2022-01-13 DIAGNOSIS — N64.4 NIPPLE PAIN: Primary | ICD-10-CM

## 2022-01-13 PROCEDURE — 1160F RVW MEDS BY RX/DR IN RCRD: CPT | Performed by: OBSTETRICS & GYNECOLOGY

## 2022-01-13 PROCEDURE — 3008F BODY MASS INDEX DOCD: CPT | Performed by: OBSTETRICS & GYNECOLOGY

## 2022-01-13 PROCEDURE — 99213 OFFICE O/P EST LOW 20 MIN: CPT | Performed by: OBSTETRICS & GYNECOLOGY

## 2022-01-13 PROCEDURE — 1036F TOBACCO NON-USER: CPT | Performed by: OBSTETRICS & GYNECOLOGY

## 2022-01-13 NOTE — PROGRESS NOTES
Assessment/Plan:     Right nipple itching and pain-she has been using moisturizer  I ordered an ultrasound of the right breast and she will have this done  She is aware that she will still need her regular screening mammogram in May if all is normal      There are no diagnoses linked to this encounter  Subjective:     Patient ID: Nithin Caruso is a 77 y o  female  Pt here for evaluation of breast problem  Since the fall, she has been having itching on the right nipple  About 2 weeks ago, she started having pain  The pain is occurring few times a week and is short lived, it is a sharp stabbing pain  She does not feel a mass  It does not radiate and is only on the right side  She denies any new medications  She is under a great deal of stress as she is going through a divorce  She has been using moisturizer  Scattered fibroglandular densities and average risk noted on mammogram from May 2021  Review of Systems   Genitourinary:        Right nipple itching and pain         Objective:     Physical Exam  Chest:   Breasts:      Right: Normal       Left: Normal         Comments: Patient examined seated and supine    Both breasts are normal, no skin changes, no erythema, no mass

## 2022-01-20 ENCOUNTER — HOSPITAL ENCOUNTER (OUTPATIENT)
Dept: MAMMOGRAPHY | Facility: CLINIC | Age: 67
Discharge: HOME | End: 2022-01-20
Payer: COMMERCIAL

## 2022-01-20 ENCOUNTER — HOSPITAL ENCOUNTER (OUTPATIENT)
Dept: ULTRASOUND IMAGING | Facility: CLINIC | Age: 67
Discharge: HOME | End: 2022-01-20
Payer: COMMERCIAL

## 2022-01-20 DIAGNOSIS — N64.4 NIPPLE PAIN: ICD-10-CM

## 2022-01-20 PROCEDURE — 76642 ULTRASOUND BREAST LIMITED: CPT

## 2022-01-20 PROCEDURE — 77065 DX MAMMO INCL CAD UNI: CPT

## 2022-01-20 PROCEDURE — G0279 TOMOSYNTHESIS, MAMMO: HCPCS

## 2022-02-14 ENCOUNTER — APPOINTMENT (OUTPATIENT)
Dept: LAB | Facility: CLINIC | Age: 67
End: 2022-02-14
Payer: COMMERCIAL

## 2022-02-14 DIAGNOSIS — K91.2 POSTSURGICAL MALABSORPTION: ICD-10-CM

## 2022-02-14 DIAGNOSIS — Z11.59 NEED FOR HEPATITIS C SCREENING TEST: ICD-10-CM

## 2022-02-14 DIAGNOSIS — E05.90 HYPERTHYROIDISM: ICD-10-CM

## 2022-02-14 DIAGNOSIS — Z98.84 BARIATRIC SURGERY STATUS: ICD-10-CM

## 2022-02-14 DIAGNOSIS — R73.9 HYPERGLYCEMIA: ICD-10-CM

## 2022-02-14 DIAGNOSIS — E78.5 MILD HYPERLIPIDEMIA: ICD-10-CM

## 2022-02-14 DIAGNOSIS — Z48.815 ENCOUNTER FOR SURGICAL AFTERCARE FOLLOWING SURGERY OF DIGESTIVE SYSTEM: ICD-10-CM

## 2022-02-14 DIAGNOSIS — F32.A DEPRESSION: ICD-10-CM

## 2022-02-14 LAB
25(OH)D3 SERPL-MCNC: 52.2 NG/ML (ref 30–100)
ALBUMIN SERPL BCP-MCNC: 3.5 G/DL (ref 3.5–5)
ALP SERPL-CCNC: 76 U/L (ref 46–116)
ALT SERPL W P-5'-P-CCNC: 15 U/L (ref 12–78)
ANION GAP SERPL CALCULATED.3IONS-SCNC: 5 MMOL/L (ref 4–13)
AST SERPL W P-5'-P-CCNC: 8 U/L (ref 5–45)
BILIRUB SERPL-MCNC: 0.52 MG/DL (ref 0.2–1)
BUN SERPL-MCNC: 9 MG/DL (ref 5–25)
CALCIUM SERPL-MCNC: 8.8 MG/DL (ref 8.3–10.1)
CHLORIDE SERPL-SCNC: 105 MMOL/L (ref 100–108)
CHOLEST SERPL-MCNC: 150 MG/DL
CO2 SERPL-SCNC: 27 MMOL/L (ref 21–32)
CREAT SERPL-MCNC: 0.55 MG/DL (ref 0.6–1.3)
ERYTHROCYTE [DISTWIDTH] IN BLOOD BY AUTOMATED COUNT: 11.6 % (ref 11.6–15.1)
EST. AVERAGE GLUCOSE BLD GHB EST-MCNC: 105 MG/DL
FERRITIN SERPL-MCNC: 45 NG/ML (ref 8–388)
FOLATE SERPL-MCNC: 19.2 NG/ML (ref 3.1–17.5)
GFR SERPL CREATININE-BSD FRML MDRD: 98 ML/MIN/1.73SQ M
GLUCOSE P FAST SERPL-MCNC: 122 MG/DL (ref 65–99)
HBA1C MFR BLD: 5.3 %
HCT VFR BLD AUTO: 40.4 % (ref 34.8–46.1)
HCV AB SER QL: NORMAL
HDLC SERPL-MCNC: 46 MG/DL
HGB BLD-MCNC: 13.2 G/DL (ref 11.5–15.4)
IRON SATN MFR SERPL: 38 % (ref 15–50)
IRON SERPL-MCNC: 103 UG/DL (ref 50–170)
LDLC SERPL CALC-MCNC: 83 MG/DL (ref 0–100)
MCH RBC QN AUTO: 30.1 PG (ref 26.8–34.3)
MCHC RBC AUTO-ENTMCNC: 32.7 G/DL (ref 31.4–37.4)
MCV RBC AUTO: 92 FL (ref 82–98)
PLATELET # BLD AUTO: 278 THOUSANDS/UL (ref 149–390)
PMV BLD AUTO: 11 FL (ref 8.9–12.7)
POTASSIUM SERPL-SCNC: 3.8 MMOL/L (ref 3.5–5.3)
PROT SERPL-MCNC: 7.2 G/DL (ref 6.4–8.2)
PTH-INTACT SERPL-MCNC: 76.9 PG/ML (ref 18.4–80.1)
RBC # BLD AUTO: 4.38 MILLION/UL (ref 3.81–5.12)
SODIUM SERPL-SCNC: 137 MMOL/L (ref 136–145)
TIBC SERPL-MCNC: 268 UG/DL (ref 250–450)
TRIGL SERPL-MCNC: 105 MG/DL
TSH SERPL DL<=0.05 MIU/L-ACNC: 0.7 UIU/ML (ref 0.36–3.74)
VIT B12 SERPL-MCNC: 410 PG/ML (ref 100–900)
WBC # BLD AUTO: 6.12 THOUSAND/UL (ref 4.31–10.16)

## 2022-02-14 PROCEDURE — 82728 ASSAY OF FERRITIN: CPT

## 2022-02-14 PROCEDURE — 83970 ASSAY OF PARATHORMONE: CPT

## 2022-02-14 PROCEDURE — 83540 ASSAY OF IRON: CPT

## 2022-02-14 PROCEDURE — 80053 COMPREHEN METABOLIC PANEL: CPT

## 2022-02-14 PROCEDURE — 82306 VITAMIN D 25 HYDROXY: CPT

## 2022-02-14 PROCEDURE — 83550 IRON BINDING TEST: CPT

## 2022-02-14 PROCEDURE — 83036 HEMOGLOBIN GLYCOSYLATED A1C: CPT

## 2022-02-14 PROCEDURE — 82746 ASSAY OF FOLIC ACID SERUM: CPT

## 2022-02-14 PROCEDURE — 84425 ASSAY OF VITAMIN B-1: CPT

## 2022-02-14 PROCEDURE — 36415 COLL VENOUS BLD VENIPUNCTURE: CPT

## 2022-02-14 PROCEDURE — 84443 ASSAY THYROID STIM HORMONE: CPT

## 2022-02-14 PROCEDURE — 84590 ASSAY OF VITAMIN A: CPT

## 2022-02-14 PROCEDURE — 80061 LIPID PANEL: CPT

## 2022-02-14 PROCEDURE — 84630 ASSAY OF ZINC: CPT

## 2022-02-14 PROCEDURE — 86803 HEPATITIS C AB TEST: CPT

## 2022-02-14 PROCEDURE — 85027 COMPLETE CBC AUTOMATED: CPT

## 2022-02-14 PROCEDURE — 82607 VITAMIN B-12: CPT

## 2022-02-15 ENCOUNTER — RA CDI HCC (OUTPATIENT)
Dept: OTHER | Facility: HOSPITAL | Age: 67
End: 2022-02-15

## 2022-02-15 NOTE — PROGRESS NOTES
[facility]  coding opportunities          Number of diagnosis code(s) already on the problem list added to FYI fla  [id number]                     Patients insurance company: [id number] (Medicare Advantage and Commercial)

## 2022-02-17 LAB — ZINC SERPL-MCNC: 88 UG/DL (ref 44–115)

## 2022-02-22 ENCOUNTER — OFFICE VISIT (OUTPATIENT)
Dept: FAMILY MEDICINE CLINIC | Facility: CLINIC | Age: 67
End: 2022-02-22
Payer: COMMERCIAL

## 2022-02-22 VITALS
HEART RATE: 96 BPM | TEMPERATURE: 98.1 F | BODY MASS INDEX: 28.55 KG/M2 | WEIGHT: 136 LBS | SYSTOLIC BLOOD PRESSURE: 118 MMHG | HEIGHT: 58 IN | DIASTOLIC BLOOD PRESSURE: 72 MMHG

## 2022-02-22 DIAGNOSIS — E78.5 MILD HYPERLIPIDEMIA: ICD-10-CM

## 2022-02-22 DIAGNOSIS — G25.81 RESTLESS LEG SYNDROME: ICD-10-CM

## 2022-02-22 DIAGNOSIS — K91.2 POSTSURGICAL MALABSORPTION: ICD-10-CM

## 2022-02-22 DIAGNOSIS — R73.9 HYPERGLYCEMIA: ICD-10-CM

## 2022-02-22 DIAGNOSIS — F41.9 ANXIETY: ICD-10-CM

## 2022-02-22 DIAGNOSIS — F51.01 PRIMARY INSOMNIA: ICD-10-CM

## 2022-02-22 DIAGNOSIS — Z00.00 MEDICARE ANNUAL WELLNESS VISIT, SUBSEQUENT: ICD-10-CM

## 2022-02-22 DIAGNOSIS — F33.41 RECURRENT MAJOR DEPRESSIVE DISORDER, IN PARTIAL REMISSION (HCC): ICD-10-CM

## 2022-02-22 DIAGNOSIS — K21.9 GERD WITHOUT ESOPHAGITIS: Primary | ICD-10-CM

## 2022-02-22 DIAGNOSIS — E05.90 HYPERTHYROIDISM: ICD-10-CM

## 2022-02-22 DIAGNOSIS — E55.9 VITAMIN D DEFICIENCY: ICD-10-CM

## 2022-02-22 LAB — VIT A SERPL-MCNC: 23.7 UG/DL (ref 22–69.5)

## 2022-02-22 PROCEDURE — 3008F BODY MASS INDEX DOCD: CPT | Performed by: PHYSICIAN ASSISTANT

## 2022-02-22 PROCEDURE — 3288F FALL RISK ASSESSMENT DOCD: CPT | Performed by: PHYSICIAN ASSISTANT

## 2022-02-22 PROCEDURE — 1170F FXNL STATUS ASSESSED: CPT | Performed by: PHYSICIAN ASSISTANT

## 2022-02-22 PROCEDURE — 99214 OFFICE O/P EST MOD 30 MIN: CPT | Performed by: PHYSICIAN ASSISTANT

## 2022-02-22 PROCEDURE — 1125F AMNT PAIN NOTED PAIN PRSNT: CPT | Performed by: PHYSICIAN ASSISTANT

## 2022-02-22 PROCEDURE — 3725F SCREEN DEPRESSION PERFORMED: CPT | Performed by: PHYSICIAN ASSISTANT

## 2022-02-22 PROCEDURE — 1160F RVW MEDS BY RX/DR IN RCRD: CPT | Performed by: PHYSICIAN ASSISTANT

## 2022-02-22 PROCEDURE — 1036F TOBACCO NON-USER: CPT | Performed by: PHYSICIAN ASSISTANT

## 2022-02-22 PROCEDURE — G0439 PPPS, SUBSEQ VISIT: HCPCS | Performed by: PHYSICIAN ASSISTANT

## 2022-02-22 RX ORDER — PAROXETINE 30 MG/1
30 TABLET, FILM COATED ORAL DAILY
Qty: 90 TABLET | Refills: 3 | Status: SHIPPED | OUTPATIENT
Start: 2022-02-22

## 2022-02-22 RX ORDER — PANTOPRAZOLE SODIUM 20 MG/1
20 TABLET, DELAYED RELEASE ORAL DAILY
Qty: 90 TABLET | Refills: 3 | Status: SHIPPED | OUTPATIENT
Start: 2022-02-22

## 2022-02-22 NOTE — ASSESSMENT & PLAN NOTE
Blood work up-to-date COVID pneumonia 23 dex and mammo all up-to-date shingles vaccine recommended  Blue pack 5 wishes given highly encouraged to bring back living will or POA to put her ACP tab   Pt does not want prevnar 13 as she had an allergic reaction to 23 in the past

## 2022-02-22 NOTE — ASSESSMENT & PLAN NOTE
Fasting sugar is 122 up from 107 however A1c is normal at 5 3 continue to observe decrease bread pasta potatoes rice junk food desserts and sweets recheck 6 months

## 2022-02-22 NOTE — PATIENT INSTRUCTIONS
Problem List Items Addressed This Visit        Digestive    GERD without esophagitis - Primary     Stable on Protonix encouraged to go every other day if able  Relevant Medications    pantoprazole (PROTONIX) 20 mg tablet    Postsurgical malabsorption    Relevant Orders    CBC and differential       Endocrine    Hyperthyroidism     TSH PTH within normal limits  Other    Mild hyperlipidemia     Patient is on Lipitor 10 mg once daily her LDL is good at 83 HDL is slightly low at 46  Increase activity recheck 6 months  Relevant Orders    Lipid Panel with Direct LDL reflex    Vitamin D deficiency     Vitamin-D is good at 52 continue check yearly  Restless leg syndrome     Stable on gabapentin  Insomnia     Stable on trazodone  Hyperglycemia     Fasting sugar is 122 up from 107 however A1c is normal at 5 3 continue to observe decrease bread pasta potatoes rice junk food desserts and sweets recheck 6 months  Relevant Orders    Comprehensive metabolic panel    Hemoglobin A1C    Recurrent major depressive disorder, in partial remission (HCC)     No SI or HI patient stable on Paxil 30 mg once daily  Relevant Medications    PARoxetine (PAXIL) 30 mg tablet    Medicare annual wellness visit, subsequent     Blood work up-to-date COVID pneumonia 23 dex and mammo all up-to-date shingles vaccine recommended  Blue pack 5 wishes given highly encouraged to bring back living will or POA to put her ACP tab   Pt does not want prevnar 13 as she had an allergic reaction to 23 in the past             Other Visit Diagnoses     Anxiety        Relevant Medications    PARoxetine (PAXIL) 30 mg tablet

## 2022-02-22 NOTE — PROGRESS NOTES
Assessment and Plan:     Problem List Items Addressed This Visit        Digestive    GERD without esophagitis - Primary    Postsurgical malabsorption       Other    Mild hyperlipidemia    Vitamin D deficiency    Restless leg syndrome    Insomnia    Hyperglycemia    Recurrent major depressive disorder, in partial remission ([address] )    Medicare annual wellness visit, subsequent     Blood work up-to-date COVID pneumonia 23 dex and mammo all up-to-date Prevnar 13 and shingles vaccine recommended  Blue pack 5 wishes given highly encouraged to bring back living will or POA to put her ACP tab  BMI Counseling: Body mass index is 28 42 kg/m²  The BMI is above normal  Nutrition recommendations include decreasing portion sizes, encouraging healthy choices of fruits and vegetables, decreasing fast food intake, consuming healthier snacks, limiting drinks that contain sugar, moderation in carbohydrate intake, increasing intake of lean protein, reducing intake of saturated and trans fat and reducing intake of cholesterol  Exercise recommendations include exercising 3-5 times per week  No pharmacotherapy was ordered  Rationale for BMI follow-up plan is due to patient being overweight or obese  Preventive health issues were discussed with patient, and age appropriate screening tests were ordered as noted in patient's After Visit Summary  Personalized health advice and appropriate referrals for health education or preventive services given if needed, as noted in patient's After Visit Summary       History of Present Illness:     Patient presents for Medicare Annual Wellness visit    Patient Care Team:  Fiona Lehman PA-C as PCP - MD Amber Salgado PA-C Arlander Grays, MD Reather Alfred, MD Mariea Brazen, MD     Problem List:     Patient Active Problem List   Diagnosis    Varicose veins of left leg with edema    PETER (obstructive sleep apnea)    Mild hyperlipidemia    Adenomatous duodenal polyp    Non-seasonal allergic rhinitis due to pollen    Carpal tunnel syndrome    Vulvar lesion    Vitamin D deficiency    Thyroid nodule    Restless leg syndrome    Osteoarthritis of knee    Painful orthopaedic hardware ([address] )    Menopause    Irritable bowel syndrome with diarrhea    Insomnia    Hyperthyroidism    Hyperglycemia    GERD without esophagitis    Gastric polyps    Fibromyalgia syndrome    Edema    Recurrent major depressive disorder, in partial remission (HCC)    Endometrial thickening on ultra sound    Bariatric surgery status    Postsurgical malabsorption    PLMD (periodic limb movement disorder)    Obesity, Class I, BMI 30-34 9    Right-sided chest wall pain    History of total knee replacement, left    History of total right knee replacement    PVC's (premature ventricular contractions)    Closed fracture of greater tuberosity of humerus    History of malignant neoplasm of skin    Depression    Fluttering heart    Medicare annual wellness visit, subsequent      Past Medical and Surgical History:     Past Medical History:   Diagnosis Date    Allergic rhinitis     Bariatric surgery status     Basal cell carcinoma     skin CA removed from nose    Bowel habit changes     Cataract     starting with a catract    Cellulitis of left lower extremity     last assessed 06/29/2016    Chronic GERD     Closed displaced fracture of greater tuberosity of humerus     last assessed 05/31/2016    CPAP (continuous positive airway pressure) dependence     Fatty liver     Fibromyalgia     Fibromyalgia, primary     Hiatal hernia     High cholesterol     Insomnia     Morbid obesity (HCC)     Osteoarthritis     Palpitations     pt denies a fib    PONV (postoperative nausea and vomiting)     Postgastrectomy malabsorption     Recurrent pregnancy loss, antepartum condition or complication     Restless leg     [facility]     Sleep apnea     Superficial phlebitis and thrombophlebitis of left lower extremity     last assessed 06/08/2016    Thyroid nodule     nodule on thyroid    Urinary tract infection     Varicose veins of left leg with edema     Wears glasses      Past Surgical History:   Procedure Laterality Date    BLADDER SURGERY      sling    CHOLECYSTECTOMY  1998    COLONOSCOPY      CYST REMOVAL      thigh    DILATION AND CURETTAGE OF UTERUS      ESOPHAGOGASTRODUODENOSCOPY      ESOPHAGOGASTRODUODENOSCOPY N/A 12/21/2017    Procedure: ESOPHAGOGASTRODUODENOSCOPY (EGD) with biopsy and polypectomy;  Surgeon: Maurisio Dickey MD;  Location: [facility]; Service: Gastroenterology    HARDWARE REMOVAL      right shoulder    JOINT REPLACEMENT      both knees    KNEE ARTHROSCOPY      ORIF HUMERAL SHAFT FRACTURE Right     WY ENDOVENOUS LASER, 1ST VEIN Left 4/29/2016    Procedure: GREATER SAPHENOUS VEIN EVLT ;  Surgeon: Mark Noriega DO;  [facility];  Service: Vascular    WY ESOPHAGOGASTRODUODENOSCOPY TRANSORAL DIAGNOSTIC N/A 1/11/2018    Procedure: egd w/ emr ;  Surgeon: Tisha Burton MD;  [facility]; Service: Gastroenterology    WY ESOPHAGOGASTRODUODENOSCOPY TRANSORAL DIAGNOSTIC N/A 6/28/2018    Procedure: ESOPHAGOGASTRODUODENOSCOPY (EGD); Surgeon: Tisha Burton MD;  [facility];   Service: Gastroenterology    WY LAP, ESA RESTRICT PROC, LONGITUDINAL GASTRECTOMY N/A 3/19/2018    Procedure: GASTRECTOMY SLEEVE LAPAROSCOPIC AND INTRAOPERATIVE EGD;  Surgeon: Momo Valle MD;  [facility];  Service: [address] [place] [address] TO  Left 4/29/2016    Procedure: AND STAB PHLEBECTOMIES ;  Surgeon: Mark Noriega DO;  [facility];  Service: Vascular    REDUCTION MAMMAPLASTY Bilateral 2011    Reduction    SKIN CANCER EXCISION      TOTAL KNEE ARTHROPLASTY Bilateral       Family History:     Family History   Problem Relation Age of Onset    Heart disease Mother     Alzheimer's disease Mother     Depression Mother     Anxiety disorder Mother     Coronary artery disease Mother     Osteoporosis Mother     Arthritis Father     Osteoarthritis Father     Prostate cancer Father         age on onset unknown    Uterine cancer Sister 28    Cardiomyopathy Sister         congestive    No Known Problems Daughter     No Known Problems Maternal Grandmother     No Known Problems Maternal Grandfather     No Known Problems Paternal Grandmother     No Known Problems Paternal Grandfather     No Known Problems Maternal Aunt     No Known Problems Maternal Aunt       Social History:     Social History     Socioeconomic History    Marital status:      Spouse name: None    Number of children: 2    Years of education: None    Highest education level: None   Occupational History    None   Tobacco Use    Smoking status: Never Smoker    Smokeless tobacco: Never Used    Tobacco comment: teenage years    Vaping Use    Vaping Use: Never used   Substance and Sexual Activity    Alcohol use: Yes     Comment: socially    Drug use: No    Sexual activity: Yes     Partners: Male     Birth control/protection: Post-menopausal   Other Topics Concern    None   Social History Narrative    Caffeine use    No caffeine use    Church affiliation [employer]    Uses safety equipment-seat belt     Social Determinants of Health     Financial Resource Strain: Not on file   Food Insecurity: Not on file   Transportation Needs: Not on file   Physical Activity: Not on file   Stress: Not on file   Social Connections: Not on file   Intimate Partner Violence: Not on file   Housing Stability: Not on file      Medications and Allergies:     Current Outpatient Medications   Medication Sig Dispense Refill    atorvastatin (LIPITOR) 10 mg tablet Take 1 tablet (10 mg total) by mouth daily 90 tablet 3    cephalexin (KEFLEX) 500 mg capsule       Cholecalciferol (VITAMIN D-3) 1000 units CAPS Take 4 capsules (4,000 Units total) by mouth daily (Patient taking differently: Take 3,000 Units by mouth daily ) 30 capsule 1    Diphenhydramine-Phenylephrine ([employer] ALLERGY-D PO) Take by mouth      fluticasone (FLONASE) 50 mcg/act nasal spray 2 sprays into each nostril daily      gabapentin (NEURONTIN) 600 MG tablet Take 600 mg by mouth daily    3    Multiple Vitamin (MULTIVITAMIN) capsule Take 1 capsule by mouth daily   pantoprazole (PROTONIX) 20 mg tablet TAKE ONE TABLET BY MOUTH EVERY DAY  90 tablet 3    PARoxetine (PAXIL) 30 mg tablet TAKE ONE TABLET BY MOUTH EVERY DAY  90 tablet 3    Restasis 0 05 % ophthalmic emulsion       traZODone (DESYREL) 50 mg tablet Take 50 mg by mouth daily at bedtime   Calcium Carbonate 1500 (600 Ca) MG TABS Take by mouth (Patient not taking: Reported on 10/18/2021)       No current facility-administered medications for this visit       Allergies   Allergen Reactions    Ketorolac Itching    Percocet [Oxycodone-Acetaminophen] Itching    Flurbiprofen      NSAIDS    Ketorolac Tromethamine Itching    Pneumovax [Pneumococcal Polysaccharide Vaccine] Swelling and Rash      Immunizations:     Immunization History   Administered Date(s) Administered    COVID-19 MODERNA VACC 0 5 ML IM 03/09/2021, 04/06/2021, 11/19/2021    INFLUENZA 11/03/2016, 10/17/2017    Influenza Quadrivalent Preservative Free 3 years and older IM 11/13/2014, 11/30/2015, 11/03/2016, 10/17/2017    Influenza, high dose seasonal 0 7 mL 12/07/2021    Influenza, injectable, quadrivalent, preservative free 0 5 mL 10/27/2020    Influenza, recombinant, quadrivalent,injectable, preservative free 01/03/2019, 10/17/2019    Pneumococcal Polysaccharide PPV23 11/03/2016      Health Maintenance:         Topic Date Due    Breast Cancer Screening: Mammogram  01/20/2023    DXA SCAN  07/29/2025    Colorectal Cancer Screening  02/16/2027    Hepatitis C Screening  Completed         Topic Date Due    DTaP,Tdap,and Td Vaccines (1 - Tdap) Never done    Pneumococcal Vaccine: 65+ Years (1 of 1 - PPSV23) 11/03/2021      Medicare Health Risk Assessment:     /72 (BP Location: Left arm, Patient Position: Sitting, Cuff Size: Adult)   Pulse 96   Temp 98 1 °F (36 7 °C) (Temporal)   Ht 4' 10" (1 473 m) Comment: on record  Wt 61 7 kg (136 lb)   LMP  (LMP Unknown)   BMI 28 42 kg/m²      Tanvir Richter is here for her Initial Wellness visit  Health Risk Assessment:   Patient rates overall health as very good  Patient feels that their physical health rating is same  Patient is very satisfied with their life  Eyesight was rated as same  Hearing was rated as same  Patient feels that their emotional and mental health rating is same  Patients states they are never, rarely angry  Patient states they are never, rarely unusually tired/fatigued  Pain experienced in the last 7 days has been none  Patient states that she has experienced no weight loss or gain in last 6 months  Depression Screening:   PHQ-9 Score: 0      Fall Risk Screening: In the past year, patient has experienced: no history of falling in past year      Urinary Incontinence Screening:   Patient has leaked urine accidently in the last six months  Home Safety:  Patient does not have trouble with stairs inside or outside of their home  Patient has working smoke alarms and has working carbon monoxide detector  Home safety hazards include: none  Nutrition:   Current diet is Regular, Limited junk food, Low Carb and Low Cholesterol  Medications:   Patient is currently taking over-the-counter supplements  OTC medications include: see medication list  Patient is able to manage medications  Activities of Daily Living (ADLs)/Instrumental Activities of Daily Living (IADLs):   Walk and transfer into and out of bed and chair?: Yes  Dress and groom yourself?: Yes    Bathe or shower yourself?: Yes    Feed yourself?  Yes  Do your laundry/housekeeping?: Yes  Manage your money, pay your bills and track your expenses?: Yes  Make your own meals?: Yes    Do your own shopping?: Yes    Previous Hospitalizations:   Any hospitalizations or ED visits within the last 12 months?: No      Advance Care Planning:     Five wishes given: Yes      Cognitive Screening:   Provider or family/friend/caregiver concerned regarding cognition?: No    PREVENTIVE SCREENINGS      Cardiovascular Screening:    General: Screening Not Indicated, History Lipid Disorder and Screening Current      Diabetes Screening:     General: Screening Current      Colorectal Cancer Screening:     General: Screening Current      Breast Cancer Screening:     General: Screening Current      Cervical Cancer Screening:    General: Screening Not Indicated      Osteoporosis Screening:    General: Screening Current      Abdominal Aortic Aneurysm (AAA) Screening:        General: Screening Not Indicated      Lung Cancer Screening:     General: Screening Not Indicated      Hepatitis C Screening:    General: Screening Current    Screening, Brief Intervention, and Referral to Treatment (SBIRT)    Screening    Typical number of drinks in a week: 1    AUDIT-C Screenin) How often did you have a drink containing alcohol in the past year? monthly or less  2) How many drinks did you have on a typical day when you were drinking in the past year?  1 to 2  3) How often did you have 6 or more drinks on one occasion in the past year? never    AUDIT-C Score: 1  Interpretation: Score 0-2 (female): Negative screen for alcohol misuse    Single Item Drug Screening:  How often have you used an illegal drug (including marijuana) or a prescription medication for non-medical reasons in the past year? never    Single Item Drug Screen Score: 0  Interpretation: Negative screen for possible drug use disorder      AMBREEN HAMMOND

## 2022-02-22 NOTE — PROGRESS NOTES
Assessment and Plan:    Problem List Items Addressed This Visit        Digestive    GERD without esophagitis - Primary     Stable on Protonix encouraged to go every other day if able  Relevant Medications    pantoprazole (PROTONIX) 20 mg tablet    Postsurgical malabsorption    Relevant Orders    CBC and differential       Endocrine    Hyperthyroidism     TSH PTH within normal limits  Other    Mild hyperlipidemia     Patient is on Lipitor 10 mg once daily her LDL is good at 83 HDL is slightly low at 46  Increase activity recheck 6 months  Relevant Orders    Lipid Panel with Direct LDL reflex    Vitamin D deficiency     Vitamin-D is good at 52 continue check yearly  Restless leg syndrome     Stable on gabapentin  Insomnia     Stable on trazodone  Hyperglycemia     Fasting sugar is 122 up from 107 however A1c is normal at 5 3 continue to observe decrease bread pasta potatoes rice junk food desserts and sweets recheck 6 months  Relevant Orders    Comprehensive metabolic panel    Hemoglobin A1C    Recurrent major depressive disorder, in partial remission (HCC)     No SI or HI patient stable on Paxil 30 mg once daily  Relevant Medications    PARoxetine (PAXIL) 30 mg tablet    Medicare annual wellness visit, subsequent     Blood work up-to-date COVID pneumonia 23 dex and mammo all up-to-date shingles vaccine recommended  Blue pack 5 wishes given highly encouraged to bring back living will or POA to put her ACP tab  Pt does not want prevnar 13 as she had an allergic reaction to 23 in the past             Other Visit Diagnoses     Anxiety        Relevant Medications    PARoxetine (PAXIL) 30 mg tablet                 Diagnoses and all orders for this visit:    GERD without esophagitis  -     pantoprazole (PROTONIX) 20 mg tablet; Take 1 tablet (20 mg total) by mouth daily    Hyperglycemia  -     Comprehensive metabolic panel;  Future  -     Hemoglobin A1C; Future    Mild hyperlipidemia  -     Lipid Panel with Direct LDL reflex; Future    Vitamin D deficiency    Recurrent major depressive disorder, in partial remission (HCC)    Restless leg syndrome    Primary insomnia    Postsurgical malabsorption  -     CBC and differential; Future    Medicare annual wellness visit, subsequent    Hyperthyroidism    Anxiety  -     PARoxetine (PAXIL) 30 mg tablet; Take 1 tablet (30 mg total) by mouth daily              Subjective:      Patient ID: Maykel Plascencia is a 77 y o  female  CC:    Chief Complaint   Patient presents with    Follow-up     6 month f/u review labs from 2/14/22 - pt offers no complaints   Medicare Wellness Visit     initial          HPI:      Maykel Plascencia is here for chronic conditions f/u including the diagnosis of Gibran Rizvi without esophagitis  (primary encounter diagnosis)  Hyperglycemia  Mild hyperlipidemia  Vitamin d deficiency  Recurrent major depressive disorder, in partial remission (hcc)  Restless leg syndrome  Primary insomnia  Postsurgical malabsorption  Medicare annual wellness visit, subsequent   Pt  states they are taking all medications as directed without complaints or side effects   Pt  had labs done prior to today's visit which included Recent Results (from the past 672 hour(s))  -Lipid Panel with Direct LDL reflex:   Collection Time: 02/14/22  7:54 AM       Result                      Value             Ref Range           Cholesterol                 150               See Comment *       Triglycerides               105               See Comment *       HDL, Direct                 46 (L)            >=50 mg/dL          LDL Calculated              83                0 - 100 mg/dL  -Hemoglobin A1C:   Collection Time: 02/14/22  7:54 AM       Result                      Value             Ref Range           Hemoglobin A1C              5 3               Normal 3 8-5*       EAG                         105               mg/dl -Comprehensive metabolic panel:   Collection Time: 02/14/22  7:54 AM       Result                      Value             Ref Range           Sodium                      137               136 - 145 mm*       Potassium                   3 8               3 5 - 5 3 mm*       Chloride                    105               100 - 108 mm*       CO2                         27                21 - 32 mmol*       ANION GAP                   5                 4 - 13 mmol/L       BUN                         9                 5 - 25 mg/dL        Creatinine                  0 55 (L)          0 60 - 1 30 *       Glucose, Fasting            122 (H)           65 - 99 mg/dL       Calcium                     8 8               8 3 - 10 1 m*       AST                         8                 5 - 45 U/L          ALT                         15                12 - 78 U/L         Alkaline Phosphatase        76                46 - 116 U/L        Total Protein               7 2               6 4 - 8 2 g/*       Albumin                     3 5               3 5 - 5 0 g/*       Total Bilirubin             0 52              0 20 - 1 00 *       eGFR                        98                ml/min/1 73s*  -TSH, 3rd generation with Free T4 reflex:   Collection Time: 02/14/22  7:54 AM       Result                      Value             Ref Range           TSH 3RD GENERATON           0 703             0 358 - 3 74*  -Vitamin D 25 hydroxy:   Collection Time: 02/14/22  7:54 AM       Result                      Value             Ref Range           Vit D, 25-Hydroxy           52 2              30 0 - 100 0*  -Hepatitis C Antibody ([facility]):   Collection Time: 02/14/22  7:54 AM       Result                      Value             Ref Range           Hepatitis C Ab              Non-reactive      Non-reactive   -Zinc:   Collection Time: 02/14/22  7:54 AM       Result                      Value             Ref Range           Zinc 88                44 - 115 ug/*  -Vitamin B12:   Collection Time: 02/14/22  7:54 AM       Result                      Value             Ref Range           Vitamin B-12                410               100 - 900 pg*  -PTH, intact:   Collection Time: 02/14/22  7:54 AM       Result                      Value             Ref Range           PTH                         76 9              18 4 - 80 1 *  -CBC and Platelet:   Collection Time: 02/14/22  7:54 AM       Result                      Value             Ref Range           WBC                         6 12              4 31 - 10 16*       RBC                         4 38              3 81 - 5 12 *       Hemoglobin                  13 2              11 5 - 15 4 *       Hematocrit                  40 4              34 8 - 46 1 %       MCV                         92                82 - 98 fL          MCH                         30 1              26 8 - 34 3 *       MCHC                        32 7              31 4 - 37 4 *       RDW                         11 6              11 6 - 15 1 %       Platelets                   278               149 - 390 Th*       MPV                         11 0              8 9 - 12 7 fL  -Ferritin:   Collection Time: 02/14/22  7:54 AM       Result                      Value             Ref Range           Ferritin                    45                8 - 388 ng/mL  -Folate:   Collection Time: 02/14/22  7:54 AM       Result                      Value             Ref Range           Folate                      19 2 (H)          3 1 - 17 5 n*  -Iron Saturation %:   Collection Time: 02/14/22  7:54 AM       Result                      Value             Ref Range           Iron Saturation             38                15 - 50 %           TIBC                        268               250 - 450 ug*       Iron                        103               50 - 170 ug/*        The following portions of the patient's history were reviewed and updated as appropriate: allergies, current medications, past family history, past medical history, past social history, past surgical history and problem list       Review of Systems   Constitutional: Negative  HENT: Negative  Eyes: Negative  Respiratory: Negative  Cardiovascular: Negative  Gastrointestinal: Negative  Endocrine: Negative  Genitourinary: Negative  Musculoskeletal: Negative  Skin: Negative  Allergic/Immunologic: Negative  Neurological: Negative  Hematological: Negative  Psychiatric/Behavioral: Negative  Data to review:       Objective:    Vitals:    02/22/22 1321   BP: 118/72   BP Location: Left arm   Patient Position: Sitting   Cuff Size: Adult   Pulse: 96   Temp: 98 1 °F (36 7 °C)   TempSrc: Temporal   Weight: 61 7 kg (136 lb)   Height: 4' 10" (1 473 m)        Physical Exam  Vitals and nursing note reviewed  Constitutional:       Appearance: Normal appearance  She is well-developed  HENT:      Head: Normocephalic and atraumatic  Eyes:      General: Lids are normal       Conjunctiva/sclera: Conjunctivae normal       Pupils: Pupils are equal, round, and reactive to light  Cardiovascular:      Rate and Rhythm: Normal rate and regular rhythm  Heart sounds: No murmur heard  Pulmonary:      Effort: Pulmonary effort is normal       Breath sounds: Normal breath sounds  Skin:     General: Skin is warm and dry  Neurological:      General: No focal deficit present  Mental Status: She is alert  Coordination: Coordination is intact  Psychiatric:         Mood and Affect: Mood normal          Behavior: Behavior normal  Behavior is cooperative  Thought Content:  Thought content normal          Judgment: Judgment normal

## 2022-02-22 NOTE — ASSESSMENT & PLAN NOTE
Patient is on Lipitor 10 mg once daily her LDL is good at 83 HDL is slightly low at 46  Increase activity recheck 6 months

## 2022-02-28 LAB — VIT B1 BLD-SCNC: 104.7 NMOL/L (ref 66.5–200)

## 2022-05-23 ENCOUNTER — HOSPITAL ENCOUNTER (OUTPATIENT)
Dept: MAMMOGRAPHY | Facility: MEDICAL CENTER | Age: 67
Discharge: HOME | End: 2022-05-23
Payer: COMMERCIAL

## 2022-05-23 VITALS — WEIGHT: 136 LBS | BODY MASS INDEX: 28.55 KG/M2 | HEIGHT: 58 IN

## 2022-05-23 DIAGNOSIS — Z12.31 ENCOUNTER FOR SCREENING MAMMOGRAM FOR BREAST CANCER: ICD-10-CM

## 2022-05-23 PROCEDURE — 77067 SCR MAMMO BI INCL CAD: CPT

## 2022-05-23 PROCEDURE — 77063 BREAST TOMOSYNTHESIS BI: CPT

## 2022-08-02 ENCOUNTER — NURSE TRIAGE (OUTPATIENT)
Dept: OTHER | Facility: OTHER | Age: 67
End: 2022-08-02

## 2022-08-02 ENCOUNTER — OFFICE VISIT (OUTPATIENT)
Dept: FAMILY MEDICINE CLINIC | Facility: CLINIC | Age: 67
End: 2022-08-02
Payer: COMMERCIAL

## 2022-08-02 VITALS
TEMPERATURE: 97.8 F | HEIGHT: 59 IN | DIASTOLIC BLOOD PRESSURE: 82 MMHG | BODY MASS INDEX: 27.58 KG/M2 | HEART RATE: 88 BPM | WEIGHT: 136.8 LBS | SYSTOLIC BLOOD PRESSURE: 138 MMHG

## 2022-08-02 DIAGNOSIS — R05.9 COUGH: ICD-10-CM

## 2022-08-02 DIAGNOSIS — L04.0 ACUTE CERVICAL ADENITIS: ICD-10-CM

## 2022-08-02 DIAGNOSIS — I49.8 FLUTTERING HEART: ICD-10-CM

## 2022-08-02 DIAGNOSIS — J30.1 NON-SEASONAL ALLERGIC RHINITIS DUE TO POLLEN: ICD-10-CM

## 2022-08-02 DIAGNOSIS — J01.40 ACUTE PANSINUSITIS, RECURRENCE NOT SPECIFIED: Primary | ICD-10-CM

## 2022-08-02 DIAGNOSIS — I49.3 PVC'S (PREMATURE VENTRICULAR CONTRACTIONS): ICD-10-CM

## 2022-08-02 DIAGNOSIS — H69.80 DYSFUNCTION OF EUSTACHIAN TUBE, UNSPECIFIED LATERALITY: ICD-10-CM

## 2022-08-02 PROBLEM — F32.A DEPRESSION: Status: RESOLVED | Noted: 2021-01-21 | Resolved: 2022-08-02

## 2022-08-02 PROBLEM — K58.0 IRRITABLE BOWEL SYNDROME WITH DIARRHEA: Status: RESOLVED | Noted: 2017-12-18 | Resolved: 2022-08-02

## 2022-08-02 PROCEDURE — 99214 OFFICE O/P EST MOD 30 MIN: CPT | Performed by: FAMILY MEDICINE

## 2022-08-02 RX ORDER — CEPHALEXIN 500 MG/1
CAPSULE ORAL
Qty: 21 CAPSULE | Refills: 0 | Status: SHIPPED | OUTPATIENT
Start: 2022-08-02 | End: 2022-08-09

## 2022-08-02 RX ORDER — GUAIFENESIN AND CODEINE PHOSPHATE 100; 10 MG/5ML; MG/5ML
5 SOLUTION ORAL 3 TIMES DAILY PRN
Qty: 120 ML | Refills: 0 | Status: SHIPPED | OUTPATIENT
Start: 2022-08-02 | End: 2022-10-03

## 2022-08-02 NOTE — TELEPHONE ENCOUNTER
Patient reports sore throat, post nasal drip, and blocked ears  She would like to be seen in the office and would like a call back to advise  Reason for Disposition   Patient wants to be seen    Answer Assessment - Initial Assessment Questions  1  ONSET: "When did the throat start hurting?" (Hours or days ago)       8/1/22  2  SEVERITY: "How bad is the sore throat?" (Scale 1-10; mild, moderate or severe)    - MILD (1-3):  doesn't interfere with eating or normal activities    - MODERATE (4-7): interferes with eating some solids and normal activities    - SEVERE (8-10):  excruciating pain, interferes with most normal activities    - SEVERE DYSPHAGIA: can't swallow liquids, drooling      Mild  3  STREP EXPOSURE: "Has there been any exposure to strep within the past week?" If Yes, ask: "What type of contact occurred?"       Denies   4  VIRAL SYMPTOMS: "Are there any symptoms of a cold, such as a runny nose, cough, hoarse voice or red eyes?"       Ears blocked, post nasal drip  5  FEVER: "Do you have a fever?" If Yes, ask: "What is your temperature, how was it measured, and when did it start?"      Denies  6  PUS ON THE TONSILS: "Is there pus on the tonsils in the back of your throat?"      Denies  7  OTHER SYMPTOMS: "Do you have any other symptoms?" (e g , difficulty breathing, headache, rash)      Denies   8   PREGNANCY: "Is there any chance you are pregnant?" "When was your last menstrual period?"      Denies    Protocols used: SORE THROAT-ADULT-OH

## 2022-08-02 NOTE — LETTER
August 2, 2022     Patient: Ran Rice   YOB: 1955   Date of Visit: 8/2/2022       To Whom It May Concern: It is my medical opinion that Mary Frank may return to work on 08/03/2022  If you have any questions or concerns, please don't hesitate to call           Sincerely,        Sam Melton DO    CC: No Recipients

## 2022-08-02 NOTE — PATIENT INSTRUCTIONS
Use Flonase nasal spray 2 sprays each nostril twice daily for 2 weeks then go down to once a day   Finish Keflex antibiotic   Use Robitussin with codeine as needed for cough  Do not drive or operate machinery until side effect is known this will cause drowsiness    I recommend using this with food   Return in 1 week if still symptoms

## 2022-08-02 NOTE — PROGRESS NOTES
Assessment and Plan:  1  Acute sinusitis   2  Acute cervical adenitis   Keflex 500 mg t i d  for 1 week is ordered  3  Allergic rhinitis, patient use Flonase 2 sprays each nostril twice daily for 2 weeks then may decrease to once a day  4  Cough  5  Eustachian tube dysfunction  Robitussin AC was ordered  Patient states she does tolerate codeine works the best for her cough  Drowsiness and GI upset were discussed  PDMP was reviewed  6  PVC/fluttering heart, because of these diagnosis no decongestant was used  7  Return in 1 week if still with symptoms  8  Patient's COVID testing was negative today at home    Problem List Items Addressed This Visit        Respiratory    Non-seasonal allergic rhinitis due to pollen     Increase Flonase to b i d  for 2 weeks            Cardiovascular and Mediastinum    PVC's (premature ventricular contractions)     Because of this no decongestant            Other    Fluttering heart     Because of this no decongestant           Other Visit Diagnoses     Acute pansinusitis, recurrence not specified    -  Primary    Relevant Medications    cephalexin (KEFLEX) 500 mg capsule    Acute cervical adenitis        Relevant Medications    cephalexin (KEFLEX) 500 mg capsule    Dysfunction of Eustachian tube, unspecified laterality        Relevant Medications    guaifenesin-codeine (GUAIFENESIN AC) 100-10 MG/5ML liquid    Cough        Relevant Medications    guaifenesin-codeine (GUAIFENESIN AC) 100-10 MG/5ML liquid                 Diagnoses and all orders for this visit:    Acute pansinusitis, recurrence not specified  -     cephalexin (KEFLEX) 500 mg capsule; Take 1 capsule 3 times daily for 1 week    Acute cervical adenitis  -     cephalexin (KEFLEX) 500 mg capsule; Take 1 capsule 3 times daily for 1 week    Non-seasonal allergic rhinitis due to pollen    Dysfunction of Eustachian tube, unspecified laterality  -     guaifenesin-codeine (GUAIFENESIN AC) 100-10 MG/5ML liquid;  Take 5 mL by mouth 3 (three) times a day as needed for cough    Cough  -     guaifenesin-codeine (GUAIFENESIN AC) 100-10 MG/5ML liquid; Take 5 mL by mouth 3 (three) times a day as needed for cough    PVC's (premature ventricular contractions)    Fluttering heart            Subjective:      Patient ID: Juma Long is a 79 y o  female  CC:    Chief Complaint   Patient presents with    Cough    Sore Throat     Pt c/o ears feeling full, dry cough sore throat started yesterday , Pt states took a Covid test today which was normal        HPI:    Yesterday patient started with nasal congestion postnasal drip sinus pain and pressure sore throat and mild dry cough  Today she had mild to moderate dry cough  Patient is vaccinated for COVID  Patient did a COVID test at home earlier today and was negative  The following portions of the patient's history were reviewed and updated as appropriate: allergies, current medications, past family history, past medical history, past social history, past surgical history and problem list       Review of Systems   Constitutional: Negative for chills and fever  HENT:        HPI   Eyes: Negative  Respiratory:        HPI   Cardiovascular: Negative  Gastrointestinal: Negative  Endocrine: Negative  Genitourinary: Negative  Musculoskeletal: Negative  Skin: Negative  Allergic/Immunologic: Positive for environmental allergies  Neurological: Negative  Hematological: Negative  Psychiatric/Behavioral: Negative  Data to review:       Objective:    Vitals:    08/02/22 1632   BP: 138/82   Pulse: 88   Temp: 97 8 °F (36 6 °C)   Weight: 62 1 kg (136 lb 12 8 oz)   Height: 4' 10 5" (1 486 m)        Physical Exam  Vitals and nursing note reviewed  Constitutional:       Appearance: Normal appearance  HENT:      Head: Normocephalic and atraumatic        Ears:      Comments: Tympanic membranes are dull no injection no fluid     Nose:      Comments: Positive allergic turbinates positive pansinus tenderness to percussion     Mouth/Throat:      Comments: Positive purulent postnasal drip minimal pharyngeal injection negative exudate  Eyes:      General: No scleral icterus  Neck:      Comments: Positive tender cervical adenopathy, anterior  Cardiovascular:      Rate and Rhythm: Normal rate and regular rhythm  Heart sounds: Normal heart sounds  Pulmonary:      Effort: Pulmonary effort is normal       Breath sounds: Normal breath sounds  Musculoskeletal:      Cervical back: Neck supple  Lymphadenopathy:      Cervical: Cervical adenopathy present  Skin:     General: Skin is warm and dry  Neurological:      General: No focal deficit present  Mental Status: She is alert     Psychiatric:         Mood and Affect: Mood normal

## 2022-08-16 ENCOUNTER — OFFICE VISIT (OUTPATIENT)
Dept: FAMILY MEDICINE CLINIC | Facility: CLINIC | Age: 67
End: 2022-08-16
Payer: COMMERCIAL

## 2022-08-16 VITALS
HEIGHT: 59 IN | HEART RATE: 66 BPM | TEMPERATURE: 97.9 F | DIASTOLIC BLOOD PRESSURE: 80 MMHG | OXYGEN SATURATION: 98 % | WEIGHT: 137.5 LBS | BODY MASS INDEX: 27.72 KG/M2 | SYSTOLIC BLOOD PRESSURE: 116 MMHG

## 2022-08-16 DIAGNOSIS — M25.512 TRIGGER POINT OF LEFT SHOULDER REGION: Primary | ICD-10-CM

## 2022-08-16 PROCEDURE — 1160F RVW MEDS BY RX/DR IN RCRD: CPT | Performed by: NURSE PRACTITIONER

## 2022-08-16 PROCEDURE — 99213 OFFICE O/P EST LOW 20 MIN: CPT | Performed by: NURSE PRACTITIONER

## 2022-08-16 RX ORDER — METHOCARBAMOL 500 MG/1
500 TABLET, FILM COATED ORAL 2 TIMES DAILY PRN
Qty: 30 TABLET | Refills: 0 | Status: SHIPPED | OUTPATIENT
Start: 2022-08-16 | End: 2022-10-03

## 2022-08-16 NOTE — ASSESSMENT & PLAN NOTE
Advised to continue with heat  Take a small softball or tennis ball and use to apply direct pressure to massage the area either against the wall or against a chair  Apply heat prior for 15- 20 minutes  Muscle relaxer prescribed       Also recommend salonpas patch

## 2022-08-16 NOTE — PATIENT INSTRUCTIONS
Problem List Items Addressed This Visit          Other    Trigger point of left shoulder region - Primary     Advised to continue with heat  Take a small softball or tennis ball and use to apply direct pressure to massage the area either against the wall or against a chair  Apply heat prior for 15- 20 minutes  Muscle relaxer prescribed       Also recommend salonpas patch            Relevant Medications    methocarbamol (ROBAXIN) 500 mg tablet

## 2022-08-16 NOTE — PROGRESS NOTES
Assessment and Plan:    Problem List Items Addressed This Visit        Other    Trigger point of left shoulder region - Primary     Advised to continue with heat  Take a small softball or tennis ball and use to apply direct pressure to massage the area either against the wall or against a chair  Apply heat prior for 15- 20 minutes  Muscle relaxer prescribed  Also recommend salonpas patch          Relevant Medications    methocarbamol (ROBAXIN) 500 mg tablet                 Diagnoses and all orders for this visit:    Trigger point of left shoulder region  -     methocarbamol (ROBAXIN) 500 mg tablet; Take 1 tablet (500 mg total) by mouth 2 (two) times a day as needed for muscle spasms              Subjective:      Patient ID: Sury Rivsa is a 79 y o  female  CC:    Chief Complaint   Patient presents with    Back Pain     Left sided upper back back pain x 5 days  Pt feels she slept wrong and has a kink in the area near the shoulder blade   mgb       HPI:    HPI     Patient states last Thursday she woke from sleep with left shoulder blade pain thought she slept wrong and it would go away  But it worsened through Sunday  Patient started heating pad most of the day yesterday  She has been taking tylenol every 4 hours  The pain is too much  Pain rating 8/10  It feels stabbing  The following portions of the patient's history were reviewed and updated as appropriate: allergies, current medications, past family history, past medical history, past social history, past surgical history and problem list       Review of Systems   Constitutional: Negative  Respiratory: Negative  Cardiovascular: Negative  Musculoskeletal: Positive for arthralgias (upper back pain )  Neurological: Negative for dizziness, weakness and numbness           Data to review:       Objective:    Vitals:    08/16/22 1422   BP: 116/80   BP Location: Right arm   Patient Position: Sitting   Cuff Size: Standard   Pulse: 66 Temp: 97 9 °F (36 6 °C)   TempSrc: Temporal   SpO2: 98%   Weight: 62 4 kg (137 lb 8 oz)   Height: 4' 10 5" (1 486 m)        Physical Exam  Vitals and nursing note reviewed  Constitutional:       General: She is not in acute distress  Appearance: Normal appearance  She is not ill-appearing or diaphoretic  HENT:      Head: Normocephalic and atraumatic  Right Ear: External ear normal       Left Ear: External ear normal    Eyes:      Extraocular Movements: Extraocular movements intact  Conjunctiva/sclera: Conjunctivae normal    Cardiovascular:      Rate and Rhythm: Normal rate and regular rhythm  Heart sounds: Normal heart sounds, S1 normal and S2 normal  No murmur heard  Pulmonary:      Effort: Pulmonary effort is normal       Breath sounds: Normal breath sounds  Musculoskeletal:        Arms:    Skin:     Coloration: Skin is not pale  Neurological:      Mental Status: She is alert and oriented to person, place, and time  Psychiatric:         Mood and Affect: Mood normal          Behavior: Behavior normal          Thought Content:  Thought content normal          Judgment: Judgment normal

## 2022-08-22 ENCOUNTER — RA CDI HCC (OUTPATIENT)
Dept: OTHER | Facility: HOSPITAL | Age: 67
End: 2022-08-22

## 2022-08-22 NOTE — PROGRESS NOTES
[facility]  coding opportunities       Chart reviewed, no opportunity found: CHART REVIEWED, NO OPPORTUNITY FOUND        Patients Insurance     Medicare Insurance: [employer] [facility] Advantage

## 2022-08-23 ENCOUNTER — APPOINTMENT (OUTPATIENT)
Dept: LAB | Facility: CLINIC | Age: 67
End: 2022-08-23
Payer: COMMERCIAL

## 2022-08-23 DIAGNOSIS — K91.2 POSTSURGICAL MALABSORPTION: ICD-10-CM

## 2022-08-23 DIAGNOSIS — R73.9 HYPERGLYCEMIA: ICD-10-CM

## 2022-08-23 DIAGNOSIS — E78.5 MILD HYPERLIPIDEMIA: ICD-10-CM

## 2022-08-23 LAB
ALBUMIN SERPL BCP-MCNC: 3.5 G/DL (ref 3.5–5)
ALP SERPL-CCNC: 66 U/L (ref 46–116)
ALT SERPL W P-5'-P-CCNC: 27 U/L (ref 12–78)
ANION GAP SERPL CALCULATED.3IONS-SCNC: 4 MMOL/L (ref 4–13)
AST SERPL W P-5'-P-CCNC: 13 U/L (ref 5–45)
BASOPHILS # BLD AUTO: 0.04 THOUSANDS/ΜL (ref 0–0.1)
BASOPHILS NFR BLD AUTO: 1 % (ref 0–1)
BILIRUB SERPL-MCNC: 0.61 MG/DL (ref 0.2–1)
BUN SERPL-MCNC: 12 MG/DL (ref 5–25)
CALCIUM SERPL-MCNC: 9.2 MG/DL (ref 8.3–10.1)
CHLORIDE SERPL-SCNC: 109 MMOL/L (ref 96–108)
CHOLEST SERPL-MCNC: 161 MG/DL
CO2 SERPL-SCNC: 27 MMOL/L (ref 21–32)
CREAT SERPL-MCNC: 0.5 MG/DL (ref 0.6–1.3)
EOSINOPHIL # BLD AUTO: 0.06 THOUSAND/ΜL (ref 0–0.61)
EOSINOPHIL NFR BLD AUTO: 1 % (ref 0–6)
ERYTHROCYTE [DISTWIDTH] IN BLOOD BY AUTOMATED COUNT: 12.1 % (ref 11.6–15.1)
EST. AVERAGE GLUCOSE BLD GHB EST-MCNC: 111 MG/DL
GFR SERPL CREATININE-BSD FRML MDRD: 100 ML/MIN/1.73SQ M
GLUCOSE P FAST SERPL-MCNC: 95 MG/DL (ref 65–99)
HBA1C MFR BLD: 5.5 %
HCT VFR BLD AUTO: 39.5 % (ref 34.8–46.1)
HDLC SERPL-MCNC: 60 MG/DL
HGB BLD-MCNC: 13.2 G/DL (ref 11.5–15.4)
IMM GRANULOCYTES # BLD AUTO: 0.03 THOUSAND/UL (ref 0–0.2)
IMM GRANULOCYTES NFR BLD AUTO: 1 % (ref 0–2)
LDLC SERPL CALC-MCNC: 86 MG/DL (ref 0–100)
LYMPHOCYTES # BLD AUTO: 1.99 THOUSANDS/ΜL (ref 0.6–4.47)
LYMPHOCYTES NFR BLD AUTO: 32 % (ref 14–44)
MCH RBC QN AUTO: 30.1 PG (ref 26.8–34.3)
MCHC RBC AUTO-ENTMCNC: 33.4 G/DL (ref 31.4–37.4)
MCV RBC AUTO: 90 FL (ref 82–98)
MONOCYTES # BLD AUTO: 0.51 THOUSAND/ΜL (ref 0.17–1.22)
MONOCYTES NFR BLD AUTO: 8 % (ref 4–12)
NEUTROPHILS # BLD AUTO: 3.55 THOUSANDS/ΜL (ref 1.85–7.62)
NEUTS SEG NFR BLD AUTO: 57 % (ref 43–75)
NRBC BLD AUTO-RTO: 0 /100 WBCS
PLATELET # BLD AUTO: 276 THOUSANDS/UL (ref 149–390)
PMV BLD AUTO: 10.6 FL (ref 8.9–12.7)
POTASSIUM SERPL-SCNC: 4.6 MMOL/L (ref 3.5–5.3)
PROT SERPL-MCNC: 7 G/DL (ref 6.4–8.4)
RBC # BLD AUTO: 4.39 MILLION/UL (ref 3.81–5.12)
SODIUM SERPL-SCNC: 140 MMOL/L (ref 135–147)
TRIGL SERPL-MCNC: 77 MG/DL
WBC # BLD AUTO: 6.18 THOUSAND/UL (ref 4.31–10.16)

## 2022-08-23 PROCEDURE — 83036 HEMOGLOBIN GLYCOSYLATED A1C: CPT

## 2022-08-23 PROCEDURE — 80061 LIPID PANEL: CPT

## 2022-08-23 PROCEDURE — 36415 COLL VENOUS BLD VENIPUNCTURE: CPT

## 2022-08-23 PROCEDURE — 85025 COMPLETE CBC W/AUTO DIFF WBC: CPT

## 2022-08-23 PROCEDURE — 80053 COMPREHEN METABOLIC PANEL: CPT

## 2022-09-07 ENCOUNTER — OFFICE VISIT (OUTPATIENT)
Dept: FAMILY MEDICINE CLINIC | Facility: CLINIC | Age: 67
End: 2022-09-07
Payer: COMMERCIAL

## 2022-09-07 VITALS
HEIGHT: 59 IN | SYSTOLIC BLOOD PRESSURE: 108 MMHG | WEIGHT: 136 LBS | HEART RATE: 86 BPM | BODY MASS INDEX: 27.42 KG/M2 | TEMPERATURE: 97.8 F | DIASTOLIC BLOOD PRESSURE: 64 MMHG | OXYGEN SATURATION: 99 %

## 2022-09-07 DIAGNOSIS — R35.0 FREQUENT URINATION: Primary | ICD-10-CM

## 2022-09-07 DIAGNOSIS — K21.9 GERD WITHOUT ESOPHAGITIS: ICD-10-CM

## 2022-09-07 DIAGNOSIS — E78.5 MILD HYPERLIPIDEMIA: ICD-10-CM

## 2022-09-07 LAB
SL AMB  POCT GLUCOSE, UA: NORMAL
SL AMB LEUKOCYTE ESTERASE,UA: NORMAL
SL AMB POCT BILIRUBIN,UA: NORMAL
SL AMB POCT BLOOD,UA: NORMAL
SL AMB POCT CLARITY,UA: NORMAL
SL AMB POCT COLOR,UA: NORMAL
SL AMB POCT KETONES,UA: NORMAL
SL AMB POCT NITRITE,UA: NORMAL
SL AMB POCT PH,UA: 5.5
SL AMB POCT SPECIFIC GRAVITY,UA: 1.02
SL AMB POCT URINE PROTEIN: NORMAL
SL AMB POCT UROBILINOGEN: 0.2

## 2022-09-07 PROCEDURE — 99214 OFFICE O/P EST MOD 30 MIN: CPT | Performed by: PHYSICIAN ASSISTANT

## 2022-09-07 PROCEDURE — 81002 URINALYSIS NONAUTO W/O SCOPE: CPT | Performed by: PHYSICIAN ASSISTANT

## 2022-09-07 PROCEDURE — 87086 URINE CULTURE/COLONY COUNT: CPT | Performed by: PHYSICIAN ASSISTANT

## 2022-09-07 PROCEDURE — 1160F RVW MEDS BY RX/DR IN RCRD: CPT | Performed by: PHYSICIAN ASSISTANT

## 2022-09-07 PROCEDURE — 3725F SCREEN DEPRESSION PERFORMED: CPT | Performed by: PHYSICIAN ASSISTANT

## 2022-09-07 RX ORDER — CIPROFLOXACIN 500 MG/1
500 TABLET, FILM COATED ORAL EVERY 12 HOURS SCHEDULED
Qty: 14 TABLET | Refills: 0 | Status: SHIPPED | OUTPATIENT
Start: 2022-09-07 | End: 2022-09-14

## 2022-09-07 NOTE — PATIENT INSTRUCTIONS
Assessment/plan:   Urinary tract infection-recommend starting on Cipro 500 mg twice daily for 7 days  Urinalysis will be sent for culture and sensitivity  Patient is advised to contact us if she would develop fevers, chills, or flank pain  Patient verbalizes understanding and agreement with plan  2  Mixed hyperlipidemia -stable on statin therapy, no medication changes  3   GERD-presently stable with   Protonix  No medication changes

## 2022-09-07 NOTE — PROGRESS NOTES
Assessment and Plan:  Patient Instructions     Assessment/plan:   Urinary tract infection-recommend starting on Cipro 500 mg twice daily for 7 days  Urinalysis will be sent for culture and sensitivity  Patient is advised to contact us if she would develop fevers, chills, or flank pain  Patient verbalizes understanding and agreement with plan  2  Mixed hyperlipidemia -stable on statin therapy, no medication changes  3   GERD-presently stable with   Protonix  No medication changes  Problem List Items Addressed This Visit        Digestive    GERD without esophagitis       Other    Mild hyperlipidemia      Other Visit Diagnoses     Frequent urination    -  Primary    Relevant Medications    ciprofloxacin (CIPRO) 500 mg tablet    Other Relevant Orders    POCT urine dip                 Diagnoses and all orders for this visit:    Frequent urination  -     POCT urine dip  -     ciprofloxacin (CIPRO) 500 mg tablet; Take 1 tablet (500 mg total) by mouth every 12 (twelve) hours for 7 days    Mild hyperlipidemia    GERD without esophagitis            Subjective:      Patient ID: Jadon Hyman is a 79 y o  female  CC:    Chief Complaint   Patient presents with    Urinary Frequency     As well as pressure and burning x 1 day   mgb       HPI:      HPI:  This is a 66-year-old female who presents to the office with concerns over dysuria and burning sensation for the past day  She has also had a sensation of pressure in the suprapubic region  She has not had any flank pain, fevers, or chills  She states it has been quite many years since her last urinary tract infection  The following portions of the patient's history were reviewed and updated as appropriate: allergies, current medications, past family history, past medical history, past social history, past surgical history and problem list       Review of Systems   Constitutional: Negative for chills, fatigue and fever     HENT: Negative for congestion, ear pain and sinus pressure  Eyes: Negative for visual disturbance  Respiratory: Negative for cough, chest tightness and shortness of breath  Cardiovascular: Negative for chest pain and palpitations  Gastrointestinal: Negative for diarrhea, nausea and vomiting  Endocrine: Negative for polyuria  Genitourinary: Negative for dysuria and frequency  Musculoskeletal: Negative for arthralgias and myalgias  Skin: Negative for pallor and rash  Neurological: Negative for dizziness, weakness, light-headedness, numbness and headaches  Psychiatric/Behavioral: Negative for agitation, behavioral problems and sleep disturbance  All other systems reviewed and are negative  Data to review:       Objective:    Vitals:    09/07/22 1343   BP: 108/64   BP Location: Left arm   Patient Position: Sitting   Cuff Size: Standard   Pulse: 86   Temp: 97 8 °F (36 6 °C)   TempSrc: Temporal   SpO2: 99%   Weight: 61 7 kg (136 lb)   Height: 4' 10 5" (1 486 m)        Physical Exam  Constitutional:       General: She is not in acute distress  Appearance: Normal appearance  HENT:      Head: Normocephalic and atraumatic  Right Ear: Tympanic membrane normal       Left Ear: Tympanic membrane normal       Nose: No congestion or rhinorrhea  Eyes:      Conjunctiva/sclera: Conjunctivae normal       Pupils: Pupils are equal, round, and reactive to light  Neck:      Vascular: No carotid bruit  Cardiovascular:      Rate and Rhythm: Normal rate and regular rhythm  Heart sounds: No murmur heard  Pulmonary:      Effort: Pulmonary effort is normal  No respiratory distress  Breath sounds: Normal breath sounds  Abdominal:      Palpations: Abdomen is soft  Tenderness: There is no right CVA tenderness or left CVA tenderness  Musculoskeletal:         General: Normal range of motion  Cervical back: Normal range of motion and neck supple  No muscular tenderness     Lymphadenopathy:      Cervical: No cervical adenopathy  Skin:     General: Skin is warm  Capillary Refill: Capillary refill takes less than 2 seconds  Neurological:      General: No focal deficit present  Mental Status: She is alert and oriented to person, place, and time     Psychiatric:         Mood and Affect: Mood normal

## 2022-09-08 LAB — BACTERIA UR CULT: NORMAL

## 2022-09-09 ENCOUNTER — TELEPHONE (OUTPATIENT)
Dept: FAMILY MEDICINE CLINIC | Facility: CLINIC | Age: 67
End: 2022-09-09

## 2022-09-09 NOTE — TELEPHONE ENCOUNTER
----- Message from María Clarke PA-C sent at 9/9/2022 10:21 AM EDT -----   Urine culture shows mixed bacteria  Please finish antibiotic therapy as prescribed and follow up if symptoms would be persistent  Bactrim Counseling:  I discussed with the patient the risks of sulfa antibiotics including but not limited to GI upset, allergic reaction, drug rash, diarrhea, dizziness, photosensitivity, and yeast infections.  Rarely, more serious reactions can occur including but not limited to aplastic anemia, agranulocytosis, methemoglobinemia, blood dyscrasias, liver or kidney failure, lung infiltrates or desquamative/blistering drug rashes.

## 2022-10-03 ENCOUNTER — OFFICE VISIT (OUTPATIENT)
Dept: FAMILY MEDICINE CLINIC | Facility: CLINIC | Age: 67
End: 2022-10-03
Payer: COMMERCIAL

## 2022-10-03 VITALS
SYSTOLIC BLOOD PRESSURE: 128 MMHG | HEART RATE: 82 BPM | WEIGHT: 138.25 LBS | DIASTOLIC BLOOD PRESSURE: 78 MMHG | BODY MASS INDEX: 27.87 KG/M2 | TEMPERATURE: 97.8 F | HEIGHT: 59 IN | OXYGEN SATURATION: 96 %

## 2022-10-03 DIAGNOSIS — K21.9 GERD WITHOUT ESOPHAGITIS: ICD-10-CM

## 2022-10-03 DIAGNOSIS — G47.33 OSA (OBSTRUCTIVE SLEEP APNEA): ICD-10-CM

## 2022-10-03 DIAGNOSIS — E55.9 VITAMIN D DEFICIENCY: ICD-10-CM

## 2022-10-03 DIAGNOSIS — M79.7 FIBROMYALGIA SYNDROME: ICD-10-CM

## 2022-10-03 DIAGNOSIS — E05.90 HYPERTHYROIDISM: Primary | ICD-10-CM

## 2022-10-03 DIAGNOSIS — R73.9 HYPERGLYCEMIA: ICD-10-CM

## 2022-10-03 DIAGNOSIS — F33.41 RECURRENT MAJOR DEPRESSIVE DISORDER, IN PARTIAL REMISSION (HCC): ICD-10-CM

## 2022-10-03 DIAGNOSIS — F51.01 PRIMARY INSOMNIA: ICD-10-CM

## 2022-10-03 DIAGNOSIS — E78.5 MILD HYPERLIPIDEMIA: ICD-10-CM

## 2022-10-03 DIAGNOSIS — Z85.828 HISTORY OF MALIGNANT NEOPLASM OF SKIN: ICD-10-CM

## 2022-10-03 PROBLEM — R07.89 RIGHT-SIDED CHEST WALL PAIN: Status: RESOLVED | Noted: 2019-08-15 | Resolved: 2022-10-03

## 2022-10-03 PROCEDURE — 99214 OFFICE O/P EST MOD 30 MIN: CPT | Performed by: PHYSICIAN ASSISTANT

## 2022-10-03 NOTE — PROGRESS NOTES
Name: Alba Cosby      : 1955      MRN: [id number]  Encounter Provider: Madison Potter PA-C  Encounter Date: 10/3/2022   Encounter department: [facility]    Assessment & Plan     1  Hyperthyroidism  Assessment & Plan:  Check TSH next last     Orders:  -     TSH, 3rd generation with Free T4 reflex; Future; Expected date: 2023    2  Vitamin D deficiency  Assessment & Plan:  Check vitamin-D with next labs  Orders:  -     Vitamin D 25 hydroxy; Future; Expected date: 2023    3  Recurrent major depressive disorder, in partial remission ([facility] [address] )    4  Hyperglycemia  Assessment & Plan:  Fasting sugar great at 95 with an A1c of 5 5  Continue a decrease simple carbs check with next labs  Orders:  -     Hemoglobin A1C; Future; Expected date: 2023    5  Primary insomnia  Assessment & Plan:  PT having issues with dreams on trazodone so will be weaning off this and changing to melatonin  6  Fibromyalgia syndrome    7  PETER (obstructive sleep apnea)  Assessment & Plan:  Does not use CPAP anymore as after her gastric bypass this dx is almost non existent  8  Mild hyperlipidemia  Assessment & Plan:  Patient on Lipitor 10 mg once daily keeping her LDL at goal 86  Continue recheck 6 months  Orders:  -     Lipid Panel with Direct LDL reflex; Future; Expected date: 2023  -     Comprehensive metabolic panel; Future; Expected date: 2023    9  History of malignant neoplasm of skin  Assessment & Plan:  Sees Derm yearly  10  GERD without esophagitis  Assessment & Plan:  Stable on daily PPI  Subjective        Alba Cosby is here for chronic conditions f/u including the diagnosis of No diagnosis found    Pt  states they are taking all medications as directed without complaints or side effects   Pt  had labs done prior to today's visit which included Recent Results (from the past 672 hour(s))  -POCT urine dip:   Collection Time: 09/07/22  2:10 PM       Result                      Value             Ref Range           LEUKOCYTE ESTERASE,UA       sml                                   NITRITE,UA                  neg                                   SL AMB POCT UROBILINOG*     0 2                                   POCT URINE PROTEIN          trc                                    PH,UA                      5 5                                   BLOOD,UA                    lge                                   SPECIFIC GRAVITY,UA         1 025                                 KETONES,UA                  trc                                   BILIRUBIN,UA                neg                                   GLUCOSE, UA                 neg                                    COLOR,UA                   ylw                                   CLARITY,UA                  cloudy                           -Urine culture:   Collection Time: 09/07/22  2:14 PM  Specimen: Urine, Clean Catch       Result                      Value             Ref Range           Urine Culture                                                 10,000-19,000 cfu/ml       Review of Systems   Constitutional: Negative  HENT: Negative  Eyes: Negative  Respiratory: Negative  Cardiovascular: Negative  Gastrointestinal: Negative  Endocrine: Negative  Genitourinary: Negative  Musculoskeletal: Negative  Skin: Negative  Allergic/Immunologic: Negative  Neurological: Negative  Hematological: Negative  Psychiatric/Behavioral: Negative          Current Outpatient Medications on File Prior to Visit   Medication Sig    atorvastatin (LIPITOR) 10 mg tablet TAKE ONE TABLET BY MOUTH DAILY    Calcium Carbonate 1500 (600 Ca) MG TABS Take by mouth    Cholecalciferol (VITAMIN D-3) 1000 units CAPS Take 4 capsules (4,000 Units total) by mouth daily (Patient taking differently: Take 3,000 Units by mouth daily)    Diphenhydramine-Phenylephrine ([employer] ALLERGY-D PO) Take by mouth    fluticasone (FLONASE) 50 mcg/act nasal spray 2 sprays into each nostril daily    gabapentin (NEURONTIN) 600 MG tablet Take 600 mg by mouth daily      Multiple Vitamin (MULTIVITAMIN) capsule Take 1 capsule by mouth daily   pantoprazole (PROTONIX) 20 mg tablet Take 1 tablet (20 mg total) by mouth daily    PARoxetine (PAXIL) 30 mg tablet Take 1 tablet (30 mg total) by mouth daily    Restasis 0 05 % ophthalmic emulsion     traZODone (DESYREL) 50 mg tablet Take 50 mg by mouth daily at bedtime   [DISCONTINUED] guaifenesin-codeine (GUAIFENESIN AC) 100-10 MG/5ML liquid Take 5 mL by mouth 3 (three) times a day as needed for cough (Patient not taking: Reported on 9/7/2022)    [DISCONTINUED] methocarbamol (ROBAXIN) 500 mg tablet Take 1 tablet (500 mg total) by mouth 2 (two) times a day as needed for muscle spasms (Patient not taking: Reported on 9/7/2022)       Objective     /78 (BP Location: Right arm, Patient Position: Sitting, Cuff Size: Standard)   Pulse 82   Temp 97 8 °F (36 6 °C) (Temporal)   Ht 4' 10 5" (1 486 m)   Wt 62 7 kg (138 lb 4 oz)   LMP  (LMP Unknown)   SpO2 96%   BMI 28 40 kg/m²     Physical Exam  Vitals and nursing note reviewed  Constitutional:       General: She is not in acute distress  Appearance: She is well-developed  She is not diaphoretic  HENT:      Head: Normocephalic and atraumatic  Eyes:      General:         Right eye: No discharge  Left eye: No discharge  Conjunctiva/sclera: Conjunctivae normal    Neck:      Vascular: No carotid bruit  Cardiovascular:      Rate and Rhythm: Normal rate and regular rhythm  Heart sounds: Normal heart sounds  No murmur heard  No friction rub  No gallop  Pulmonary:      Effort: Pulmonary effort is normal  No respiratory distress  Breath sounds: Normal breath sounds  No wheezing or rales  Musculoskeletal:      Cervical back: Neck supple  Skin:     General: Skin is warm and dry  Neurological:      Mental Status: She is alert and oriented to person, place, and time     Psychiatric:         Judgment: Judgment normal        Ariella June PA-C

## 2022-10-03 NOTE — ASSESSMENT & PLAN NOTE
Fasting sugar great at 95 with an A1c of 5 5  Continue a decrease simple carbs check with next labs

## 2022-10-11 PROBLEM — Z00.00 MEDICARE ANNUAL WELLNESS VISIT, SUBSEQUENT: Status: RESOLVED | Noted: 2022-02-22 | Resolved: 2022-10-11

## 2022-10-24 ENCOUNTER — OFFICE VISIT (OUTPATIENT)
Dept: BARIATRICS | Facility: CLINIC | Age: 67
End: 2022-10-24
Payer: COMMERCIAL

## 2022-10-24 VITALS
HEIGHT: 58 IN | WEIGHT: 139 LBS | SYSTOLIC BLOOD PRESSURE: 120 MMHG | TEMPERATURE: 97.6 F | BODY MASS INDEX: 29.18 KG/M2 | DIASTOLIC BLOOD PRESSURE: 70 MMHG | HEART RATE: 58 BPM

## 2022-10-24 DIAGNOSIS — K31.7 GASTRIC POLYPS: ICD-10-CM

## 2022-10-24 DIAGNOSIS — K91.2 POSTSURGICAL MALABSORPTION: ICD-10-CM

## 2022-10-24 DIAGNOSIS — Z48.815 ENCOUNTER FOR SURGICAL AFTERCARE FOLLOWING SURGERY OF DIGESTIVE SYSTEM: Primary | ICD-10-CM

## 2022-10-24 DIAGNOSIS — K21.9 GERD WITHOUT ESOPHAGITIS: ICD-10-CM

## 2022-10-24 DIAGNOSIS — Z98.84 BARIATRIC SURGERY STATUS: ICD-10-CM

## 2022-10-24 PROCEDURE — 99213 OFFICE O/P EST LOW 20 MIN: CPT | Performed by: NURSE PRACTITIONER

## 2022-10-24 NOTE — PROGRESS NOTES
Assessment/Plan:     Patient ID: Corine Linder is a 79 y o  female  Bariatric Surgery Status    -s/p Vertical Sleeve Gastrectomy with Dr Amara Uribe on 03/19/2018  Presents to the office today for annual  Doing very well, tolerating a regular diet  Denies having any abdominal pain, N/V/D/C, regurgitation, reflux or dysphagia  Taking her MVI  No concerns at this time  PLAN:     - Routine follow up in 1  Year for annual visit  - Continue with healthy lifestyle, adequate protein intake of 60 gm, fluid intake of at least 64 oz  - Continue with MVI daily  - Activity as tolerated  - Labs ordered and will adjust accordingly if any deficiency  - Follow up with RD and SW as needed  Hx of gastric polyps/GERD - Last EGD in 2019 - performed by Dr Renetta Stanford, GI team  She currently denies any abdominal discomfort or GERD symptoms  Taking protonix 20 mg PO QD  She is due to sleeve screening this year  Advised to get done  She will get done by GI again and will call to arrange an appt  · Continued/Maintain healthy weight loss with good nutrition intakes  · Adequate hydration with at least 64oz  fluid intake  · Follow diet as discussed  · Follow vitamin and mineral recommendations as reviewed with you  · Exercise as tolerated  · Colonoscopy referral made: UTD   · Mammogram - UTD  · Sleeve screening - due - will get done through GI team      · Follow-up in 1 year for annual visit    We kindly ask that your arrive 15 minutes before your scheduled appointment time with your provider to allow our staff to room you, get your vital signs and update your chart  · Get lab work done  Please call the office if you need a script  It is recommended to check with your insurance BEFORE getting labs done to make sure they are covered by your policy  · Call our office if you have any problems with abdominal pain especially associated with fever, chills, nausea, vomiting or any other concerns      · All Post-bariatric surgery patients should be aware that very small quantities of any alcohol can cause impairment and it is very possible not to feel the effect  The effect can be in the system for several hours  It is also a stomach irritant  · It is advised to AVOID alcohol, Nonsteroidal antiinflammatory drugs (NSAIDS) and nicotine of all forms   Any of these can cause stomach irritation/pain  · Discussed the effects of alcohol on a bariatric patient and the increased impairment risk  · Keep up the good work! Postsurgical Malabsorption   -At risk for malabsorption of vitamins/minerals secondary to malabsorption and restriction of intake from bariatric surgery  -Currently taking adequate postop bariatric surgery vitamin supplementation  -Last set of bariatric labs completed on 02/2022 and showed WNL  -Next set of bariatric labs ordered for approximately 6 months  -Patient received education about the importance of adhering to a lifelong supplementation regimen to avoid vitamin/mineral deficiencies      Diagnoses and all orders for this visit:    Encounter for surgical aftercare following surgery of digestive system  -     Zinc; Future  -     Vitamin B12; Future  -     Vitamin B1, whole blood; Future  -     Vitamin A; Future  -     PTH, intact; Future  -     CBC and Platelet; Future  -     Ferritin; Future  -     Iron Saturation %; Future    Bariatric surgery status  -     Zinc; Future  -     Vitamin B12; Future  -     Vitamin B1, whole blood; Future  -     Vitamin A; Future  -     PTH, intact; Future  -     CBC and Platelet; Future  -     Ferritin; Future  -     Iron Saturation %; Future    Postsurgical malabsorption  -     Zinc; Future  -     Vitamin B12; Future  -     Vitamin B1, whole blood; Future  -     Vitamin A; Future  -     PTH, intact; Future  -     CBC and Platelet; Future  -     Ferritin; Future  -     Iron Saturation %;  Future    BMI 29 0-29 9,adult  -     Zinc; Future  -     Vitamin B12; Future  -     Vitamin B1, whole blood; Future  -     Vitamin A; Future  -     PTH, intact; Future  -     CBC and Platelet; Future  -     Ferritin; Future  -     Iron Saturation %; Future    GERD without esophagitis    Gastric polyps         Subjective:      Patient ID: Shavon Monroe is a 79 y o  female  -s/p Vertical Sleeve Gastrectomy with Dr Evan Marte on 03/19/2018  Presents to the office today for annual  Doing very well, tolerating a regular diet  Denies having any abdominal pain, N/V/D/C, regurgitation, reflux or dysphagia  Taking her MVI  No concerns at this time  Initial: 199 lbs  Current: 139 lbs  EWL: (Weight loss is ahead of schedule at this post surgical period )  Stanley: current   Current BMI is Body mass index is 29 05 kg/m²  · Tolerating a regular diet-yes  · Eating at least 60 grams of protein per day-yes  · Following 30/60 minute rule with liquids-yes  · Drinking at least 64 ounces of fluid per day-yes   · Drinking carbonated beverages-yes  · Sufficient exercise-yes  · Using NSAIDs regularly-no  · Using nicotine-no  · Using alcohol-rarely - glass of wine   · Supplements: Multivitamins and Calcium     · EWL is 75%, which places the patient ahead of schedule for expected post surgical weight loss at this time  The following portions of the patient's history were reviewed and updated as appropriate: allergies, current medications, past family history, past medical history, past social history, past surgical history and problem list     Review of Systems   Constitutional: Negative  Respiratory: Negative  Cardiovascular: Negative  Gastrointestinal: Negative  Musculoskeletal: Positive for arthralgias and back pain (arthritis)  Neurological: Negative  Psychiatric/Behavioral: Negative            Objective:    /70   Pulse 58   Temp 97 6 °F (36 4 °C) (Tympanic)   Ht 4' 10" (1 473 m)   Wt 63 kg (139 lb)   LMP  (LMP Unknown)   BMI 29 05 kg/m²      Physical Exam  Vitals and nursing note reviewed  Constitutional:       Appearance: Normal appearance  Cardiovascular:      Rate and Rhythm: Normal rate and regular rhythm  Pulses: Normal pulses  Heart sounds: Normal heart sounds  Pulmonary:      Effort: Pulmonary effort is normal       Breath sounds: Normal breath sounds  Abdominal:      General: Bowel sounds are normal       Palpations: Abdomen is soft  Tenderness: There is no abdominal tenderness  Musculoskeletal:         General: Normal range of motion  Skin:     General: Skin is warm and dry  Neurological:      General: No focal deficit present  Mental Status: She is alert and oriented to person, place, and time  Psychiatric:         Mood and Affect: Mood normal          Behavior: Behavior normal          Thought Content:  Thought content normal          Judgment: Judgment normal

## 2022-11-04 ENCOUNTER — TELEPHONE (OUTPATIENT)
Dept: GASTROENTEROLOGY | Facility: CLINIC | Age: 67
End: 2022-11-04

## 2022-11-04 NOTE — TELEPHONE ENCOUNTER
Patients GI provider:  Dr Ady Issa     Number to return call: [phone]    Reason for call: Pt calling to schedule repeat EGD        Scheduled procedure/appointment date if applicable: n/a

## 2022-11-17 ENCOUNTER — OFFICE VISIT (OUTPATIENT)
Dept: FAMILY MEDICINE CLINIC | Facility: CLINIC | Age: 67
End: 2022-11-17

## 2022-11-17 VITALS
HEIGHT: 58 IN | SYSTOLIC BLOOD PRESSURE: 102 MMHG | HEART RATE: 84 BPM | BODY MASS INDEX: 29.6 KG/M2 | TEMPERATURE: 97.3 F | WEIGHT: 141 LBS | DIASTOLIC BLOOD PRESSURE: 68 MMHG

## 2022-11-17 DIAGNOSIS — R39.9 UTI SYMPTOMS: Primary | ICD-10-CM

## 2022-11-17 LAB
SL AMB  POCT GLUCOSE, UA: NORMAL
SL AMB LEUKOCYTE ESTERASE,UA: NORMAL
SL AMB POCT BILIRUBIN,UA: NORMAL
SL AMB POCT BLOOD,UA: NORMAL
SL AMB POCT CLARITY,UA: CLEAR
SL AMB POCT COLOR,UA: YELLOW
SL AMB POCT KETONES,UA: NORMAL
SL AMB POCT NITRITE,UA: NORMAL
SL AMB POCT PH,UA: 5
SL AMB POCT SPECIFIC GRAVITY,UA: 1.03
SL AMB POCT URINE PROTEIN: NORMAL
SL AMB POCT UROBILINOGEN: 0.2

## 2022-11-17 RX ORDER — CIPROFLOXACIN 500 MG/1
500 TABLET, FILM COATED ORAL EVERY 12 HOURS SCHEDULED
Qty: 14 TABLET | Refills: 0 | Status: SHIPPED | OUTPATIENT
Start: 2022-11-17 | End: 2022-11-24

## 2022-11-17 RX ORDER — CEPHALEXIN 500 MG/1
CAPSULE ORAL
COMMUNITY
Start: 2022-10-21

## 2022-11-17 NOTE — PROGRESS NOTES
Name: Berta Mendez      : 1955      MRN: [id number]  Encounter Provider: KEENA Paulino  Encounter Date: 2022   Encounter department: [facility]    Assessment & Plan     1  UTI symptoms  Comments:  abnormal urinalysis suggestive of UTI with current symptoms  willstart cipro and send urine for culture   Orders:  -     POCT urine dip  -     Urine culture; Future  -     Urine culture  -     ciprofloxacin (CIPRO) 500 mg tablet; Take 1 tablet (500 mg total) by mouth every 12 (twelve) hours for 7 days         Subjective      Patient states her urinary symptoms started this morning   She states throughout the day the pressure intensified and then she had more burning with urination  Review of Systems   Constitutional: Negative for diaphoresis, fatigue and fever  Gastrointestinal: Positive for abdominal pain and diarrhea  Negative for nausea  Genitourinary: Positive for dysuria, frequency and urgency  Negative for difficulty urinating, flank pain and hematuria  Neurological: Positive for headaches  Current Outpatient Medications on File Prior to Visit   Medication Sig   • atorvastatin (LIPITOR) 10 mg tablet TAKE ONE TABLET BY MOUTH DAILY   • Calcium Carbonate 1500 (600 Ca) MG TABS Take by mouth   • cephalexin (KEFLEX) 500 mg capsule    • Cholecalciferol (VITAMIN D-3) 1000 units CAPS Take 4 capsules (4,000 Units total) by mouth daily (Patient taking differently: Take 3,000 Units by mouth daily)   • Diphenhydramine-Phenylephrine ([employer] ALLERGY-D PO) Take by mouth   • fluticasone (FLONASE) 50 mcg/act nasal spray 2 sprays into each nostril daily   • gabapentin (NEURONTIN) 600 MG tablet Take 600 mg by mouth daily     • Multiple Vitamin (MULTIVITAMIN) capsule Take 1 capsule by mouth daily     • pantoprazole (PROTONIX) 20 mg tablet Take 1 tablet (20 mg total) by mouth daily   • PARoxetine (PAXIL) 30 mg tablet Take 1 tablet (30 mg total) by mouth daily   • Restasis 0 05 % ophthalmic emulsion    • traZODone (DESYREL) 50 mg tablet Take 50 mg by mouth daily at bedtime       Objective     /68 (BP Location: Right arm, Patient Position: Sitting, Cuff Size: Adult)   Pulse 84   Temp (!) 97 3 °F (36 3 °C) (Temporal)   Ht 4' 10" (1 473 m)   Wt 64 kg (141 lb)   LMP  (LMP Unknown)   BMI 29 47 kg/m²     Physical Exam  Constitutional:       General: Vital signs are normal       Appearance: She is well-developed and well-nourished  She is not ill-appearing  HENT:      Head: Normocephalic and atraumatic  Cardiovascular:      Rate and Rhythm: Normal rate and regular rhythm  Heart sounds: Normal heart sounds, S1 normal and S2 normal  No murmur heard  Pulmonary:      Effort: Pulmonary effort is normal  No respiratory distress  Breath sounds: Normal breath sounds  Abdominal:      General: Bowel sounds are normal  There is no distension  Palpations: Abdomen is soft  Tenderness: There is abdominal tenderness in the suprapubic area  There is no CVA tenderness  Neurological:      Mental Status: She is alert and oriented to person, place, and time         Cathlene Coleman

## 2022-11-20 LAB — BACTERIA UR CULT: ABNORMAL

## 2022-12-16 DIAGNOSIS — E78.5 MILD HYPERLIPIDEMIA: ICD-10-CM

## 2022-12-16 RX ORDER — ATORVASTATIN CALCIUM 10 MG/1
10 TABLET, FILM COATED ORAL DAILY
Qty: 30 TABLET | Refills: 1 | Status: SHIPPED | OUTPATIENT
Start: 2022-12-16

## 2023-02-01 ENCOUNTER — OFFICE VISIT (OUTPATIENT)
Dept: CARDIOLOGY CLINIC | Facility: CLINIC | Age: 68
End: 2023-02-01

## 2023-02-01 VITALS
HEART RATE: 75 BPM | SYSTOLIC BLOOD PRESSURE: 128 MMHG | DIASTOLIC BLOOD PRESSURE: 76 MMHG | WEIGHT: 143.2 LBS | HEIGHT: 58 IN | BODY MASS INDEX: 30.06 KG/M2

## 2023-02-01 DIAGNOSIS — E78.5 MILD HYPERLIPIDEMIA: ICD-10-CM

## 2023-02-01 DIAGNOSIS — I49.3 PVC'S (PREMATURE VENTRICULAR CONTRACTIONS): Primary | ICD-10-CM

## 2023-02-01 RX ORDER — PHENOL 1.4 %
10 AEROSOL, SPRAY (ML) MUCOUS MEMBRANE
COMMUNITY

## 2023-02-01 RX ORDER — ATORVASTATIN CALCIUM 10 MG/1
10 TABLET, FILM COATED ORAL DAILY
Qty: 90 TABLET | Refills: 3 | Status: SHIPPED | OUTPATIENT
Start: 2023-02-01

## 2023-02-01 NOTE — ASSESSMENT & PLAN NOTE
Mr Cecil Kingston seems to be doing well from cardiac perspective with no recent symptoms of palpitations or angina  Her blood pressure is normal   She had minimal PVC burden during monitoring in 2021  She is not on any beta-blocker or calcium channel blocker therapy  Her last noted lipids are well controlled  Physical examination is normal   Last noted thyroid function was also normal     -- At this time I am advising her to continue current medications  -- Since her symptoms have more or less resolved I am advising that she can follow-up with her primary care physician but she is advised to reach out to us if there are any new symptoms or if she is experiencing decline in her exercise tolerance     -- Reinforced importance of eating healthy and exercising regularly to maintain weight

## 2023-02-01 NOTE — PATIENT INSTRUCTIONS
CARDIOLOGY ASSESSMENT & PLAN     1  PVC's (premature ventricular contractions)  POCT ECG      2  Mild hyperlipidemia  atorvastatin (LIPITOR) 10 mg tablet        PVC's (premature ventricular contractions)  Mr Jeppie Gilford seems to be doing well from cardiac perspective with no recent symptoms of palpitations or angina  Her blood pressure is normal   She had minimal PVC burden during monitoring in 2021  She is not on any beta-blocker or calcium channel blocker therapy  Her last noted lipids are well controlled  Physical examination is normal   Last noted thyroid function was also normal     -- At this time I am advising her to continue current medications  -- Since her symptoms have more or less resolved I am advising that she can follow-up with her primary care physician but she is advised to reach out to us if there are any new symptoms or if she is experiencing decline in her exercise tolerance     -- Reinforced importance of eating healthy and exercising regularly to maintain weight

## 2023-02-01 NOTE — PROGRESS NOTES
[facility]  [address] [place] [address] Emily Combs [address]  Phone#  [phone]  Fax#  [phone]  *-*-*-*-*-*-*-*-*-*-*-*-*-*-*-*-*-*-*-*-*-*-*-*-*-*-*-*-*-*-*-*-*-*-*-*-*-*-*-*-*-*-*-*-*-*-*-*-*-*-*-*-*-*  ENCOUNTER DATE: 02/01/23 11:36 AM  PATIENT NAME: Cecil Kingston   1955    [id number]  AGE:67 y o  SEX: female  Kelly Patel MD     PRIMARY CARE PHYSICIAN: Jessi Aquino PA-C    DIAGNOSES:  1  Symptomatic premature ventricular contractions  2  Dyslipidemia  3  Family history of premature coronary artery disease  4  Previous history of obesity, status post sleeve gastrectomy in 2018 with good weight loss,   5  Obstructive sleep apnea and restless leg syndrome  6  Myotonic dystrophy  7  Fibromyalgia syndrome  8   rritable bowel syndrome  9   Degenerative joint disease, status post left and right total knee replacement  10  History of closed fracture of humerus tuberosity  11  Hypothyroidism  12  Carpal tunnel syndrome  13  Seasonal allergies  14  Peripheral venous disease    EXERCISE TREADMILL STRESS TEST 7/22/2021: Total exercise time was 6 minutes and 22 seconds achieving 86% of MPHR and workload of 7 5 METS  There were no ECG changes of ischemia  No angina was reported  There were occasional premature ventricular contractions during the test     HOLTER MONITOR 5/11/2021: Average heart rate was 74 bpm, minimum heart rate 51 and maximum heart rate 119 bpm   There were 2 5-0 PVCs  There was no nonsustained or sustained ventricular tachycardia  There were rare premature atrial contractions  Patient had 9 episodes of palpitations 4 of which correlated with single PVC at 5 correlated with sinus rhythm without ectopy      ECHOCARDIOGRAM 5/5/2021: Normal left ventricular size and function, EF 93%, grade 1 diastolic dysfunction, normal right ventricle size and systolic function, normal left and right atrial size, mitral annular calcification, no mitral valve regurgitation, normal tricuspid valve, normal aortic valve, normal pulmonic valve, no obvious pulmonary hypertension, no pericardial effusion  CURRENT ECG     Results for orders placed or performed in visit on 02/01/23   POCT ECG    Narrative    Sinus rhythm with no significant ST-T wave abnormalities  No evidence of prior infarction, or chamber enlargement or hypertrophy  HR 75 bpm   Normal axis and intervals  CARDIOLOGY ASSESSMENT & PLAN     1  PVC's (premature ventricular contractions)  POCT ECG      2  Mild hyperlipidemia  atorvastatin (LIPITOR) 10 mg tablet        PVC's (premature ventricular contractions)  Mr Triny Worley seems to be doing well from cardiac perspective with no recent symptoms of palpitations or angina  Her blood pressure is normal   She had minimal PVC burden during monitoring in 2021  She is not on any beta-blocker or calcium channel blocker therapy  Her last noted lipids are well controlled  Physical examination is normal   Last noted thyroid function was also normal     -- At this time I am advising her to continue current medications  -- Since her symptoms have more or less resolved I am advising that she can follow-up with her primary care physician but she is advised to reach out to us if there are any new symptoms or if she is experiencing decline in her exercise tolerance     -- Reinforced importance of eating healthy and exercising regularly to maintain weight  INTERVAL HISTORY & HISTORY OF PRESENT ILLNESS     Triny Worley is here for follow-up regarding her cardiac comorbidities which include: Symptomatic PVCs and dyslipidemia  She previously followed with Dr Andrew Barreto and was last seen in 2021  She is reestablishing follow-up with us  Today she mentions that since her last visit with Dr You Shall in 2020 when she has had no new diagnoses or hospitalizations or significant illnesses      From a symptom perspective she reports feeling very well  Her previously reported symptoms of fluttering in the chest and palpitations have resolved  She is wondering if those were related to her difficult emotional state at that time due to divorce proceedings  She denies any recent palpitations chest pain shortness of breath with normal activities or passing out or near passing out episodes  Functional capacity status: Good   (Excellent- >10 METs; Good: (7-10 METs); Moderate (4-7 METs); Poor (<= 4 METs)    Any chronic stressors: None   (feeling of poor health, financial problems, and social isolation etc)  Tobacco or alcohol dependence: She does not smoke and denies alcohol use  Has about 12 ounces of coffee a day  Current cardiac medications: Atorvastatin 10 mg daily  Last blood work is from August 2022  Sodium was 140 potassium 4 6 chloride 109 bicarb 27 BUN 12 creatinine 0 50 AST 13 ALT 27  Total cholesterol was 161 triglycerides 77 HDL 60 calculated LDL 86  Hemoglobin A1c 5 5, TSH was 0 703 in February 2022  REVIEW OF SYSTEMS   Positive for: As noted above in HPI  Negative for: All remaining as reviewed below and in HPI      SYSTEM SYMPTOMS REVIEWED:  General--weight change, fever, night sweats  Respiratory--cough, wheezing, shortness of breath, sputum production  Cardiovascular--chest pain, syncope, dyspnea on exertion, edema, decline in exercise tolerance, claudication   Gastrointestinal--persistent vomiting, diarrhea, abdominal distention, blood in stool   Muscular or skeletal--joint pain or swelling   Neurologic--headaches, syncope, abnormal movement  Hematologic--history of easy bruising and bleeding   Endocrine--thyroid enlargement, heat or cold intolerance, polyuria   Psychiatric--anxiety, depression     *-*-*-*-*-*-*-*-*-*-*-*-*-*-*-*-*-*-*-*-*-*-*-*-*-*-*-*-*-*-*-*-*-*-*-*-*-*-*-*-*-*-*-*-*-*-*-*-*-*-*-*-*-*-  VITAL SIGNS     CURRENT VITAL SIGNS:   Vitals:    02/01/23 1058   BP: 128/76   Pulse: 75   Weight: 65 kg (143 lb 3 2 oz)   Height: 4' 10" (1 473 m)       BMI: Body mass index is 29 93 kg/m²  WEIGHTS:   Wt Readings from Last 25 Encounters:   02/01/23 65 kg (143 lb 3 2 oz)   11/17/22 64 kg (141 lb)   10/24/22 63 kg (139 lb)   10/03/22 62 7 kg (138 lb 4 oz)   09/07/22 61 7 kg (136 lb)   08/16/22 62 4 kg (137 lb 8 oz)   08/02/22 62 1 kg (136 lb 12 8 oz)   05/23/22 61 7 kg (136 lb)   02/22/22 61 7 kg (136 lb)   01/13/22 57 4 kg (126 lb 9 6 oz)   10/18/21 65 1 kg (143 lb 8 oz)   08/12/21 72 1 kg (159 lb)   06/25/21 71 7 kg (158 lb)   05/18/21 68 9 kg (152 lb)   03/05/21 68 9 kg (152 lb)   01/22/21 68 9 kg (152 lb)   08/21/20 71 8 kg (158 lb 6 4 oz)   08/14/20 71 3 kg (157 lb 3 2 oz)   08/03/20 71 kg (156 lb 8 oz)   07/10/20 69 9 kg (154 lb)   01/07/20 66 2 kg (146 lb)   10/18/19 67 6 kg (149 lb)   08/15/19 67 8 kg (149 lb 6 4 oz)   08/02/19 67 6 kg (149 lb)   07/31/19 68 1 kg (150 lb 3 2 oz)        *-*-*-*-*-*-*-*-*-*-*-*-*-*-*-*-*-*-*-*-*-*-*-*-*-*-*-*-*-*-*-*-*-*-*-*-*-*-*-*-*-*-*-*-*-*-*-*-*-*-*-*-*-*-  PHYSICAL EXAM     General Appearance:    Alert, cooperative, no distress, appears stated age   Head, Eyes, ENT:    No obvious abnormality, moist mucous mebranes  Neck:   Supple, no carotid bruit or JVD   Back:     Symmetric, no curvature  Lungs:     Respirations unlabored  Clear to auscultation bilaterally,    Chest wall:    No tenderness or deformity   Heart:    Regular rate and rhythm, S1 and S2 normal, no murmur, rub  or gallop  Abdomen:     Soft, non-tender,    Extremities:   Extremities warm, no cyanosis or edema    Skin:   No venostatic changes in lower extremities  Normal skin color, texture, and turgor   No rashes or lesions     *-*-*-*-*-*-*-*-*-*-*-*-*-*-*-*-*-*-*-*-*-*-*-*-*-*-*-*-*-*-*-*-*-*-*-*-*-*-*-*-*-*-*-*-*-*-*-*-*-*-*-*-*-*-  CURRENT MEDICATIONS LIST     Current Outpatient Medications:   •  atorvastatin (LIPITOR) 10 mg tablet, Take 1 tablet (10 mg total) by mouth daily, Disp: 90 tablet, Rfl: 3  •  Calcium Carbonate 1500 (600 Ca) MG TABS, Take by mouth, Disp: , Rfl:   •  cephalexin (KEFLEX) 500 mg capsule, For Dental appts, Disp: , Rfl:   •  Cholecalciferol (VITAMIN D-3) 1000 units CAPS, Take 4 capsules (4,000 Units total) by mouth daily (Patient taking differently: Take 3,000 Units by mouth daily), Disp: 30 capsule, Rfl: 1  •  Diphenhydramine-Phenylephrine ([employer] ALLERGY-D PO), Take by mouth, Disp: , Rfl:   •  fluticasone (FLONASE) 50 mcg/act nasal spray, 2 sprays into each nostril daily, Disp: , Rfl:   •  gabapentin (NEURONTIN) 600 MG tablet, Take 600 mg by mouth daily  , Disp: , Rfl: 3  •  Melatonin 10 MG TABS, Take 10 mg by mouth, Disp: , Rfl:   •  Multiple Vitamin (MULTIVITAMIN) capsule, Take 1 capsule by mouth daily  , Disp: , Rfl:   •  pantoprazole (PROTONIX) 20 mg tablet, Take 1 tablet (20 mg total) by mouth daily, Disp: 90 tablet, Rfl: 3  •  PARoxetine (PAXIL) 30 mg tablet, Take 1 tablet (30 mg total) by mouth daily, Disp: 90 tablet, Rfl: 3  •  Restasis 0 05 % ophthalmic emulsion, , Disp: , Rfl:   •  traZODone (DESYREL) 50 mg tablet, Take 50 mg by mouth daily at bedtime (Patient not taking: Reported on 2/1/2023), Disp: , Rfl:        ALLERGIES     Allergies   Allergen Reactions   • Ketorolac Itching   • Percocet [Oxycodone-Acetaminophen] Itching   • Flurbiprofen      NSAIDS   • Ketorolac Tromethamine Itching   • Pneumovax [Pneumococcal Polysaccharide Vaccine] Swelling and Rash       *-*-*-*-*-*-*-*-*-*-*-*-*-*-*-*-*-*-*-*-*-*-*-*-*-*-*-*-*-*-*-*-*-*-*-*-*-*-*-*-*-*-*-*-*-*-*-*-*-*-*-*-*-*-  LABORATORY DATA     Sodium   Date Value Ref Range Status   11/27/2015 139 136 - 145 mmol/L Final   11/17/2015 139 136 - 145 mmol/L Final   05/13/2015 138 136 - 145 mmol/L Final     Sodium   Date Value Ref Range Status   11/27/2015 139 136 - 145 mmol/L Final   11/17/2015 139 136 - 145 mmol/L Final   05/13/2015 138 136 - 145 mmol/L Final     Potassium   Date Value Ref Range Status   08/23/2022 4 6 3 5 - 5 3 mmol/L Final   02/14/2022 3 8 3 5 - 5 3 mmol/L Final   07/26/2021 4 1 3 5 - 5 3 mmol/L Final   11/27/2015 3 5 3 5 - 5 3 mmol/L Final   11/17/2015 4 1 3 5 - 5 3 mmol/L Final   05/13/2015 3 9 3 5 - 5 3 mmol/L Final     Chloride   Date Value Ref Range Status   08/23/2022 109 (H) 96 - 108 mmol/L Final   02/14/2022 105 100 - 108 mmol/L Final   07/26/2021 107 100 - 108 mmol/L Final   11/27/2015 105 98 - 108 mmol/L Final   11/17/2015 107 100 - 108 mmol/L Final   05/13/2015 103 100 - 108 mmol/L Final     CO2   Date Value Ref Range Status   08/23/2022 27 21 - 32 mmol/L Final   02/14/2022 27 21 - 32 mmol/L Final   07/26/2021 24 21 - 32 mmol/L Final   11/27/2015 25 7 21 0 - 32 0 mmol/L Final   11/17/2015 24 21 0 - 32 0 mmol/L Final   05/13/2015 28 23 - 33 mmol/L Final     Anion Gap   Date Value Ref Range Status   11/27/2015 8 4 - 13 mmol/L Final   11/17/2015 8 4 - 13 mmol/L Final   05/13/2015 7 4 - 13 mmol/L Final     BUN   Date Value Ref Range Status   08/23/2022 12 5 - 25 mg/dL Final   02/14/2022 9 5 - 25 mg/dL Final   07/26/2021 9 5 - 25 mg/dL Final   11/27/2015 13 5 - 25 mg/dL Final   11/17/2015 21 5 - 25 mg/dL Final   05/13/2015 11 5 - 25 mg/dL Final     Creatinine   Date Value Ref Range Status   08/23/2022 0 50 (L) 0 60 - 1 30 mg/dL Final     Comment:     Standardized to IDMS reference method   02/14/2022 0 55 (L) 0 60 - 1 30 mg/dL Final     Comment:     Standardized to IDMS reference method   07/26/2021 0 55 (L) 0 60 - 1 30 mg/dL Final     Comment:     Standardized to IDMS reference method   11/27/2015 0 66 0 60 - 1 30 mg/dL Final     Comment:     Standardized to IDMS reference method   11/17/2015 0 51 (L) 0 60 - 1 30 mg/dL Final     Comment:     Standardized to IDMS reference method   05/13/2015 0 48 (L) 0 60 - 1 30 mg/dL Final     Comment:     Standardized to IDMS reference method     eGFR   Date Value Ref Range Status   08/23/2022 100 ml/min/1 73sq m Final   02/14/2022 98 ml/min/1 73sq m Final   07/26/2021 99 ml/min/1 73sq m Final     Glucose   Date Value Ref Range Status   11/27/2015 108 65 - 140 mg/dL Final     Comment:     If patient is fasting, the ADA then defines impaired fasting glucose as  >100 mg/dl and diabetes as  >or equal to 126 mg/dl  11/17/2015 100 65 - 140 mg/dL Final     Comment:     If patient is fasting, the ADA then defines impaired fasting glucose as  >100 mg/dl and diabetes as  >or equal to 126 mg/dl  11/17/2015 100 65 - 140 mg/dL Final     Comment:     If patient is fasting, the ADA then defines impaired fasting glucose as  >100 mg/dl and diabetes as  >or equal to 126 mg/dl  The above 1 analytes were performed by [facility] Messina [id number]       Calcium   Date Value Ref Range Status   08/23/2022 9 2 8 3 - 10 1 mg/dL Final   02/14/2022 8 8 8 3 - 10 1 mg/dL Final   07/26/2021 8 8 8 3 - 10 1 mg/dL Final   11/27/2015 8 7 8 3 - 10 1 mg/dL Final   11/17/2015 9 0 8 3 - 10 1 mg/dL Final   05/13/2015 9 2 8 3 - 10 1 mg/dL Final     AST   Date Value Ref Range Status   08/23/2022 13 5 - 45 U/L Final     Comment:     Specimen collection should occur prior to Sulfasalazine administration due to the potential for falsely depressed results  02/14/2022 8 5 - 45 U/L Final     Comment:     Specimen collection should occur prior to Sulfasalazine administration due to the potential for falsely depressed results  07/26/2021 6 5 - 45 U/L Final     Comment:     Specimen collection should occur prior to Sulfasalazine administration due to the potential for falsely depressed results  11/17/2015 5 5 - 45 U/L Final   05/13/2015 15 0 - 45 U/L Final   11/24/2014 10 10 - 42 U/L Final     ALT   Date Value Ref Range Status   08/23/2022 27 12 - 78 U/L Final     Comment:     Specimen collection should occur prior to Sulfasalazine and/or Sulfapyridine administration due to the potential for falsely depressed results      02/14/2022 15 12 - 78 U/L Final     Comment:     Specimen collection should occur prior to Sulfasalazine and/or Sulfapyridine administration due to the potential for falsely depressed results  07/26/2021 20 12 - 78 U/L Final     Comment:     Specimen collection should occur prior to Sulfasalazine and/or Sulfapyridine administration due to the potential for falsely depressed results      11/17/2015 17 12 - 78 U/L Final   11/17/2015 20 12 - 78 U/L Final     Comment:     The above 1 analytes were performed by [facility] Messina [id number]     05/13/2015 24 14 - 59 U/L Final     Alkaline Phosphatase   Date Value Ref Range Status   08/23/2022 66 46 - 116 U/L Final   02/14/2022 76 46 - 116 U/L Final   07/26/2021 75 46 - 116 U/L Final   11/17/2015 83 46 - 116 U/L Final   05/13/2015 84 46 - 116 U/L Final   11/24/2014 97 42 - 121 U/L Final     Total Protein   Date Value Ref Range Status   11/17/2015 7 2 6 4 - 8 2 g/dL Final   05/13/2015 7 1 6 4 - 8 2 g/dL Final   11/24/2014 7 2 6 4 - 8 2 g/dL Final     TOTAL PROTEIN   Date Value Ref Range Status   11/17/2015 7 1 6 4 - 8 2 g/dL Final     Comment:     The above 15 analytes were performed by Frantz  [address] [place], [place] [address] [address]     05/13/2015 6 8 6 4 - 8 2 g/dL Final     Comment:     The above 15 analytes were performed by Frantz  [address] [place], [place] [address] [address]       Total Bilirubin   Date Value Ref Range Status   11/17/2015 0 32 0 20 - 1 00 mg/dL Final   05/13/2015 0 30 0 20 - 1 00 mg/dL Final   11/24/2014 0 3 0 2 - 1 0 mg/dL Final     Magnesium   Date Value Ref Range Status   06/24/2017 1 7 1 6 - 2 6 mg/dL Final     WBC   Date Value Ref Range Status   08/23/2022 6 18 4 31 - 10 16 Thousand/uL Final   02/14/2022 6 12 4 31 - 10 16 Thousand/uL Final   07/01/2020 5 90 4 31 - 10 16 Thousand/uL Final   11/27/2015 7 23 4 31 - 10 16 Thousand/uL Final   04/14/2015 9 83 4 31 - 10 16 Thousand/uL Final   12/18/2014 11 44 (H) 4 31 - 10 16 Thousand/uL Final     Hemoglobin   Date Value Ref Range Status   08/23/2022 13 2 11 5 - 15 4 g/dL Final   02/14/2022 13 2 11 5 - 15 4 g/dL Final   07/01/2020 13 3 11 5 - 15 4 g/dL Final   11/27/2015 11 5 11 5 - 15 4 g/dL Final   04/14/2015 11 7 11 5 - 15 4 g/dL Final   12/20/2014 9 7 (L) 11 5 - 15 4 g/dL Final     Platelets   Date Value Ref Range Status   08/23/2022 276 149 - 390 Thousands/uL Final   02/14/2022 278 149 - 390 Thousands/uL Final   07/01/2020 264 149 - 390 Thousands/uL Final   11/27/2015 256 149 - 390 Thousand/uL Final   04/14/2015 311 149 - 390 Thousand/uL Final   12/18/2014 243 149 - 390 Thousand/uL Final     PTT   Date Value Ref Range Status   06/24/2017 28 23 - 35 seconds Final   11/27/2015 30 24 - 35 Seconds Final     Comment:     Therapeutic Heparin Range =  60-90 seconds  The above 1 analytes were performed by [address] [address]     04/14/2015 32 24 - 35 Seconds Final     Comment:     Therapeutic Heparin Range =  60-90 seconds  The above 1 analytes were performed by [address] [id number]       INR   Date Value Ref Range Status   06/24/2017 0 87 0 86 - 1 16 Final   11/27/2015 1 00 0 86 - 1 16 Final     Comment:     The above 2 analytes were performed by [address] [id number]     04/14/2015 0 96 0 86 - 1 16 Final     Comment:     The above 2 analytes were performed by [address] [id number]       No results found for: CKMB, DIGOXIN  No results found for: TSH  Cholesterol   Date Value Ref Range Status   11/17/2015 152 mg/dL Final     Comment:     CHOLESTEROL:       Desirable        <200 mg/dl       Borderline High  200-239 mg/dl       High             >239 mg/dl  ____________________________________        Hemoglobin A1C   Date Value Ref Range Status   08/23/2022 5 5 Normal 3 8-5 6%; PreDiabetic 5 7-6 4%; Diabetic >=6 5%; Glycemic control for adults with diabetes <7 0% % Final   12/12/2016 5 9  Final     Comment:       Performed at:  In Office  ,       Urine Culture   Date Value Ref Range Status   2022 50,000-59,000 cfu/ml Klebsiella pneumoniae (A)  Final     Comment: This organism has been edited  The previous result was Gram Negative Eros Enteric Like on 2022 at 1049 EST    2022 10,000-19,000 cfu/ml  Final     Comment:     Mixed Contaminants X5       *-*-*-*-*-*-*-*-*-*-*-*-*-*-*-*-*-*-*-*-*-*-*-*-*-*-*-*-*-*-*-*-*-*-*-*-*-*-*-*-*-*-*-*-*-*-*-*-*-*-*-*-*-*-  PREVIOUS CARDIOLOGY & RADIOLOGY TEST RESULTS   I have personally reviewed pertinent results of cardiovascular tests noted below  Results for orders placed during the hospital encounter of 21    Echo complete with contrast if indicated    Narrative  [address] [place] [address], [address] [facility]  [phone]    Transthoracic Echocardiogram  2D, M-mode, Doppler, and Color Doppler    Study date:  05-May-2021    Patient: Belkis Knight  MR number: [id number]  Account number: [de-identified]  : 1955  Age: 72 years  Gender: Female  Status: Outpatient  Location: [facility]  Height: 58 in  Weight: 151 6 lb  BP: 112/ 72 mmHg    Indications: Palpitations  Diagnoses: R00 2 - Palpitations    Sonographer:  Ivory Herring, [facility]  Referring Physician:  [place] Susan Ruffini PAC  Group:  [facility] Mc[facility]  Interpreting Physician:  Mehreen Broussard DO    SUMMARY    LEFT VENTRICLE:  Systolic function was normal  Ejection fraction was estimated to be 60 %  There were no regional wall motion abnormalities  Doppler parameters were consistent with abnormal left ventricular relaxation (grade 1 diastolic dysfunction)  MITRAL VALVE:  There was mild annular calcification  SUMMARY MEASUREMENTS  PW measurements:  Unspecified Anatomy:   E' Sept was 0 1 m/s  E/E' Sept was 11 6   MV E/A Ratio was 1   HISTORY: PRIOR HISTORY: GERD  Hyperthyroid  PETER  Atrial fibrillation  Hyperlipidemia  Edema  Palpitations  PROCEDURE: The study was performed in the [facility] Echo 3   This was a routine study  The transthoracic approach was used  The study included complete 2D imaging, M-mode, complete spectral Doppler, and color Doppler  The heart rate was 75 bpm, at  the start of the study  Images were obtained from the parasternal, apical, subcostal, and suprasternal notch acoustic windows  Image quality was adequate  LEFT VENTRICLE: Size was normal  Systolic function was normal  Ejection fraction was estimated to be 60 %  There were no regional wall motion abnormalities  Wall thickness was normal  No evidence of apical thrombus  DOPPLER: Doppler  parameters were consistent with abnormal left ventricular relaxation (grade 1 diastolic dysfunction)  RIGHT VENTRICLE: The size was normal  Systolic function was normal  Wall thickness was normal     LEFT ATRIUM: Size was normal     RIGHT ATRIUM: Size was normal     MITRAL VALVE: There was mild annular calcification  There was normal leaflet separation  DOPPLER: The transmitral velocity was within the normal range  There was no evidence for stenosis  There was no significant regurgitation  AORTIC VALVE: The valve was trileaflet  Leaflets exhibited normal thickness and normal cuspal separation  DOPPLER: Transaortic velocity was within the normal range  There was no evidence for stenosis  There was no significant  regurgitation  TRICUSPID VALVE: The valve structure was normal  There was normal leaflet separation  DOPPLER: The transtricuspid velocity was within the normal range  There was no evidence for stenosis  There was no significant regurgitation  PULMONIC VALVE: Leaflets exhibited normal thickness, no calcification, and normal cuspal separation  DOPPLER: The transpulmonic velocity was within the normal range  There was no significant regurgitation  PERICARDIUM: There was no pericardial effusion  The pericardium was normal in appearance  AORTA: The root exhibited normal size      SYSTEMIC VEINS: IVC: The inferior vena cava was normal in size and course  The inferior vena cava was normal in size  Respirophasic changes were normal     SYSTEM MEASUREMENT TABLES    2D  %FS: 33 8 %  Ao Diam: 2 9 cm  Ao asc: 2 8 cm  EDV(Teich): 104 ml  EF(Teich): 62 6 %  ESV(Teich): 38 9 ml  IVSd: 0 8 cm  LA Area: 16 4 cm2  LA Diam: 3 5 cm  LVEDV MOD A4C: 73 2 ml  LVEF MOD A4C: 64 1 %  LVESV MOD A4C: 26 3 ml  LVIDd: 4 7 cm  LVIDs: 3 1 cm  LVLd A4C: 7 8 cm  LVLs A4C: 6 3 cm  LVPWd: 0 9 cm  RA Area: 10 6 cm2  RVIDd: 3 2 cm  SV MOD A4C: 46 9 ml  SV(Teich): 65 1 ml    MM  TAPSE: 2 1 cm    PW  E' Sept: 0 1 m/s  E/E' Sept: 11 6  MV A [place]: 0 9 m/s  MV Dec [place]: 5 2 m/s2  MV DecT: 175 3 ms  MV E Hilar[place]: 0 9 m/s  MV E/A Ratio: 1    [facility] Accredited Echocardiography Laboratory    Prepared and electronically signed by    Anthony Martinez DO  Signed 05-May-2021 10:21:02    No results found for this or any previous visit  No results found for this or any previous visit  No results found for this or any previous visit  Mammo screening bilateral w 3d & cad  Narrative: DIAGNOSIS: Encounter for screening mammogram for breast cancer     TECHNIQUE:  Digital screening mammography was performed  Computer Aided Detection   (CAD) analyzed all applicable images  COMPARISONS: Prior breast imaging dated: 01/20/2022, 05/18/2021,   01/07/2020, 12/13/2018, 11/17/2017, 11/02/2016, 09/01/2015, and 08/26/2014    RELEVANT HISTORY:   Family Breast Cancer History: No known family history of breast cancer  Family Medical History: No known relevant family medical history  Personal History: Hormone history includes birth control  Surgical history   includes breast reduction  No known relevant medical history  The patient is scheduled in a reminder system for screening mammography  8-10% of cancers will be missed on mammography  Management of a palpable   abnormality must be based on clinical grounds  Patients will be notified   of their results via letter from our facility  Accredited by [employer] and FDA  RISK ASSESSMENT:   5 Year Tyrer-Cuzick: 0 98 %  10 Year Tyrer-Cuzick: 1 87 %  Lifetime Tyrer-Cuzick: 3 78 %    TISSUE DENSITY:   There are scattered areas of fibroglandular density  INDICATION: Linda Calero is a 77 y o  female presenting for   screening mammography  Reduction mammoplasty 2011  FINDINGS:   Bilateral  There are no suspicious masses, grouped microcalcifications or areas of   unexplained architectural distortion  The skin and nipple areolar complex   are unremarkable  There are benign postsurgical changes of reduction mammoplasty  Impression: No mammographic evidence of malignancy  ASSESSMENT/BI-RADS CATEGORY:  Left: 2 - Benign  Right: 2 - Benign  Overall: 2 - Benign    RECOMMENDATION:       - Routine screening mammogram in 1 year for both breasts      Workstation ID: [id number]        *-*-*-*-*-*-*-*-*-*-*-*-*-*-*-*-*-*-*-*-*-*-*-*-*-*-*-*-*-*-*-*-*-*-*-*-*-*-*-*-*-*-*-*-*-*-*-*-*-*-*-*-*-*-  SIGNATURES:   [unfilled]   Jessica Ruffin MD; RHODA    *-*-*-*-*-*-*-*-*-*-*-*-*-*-*-*-*-*-*-*-*-*-*-*-*-*-*-*-*-*-*-*-*-*-*-*-*-*-*-*-*-*-*-*-*-*-*-*-*-*-*-*-*-*-  PAST MEDICAL HISTORY:  Past Medical History:   Diagnosis Date   • Allergic rhinitis    • Bariatric surgery status    • Basal cell carcinoma     skin CA removed from nose   • Bowel habit changes    • Cataract     starting with a catract   • Cellulitis of left lower extremity     last assessed 06/29/2016   • Chronic GERD    • Closed displaced fracture of greater tuberosity of humerus     last assessed 05/31/2016   • CPAP (continuous positive airway pressure) dependence    • Fatty liver    • Fibromyalgia    • Fibromyalgia, primary    • Hiatal hernia    • High cholesterol    • Insomnia    • Morbid obesity (HCC)    • Osteoarthritis    • Palpitations     pt denies a fib   • PONV (postoperative nausea and vomiting)    • Postgastrectomy malabsorption    • Recurrent pregnancy loss, antepartum condition or complication    • Restless leg    • Sicca syndrome (HCC)    • Sleep apnea    • Superficial phlebitis and thrombophlebitis of left lower extremity     last assessed 2016   • Thyroid nodule     nodule on thyroid   • Urinary tract infection    • Varicose veins of left leg with edema    • Wears glasses     PAST SURGICAL HISTORY:  Past Surgical History:   Procedure Laterality Date   • BLADDER SURGERY      sling   • CHOLECYSTECTOMY     • COLONOSCOPY     • CYST REMOVAL      thigh   • DILATION AND CURETTAGE OF UTERUS     • ESOPHAGOGASTRODUODENOSCOPY     • ESOPHAGOGASTRODUODENOSCOPY N/A 2017    Procedure: ESOPHAGOGASTRODUODENOSCOPY (EGD) with biopsy and polypectomy;  Surgeon: Davon Coulter MD;  Location: [facility]; Service: Gastroenterology   • HARDWARE REMOVAL      right shoulder   • JOINT REPLACEMENT      both knees   • KNEE ARTHROSCOPY     • ORIF HUMERAL SHAFT FRACTURE Right    • SC ENDOVENOUS LASER, 1ST VEIN Left 2016    Procedure: GREATER SAPHENOUS VEIN EVLT ;  Surgeon: Alaina Chan DO;  [facility]: [facility];  Service: Vascular   • SC ESOPHAGOGASTRODUODENOSCOPY TRANSORAL DIAGNOSTIC N/A 2018    Procedure: egd w/ emr ;  Surgeon: Jacobo Chavis MD;  [facility]: [facility]; Service: Gastroenterology   • SC ESOPHAGOGASTRODUODENOSCOPY TRANSORAL DIAGNOSTIC N/A 2018    Procedure: ESOPHAGOGASTRODUODENOSCOPY (EGD); Surgeon: Jacobo Chavis MD;  Location: [facility];   Service: Gastroenterology   • SC LAP, ESA RESTRICT PROC, LONGITUDINAL GASTRECTOMY N/A 3/19/2018    Procedure: GASTRECTOMY SLEEVE LAPAROSCOPIC AND INTRAOPERATIVE EGD;  Surgeon: Rinku Hinds MD;  Location: [facility];  Service: Bariatrics   • SC PHLEB VEINS - EXTREM - TO 20 Left 2016    Procedure: AND STAB PHLEBECTOMIES ;  Surgeon: Alaina Chan DO;  Location: [facility];  Service: Vascular   • REDUCTION MAMMAPLASTY Bilateral 2011    Reduction   • SKIN CANCER EXCISION     • TOTAL KNEE ARTHROPLASTY Bilateral          FAMILY HISTORY:  Family History   Problem Relation Age of Onset   • Heart disease Mother    • Alzheimer's disease Mother    • Depression Mother    • Anxiety disorder Mother    • Coronary artery disease Mother    • Osteoporosis Mother    • Arthritis Father    • Osteoarthritis Father    • Prostate cancer Father         age on onset unknown   • Uterine cancer Sister 28   • Cardiomyopathy Sister         congestive   • No Known Problems Daughter    • No Known Problems Maternal Grandmother    • No Known Problems Maternal Grandfather    • No Known Problems Paternal Grandmother    • No Known Problems Paternal Grandfather    • No Known Problems Maternal Aunt    • No Known Problems Maternal Aunt     SOCIAL HISTORY:  Social History     Tobacco Use   Smoking Status Never   Smokeless Tobacco Never   Tobacco Comments    teenage years       Social History     Substance and Sexual Activity   Alcohol Use Yes    Comment: socially     Social History     Substance and Sexual Activity   Drug Use No    [unfilled]     *-*-*-*-*-*-*-*-*-*-*-*-*-*-*-*-*-*-*-*-*-*-*-*-*-*-*-*-*-*-*-*-*-*-*-*-*-*-*-*-*-*-*-*-*-*-*-*-*-*-*-*-*-*  ALLERGIES:  Allergies   Allergen Reactions   • Ketorolac Itching   • Percocet [Oxycodone-Acetaminophen] Itching   • Flurbiprofen      NSAIDS   • Ketorolac Tromethamine Itching   • Pneumovax [Pneumococcal Polysaccharide Vaccine] Swelling and Rash    CURRENT SCHEDULED MEDICATIONS:    Current Outpatient Medications:   •  atorvastatin (LIPITOR) 10 mg tablet, Take 1 tablet (10 mg total) by mouth daily, Disp: 90 tablet, Rfl: 3  •  Calcium Carbonate 1500 (600 Ca) MG TABS, Take by mouth, Disp: , Rfl:   •  cephalexin (KEFLEX) 500 mg capsule, For Dental appts, Disp: , Rfl:   •  Cholecalciferol (VITAMIN D-3) 1000 units CAPS, Take 4 capsules (4,000 Units total) by mouth daily (Patient taking differently: Take 3,000 Units by mouth daily), Disp: 30 capsule, Rfl: 1  •  Diphenhydramine-Phenylephrine ([employer] ALLERGY-D PO), Take by mouth, Disp: , Rfl:   •  fluticasone (FLONASE) 50 mcg/act nasal spray, 2 sprays into each nostril daily, Disp: , Rfl:   •  gabapentin (NEURONTIN) 600 MG tablet, Take 600 mg by mouth daily  , Disp: , Rfl: 3  •  Melatonin 10 MG TABS, Take 10 mg by mouth, Disp: , Rfl:   •  Multiple Vitamin (MULTIVITAMIN) capsule, Take 1 capsule by mouth daily  , Disp: , Rfl:   •  pantoprazole (PROTONIX) 20 mg tablet, Take 1 tablet (20 mg total) by mouth daily, Disp: 90 tablet, Rfl: 3  •  PARoxetine (PAXIL) 30 mg tablet, Take 1 tablet (30 mg total) by mouth daily, Disp: 90 tablet, Rfl: 3  •  Restasis 0 05 % ophthalmic emulsion, , Disp: , Rfl:   •  traZODone (DESYREL) 50 mg tablet, Take 50 mg by mouth daily at bedtime (Patient not taking: Reported on 2/1/2023), Disp: , Rfl:      *-*-*-*-*-*-*-*-*-*-*-*-*-*-*-*-*-*-*-*-*-*-*-*-*-*-*-*-*-*-*-*-*-*-*-*-*-*-*-*-*-*-*-*-*-*-*-*-*-*-*-*-*-*

## 2023-02-03 ENCOUNTER — OFFICE VISIT (OUTPATIENT)
Dept: FAMILY MEDICINE CLINIC | Facility: CLINIC | Age: 68
End: 2023-02-03

## 2023-02-03 VITALS
HEIGHT: 58 IN | SYSTOLIC BLOOD PRESSURE: 122 MMHG | WEIGHT: 146 LBS | OXYGEN SATURATION: 99 % | DIASTOLIC BLOOD PRESSURE: 74 MMHG | BODY MASS INDEX: 30.64 KG/M2 | HEART RATE: 68 BPM

## 2023-02-03 DIAGNOSIS — R31.0 GROSS HEMATURIA: ICD-10-CM

## 2023-02-03 DIAGNOSIS — R35.0 URINARY FREQUENCY: Primary | ICD-10-CM

## 2023-02-03 DIAGNOSIS — N30.00 ACUTE CYSTITIS WITHOUT HEMATURIA: ICD-10-CM

## 2023-02-03 LAB
SL AMB  POCT GLUCOSE, UA: NEGATIVE
SL AMB LEUKOCYTE ESTERASE,UA: ABNORMAL
SL AMB POCT BILIRUBIN,UA: NEGATIVE
SL AMB POCT BLOOD,UA: ABNORMAL
SL AMB POCT CLARITY,UA: ABNORMAL
SL AMB POCT COLOR,UA: YELLOW
SL AMB POCT KETONES,UA: NEGATIVE
SL AMB POCT NITRITE,UA: NEGATIVE
SL AMB POCT PH,UA: 5
SL AMB POCT SPECIFIC GRAVITY,UA: >=1.03
SL AMB POCT URINE PROTEIN: NEGATIVE
SL AMB POCT UROBILINOGEN: 0.2

## 2023-02-03 RX ORDER — SULFAMETHOXAZOLE AND TRIMETHOPRIM 800; 160 MG/1; MG/1
1 TABLET ORAL EVERY 12 HOURS SCHEDULED
Qty: 10 TABLET | Refills: 0 | Status: SHIPPED | OUTPATIENT
Start: 2023-02-03 | End: 2023-02-08

## 2023-02-03 NOTE — ASSESSMENT & PLAN NOTE
We will treat patient with Bactrim double strength 1 twice daily for 5 days increase fluids try not to hold it when she has the urge to urinate  Prevention information sheet given at checkout  Her urine call patient only if we need to change antibiotic

## 2023-02-03 NOTE — PROGRESS NOTES
Assessment and Plan:    1  Urinary frequency  -     POCT urine dip  -     Urine culture; Future  -     Urine culture    2  Gross hematuria  -     Urine culture; Future  -     Urine culture    3  Acute cystitis without hematuria  Assessment & Plan: We will treat patient with Bactrim double strength 1 twice daily for 5 days increase fluids try not to hold it when she has the urge to urinate  Prevention information sheet given at checkout  Her urine call patient only if we need to change antibiotic  Orders:  -     sulfamethoxazole-trimethoprim (BACTRIM DS) 800-160 mg per tablet; Take 1 tablet by mouth every 12 (twelve) hours for 5 days               Diagnoses and all orders for this visit:    Urinary frequency  -     POCT urine dip  -     Urine culture; Future  -     Urine culture    Gross hematuria  -     Urine culture; Future  -     Urine culture    Acute cystitis without hematuria  -     sulfamethoxazole-trimethoprim (BACTRIM DS) 800-160 mg per tablet; Take 1 tablet by mouth every 12 (twelve) hours for 5 days            Subjective:      Patient ID: Alice Driver is a 79 y o  female  CC:    Chief Complaint   Patient presents with   • Urinary Frequency     Patient present today for possible Urinary Tract infection, pt is experiencing pressure, urinary frequency and burning sensation since this Am  HPI:    Patient here today because she woke up this morning and felt like she had the start of a UTI with some frequency disks comfort with urination  She did go to work today but her boss told her that she really should get an appointment to get this evaluated as she has a history of a few UTIs in the past 6 months and it is Friday  Patient denies any nausea back pain fevers  Culture done at the last UTI within the past 3 months was intermediate against Macrobid        The following portions of the patient's history were reviewed and updated as appropriate: allergies, current medications, past family history, past medical history, past social history, past surgical history and problem list       Review of Systems   Constitutional: Negative  HENT: Negative  Eyes: Negative  Respiratory: Negative  Cardiovascular: Negative  Gastrointestinal: Negative  Endocrine: Negative  Genitourinary: Positive for dysuria, frequency and urgency  Musculoskeletal: Negative  Skin: Negative  Allergic/Immunologic: Negative  Neurological: Negative  Hematological: Negative  Psychiatric/Behavioral: Negative  Data to review:       Objective:    Vitals:    02/03/23 1207   BP: 122/74   BP Location: Left arm   Patient Position: Sitting   Cuff Size: Large   Pulse: 68   SpO2: 99%   Weight: 66 2 kg (146 lb)   Height: 4' 10" (1 473 m)        Physical Exam  Vitals and nursing note reviewed  Constitutional:       Appearance: Normal appearance  She is well-developed  HENT:      Head: Normocephalic and atraumatic  Eyes:      General: Lids are normal       Conjunctiva/sclera: Conjunctivae normal       Pupils: Pupils are equal, round, and reactive to light  Cardiovascular:      Rate and Rhythm: Normal rate and regular rhythm  Heart sounds: No murmur heard  Pulmonary:      Effort: Pulmonary effort is normal       Breath sounds: Normal breath sounds  Abdominal:      General: Abdomen is flat  Bowel sounds are normal       Tenderness: There is no abdominal tenderness  There is no right CVA tenderness or left CVA tenderness  Skin:     General: Skin is warm and dry  Neurological:      General: No focal deficit present  Mental Status: She is alert  Coordination: Coordination is intact  Psychiatric:         Mood and Affect: Mood normal          Behavior: Behavior normal  Behavior is cooperative  Thought Content: Thought content normal          Judgment: Judgment normal            BMI Counseling: Body mass index is 30 51 kg/m²   The BMI is above normal  Nutrition recommendations include decreasing portion sizes, encouraging healthy choices of fruits and vegetables, decreasing fast food intake, consuming healthier snacks and limiting drinks that contain sugar  Rationale for BMI follow-up plan is due to patient being overweight or obese  Depression Screening and Follow-up Plan: Patient was screened for depression during today's encounter  They screened negative with a PHQ-2 score of 0

## 2023-02-03 NOTE — PATIENT INSTRUCTIONS
1  Urinary frequency  -     POCT urine dip  -     Urine culture; Future  -     Urine culture    2  Gross hematuria  -     Urine culture; Future  -     Urine culture    3  Acute cystitis without hematuria  Assessment & Plan: We will treat patient with Bactrim double strength 1 twice daily for 5 days increase fluids try not to hold it when she has the urge to urinate  Prevention information sheet given at checkout  Her urine call patient only if we need to change antibiotic  Orders:  -     sulfamethoxazole-trimethoprim (BACTRIM DS) 800-160 mg per tablet;  Take 1 tablet by mouth every 12 (twelve) hours for 5 days

## 2023-02-05 LAB — BACTERIA UR CULT: NORMAL

## 2023-02-06 NOTE — RESULT ENCOUNTER NOTE
Please let pt know her urine culture was almost normal  She can finish her antibiotic course but needs further eval if symptoms persist

## 2023-02-07 ENCOUNTER — PREP FOR PROCEDURE (OUTPATIENT)
Dept: BARIATRICS | Facility: CLINIC | Age: 68
End: 2023-02-07

## 2023-02-07 DIAGNOSIS — Z98.84 BARIATRIC SURGERY STATUS: Primary | ICD-10-CM

## 2023-03-07 ENCOUNTER — TELEPHONE (OUTPATIENT)
Dept: BARIATRICS | Facility: CLINIC | Age: 68
End: 2023-03-07

## 2023-03-13 RX ORDER — SODIUM CHLORIDE 9 MG/ML
125 INJECTION, SOLUTION INTRAVENOUS CONTINUOUS
Status: CANCELLED | OUTPATIENT
Start: 2023-03-13

## 2023-03-15 ENCOUNTER — ANESTHESIA EVENT (OUTPATIENT)
Dept: GASTROENTEROLOGY | Facility: HOSPITAL | Age: 68
End: 2023-03-15

## 2023-03-15 ENCOUNTER — ANESTHESIA (OUTPATIENT)
Dept: GASTROENTEROLOGY | Facility: HOSPITAL | Age: 68
End: 2023-03-15

## 2023-03-15 ENCOUNTER — HOSPITAL ENCOUNTER (OUTPATIENT)
Dept: GASTROENTEROLOGY | Facility: HOSPITAL | Age: 68
Setting detail: OUTPATIENT SURGERY
Discharge: HOME | End: 2023-03-15
Attending: SURGERY

## 2023-03-15 VITALS
DIASTOLIC BLOOD PRESSURE: 54 MMHG | TEMPERATURE: 98.1 F | SYSTOLIC BLOOD PRESSURE: 104 MMHG | HEIGHT: 58 IN | RESPIRATION RATE: 17 BRPM | HEART RATE: 75 BPM | WEIGHT: 146 LBS | OXYGEN SATURATION: 99 % | BODY MASS INDEX: 30.64 KG/M2

## 2023-03-15 DIAGNOSIS — Z98.84 BARIATRIC SURGERY STATUS: ICD-10-CM

## 2023-03-15 RX ORDER — LIDOCAINE HYDROCHLORIDE 20 MG/ML
INJECTION, SOLUTION EPIDURAL; INFILTRATION; INTRACAUDAL; PERINEURAL AS NEEDED
Status: DISCONTINUED | OUTPATIENT
Start: 2023-03-15 | End: 2023-03-15

## 2023-03-15 RX ORDER — PROPOFOL 10 MG/ML
INJECTION, EMULSION INTRAVENOUS AS NEEDED
Status: DISCONTINUED | OUTPATIENT
Start: 2023-03-15 | End: 2023-03-15

## 2023-03-15 RX ORDER — SODIUM CHLORIDE 9 MG/ML
125 INJECTION, SOLUTION INTRAVENOUS CONTINUOUS
Status: DISCONTINUED | OUTPATIENT
Start: 2023-03-15 | End: 2023-03-19 | Stop reason: HOSPADM

## 2023-03-15 RX ADMIN — PROPOFOL 110 MG: 10 INJECTION, EMULSION INTRAVENOUS at 08:06

## 2023-03-15 RX ADMIN — PROPOFOL 50 MG: 10 INJECTION, EMULSION INTRAVENOUS at 08:10

## 2023-03-15 RX ADMIN — LIDOCAINE HYDROCHLORIDE 80 MG: 20 INJECTION, SOLUTION EPIDURAL; INFILTRATION; INTRACAUDAL; PERINEURAL at 08:06

## 2023-03-15 RX ADMIN — SODIUM CHLORIDE 125 ML/HR: 0.9 INJECTION, SOLUTION INTRAVENOUS at 07:15

## 2023-03-15 NOTE — ANESTHESIA POSTPROCEDURE EVALUATION
Post-Op Assessment Note    CV Status:  Stable    Pain management: adequate     Mental Status:  Alert and awake   Hydration Status:  Euvolemic   PONV Controlled:  Controlled   Airway Patency:  Patent      Post Op Vitals Reviewed: Yes      Staff: Anesthesiologist, CRNA         No notable events documented      BP      Temp      Pulse     Resp      SpO2      /54   Pulse 75   Temp 98 1 °F (36 7 °C) (Temporal)   Resp 17   Ht 4' 10" (1 473 m)   Wt 66 2 kg (146 lb)   LMP  (LMP Unknown)   SpO2 99%   BMI 30 51 kg/m²

## 2023-03-15 NOTE — H&P
H&P EXAM - Outpatient Endoscopy  [facility] [address] [facility]   Lynda Rianalydia Félix 79 y o  female MRN: [id number]  Unit/Bed#:  Encounter: [id number]        Impression: Status post sleeve gastrectomy    Plan: Screening upper endoscopy    Chief Complaint: Morbid obesity     Physical Exam: Normal not in acute distress   Chest: Clear to auscultation   Heart: Normal S1 and S2

## 2023-03-15 NOTE — ANESTHESIA PREPROCEDURE EVALUATION
Review of Systems/Medical History  Patient summary reviewed  Chart reviewed  History of anesthetic complications PONV    Cardiovascular  Hyperlipidemia,    Pulmonary  Sleep apnea (stopped using CPAP after 60 lb weight loss after gastric sleeve) ,        GI/Hepatic    GERD well controlled, Liver disease (Fatty liver ) ,  Hiatal hernia, Bariatric surgery (s/p gastric sleeve 2018),        Negative  ROS        Endo/Other  History of thyroid disease , hypothyroidism,      GYN       Hematology  Negative hematology ROS      Musculoskeletal  Osteoarthritis,   Comment: Fibromyalgia Arthritis     Neurology  Negative neurology ROS      Psychology   Negative psychology ROS              Physical Exam    Airway    Mallampati score: II  TM Distance: >3 FB  Neck ROM: full     Dental   No notable dental hx     Cardiovascular  Rhythm: regular, Rate: normal, Cardiovascular exam normal    Pulmonary  Pulmonary exam normal Breath sounds clear to auscultation,     Other Findings        Anesthesia Plan  ASA Score- 2     Anesthesia Type- IV sedation with anesthesia with ASA Monitors  Additional Monitors:   Airway Plan:     Comment: GA prn  Plan Factors-    Chart reviewed  Patient summary reviewed  Patient is not a current smoker  Patient not instructed to abstain from smoking on day of procedure  Patient did not smoke on day of surgery  Induction- intravenous  Postoperative Plan-     Informed Consent- Anesthetic plan and risks discussed with patient  I personally reviewed this patient with the CRNA  Discussed and agreed on the Anesthesia Plan with the CRNA           NPO and allergies verified  Plan:  IV sedation/MAC; GA as backup    Risks and benefits discussed with patient including possibility of recall under sedation  Questions answered  Patient consented

## 2023-03-16 ENCOUNTER — ANNUAL EXAM (OUTPATIENT)
Dept: GYNECOLOGY | Facility: CLINIC | Age: 68
End: 2023-03-16

## 2023-03-16 VITALS
DIASTOLIC BLOOD PRESSURE: 62 MMHG | SYSTOLIC BLOOD PRESSURE: 118 MMHG | BODY MASS INDEX: 30.64 KG/M2 | HEIGHT: 58 IN | WEIGHT: 146 LBS

## 2023-03-16 DIAGNOSIS — Z12.31 ENCOUNTER FOR SCREENING MAMMOGRAM FOR BREAST CANCER: ICD-10-CM

## 2023-03-16 DIAGNOSIS — Z01.419 ENCOUNTER FOR ANNUAL ROUTINE GYNECOLOGICAL EXAMINATION: Primary | ICD-10-CM

## 2023-03-16 NOTE — PROGRESS NOTES
Assessment/Plan:    Pap with reflex done today, she has had a new partner in the last year    mammogram reviewed with her including breast density  RX given for May  Her exam is normal today, will observe the itching  Discussed self breast exams    colon cancer screening-colonoscopy is due at age 79 which will be 10 years    Osteopenia-I reviewed her DEXA with her  We discussed weightbearing and muscle strengthening exercises, balance and flexibility  She is taking calcium and vitamin D     discussed preventive care, regular exercise and a healthy diet      No problem-specific Assessment & Plan notes found for this encounter  Diagnoses and all orders for this visit:    Encounter for annual routine gynecological examination    Encounter for screening mammogram for breast cancer  -     Mammo screening bilateral w 3d & cad; Future          Subjective:      Patient ID: Perry Rose is a 79 y o  female  Patient here for yearly  Last year she was having some nipple itching and irritation  Mammogram and ultrasound were normal   It has improved, still some itching but better than last year  She is sexually active and has a new partner since her last visit    Normal Pap in 2019  Normal 3D mammogram last May with scattered fibroglandular densities and average risk      The following portions of the patient's history were reviewed and updated as appropriate: allergies, current medications, past family history, past medical history, past social history, past surgical history and problem list     Review of Systems   Constitutional: Negative  Gastrointestinal: Negative  Genitourinary: Negative  Objective:      LMP  (LMP Unknown)          Physical Exam  Vitals reviewed  Constitutional:       Appearance: She is well-developed  Neck:      Thyroid: No thyromegaly  Trachea: No tracheal deviation  Cardiovascular:      Rate and Rhythm: Normal rate and regular rhythm     Pulmonary: Effort: Pulmonary effort is normal       Breath sounds: Normal breath sounds  Chest:   Breasts:     Breasts are symmetrical       Right: No inverted nipple, mass, nipple discharge, skin change or tenderness  Left: No inverted nipple, mass, nipple discharge, skin change or tenderness  Abdominal:      General: There is no distension  Palpations: Abdomen is soft  There is no mass  Tenderness: There is no abdominal tenderness  Genitourinary:     Labia:         Right: No rash, tenderness, lesion or injury  Left: No rash, tenderness, lesion or injury  Vagina: Normal       Cervix: No cervical motion tenderness, discharge or friability  Adnexa:         Right: No mass, tenderness or fullness  Left: No mass, tenderness or fullness          Rectum: Normal

## 2023-03-22 LAB
LAB AP GYN PRIMARY INTERPRETATION: NORMAL
Lab: NORMAL

## 2023-03-28 ENCOUNTER — OFFICE VISIT (OUTPATIENT)
Dept: BARIATRICS | Facility: CLINIC | Age: 68
End: 2023-03-28

## 2023-03-28 VITALS
SYSTOLIC BLOOD PRESSURE: 120 MMHG | HEART RATE: 75 BPM | WEIGHT: 148.5 LBS | HEIGHT: 58 IN | TEMPERATURE: 97.8 F | BODY MASS INDEX: 31.17 KG/M2 | DIASTOLIC BLOOD PRESSURE: 76 MMHG

## 2023-03-28 DIAGNOSIS — Z48.815 ENCOUNTER FOR SURGICAL AFTERCARE FOLLOWING SURGERY OF DIGESTIVE SYSTEM: Primary | ICD-10-CM

## 2023-03-28 DIAGNOSIS — K21.9 GERD WITHOUT ESOPHAGITIS: ICD-10-CM

## 2023-03-28 DIAGNOSIS — Z98.84 BARIATRIC SURGERY STATUS: ICD-10-CM

## 2023-03-28 NOTE — PROGRESS NOTES
Assessment/Plan:    No problem-specific Assessment & Plan notes found for this encounter  Diagnoses and all orders for this visit:    Encounter for surgical aftercare following surgery of digestive system    Bariatric surgery status    GERD without esophagitis      - EGD and tissue biopsy unremarkable  - routine EGD screening - due in 2026; sooner if having any issues or concerns  - Patient continues to use pantoprazole  Discussed GERD precautions; advised to start working on cutting down triggering factors such as caffienated sodas and will start tapering process when she f/u in Oct  - Follow up as scheduled for annual visit in Oct 24, 2023  Subjective:      Patient ID: Jorge York is a 79 y o  female  HPI   -s/p Vertical Sleeve Gastrectomy with Dr Ernesto Rojas on 03/19/2018 here for EGD d/t sleeve screening  EGD:  DATE OF SERVICE:  3/15/23     PHYSICIAN(S):  Attending:   Montana Haywood MD      Fellow:   No Staff Documented         INDICATION:  Bariatric surgery status     POST-OP DIAGNOSIS:  See the impression below      PREPROCEDURE:  Informed consent was obtained for the procedure, including sedation  Risks of perforation, hemorrhage, adverse drug reaction and aspiration were discussed  The patient was placed in the left lateral decubitus position      Patient was explained about the risks and benefits of the procedure  Risks including but not limited to bleeding, infection, and perforation were explained in detail  Also explained about less than 100% sensitivity with the exam and other alternatives      PROCEDURE: EGD     DETAILS OF PROCEDURE:   Patient was taken to the procedure room where a time out was performed to confirm correct patient and correct procedure   The patient underwent monitored anesthesia care, which was administered by an anesthesia professional  The patient's blood pressure, heart rate, level of consciousness, respirations and oxygen were monitored throughout the "procedure  The scope was advanced to the second part of the duodenum  Retroflexion was performed in the fundus  The patient experienced no blood loss  The procedure was not difficult  The patient tolerated the procedure well  There were no apparent complications       ANESTHESIA INFORMATION:  ASA: II  Anesthesia Type: IV Sedation with Anesthesia     MEDICATIONS:  No administrations occurring from 0804 to 0808 on 03/15/23         FINDINGS:  • Previous sleeve gastrectomy  No evidence of stricture at the incisura  No evidence of hiatal hernia  • Regular Z-line 32 cm from the incisors  • The esophagus, stomach and 2nd part of the duodenum appeared normal   • Performed single forceps biopsy to rule out H  pylori in the antrum        SPECIMENS:  ID Type Source Tests Collected by Time Destination   1 : r/o h pylori Tissue Stomach TISSUE EXAM Alfredo Mcknight MD 3/15/2023 0809              IMPRESSION:  Status post sleeve gastrectomy  Normal findings     RECOMMENDATION:    There is no recommended follow-up for this procedure  Final Diagnosis   A  Stomach, Biopsy:  - Antral-type gastric mucosa with mild chronic inactive gastritis  - Negative for H  pylori organisms on H&E stain  The following portions of the patient's history were reviewed and updated as appropriate: allergies, current medications, past family history, past medical history, past social history, past surgical history and problem list     Review of Systems   Constitutional: Negative  Respiratory: Negative  Cardiovascular: Negative  Gastrointestinal: Negative  Objective:      /76   Pulse 75   Temp 97 8 °F (36 6 °C) (Tympanic)   Ht 4' 10\" (1 473 m)   Wt 67 4 kg (148 lb 8 oz)   LMP  (LMP Unknown)   BMI 31 04 kg/m²          Physical Exam  Vitals and nursing note reviewed  Constitutional:       Appearance: Normal appearance  Cardiovascular:      Rate and Rhythm: Normal rate and regular rhythm        Pulses: Normal " pulses  Heart sounds: Normal heart sounds  Pulmonary:      Effort: Pulmonary effort is normal       Breath sounds: Normal breath sounds  Abdominal:      General: Bowel sounds are normal       Palpations: Abdomen is soft  Tenderness: There is no abdominal tenderness  Musculoskeletal:         General: Normal range of motion  Skin:     General: Skin is warm and dry  Neurological:      General: No focal deficit present  Mental Status: She is alert and oriented to person, place, and time  Psychiatric:         Mood and Affect: Mood normal          Behavior: Behavior normal          Thought Content:  Thought content normal          Judgment: Judgment normal

## 2023-04-04 ENCOUNTER — RA CDI HCC (OUTPATIENT)
Dept: OTHER | Facility: HOSPITAL | Age: 68
End: 2023-04-04

## 2023-04-04 ENCOUNTER — APPOINTMENT (OUTPATIENT)
Dept: LAB | Facility: CLINIC | Age: 68
End: 2023-04-04

## 2023-04-04 DIAGNOSIS — K91.2 POSTSURGICAL MALABSORPTION: ICD-10-CM

## 2023-04-04 DIAGNOSIS — Z48.815 ENCOUNTER FOR SURGICAL AFTERCARE FOLLOWING SURGERY OF DIGESTIVE SYSTEM: ICD-10-CM

## 2023-04-04 DIAGNOSIS — E05.90 HYPERTHYROIDISM: ICD-10-CM

## 2023-04-04 DIAGNOSIS — Z98.84 BARIATRIC SURGERY STATUS: ICD-10-CM

## 2023-04-04 DIAGNOSIS — E55.9 VITAMIN D DEFICIENCY: ICD-10-CM

## 2023-04-04 DIAGNOSIS — E78.5 MILD HYPERLIPIDEMIA: ICD-10-CM

## 2023-04-04 DIAGNOSIS — R73.9 HYPERGLYCEMIA: ICD-10-CM

## 2023-04-04 PROBLEM — N30.00 ACUTE CYSTITIS WITHOUT HEMATURIA: Status: RESOLVED | Noted: 2023-02-03 | Resolved: 2023-04-04

## 2023-04-04 LAB
25(OH)D3 SERPL-MCNC: 45.7 NG/ML (ref 30–100)
ALBUMIN SERPL BCP-MCNC: 3.6 G/DL (ref 3.5–5)
ALP SERPL-CCNC: 68 U/L (ref 46–116)
ALT SERPL W P-5'-P-CCNC: 24 U/L (ref 12–78)
ANION GAP SERPL CALCULATED.3IONS-SCNC: 3 MMOL/L (ref 4–13)
AST SERPL W P-5'-P-CCNC: 12 U/L (ref 5–45)
BILIRUB SERPL-MCNC: 0.42 MG/DL (ref 0.2–1)
BUN SERPL-MCNC: 10 MG/DL (ref 5–25)
CALCIUM SERPL-MCNC: 9 MG/DL (ref 8.3–10.1)
CHLORIDE SERPL-SCNC: 108 MMOL/L (ref 96–108)
CHOLEST SERPL-MCNC: 143 MG/DL
CO2 SERPL-SCNC: 27 MMOL/L (ref 21–32)
CREAT SERPL-MCNC: 0.48 MG/DL (ref 0.6–1.3)
ERYTHROCYTE [DISTWIDTH] IN BLOOD BY AUTOMATED COUNT: 11.9 % (ref 11.6–15.1)
EST. AVERAGE GLUCOSE BLD GHB EST-MCNC: 105 MG/DL
FERRITIN SERPL-MCNC: 44 NG/ML (ref 8–388)
GFR SERPL CREATININE-BSD FRML MDRD: 101 ML/MIN/1.73SQ M
GLUCOSE P FAST SERPL-MCNC: 112 MG/DL (ref 65–99)
HBA1C MFR BLD: 5.3 %
HCT VFR BLD AUTO: 38.9 % (ref 34.8–46.1)
HDLC SERPL-MCNC: 54 MG/DL
HGB BLD-MCNC: 12.8 G/DL (ref 11.5–15.4)
LDLC SERPL CALC-MCNC: 71 MG/DL (ref 0–100)
MCH RBC QN AUTO: 28.8 PG (ref 26.8–34.3)
MCHC RBC AUTO-ENTMCNC: 32.9 G/DL (ref 31.4–37.4)
MCV RBC AUTO: 88 FL (ref 82–98)
PLATELET # BLD AUTO: 319 THOUSANDS/UL (ref 149–390)
PMV BLD AUTO: 10.8 FL (ref 8.9–12.7)
POTASSIUM SERPL-SCNC: 4 MMOL/L (ref 3.5–5.3)
PROT SERPL-MCNC: 7.1 G/DL (ref 6.4–8.4)
PTH-INTACT SERPL-MCNC: 71.6 PG/ML (ref 18.4–80.1)
RBC # BLD AUTO: 4.44 MILLION/UL (ref 3.81–5.12)
SODIUM SERPL-SCNC: 138 MMOL/L (ref 135–147)
TRIGL SERPL-MCNC: 90 MG/DL
TSH SERPL DL<=0.05 MIU/L-ACNC: 0.46 UIU/ML (ref 0.45–4.5)
VIT B12 SERPL-MCNC: 519 PG/ML (ref 100–900)
WBC # BLD AUTO: 6.71 THOUSAND/UL (ref 4.31–10.16)

## 2023-04-04 NOTE — PROGRESS NOTES
[facility] 75  coding opportunities       Chart reviewed, no opportunity found: CHART REVIEWED, NO OPPORTUNITY FOUND      This is a reminder to address ALL HCC (risk adjustment) codes as found on active problem list for 2023 as patient scores reset to zero LISET    Patients Insurance     Medicare Insurance: [employer]

## 2023-04-05 LAB — ZINC SERPL-MCNC: 92 UG/DL (ref 44–115)

## 2023-04-06 LAB
IRON SATN MFR SERPL: 33 % (ref 15–50)
IRON SERPL-MCNC: 87 UG/DL (ref 50–170)
TIBC SERPL-MCNC: 263 UG/DL (ref 250–450)
VIT B1 BLD-SCNC: 112.8 NMOL/L (ref 66.5–200)

## 2023-04-08 LAB — VIT A SERPL-MCNC: 39.2 UG/DL (ref 22–69.5)

## 2023-05-30 ENCOUNTER — HOSPITAL ENCOUNTER (OUTPATIENT)
Dept: MAMMOGRAPHY | Facility: MEDICAL CENTER | Age: 68
Discharge: HOME | End: 2023-05-30

## 2023-05-30 VITALS — BODY MASS INDEX: 31.28 KG/M2 | WEIGHT: 149 LBS | HEIGHT: 58 IN

## 2023-05-30 DIAGNOSIS — Z12.31 ENCOUNTER FOR SCREENING MAMMOGRAM FOR BREAST CANCER: ICD-10-CM

## 2023-10-02 ENCOUNTER — OFFICE VISIT (OUTPATIENT)
Dept: URGENT CARE | Facility: MEDICAL CENTER | Age: 68
End: 2023-10-02
Payer: COMMERCIAL

## 2023-10-02 VITALS
SYSTOLIC BLOOD PRESSURE: 122 MMHG | RESPIRATION RATE: 18 BRPM | HEART RATE: 82 BPM | OXYGEN SATURATION: 99 % | DIASTOLIC BLOOD PRESSURE: 70 MMHG | TEMPERATURE: 98.2 F

## 2023-10-02 DIAGNOSIS — S61.012A LACERATION OF LEFT THUMB WITHOUT FOREIGN BODY WITHOUT DAMAGE TO NAIL, INITIAL ENCOUNTER: Primary | ICD-10-CM

## 2023-10-02 PROCEDURE — 90471 IMMUNIZATION ADMIN: CPT

## 2023-10-02 PROCEDURE — 90715 TDAP VACCINE 7 YRS/> IM: CPT

## 2023-10-02 PROCEDURE — 99213 OFFICE O/P EST LOW 20 MIN: CPT

## 2023-10-02 RX ORDER — FEXOFENADINE HCL 180 MG/1
TABLET ORAL
COMMUNITY

## 2023-10-02 RX ORDER — CEPHALEXIN 500 MG/1
500 CAPSULE ORAL EVERY 6 HOURS SCHEDULED
Qty: 28 CAPSULE | Refills: 0 | Status: SHIPPED | OUTPATIENT
Start: 2023-10-02 | End: 2023-10-09

## 2023-10-02 NOTE — PROGRESS NOTES
[facility]        NAME: Clementina Street is a 76 y.o. female  : 1955    MRN: [id number]  DATE: 2023  TIME: 8:52 PM    Assessment and Plan   Laceration of left thumb without foreign body without damage to nail, initial encounter [S61.012A]  1. Laceration of left thumb without foreign body without damage to nail, initial encounter  Tdap Vaccine greater than or equal to 6yo    cephalexin (KEFLEX) 500 mg capsule    Laceration repair        4 sutures left thumb, antibiotics, return in 10-14 days. 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 Patient Instructions   Start and complete antibiotics for prophylaxes. Antibiotic ointment to wound, cover with dressing and keep covered. If at any time the wound becomes red or draining yellow - return earlier for re-evaluation. Return in about 10-14 days for suture removal - either your PCP or here. Chief Complaint     Chief Complaint   Patient presents with   • Finger Laceration     Patient c/o left thumb laceration on a . Tdap -     History of Present Illness       Accidentally lacerated padding of left thumb with a . Unknown last Tdap. Has applied pressure prior to arrival.      Review of Systems   Review of Systems   Constitutional: Negative for chills and fever. Respiratory: Negative for shortness of breath. Skin: Positive for wound. Left thumb   Neurological: Negative for dizziness, weakness, light-headedness and numbness.          Current Medications       Current Outpatient Medications:   •  cephalexin (KEFLEX) 500 mg capsule, Take 1 capsule (500 mg total) by mouth every 6 (six) hours for 7 days, Disp: 28 capsule, Rfl: 0  •  atorvastatin (LIPITOR) 10 mg tablet, Take 1 tablet (10 mg total) by mouth daily, Disp: 90 tablet, Rfl: 3  •  Calcium Carbonate 1500 (600 Ca) MG TABS, Take by mouth, Disp: , Rfl:   •  cephalexin (KEFLEX) 500 mg capsule, For Dental appts, Disp: , Rfl:   •  Cholecalciferol (VITAMIN D-3) 1000 units CAPS, Take 4 capsules (4,000 Units total) by mouth daily (Patient taking differently: Take 3,000 Units by mouth daily), Disp: 30 capsule, Rfl: 1  •  Diphenhydramine-Phenylephrine ([employer] ALLERGY-D PO), Take by mouth, Disp: , Rfl:   •  fexofenadine (Allegra Allergy) 180 MG tablet, , Disp: , Rfl:   •  fluticasone (FLONASE) 50 mcg/act nasal spray, 2 sprays into each nostril daily, Disp: , Rfl:   •  gabapentin (NEURONTIN) 600 MG tablet, Take 600 mg by mouth daily  , Disp: , Rfl: 3  •  Melatonin 10 MG TABS, Take 10 mg by mouth, Disp: , Rfl:   •  Multiple Vitamin (MULTIVITAMIN) capsule, Take 1 capsule by mouth daily. , Disp: , Rfl:   •  pantoprazole (PROTONIX) 20 mg tablet, TAKE ONE TABLET BY MOUTH EVERY DAY, Disp: 90 tablet, Rfl: 3  •  PARoxetine (PAXIL) 30 mg tablet, TAKE ONE TABLET BY MOUTH EVERY DAY, Disp: 90 tablet, Rfl: 3  •  Restasis 0.05 % ophthalmic emulsion, , Disp: , Rfl:     Current Allergies     Allergies as of 10/02/2023 - Reviewed 10/02/2023   Allergen Reaction Noted   • Ketorolac Itching 02/16/2017   • Other Allergic Rhinitis 09/27/2023   • Percocet [oxycodone-acetaminophen] Itching 03/26/2018   • Flurbiprofen  05/14/2012   • Ketorolac tromethamine Itching 12/28/2015   • Pneumovax [pneumococcal polysaccharide vaccine] Swelling and Rash 12/12/2016            The following portions of the patient's history were reviewed and updated as appropriate: allergies, current medications, past family history, past medical history, past social history, past surgical history and problem list.     Past Medical History:   Diagnosis Date   • Allergic rhinitis    • Bariatric surgery status    • Basal cell carcinoma     skin CA removed from nose   • Bowel habit changes    • Cataract     starting with a catract   • Cellulitis of left lower extremity     last assessed 06/29/2016   • Chronic GERD    • Closed displaced fracture of greater tuberosity of humerus     last assessed 05/31/2016   • CPAP (continuous positive airway pressure) dependence    • Fatty liver    • Fibromyalgia    • Fibromyalgia, primary    • Hiatal hernia    • High cholesterol    • Insomnia    • Morbid obesity (HCC)    • Osteoarthritis    • Palpitations     pt denies a fib   • PONV (postoperative nausea and vomiting)    • Postgastrectomy malabsorption    • Recurrent pregnancy loss, antepartum condition or complication    • Restless leg    • Sicca syndrome (HCC)    • Sleep apnea    • Superficial phlebitis and thrombophlebitis of left lower extremity     last assessed 06/08/2016   • Thyroid nodule     nodule on thyroid   • Urinary tract infection    • Varicose veins of left leg with edema    • Wears glasses        Past Surgical History:   Procedure Laterality Date   • BLADDER SURGERY      sling   • CHOLECYSTECTOMY  1998   • COLONOSCOPY     • CYST REMOVAL      thigh   • DILATION AND CURETTAGE OF UTERUS     • ESOPHAGOGASTRODUODENOSCOPY     • ESOPHAGOGASTRODUODENOSCOPY N/A 12/21/2017    Procedure: ESOPHAGOGASTRODUODENOSCOPY (EGD) with biopsy and polypectomy;  Surgeon: Gerald Medina MD;  Location: [facility]; Service: Gastroenterology   • HARDWARE REMOVAL      right shoulder   • JOINT REPLACEMENT      both knees   • KNEE ARTHROSCOPY     • ORIF HUMERAL SHAFT FRACTURE Right    • FL ENDOVEN ABLTJ INCMPTNT VEIN XTR LASER 1ST VEIN Left 04/29/2016    Procedure: GREATER SAPHENOUS VEIN EVLT ;  Surgeon: Jacoby Cruz DO;  Location: [facility];  Service: Vascular   • FL ESOPHAGOGASTRODUODENOSCOPY TRANSORAL DIAGNOSTIC N/A 01/11/2018    Procedure: egd w/ emr ;  Surgeon: Juliana Conn MD;  Location: [facility]; Service: Gastroenterology   • FL ESOPHAGOGASTRODUODENOSCOPY TRANSORAL DIAGNOSTIC N/A 06/28/2018    Procedure: ESOPHAGOGASTRODUODENOSCOPY (EGD); Surgeon: Juliana Conn MD;  Location: [facility];   Service: Gastroenterology   • FL LAPS [address] LONGITUDINAL GASTRECTOMY N/A 03/19/2018    Procedure: GASTRECTOMY SLEEVE LAPAROSCOPIC AND INTRAOPERATIVE EGD; Surgeon: Gurjit Tolentino MD;  Location: [facility];  Service: Bariatrics   • OR STAB PHLEBT VARICOSE VEINS 1 XTR 10-20 STAB INCS Left 04/29/2016    Procedure: AND STAB PHLEBECTOMIES ;  Surgeon: Waylan Holstein, DO;  Location:  MAIN OR;  Service: Vascular   • REDUCTION MAMMAPLASTY Bilateral 2011    Reduction   • SKIN CANCER EXCISION     • TOTAL KNEE ARTHROPLASTY Bilateral        Family History   Problem Relation Age of Onset   • Heart disease Mother    • Alzheimer's disease Mother    • Depression Mother    • Anxiety disorder Mother    • Coronary artery disease Mother    • Osteoporosis Mother    • Arthritis Father    • Osteoarthritis Father    • Prostate cancer Father         age on onset unknown   • Uterine cancer Sister 28   • Cardiomyopathy Sister         congestive   • No Known Problems Daughter    • No Known Problems Maternal Grandmother    • No Known Problems Maternal Grandfather    • No Known Problems Paternal Grandmother    • No Known Problems Paternal Grandfather    • No Known Problems Maternal Aunt    • No Known Problems Maternal Aunt          Medications have been verified. Objective   /70   Pulse 82   Temp 98.2 °F (36.8 °C)   Resp 18   LMP  (LMP Unknown)   SpO2 99%   No LMP recorded (lmp unknown). Patient is postmenopausal.       Physical Exam     Physical Exam  Vitals and nursing note reviewed. Constitutional:       Appearance: Normal appearance. HENT:      Head: Normocephalic and atraumatic. Pulmonary:      Effort: Pulmonary effort is normal.   Skin:     General: Skin is warm and dry. Capillary Refill: Capillary refill takes less than 2 seconds. Neurological:      General: No focal deficit present. Mental Status: She is alert and oriented to person, place, and time. Mental status is at baseline. Sensory: No sensory deficit. Motor: No weakness.    Psychiatric:         Mood and Affect: Mood normal.         Behavior: Behavior normal.         Thought Content: Thought content normal.       Universal Protocol:  Consent: Verbal consent obtained. Risks and benefits: risks, benefits and alternatives were discussed  Consent given by: patient  Time out: Immediately prior to procedure a "time out" was called to verify the correct patient, procedure, equipment, support staff and site/side marked as required. Patient understanding: patient states understanding of the procedure being performed    Laceration repair    Date/Time: 10/2/2023 6:30 PM    Performed by: KEENA Peña  Authorized by:  KEENA Peña  Body area: upper extremity  Location details: left thumb  Laceration length: 1.5 cm  Foreign bodies: no foreign bodies  Tendon involvement: none  Nerve involvement: none  Vascular damage: no  Anesthesia: digital block    Anesthesia:  Local Anesthetic: lidocaine 1% without epinephrine  Anesthetic total: 5 mL    Sedation:  Patient sedated: no      Wound Dehiscence:  Superficial Wound Dehiscence: simple closure      Procedure Details:  Subcutaneous closure: 4-0 Vicryl  Number of sutures: 4  Technique: simple  Approximation: close  Approximation difficulty: simple  Dressing: antibiotic ointment, 4x4 sterile gauze and pressure dressing

## 2023-10-03 NOTE — PATIENT INSTRUCTIONS
Start and complete antibiotics for prophylaxes. Antibiotic ointment to wound, cover with dressing and keep covered. If at any time the wound becomes red or draining yellow - return earlier for re-evaluation. Return in about 10-14 days for suture removal - either your PCP or here.

## 2023-10-05 ENCOUNTER — RA CDI HCC (OUTPATIENT)
Dept: OTHER | Facility: HOSPITAL | Age: 68
End: 2023-10-05

## 2023-10-05 NOTE — PROGRESS NOTES
[facility] [address] [facility] coding opportunities       Chart reviewed, no opportunity found: CHART REVIEWED, NO OPPORTUNITY FOUND        Patients Insurance     Medicare Insurance: [employer]

## 2023-10-11 ENCOUNTER — VBI (OUTPATIENT)
Dept: ADMINISTRATIVE | Facility: OTHER | Age: 68
End: 2023-10-11

## 2023-10-11 NOTE — TELEPHONE ENCOUNTER
10/11/23 12:39 PM    Patient contacted (left message) to bring Advance Directive, POLST, or Living Will document to next scheduled pcp visit. Thank you.   Elmira Lai  [facility]

## 2023-10-12 ENCOUNTER — OFFICE VISIT (OUTPATIENT)
Dept: FAMILY MEDICINE CLINIC | Facility: CLINIC | Age: 68
End: 2023-10-12
Payer: COMMERCIAL

## 2023-10-12 VITALS
RESPIRATION RATE: 16 BRPM | DIASTOLIC BLOOD PRESSURE: 74 MMHG | HEART RATE: 88 BPM | TEMPERATURE: 98.2 F | WEIGHT: 155.6 LBS | BODY MASS INDEX: 32.66 KG/M2 | SYSTOLIC BLOOD PRESSURE: 124 MMHG | HEIGHT: 58 IN

## 2023-10-12 DIAGNOSIS — L03.90 CELLULITIS, UNSPECIFIED CELLULITIS SITE: ICD-10-CM

## 2023-10-12 DIAGNOSIS — S61.019D: Primary | ICD-10-CM

## 2023-10-12 PROCEDURE — 99213 OFFICE O/P EST LOW 20 MIN: CPT | Performed by: PHYSICIAN ASSISTANT

## 2023-10-12 RX ORDER — AMOXICILLIN AND CLAVULANATE POTASSIUM 875; 125 MG/1; MG/1
1 TABLET, FILM COATED ORAL EVERY 12 HOURS SCHEDULED
Qty: 14 TABLET | Refills: 0 | Status: SHIPPED | OUTPATIENT
Start: 2023-10-12 | End: 2023-10-19

## 2023-10-12 RX ORDER — PAROXETINE 30 MG/1
1 TABLET, FILM COATED ORAL DAILY
COMMUNITY
End: 2023-10-17

## 2023-10-12 NOTE — PROGRESS NOTES
Assessment/Plan:  Patient Instructions   Assessment/plan:  1. Cellulitis-patient does have redness and induration and warmth spreading from the site of injection from 5 days ago. It still continues to spread over the past 24 hours. We will start patient on Augmentin 875 mg twice daily for 10 days. She does continue allergy medicines over-the-counter for possible allergic response as well. 2.  Laceration of the thumb-3 of the 4 nylon sutures were removed. There is a small area of dehiscence which I will leave the fourth suture in. Patient does have follow-up with Savannah Garnica in 5 days and it should be able to review be removed at that time. Steri-Strips placed on it for support. Patient has been leaving this covered. I would recommend that she leave it open at this point unless she is doing something, working with dirty or conditions. No problem-specific Assessment & Plan notes found for this encounter. Diagnoses and all orders for this visit:    Laceration of skin of thumb, unspecified laterality, subsequent encounter    Cellulitis, unspecified cellulitis site  -     amoxicillin-clavulanate (AUGMENTIN) 875-125 mg per tablet; Take 1 tablet by mouth every 12 (twelve) hours for 7 days    Other orders  -     PARoxetine (Paxil) 30 mg tablet; Take 1 tablet by mouth daily (Patient not taking: Reported on 10/12/2023)          Subjective:      Patient ID: Berta Woodard is a 76 y.o. female. HPI: This is a 78-year-old female who presents to the office for follow-up of laceration of the thumb. She was using a hedge tremor about 10 days ago and nicked herself on the fat pad of the thumb. She had trouble getting the bleeding to stop and went to the urgent care center where she had 4 stitches placed. She was started on Keflex therapy for a week and has been doing well. On Sunday of this week, 5 days ago she received a second shingles vaccination.   Within a few days she started to get redness around the area and still in the past 24 hours it has been spreading more distally down the arm. It is very warm to the touch and has been itching and somewhat uncomfortable. She does have over-the-counter allergy medicine that she takes twice daily on a regular basis. She has not been seeing any benefit with this. The following portions of the patient's history were reviewed and updated as appropriate: allergies, current medications, past family history, past medical history, past social history, past surgical history, and problem list.    Review of Systems   Constitutional:  Negative for chills, fatigue and fever. HENT:  Negative for congestion, ear pain and sinus pressure. Eyes:  Negative for visual disturbance. Respiratory:  Negative for cough, chest tightness and shortness of breath. Cardiovascular:  Negative for chest pain and palpitations. Gastrointestinal:  Negative for diarrhea, nausea and vomiting. Endocrine: Negative for polyuria. Genitourinary:  Negative for dysuria and frequency. Musculoskeletal:  Negative for arthralgias and myalgias. Skin:  Positive for rash and wound. Negative for pallor. Neurological:  Negative for dizziness, weakness, light-headedness, numbness and headaches. Psychiatric/Behavioral:  Negative for agitation, behavioral problems and sleep disturbance. All other systems reviewed and are negative. Objective:      /74 (BP Location: Right arm, Patient Position: Sitting, Cuff Size: Adult)   Pulse 88   Temp 98.2 °F (36.8 °C) (Temporal)   Resp 16   Ht 4' 10" (1.473 m)   Wt 70.6 kg (155 lb 9.6 oz)   LMP  (LMP Unknown)   BMI 32.52 kg/m²          Physical Exam  Vitals and nursing note reviewed. Constitutional:       General: She is not in acute distress. Appearance: She is well-developed. HENT:      Head: Normocephalic and atraumatic.       Right Ear: External ear normal.      Left Ear: External ear normal.      Nose: Nose normal. Mouth/Throat:      Pharynx: No oropharyngeal exudate. Eyes:      Conjunctiva/sclera: Conjunctivae normal.      Pupils: Pupils are equal, round, and reactive to light. Neck:      Thyroid: No thyromegaly. Trachea: No tracheal deviation. Cardiovascular:      Rate and Rhythm: Normal rate and regular rhythm. Heart sounds: Normal heart sounds. No murmur heard. No friction rub. Pulmonary:      Effort: Pulmonary effort is normal. No respiratory distress. Breath sounds: Normal breath sounds. No wheezing or rales. Abdominal:      General: Bowel sounds are normal. There is no distension. Palpations: Abdomen is soft. Tenderness: There is no abdominal tenderness. There is no guarding or rebound. Musculoskeletal:         General: No tenderness. Normal range of motion. Cervical back: Normal range of motion and neck supple. Lymphadenopathy:      Cervical: No cervical adenopathy. Skin:     General: Skin is warm and dry. Findings: Rash present. No erythema. Comments: Left arm with erythema extending from just below the shoulder to the distal to the elbow. This is mildly tender to touch and certainly warm to touch. There is approximately 1.5 cm laceration of the left thumb. This is mostly intact with 4 nylon sutures. There is a small area of dehiscence. Neurological:      Mental Status: She is alert and oriented to person, place, and time. Cranial Nerves: No cranial nerve deficit. Coordination: Coordination normal.   Psychiatric:         Behavior: Behavior normal.         Thought Content:  Thought content normal.

## 2023-10-12 NOTE — PATIENT INSTRUCTIONS
Assessment/plan:  1. Cellulitis-patient does have redness and induration and warmth spreading from the site of injection from 5 days ago. It still continues to spread over the past 24 hours. We will start patient on Augmentin 875 mg twice daily for 10 days. She does continue allergy medicines over-the-counter for possible allergic response as well. 2.  Laceration of the thumb-3 of the 4 nylon sutures were removed. There is a small area of dehiscence which I will leave the fourth suture in. Patient does have follow-up with Lolita Villegas in 5 days and it should be able to review be removed at that time. Steri-Strips placed on it for support. Patient has been leaving this covered. I would recommend that she leave it open at this point unless she is doing something, working with dirty or conditions.

## 2023-10-13 ENCOUNTER — APPOINTMENT (OUTPATIENT)
Dept: LAB | Facility: CLINIC | Age: 68
End: 2023-10-13
Payer: COMMERCIAL

## 2023-10-13 DIAGNOSIS — R73.9 HYPERGLYCEMIA: ICD-10-CM

## 2023-10-13 DIAGNOSIS — E05.90 HYPERTHYROIDISM: ICD-10-CM

## 2023-10-13 DIAGNOSIS — E78.5 MILD HYPERLIPIDEMIA: ICD-10-CM

## 2023-10-13 LAB
ALBUMIN SERPL BCP-MCNC: 3.8 G/DL (ref 3.5–5)
ALP SERPL-CCNC: 70 U/L (ref 34–104)
ALT SERPL W P-5'-P-CCNC: 10 U/L (ref 7–52)
ANION GAP SERPL CALCULATED.3IONS-SCNC: 9 MMOL/L
AST SERPL W P-5'-P-CCNC: 12 U/L (ref 13–39)
BILIRUB SERPL-MCNC: 0.42 MG/DL (ref 0.2–1)
BUN SERPL-MCNC: 9 MG/DL (ref 5–25)
CALCIUM SERPL-MCNC: 8.9 MG/DL (ref 8.4–10.2)
CHLORIDE SERPL-SCNC: 105 MMOL/L (ref 96–108)
CHOLEST SERPL-MCNC: 137 MG/DL
CO2 SERPL-SCNC: 25 MMOL/L (ref 21–32)
CREAT SERPL-MCNC: 0.42 MG/DL (ref 0.6–1.3)
GFR SERPL CREATININE-BSD FRML MDRD: 105 ML/MIN/1.73SQ M
GLUCOSE P FAST SERPL-MCNC: 90 MG/DL (ref 65–99)
HDLC SERPL-MCNC: 45 MG/DL
LDLC SERPL CALC-MCNC: 74 MG/DL (ref 0–100)
POTASSIUM SERPL-SCNC: 5.3 MMOL/L (ref 3.5–5.3)
PROT SERPL-MCNC: 6.8 G/DL (ref 6.4–8.4)
SODIUM SERPL-SCNC: 139 MMOL/L (ref 135–147)
TRIGL SERPL-MCNC: 92 MG/DL
TSH SERPL DL<=0.05 MIU/L-ACNC: 0.62 UIU/ML (ref 0.45–4.5)

## 2023-10-13 PROCEDURE — 36415 COLL VENOUS BLD VENIPUNCTURE: CPT

## 2023-10-13 PROCEDURE — 80053 COMPREHEN METABOLIC PANEL: CPT

## 2023-10-13 PROCEDURE — 84443 ASSAY THYROID STIM HORMONE: CPT

## 2023-10-13 PROCEDURE — 80061 LIPID PANEL: CPT

## 2023-10-13 PROCEDURE — 83036 HEMOGLOBIN GLYCOSYLATED A1C: CPT

## 2023-10-14 LAB
EST. AVERAGE GLUCOSE BLD GHB EST-MCNC: 117 MG/DL
HBA1C MFR BLD: 5.7 %

## 2023-10-17 ENCOUNTER — OFFICE VISIT (OUTPATIENT)
Dept: FAMILY MEDICINE CLINIC | Facility: CLINIC | Age: 68
End: 2023-10-17
Payer: COMMERCIAL

## 2023-10-17 VITALS
HEART RATE: 85 BPM | DIASTOLIC BLOOD PRESSURE: 84 MMHG | WEIGHT: 155 LBS | BODY MASS INDEX: 32.54 KG/M2 | OXYGEN SATURATION: 94 % | HEIGHT: 58 IN | SYSTOLIC BLOOD PRESSURE: 120 MMHG

## 2023-10-17 DIAGNOSIS — G25.81 RESTLESS LEG SYNDROME: ICD-10-CM

## 2023-10-17 DIAGNOSIS — E05.90 HYPERTHYROIDISM: ICD-10-CM

## 2023-10-17 DIAGNOSIS — E55.9 VITAMIN D DEFICIENCY: ICD-10-CM

## 2023-10-17 DIAGNOSIS — S61.019D: ICD-10-CM

## 2023-10-17 DIAGNOSIS — K21.9 GERD WITHOUT ESOPHAGITIS: ICD-10-CM

## 2023-10-17 DIAGNOSIS — L03.90 CELLULITIS, UNSPECIFIED CELLULITIS SITE: ICD-10-CM

## 2023-10-17 DIAGNOSIS — E78.5 MILD HYPERLIPIDEMIA: ICD-10-CM

## 2023-10-17 DIAGNOSIS — R73.9 HYPERGLYCEMIA: Primary | ICD-10-CM

## 2023-10-17 PROBLEM — H43.819 POSTERIOR VITREOUS DETACHMENT: Status: ACTIVE | Noted: 2023-07-31

## 2023-10-17 PROBLEM — S42.253A CLOSED FRACTURE OF GREATER TUBEROSITY OF HUMERUS: Status: RESOLVED | Noted: 2021-01-21 | Resolved: 2023-10-17

## 2023-10-17 PROBLEM — Z96.652 HISTORY OF TOTAL KNEE REPLACEMENT, LEFT: Status: RESOLVED | Noted: 2020-08-20 | Resolved: 2023-10-17

## 2023-10-17 PROBLEM — Z96.651 HISTORY OF TOTAL RIGHT KNEE REPLACEMENT: Status: RESOLVED | Noted: 2020-08-20 | Resolved: 2023-10-17

## 2023-10-17 PROBLEM — M25.512 TRIGGER POINT OF LEFT SHOULDER REGION: Status: RESOLVED | Noted: 2022-08-16 | Resolved: 2023-10-17

## 2023-10-17 PROCEDURE — 99214 OFFICE O/P EST MOD 30 MIN: CPT | Performed by: PHYSICIAN ASSISTANT

## 2023-10-17 NOTE — PATIENT INSTRUCTIONS
Problem List Items Addressed This Visit          Digestive    GERD without esophagitis     Stable on daily PPI. Endocrine    Hyperthyroidism     TSH within normal limits. Other    Mild hyperlipidemia     Patient is on Lipitor 10 mg keeping her LDL at goal at 74 continue recheck 6 months. Vitamin D deficiency     Check vitamin D level next labs. Restless leg syndrome    Hyperglycemia - Primary     Fasting glucose is normal at 90 with an A1c of 5.7 continue to observe.           Other Visit Diagnoses       Laceration of skin of thumb, unspecified laterality, subsequent encounter        Cellulitis, unspecified cellulitis site

## 2023-10-17 NOTE — PROGRESS NOTES
Name: Clementina Street      : 1955      MRN: [id number]  Encounter Provider: Avelina Schaefer PA-C  Encounter Date: 10/17/2023   Encounter department: [facility]    Assessment & Plan     1. Hyperglycemia  Assessment & Plan:  Fasting glucose is normal at 90 with an A1c of 5.7 continue to observe. Orders:  -     Comprehensive metabolic panel; Future; Expected date: 2024    2. Restless leg syndrome  Assessment & Plan:  Pt on gabapentin 600 hs from Rheum. 3. Vitamin D deficiency  Assessment & Plan:  Check vitamin D level next labs. Orders:  -     Vitamin D 25 hydroxy; Future; Expected date: 2024    4. Mild hyperlipidemia  Assessment & Plan:  Patient is on Lipitor 10 mg keeping her LDL at goal at 74 continue recheck 6 months. Orders:  -     Lipid Panel with Direct LDL reflex; Future; Expected date: 2024    5. GERD without esophagitis  Assessment & Plan:  Stable on daily PPI. 6. Hyperthyroidism  Assessment & Plan:  TSH within normal limits. 7. Laceration of skin of thumb, unspecified laterality, subsequent encounter  Comments:  Last single suture removed from L thumb. NO infection noted and healing very well. Caution to catch on the dry healing edge. 8. Cellulitis, unspecified cellulitis site  Comments:  Much improved adn to finish antibiotic. Orders:  -     CBC and differential; Future; Expected date: 2024      Depression Screening and Follow-up Plan: Patient was screened for depression during today's encounter. They screened negative with a PHQ-2 score of 0. Subjective        Clementina Street is here for chronic conditions f/u.  Pt. had labs done prior to today's visit which included Recent Results (from the past 672 hour(s))  -Lipid Panel with Direct LDL reflex:   Collection Time: 10/13/23 10:50 AM       Result                      Value             Ref Range           Cholesterol                 137               See Comment *       Triglycerides               92                See Comment *       HDL, Direct                 45 (L)            >=50 mg/dL          LDL Calculated              74                0 - 100 mg/dL  -Comprehensive metabolic panel:   Collection Time: 10/13/23 10:50 AM       Result                      Value             Ref Range           Sodium                      139               135 - 147 mm*       Potassium                   5.3               3.5 - 5.3 mm*       Chloride                    105               96 - 108 mmo*       CO2                         25                21 - 32 mmol*       ANION GAP                   9                 mmol/L              BUN                         9                 5 - 25 mg/dL        Creatinine                  0.42 (L)          0.60 - 1.30 *       Glucose, Fasting            90                65 - 99 mg/dL       Calcium                     8.9               8.4 - 10.2 m*       AST                         12 (L)            13 - 39 U/L         ALT                         10                7 - 52 U/L          Alkaline Phosphatase        70                34 - 104 U/L        Total Protein               6.8               6.4 - 8.4 g/*       Albumin                     3.8               3.5 - 5.0 g/*       Total Bilirubin             0.42              0.20 - 1.00 *       eGFR                        105               ml/min/1.73s*  -Hemoglobin A1C:   Collection Time: 10/13/23 10:50 AM       Result                      Value             Ref Range           Hemoglobin A1C              5.7 (H)           Normal 4.0-5*       EAG                         117               mg/dl          -TSH, 3rd generation with Free T4 reflex:   Collection Time: 10/13/23 10:50 AM       Result                      Value             Ref Range           TSH 3RD GENERATON           0.618             0.450 - 4.50*        Review of Systems   Constitutional: Negative. HENT: Negative. Eyes: Negative. Respiratory: Negative. Cardiovascular: Negative. Gastrointestinal: Negative. Endocrine: Negative. Genitourinary: Negative. Musculoskeletal: Negative. Skin:  Positive for wound. Allergic/Immunologic: Negative. Neurological: Negative. Hematological: Negative. Psychiatric/Behavioral: Negative. Current Outpatient Medications on File Prior to Visit   Medication Sig   • amoxicillin-clavulanate (AUGMENTIN) 875-125 mg per tablet Take 1 tablet by mouth every 12 (twelve) hours for 7 days   • atorvastatin (LIPITOR) 10 mg tablet Take 1 tablet (10 mg total) by mouth daily   • Calcium Carbonate 1500 (600 Ca) MG TABS Take by mouth   • cephalexin (KEFLEX) 500 mg capsule For Dental appts   • Cholecalciferol (VITAMIN D-3) 1000 units CAPS Take 4 capsules (4,000 Units total) by mouth daily (Patient taking differently: Take 3,000 Units by mouth daily)   • Diphenhydramine-Phenylephrine ([employer] ALLERGY-D PO) Take by mouth   • fexofenadine (Allegra Allergy) 180 MG tablet    • fluticasone (FLONASE) 50 mcg/act nasal spray 2 sprays into each nostril daily   • gabapentin (NEURONTIN) 600 MG tablet Take 600 mg by mouth daily     • Melatonin 10 MG TABS Take 10 mg by mouth   • Multiple Vitamin (MULTIVITAMIN) capsule Take 1 capsule by mouth daily. • pantoprazole (PROTONIX) 20 mg tablet TAKE ONE TABLET BY MOUTH EVERY DAY   • PARoxetine (PAXIL) 30 mg tablet TAKE ONE TABLET BY MOUTH EVERY DAY   • Restasis 0.05 % ophthalmic emulsion    • [DISCONTINUED] PARoxetine (Paxil) 30 mg tablet Take 1 tablet by mouth daily (Patient not taking: Reported on 10/12/2023)       Objective     /84 (BP Location: Left arm, Patient Position: Sitting, Cuff Size: Standard)   Pulse 85   Ht 4' 10" (1.473 m)   Wt 70.3 kg (155 lb)   LMP  (LMP Unknown)   SpO2 94%   BMI 32.40 kg/m²     Physical Exam  Vitals and nursing note reviewed. Constitutional:       General: She is not in acute distress.      Appearance: She is well-developed. She is not diaphoretic. HENT:      Head: Normocephalic and atraumatic. Eyes:      General:         Right eye: No discharge. Left eye: No discharge. Conjunctiva/sclera: Conjunctivae normal.   Neck:      Vascular: No carotid bruit. Cardiovascular:      Rate and Rhythm: Normal rate and regular rhythm. Heart sounds: Normal heart sounds. No murmur heard. No friction rub. No gallop. Pulmonary:      Effort: Pulmonary effort is normal. No respiratory distress. Breath sounds: Normal breath sounds. No wheezing or rales. Musculoskeletal:      Cervical back: Neck supple. Skin:     General: Skin is warm and dry. Neurological:      Mental Status: She is alert and oriented to person, place, and time.    Psychiatric:         Judgment: Judgment normal.       Daniella Buenrostro PA-C

## 2023-11-02 ENCOUNTER — OFFICE VISIT (OUTPATIENT)
Dept: FAMILY MEDICINE CLINIC | Facility: CLINIC | Age: 68
End: 2023-11-02
Payer: COMMERCIAL

## 2023-11-02 VITALS
WEIGHT: 155.25 LBS | BODY MASS INDEX: 32.45 KG/M2 | OXYGEN SATURATION: 97 % | TEMPERATURE: 99 F | SYSTOLIC BLOOD PRESSURE: 122 MMHG | HEART RATE: 94 BPM | DIASTOLIC BLOOD PRESSURE: 74 MMHG

## 2023-11-02 DIAGNOSIS — R09.81 CONGESTED NOSE: ICD-10-CM

## 2023-11-02 DIAGNOSIS — U07.1 COVID-19 VIRUS INFECTION: Primary | ICD-10-CM

## 2023-11-02 DIAGNOSIS — R05.9 COUGH, UNSPECIFIED TYPE: ICD-10-CM

## 2023-11-02 DIAGNOSIS — K21.9 GERD WITHOUT ESOPHAGITIS: ICD-10-CM

## 2023-11-02 LAB
SARS-COV-2 AG UPPER RESP QL IA: POSITIVE
VALID CONTROL: ABNORMAL

## 2023-11-02 PROCEDURE — 99214 OFFICE O/P EST MOD 30 MIN: CPT | Performed by: NURSE PRACTITIONER

## 2023-11-02 PROCEDURE — 87811 SARS-COV-2 COVID19 W/OPTIC: CPT | Performed by: NURSE PRACTITIONER

## 2023-11-02 RX ORDER — NIRMATRELVIR AND RITONAVIR 300-100 MG
3 KIT ORAL 2 TIMES DAILY
Qty: 30 TABLET | Refills: 0 | Status: SHIPPED | OUTPATIENT
Start: 2023-11-02 | End: 2023-11-07

## 2023-11-02 RX ORDER — DEXTROMETHORPHAN HYDROBROMIDE AND PROMETHAZINE HYDROCHLORIDE 15; 6.25 MG/5ML; MG/5ML
5 SYRUP ORAL 4 TIMES DAILY PRN
Qty: 118 ML | Refills: 0 | Status: SHIPPED | OUTPATIENT
Start: 2023-11-02

## 2023-11-02 NOTE — PROGRESS NOTES
COVID-19 Outpatient Progress Note    Assessment/Plan:    Problem List Items Addressed This Visit     GERD without esophagitis     Well-controlled on current regimen. COVID-19 virus infection - Primary     Paxlovid was ordered to be used as directed over the next 5 days for treatment of acute COVID infection. Phenergan DM was ordered to be used as needed for cough. Patient was advised to continue to quarantine through 11/4/2023 and if she remains afebrile for 24 hours without the use of antipyretics starting on 11/4/2023 she may return from isolation on 11/5/2023 and begin to mask for 5 days when in public and when around others. Patient was advised to contact the office for any worsening symptoms. Relevant Medications    nirmatrelvir & ritonavir (Paxlovid, 300/100,) tablet therapy pack    promethazine-dextromethorphan (PHENERGAN-DM) 6.25-15 mg/5 mL oral syrup   Other Visit Diagnoses     Cough, unspecified type        Relevant Orders    POCT Rapid Covid Ag (Completed)    Congested nose        Relevant Orders    POCT Rapid Covid Ag (Completed)         Disposition:     Rapid antigen testing was performed and the result is POSITIVE for COVID-19. Patient with asymptomatic/mild COVID-19: They were recommended to isolate for at least 5 days (day 0 is the day symptoms appeared or the date the specimen was collected for the positive test for people who are asymptomatic). If they are asymptomatic or symptoms are improving with no fevers in the past 24 hours, isolation may be ended followed by 5 days of wearing a high quality mask when around others to minimize risk of infecting others. They should wear a mask through day 10 and a test-based strategy may be used to remove a mask sooner. Discussed symptom directed medication options with patient. Patient meets criteria for Paxlovid and they have been counseled appropriately regarding risks, benefits, side effects, and alternative treatment options. Will route to reception pool to assist in scheduling OV with Jody Hewitt NP on 6/15 @ 1:30 PM for hospital follow up. Thanks   After discussion, patient agrees to treatment. Possible side effects of Paxlovid? Possible side effects of Paxlovid are:  - Liver Problems. Notify us right away if you start to experience loss of appetite, yellowing of your skin and the whites of eyes (jaundice), dark-colored urine, pale colored stools and itchy skin, stomach area (abdominal) pain. - Resistance to HIV Medicines. If you have untreated HIV infection, Paxlovid may lead to some HIV medicines not working as well in the future. - Other possible side effects include: altered sense of taste, diarrhea, high blood pressure, or muscle aches. I have spent a total time of 15 minutes on the day of the encounter for this patient including       Encounter provider: KEENA Combs     Provider located at: [address] Replaced by [facility] [address] [address] [phone]     Recent Visits  No visits were found meeting these conditions. Showing recent visits within past 7 days and meeting all other requirements  Today's Visits  Date Type Provider Dept   11/02/23 Office Visit Schuyler Kelley, [address] [facility]   Showing today's visits and meeting all other requirements  Future Appointments  No visits were found meeting these conditions. Showing future appointments within next 150 days and meeting all other requirements     Subjective:   Andrés Oneill is a 76 y.o. female who is concerned about COVID-19. Patient's symptoms include nasal congestion, rhinorrhea, cough (non-productive), myalgias and headache. Patient denies fever, chills, fatigue, malaise, sore throat, anosmia, loss of taste, shortness of breath, chest tightness, abdominal pain, nausea, vomiting and diarrhea.      - Date of symptom onset: 10/31/2023      COVID-19 vaccination status: Fully vaccinated with booster    Exposure:   Contact with a person who is under investigation (PUI) for or who is positive for COVID-19 within the last 14 days?: No    Hospitalized recently for fever and/or lower respiratory symptoms?: No      Currently a healthcare worker that is involved in direct patient care?: No      Works in a special setting where the risk of COVID-19 transmission may be high? (this may include long-term care, correctional and residential facilities; homeless shelters; assisted-living facilities and group homes.): No      Resident in a special setting where the risk of COVID-19 transmission may be high? (this may include long-term care, correctional and residential facilities; homeless shelters; assisted-living facilities and group homes.): No      URI symptoms: Patient is currently reporting symptoms of intermittent nonproductive cough, rhinorrhea, nasal congestion, headaches, and myalgias. Patient denies any fevers or shortness of breath. Rapid COVID test completed in the office today was positive. I did speak with patient about the benefits and potential side effects of Paxlovid and the patient was agreeable to beginning medication. GERD: Well-controlled on current dosage of Protonix. Lab Results   Component Value Date    SARSCOVAG Positive (A) 11/02/2023       Review of Systems   Constitutional:  Negative for chills, fatigue and fever. HENT:  Positive for congestion, postnasal drip and rhinorrhea. Negative for sore throat. Eyes: Negative. Respiratory:  Positive for cough (non-productive). Negative for chest tightness and shortness of breath. Cardiovascular: Negative. Gastrointestinal:  Negative for abdominal pain, diarrhea, nausea and vomiting. Endocrine: Negative. Genitourinary: Negative. Musculoskeletal:  Positive for myalgias. Skin: Negative. Allergic/Immunologic: Negative. Neurological:  Positive for headaches. Hematological: Negative. Psychiatric/Behavioral: Negative.        Current Outpatient Medications on File Prior to Visit   Medication Sig   • atorvastatin (LIPITOR) 10 mg tablet Take 1 tablet (10 mg total) by mouth daily   • Calcium Carbonate 1500 (600 Ca) MG TABS Take by mouth   • cephalexin (KEFLEX) 500 mg capsule For Dental appts   • Cholecalciferol (VITAMIN D-3) 1000 units CAPS Take 4 capsules (4,000 Units total) by mouth daily (Patient taking differently: Take 3,000 Units by mouth daily)   • Diphenhydramine-Phenylephrine ([employer] ALLERGY-D PO) Take by mouth   • fexofenadine (Allegra Allergy) 180 MG tablet    • fluticasone (FLONASE) 50 mcg/act nasal spray 2 sprays into each nostril daily   • gabapentin (NEURONTIN) 600 MG tablet Take 600 mg by mouth daily     • Melatonin 10 MG TABS Take 10 mg by mouth   • Multiple Vitamin (MULTIVITAMIN) capsule Take 1 capsule by mouth daily. • pantoprazole (PROTONIX) 20 mg tablet TAKE ONE TABLET BY MOUTH EVERY DAY   • PARoxetine (PAXIL) 30 mg tablet TAKE ONE TABLET BY MOUTH EVERY DAY   • Restasis 0.05 % ophthalmic emulsion        Objective:    /74 (BP Location: Right arm, Patient Position: Sitting, Cuff Size: Standard)   Pulse 94   Temp 99 °F (37.2 °C) (Temporal)   Wt 70.4 kg (155 lb 4 oz)   LMP  (LMP Unknown)   SpO2 97%   BMI 32.45 kg/m²        Physical Exam  Vitals and nursing note reviewed. Constitutional:       General: She is not in acute distress. Appearance: Normal appearance. She is well-developed. She is not ill-appearing. HENT:      Head: Normocephalic. Eyes:      Conjunctiva/sclera: Conjunctivae normal.   Cardiovascular:      Rate and Rhythm: Normal rate and regular rhythm. Pulses:           Carotid pulses are 2+ on the right side and 2+ on the left side. Radial pulses are 2+ on the right side and 2+ on the left side. Posterior tibial pulses are 2+ on the right side and 2+ on the left side. Heart sounds: No murmur heard. Pulmonary:      Effort: Pulmonary effort is normal. No respiratory distress. Breath sounds: Normal breath sounds. No decreased breath sounds, wheezing, rhonchi or rales.    Abdominal: General: Abdomen is flat. Bowel sounds are normal. There is no distension. Palpations: Abdomen is soft. Tenderness: There is no abdominal tenderness. There is no guarding. Musculoskeletal:         General: No swelling. Normal range of motion. Cervical back: Normal range of motion and neck supple. Right lower leg: No edema. Left lower leg: No edema. Skin:     General: Skin is warm and dry. Capillary Refill: Capillary refill takes less than 2 seconds. Neurological:      General: No focal deficit present. Mental Status: She is alert and oriented to person, place, and time. Psychiatric:         Mood and Affect: Mood normal.         Behavior: Behavior normal.         Thought Content:  Thought content normal.         Judgment: Judgment normal.       KEENA Feliciano

## 2023-11-02 NOTE — ASSESSMENT & PLAN NOTE
Paxlovid was ordered to be used as directed over the next 5 days for treatment of acute COVID infection. Phenergan DM was ordered to be used as needed for cough. Patient was advised to continue to quarantine through 11/4/2023 and if she remains afebrile for 24 hours without the use of antipyretics starting on 11/4/2023 she may return from isolation on 11/5/2023 and begin to mask for 5 days when in public and when around others. Patient was advised to contact the office for any worsening symptoms.

## 2023-11-02 NOTE — PROGRESS NOTES
Name: Rose Faria      : 1955      MRN: [id number]  Encounter Provider: KEENA Elise  Encounter Date: 2023   Encounter department: [place] [address]     1. Cough, unspecified type  -     POCT Rapid Covid Ag    2. Congested nose  -     POCT Rapid Covid Ag           Subjective      URI symptoms: Patient reports over the past 2 days she has been experiencing symptoms of rhinorrhea, nasal congestion, PND, cough,    GERD: Well-controlled on current dosage of Protonix. Review of Systems    Current Outpatient Medications on File Prior to Visit   Medication Sig   • atorvastatin (LIPITOR) 10 mg tablet Take 1 tablet (10 mg total) by mouth daily   • Calcium Carbonate 1500 (600 Ca) MG TABS Take by mouth   • cephalexin (KEFLEX) 500 mg capsule For Dental appts   • Cholecalciferol (VITAMIN D-3) 1000 units CAPS Take 4 capsules (4,000 Units total) by mouth daily (Patient taking differently: Take 3,000 Units by mouth daily)   • Diphenhydramine-Phenylephrine ([employer]S ALLERGY-D PO) Take by mouth   • fexofenadine (Allegra Allergy) 180 MG tablet    • fluticasone (FLONASE) 50 mcg/act nasal spray 2 sprays into each nostril daily   • gabapentin (NEURONTIN) 600 MG tablet Take 600 mg by mouth daily     • Melatonin 10 MG TABS Take 10 mg by mouth   • Multiple Vitamin (MULTIVITAMIN) capsule Take 1 capsule by mouth daily.    • pantoprazole (PROTONIX) 20 mg tablet TAKE ONE TABLET BY MOUTH EVERY DAY   • PARoxetine (PAXIL) 30 mg tablet TAKE ONE TABLET BY MOUTH EVERY DAY   • Restasis 0.05 % ophthalmic emulsion        Objective     /74 (BP Location: Right arm, Patient Position: Sitting, Cuff Size: Standard)   Pulse 94   Temp 99 °F (37.2 °C) (Temporal)   Wt 70.4 kg (155 lb 4 oz)   LMP  (LMP Unknown)   SpO2 97%   BMI 32.45 kg/m²     Physical Exam  KEENA Elise

## 2023-12-06 ENCOUNTER — OFFICE VISIT (OUTPATIENT)
Dept: BARIATRICS | Facility: CLINIC | Age: 68
End: 2023-12-06
Payer: COMMERCIAL

## 2023-12-06 VITALS
HEIGHT: 58 IN | SYSTOLIC BLOOD PRESSURE: 126 MMHG | WEIGHT: 153.5 LBS | DIASTOLIC BLOOD PRESSURE: 80 MMHG | BODY MASS INDEX: 32.22 KG/M2 | HEART RATE: 84 BPM | TEMPERATURE: 97.6 F

## 2023-12-06 DIAGNOSIS — E66.9 OBESITY, CLASS I, BMI 30-34.9: ICD-10-CM

## 2023-12-06 DIAGNOSIS — Z98.84 BARIATRIC SURGERY STATUS: ICD-10-CM

## 2023-12-06 DIAGNOSIS — Z48.815 ENCOUNTER FOR SURGICAL AFTERCARE FOLLOWING SURGERY OF DIGESTIVE SYSTEM: Primary | ICD-10-CM

## 2023-12-06 DIAGNOSIS — K91.2 POSTSURGICAL MALABSORPTION: ICD-10-CM

## 2023-12-06 DIAGNOSIS — G47.33 OSA (OBSTRUCTIVE SLEEP APNEA): ICD-10-CM

## 2023-12-06 PROCEDURE — 99214 OFFICE O/P EST MOD 30 MIN: CPT | Performed by: NURSE PRACTITIONER

## 2023-12-06 NOTE — PROGRESS NOTES
Assessment/Plan:     Patient ID: Michelle Hagen is a 76 y.o. female. Bariatric Surgery Status    -s/p Vertical Sleeve Gastrectomy with Dr. Collette Hidden on 03/19/2018. Presents to the office today for annual. Doing very fair - had some weight gain. Knows she needs to cut down her sugar intake and increase her fluid intake. Tolerating a regular diet. Denies having any abdominal pain, N/V/D/C, regurgitation, reflux or dysphagia. Taking her MVI. No concerns at this time. PLAN:      - Routine follow up in 1  Year for annual visit. - Continue with healthy lifestyle, adequate protein intake of 60 gm, fluid intake of at least 64 oz. - Continue with MVI daily. - Activity as tolerated. - Labs ordered and will adjust accordingly if any deficiency. - Follow up with RD and SW as needed. PETER - was told she is ronda to stop her CPAP machine. Denies fatigue and excessive sleepiness. F/u with Sleep medicine as needed. Continued/Maintain healthy weight loss with good nutrition intakes. Adequate hydration with at least 64oz. fluid intake. Follow diet as discussed. Follow vitamin and mineral recommendations as reviewed with you. Exercise as tolerated. Colonoscopy referral made: utd  Mammogram - UTD  EGD screening - UTD     Follow-up in 1 year for annual visit. We kindly ask that your arrive 15 minutes before your scheduled appointment time with your provider to allow our staff to room you, get your vital signs and update your chart. Get lab work done prior to annual visit. Please call the office if you need a script. It is recommended to check with your insurance BEFORE getting labs done to make sure they are covered by your policy. Call our office if you have any problems with abdominal pain especially associated with fever, chills, nausea, vomiting or any other concerns.     All  Post-bariatric surgery patients should be aware that very small quantities of any alcohol can cause impairment and it is very possible not to feel the effect. The effect can be in the system for several hours. It is also a stomach irritant. It is advised to AVOID alcohol, Nonsteroidal antiinflammatory drugs (NSAIDS) and nicotine of all forms . Any of these can cause stomach irritation/pain. Discussed the effects of alcohol on a bariatric patient and the increased impairment risk. Keep up the good work! Postsurgical Malabsorption   -At risk for malabsorption of vitamins/minerals secondary to malabsorption and restriction of intake from bariatric surgery  -Currently taking adequate postop bariatric surgery vitamin supplementation  -Last set of bariatric labs completed on 04/04/2023 and showed WNL   -Next set of bariatric labs ordered for approximately 2 weeks  -Patient received education about the importance of adhering to a lifelong supplementation regimen to avoid vitamin/mineral deficiencies      Diagnoses and all orders for this visit:    Encounter for surgical aftercare following surgery of digestive system  -     CBC and Platelet; Future  -     Ferritin; Future  -     Folate; Future  -     PTH, intact; Future  -     Vitamin A; Future  -     TIBC Panel (incl. Iron, TIBC, % Iron Saturation); Future  -     Vitamin B1, whole blood; Future  -     Vitamin B12; Future  -     Zinc; Future    Bariatric surgery status  -     CBC and Platelet; Future  -     Ferritin; Future  -     Folate; Future  -     PTH, intact; Future  -     Vitamin A; Future  -     TIBC Panel (incl. Iron, TIBC, % Iron Saturation); Future  -     Vitamin B1, whole blood; Future  -     Vitamin B12; Future  -     Zinc; Future    Postsurgical malabsorption  -     CBC and Platelet; Future  -     Ferritin; Future  -     Folate; Future  -     PTH, intact; Future  -     Vitamin A; Future  -     TIBC Panel (incl. Iron, TIBC, % Iron Saturation); Future  -     Vitamin B1, whole blood;  Future  -     Vitamin B12; Future  -     Zinc; Future    Obesity, Class I, BMI 30-34.9  -     CBC and Platelet; Future  -     Ferritin; Future  -     Folate; Future  -     PTH, intact; Future  -     Vitamin A; Future  -     TIBC Panel (incl. Iron, TIBC, % Iron Saturation); Future  -     Vitamin B1, whole blood; Future  -     Vitamin B12; Future  -     Zinc; Future    PETER (obstructive sleep apnea)         Subjective:      Patient ID: Christiana Escalera is a 76 y.o. female. -s/p Vertical Sleeve Gastrectomy with Dr. Mary Anne Cano on 03/19/2018. Presents to the office today for annual. Doing very fair - had some weight gain. Knows she needs to cut down her sugar intake and increase her fluid intake. Tolerating a regular diet. Denies having any abdominal pain, N/V/D/C, regurgitation, reflux or dysphagia. Taking her MVI. No concerns at this time. Initial: 199 lbs   Current: 153.5 lbs  EWL: (Weight loss is ahead of schedule at this post surgical period.)  Stanley: 139 lbs  Current BMI is Body mass index is 32.08 kg/m². Tolerating a regular diet-yes  Eating at least 60 grams of protein per day-yes  Following 30/60 minute rule with liquids-yes  Drinking at least 64 ounces of fluid per day-no  Drinking carbonated beverages-yes  Sufficient exercise-no  Using NSAIDs regularly-no  Using nicotine-no  Using alcohol-rarely   Supplements: Multivitamins and Calcium     EWL is 57%, which places the patient ahead of schedule for expected post surgical weight loss at this time. The following portions of the patient's history were reviewed and updated as appropriate: allergies, current medications, past family history, past medical history, past social history, past surgical history and problem list.    Review of Systems   Constitutional:  Positive for unexpected weight change. Respiratory: Negative. Cardiovascular: Negative. Gastrointestinal: Negative. Musculoskeletal: Negative. Neurological: Negative. Psychiatric/Behavioral: Negative.            Objective:    /80   Pulse 84   Temp 97.6 °F (36.4 °C) (Tympanic)   Ht 4' 10" (1.473 m)   Wt 69.6 kg (153 lb 8 oz)   LMP  (LMP Unknown)   BMI 32.08 kg/m²      Physical Exam  Vitals and nursing note reviewed. Constitutional:       Appearance: Normal appearance. She is obese. Cardiovascular:      Rate and Rhythm: Normal rate and regular rhythm. Pulses: Normal pulses. Heart sounds: Normal heart sounds. Pulmonary:      Effort: Pulmonary effort is normal.      Breath sounds: Normal breath sounds. Abdominal:      General: Bowel sounds are normal.      Palpations: Abdomen is soft. Tenderness: There is no abdominal tenderness. Musculoskeletal:         General: Normal range of motion. Skin:     General: Skin is warm and dry. Neurological:      General: No focal deficit present. Mental Status: She is alert and oriented to person, place, and time. Psychiatric:         Mood and Affect: Mood normal.         Behavior: Behavior normal.         Thought Content:  Thought content normal.         Judgment: Judgment normal.

## 2024-01-02 ENCOUNTER — CONSULT (OUTPATIENT)
Dept: FAMILY MEDICINE CLINIC | Facility: CLINIC | Age: 69
End: 2024-01-02
Payer: COMMERCIAL

## 2024-01-02 VITALS
HEART RATE: 82 BPM | WEIGHT: 155 LBS | DIASTOLIC BLOOD PRESSURE: 68 MMHG | BODY MASS INDEX: 32.54 KG/M2 | TEMPERATURE: 97.6 F | HEIGHT: 58 IN | OXYGEN SATURATION: 97 % | SYSTOLIC BLOOD PRESSURE: 116 MMHG

## 2024-01-02 DIAGNOSIS — E78.5 MILD HYPERLIPIDEMIA: ICD-10-CM

## 2024-01-02 DIAGNOSIS — Z01.818 PREOPERATIVE EVALUATION OF A MEDICAL CONDITION TO RULE OUT SURGICAL CONTRAINDICATIONS (TAR REQUIRED): Primary | ICD-10-CM

## 2024-01-02 DIAGNOSIS — K21.9 GERD WITHOUT ESOPHAGITIS: ICD-10-CM

## 2024-01-02 DIAGNOSIS — H43.819 POSTERIOR VITREOUS DETACHMENT, UNSPECIFIED LATERALITY: ICD-10-CM

## 2024-01-02 PROCEDURE — 99214 OFFICE O/P EST MOD 30 MIN: CPT | Performed by: PHYSICIAN ASSISTANT

## 2024-01-02 RX ORDER — BROMFENAC SODIUM 0.7 MG/ML
SOLUTION/ DROPS OPHTHALMIC
COMMUNITY
Start: 2023-12-27

## 2024-01-02 NOTE — PROGRESS NOTES
Name: Jeannette Heard      : 1955      MRN: [id number]  Encounter Provider: Yair Mello PA-C  Encounter Date: 2024   Encounter department: [facility]    Assessment & Plan     Patient Instructions   Assessment/plan:  1.  Preop consultation- Patient is medically stable for planned cataract procedure with Dr. Lazaro on Friday, .  2.  Cataract-stable medically for planned surgery without further testing.  3.  Hyperlipidemia-stable with Lipitor 10 mg daily.  4.  GERD-stable on Protonix 20 mg daily.    1. Preoperative evaluation of a medical condition to rule out surgical contraindications (TAR required)    2. Posterior vitreous detachment, unspecified laterality    3. Mild hyperlipidemia    4. GERD without esophagitis           Subjective      HPI: This is a 68-year-old female that presents to the office for preop consultation for cataract surgery to be performed.  She has been feeling well without any acute complaint.  She denies any chest pains or shortness of breath.  She has been medically stable with Lipitor for her cholesterol.  She also continues Protonix 20 mg for her esophageal reflux disease.  Anxiety has been stable on Paxil long-term.  She also has history of allergic rhinitis and continues on Allegra.  She denies any recent signs of infection such as persistent coughing, fever, or diarrhea.  She has had surgeries before including bilateral knee replacement, cholecystectomy, and gastric sleeve.      Review of Systems   Constitutional:  Negative for chills, fatigue and fever.   HENT:  Negative for congestion, ear pain and sinus pressure.    Eyes:  Negative for visual disturbance.   Respiratory:  Negative for cough, chest tightness and shortness of breath.    Cardiovascular:  Negative for chest pain and palpitations.   Gastrointestinal:  Negative for diarrhea, nausea and vomiting.   Endocrine: Negative for polyuria.   Genitourinary:  Negative for dysuria and  "frequency.   Musculoskeletal:  Negative for arthralgias and myalgias.   Skin:  Negative for pallor and rash.   Neurological:  Negative for dizziness, weakness, light-headedness, numbness and headaches.   Psychiatric/Behavioral:  Negative for agitation, behavioral problems and sleep disturbance.    All other systems reviewed and are negative.      Current Outpatient Medications on File Prior to Visit   Medication Sig   • atorvastatin (LIPITOR) 10 mg tablet Take 1 tablet (10 mg total) by mouth daily   • Calcium Carbonate 1500 (600 Ca) MG TABS Take by mouth   • Cholecalciferol (VITAMIN D-3) 1000 units CAPS Take 4 capsules (4,000 Units total) by mouth daily (Patient taking differently: Take 3,000 Units by mouth daily)   • Diphenhydramine-Phenylephrine ([employer]S ALLERGY-D PO) Take by mouth   • fexofenadine (Allegra Allergy) 180 MG tablet    • fluticasone (FLONASE) 50 mcg/act nasal spray 2 sprays into each nostril daily   • gabapentin (NEURONTIN) 600 MG tablet Take 600 mg by mouth daily     • Melatonin 10 MG TABS Take 10 mg by mouth   • Multiple Vitamin (MULTIVITAMIN) capsule Take 1 capsule by mouth daily.   • pantoprazole (PROTONIX) 20 mg tablet TAKE ONE TABLET BY MOUTH EVERY DAY   • PARoxetine (PAXIL) 30 mg tablet TAKE ONE TABLET BY MOUTH EVERY DAY   • Prolensa 0.07 % SOLN    • Restasis 0.05 % ophthalmic emulsion    • cephalexin (KEFLEX) 500 mg capsule For Dental appts (Patient not taking: Reported on 12/6/2023)   • promethazine-dextromethorphan (PHENERGAN-DM) 6.25-15 mg/5 mL oral syrup Take 5 mL by mouth 4 (four) times a day as needed for cough (Patient not taking: Reported on 12/6/2023)       Objective     /68 (BP Location: Left arm, Patient Position: Sitting, Cuff Size: Large)   Pulse 82   Temp 97.6 °F (36.4 °C) (Tympanic)   Ht 4' 10\" (1.473 m)   Wt 70.3 kg (155 lb)   LMP  (LMP Unknown)   SpO2 97%   BMI 32.40 kg/m²     Physical Exam  Vitals and nursing note reviewed.   Constitutional:       General: She is " not in acute distress.     Appearance: She is well-developed.   HENT:      Head: Normocephalic and atraumatic.      Right Ear: External ear normal.      Left Ear: External ear normal.      Nose: Nose normal.      Mouth/Throat:      Pharynx: No oropharyngeal exudate.   Eyes:      Conjunctiva/sclera: Conjunctivae normal.      Pupils: Pupils are equal, round, and reactive to light.   Neck:      Thyroid: No thyromegaly.      Trachea: No tracheal deviation.   Cardiovascular:      Rate and Rhythm: Normal rate and regular rhythm.      Heart sounds: Normal heart sounds. No murmur heard.     No friction rub.   Pulmonary:      Effort: Pulmonary effort is normal. No respiratory distress.      Breath sounds: Normal breath sounds. No wheezing or rales.   Abdominal:      General: Bowel sounds are normal. There is no distension.      Palpations: Abdomen is soft.      Tenderness: There is no abdominal tenderness. There is no guarding or rebound.   Musculoskeletal:         General: No tenderness. Normal range of motion.      Cervical back: Normal range of motion and neck supple.   Lymphadenopathy:      Cervical: No cervical adenopathy.   Skin:     General: Skin is warm and dry.      Findings: No erythema or rash.   Neurological:      Mental Status: She is alert and oriented to person, place, and time.      Cranial Nerves: No cranial nerve deficit.      Coordination: Coordination normal.   Psychiatric:         Behavior: Behavior normal.         Thought Content: Thought content normal.       Yair Mello PA-C

## 2024-01-02 NOTE — PATIENT INSTRUCTIONS
Assessment/plan:  1.  Preop consultation- Patient is medically stable for planned cataract procedure with Dr. Lazaro on Friday, 5 January.  2.  Cataract-stable medically for planned surgery without further testing.  3.  Hyperlipidemia-stable with Lipitor 10 mg daily.  4.  GERD-stable on Protonix 20 mg daily.

## 2024-01-02 NOTE — PROGRESS NOTES
Presurgical Evaluation    Subjective:      Patient ID: Jeannette Heard is a 68 y.o. female.    Chief Complaint   Patient presents with   • Pre-op Exam     Pt present today for her  pre-op clearance. Pt is scheduled to have cataract surgery on 1/5/2023.       HPI    {Common ambulatory SmartLinks:65757}    Procedure date: {DATE:23014}    Surgeon:  ***  Planned procedure:  ***  Diagnosis for procedure:  ***    Prior anesthesia: {[facility] ANESTHSIA :3556739962}    [facility]   NOTE: Patient should see Cardiology if time available before surgery, and if appropriate.    Pulmonary History: { [facility]B PULM HX:6806683967}    Renal history: {[facility] Renal Hx:8683979756}    Diabetes History:  {[facility] DIABETES TYPE:1447869289}     Neurological History: {[facility] neuro:9640161989}     On Immunosuppressant meds/biologics: {[facility] IMMUNOSUPRESSANTS:6447143497}      Review of Systems      Current Outpatient Medications   Medication Sig Dispense Refill   • atorvastatin (LIPITOR) 10 mg tablet Take 1 tablet (10 mg total) by mouth daily 90 tablet 3   • Calcium Carbonate 1500 (600 Ca) MG TABS Take by mouth     • Cholecalciferol (VITAMIN D-3) 1000 units CAPS Take 4 capsules (4,000 Units total) by mouth daily (Patient taking differently: Take 3,000 Units by mouth daily) 30 capsule 1   • Diphenhydramine-Phenylephrine ([facility] ALLERGY-D PO) Take by mouth     • fexofenadine (Allegra Allergy) 180 MG tablet      • fluticasone (FLONASE) 50 mcg/act nasal spray 2 sprays into each nostril daily     • gabapentin (NEURONTIN) 600 MG tablet Take 600 mg by mouth daily    3   • Melatonin 10 MG TABS Take 10 mg by mouth     • Multiple Vitamin (MULTIVITAMIN) capsule Take 1 capsule by mouth daily.     • pantoprazole (PROTONIX) 20 mg tablet TAKE ONE TABLET BY MOUTH EVERY DAY 90 tablet 3   • PARoxetine (PAXIL) 30 mg tablet TAKE ONE TABLET BY MOUTH EVERY DAY 90 tablet 3   • Prolensa 0.07 % SOLN      • Restasis 0.05 % ophthalmic emulsion      •  cephalexin (KEFLEX) 500 mg capsule For Dental appts (Patient not taking: Reported on 12/6/2023)     • promethazine-dextromethorphan (PHENERGAN-DM) 6.25-15 mg/5 mL oral syrup Take 5 mL by mouth 4 (four) times a day as needed for cough (Patient not taking: Reported on 12/6/2023) 118 mL 0     No current facility-administered medications for this visit.       Allergies on file:   Ketorolac; Other; Percocet [oxycodone-acetaminophen]; Zoster vaccine recombinant, adjuvanted; Flurbiprofen; Ketorolac tromethamine; and Pneumovax [pneumococcal polysaccharide vaccine]    Patient Active Problem List   Diagnosis   • Varicose veins of left leg with edema   • PETER (obstructive sleep apnea)   • Mild hyperlipidemia   • Adenomatous duodenal polyp   • Non-seasonal allergic rhinitis due to pollen   • Vulvar lesion   • Vitamin D deficiency   • Thyroid nodule   • Restless leg syndrome   • Osteoarthritis of knee   • Menopause   • Insomnia   • Hyperthyroidism   • Hyperglycemia   • GERD without esophagitis   • Gastric polyps   • Fibromyalgia syndrome   • Endometrial thickening on ultra sound   • Bariatric surgery status   • Postsurgical malabsorption   • PLMD (periodic limb movement disorder)   • Obesity, Class I, BMI 30-34.9   • PVC's (premature ventricular contractions)   • History of malignant neoplasm of skin   • Fluttering heart   • Posterior vitreous detachment   • COVID-19 virus infection        Past Medical History:   Diagnosis Date   • Allergic    • Allergic rhinitis    • Arthritis    • Bariatric surgery status    • Basal cell carcinoma     skin CA removed from nose   • Bowel habit changes    • Cataract     starting with a catract   • Cellulitis of left lower extremity     last assessed 06/29/2016   • Chronic GERD    • Closed displaced fracture of greater tuberosity of humerus     last assessed 05/31/2016   • CPAP (continuous positive airway pressure) dependence    • Fatty liver    • Fibromyalgia    • Fibromyalgia, primary    • GERD  (gastroesophageal reflux disease)    • Hiatal hernia    • High cholesterol    • Insomnia    • Morbid obesity (HCC)    • Osteoarthritis    • Palpitations     pt denies a fib   • PONV (postoperative nausea and vomiting)    • Postgastrectomy malabsorption    • Recurrent pregnancy loss, antepartum condition or complication    • Restless leg    • Sicca syndrome (HCC)    • Sleep apnea    • Superficial phlebitis and thrombophlebitis of left lower extremity     last assessed 06/08/2016   • Thyroid nodule     nodule on thyroid   • Urinary tract infection    • Varicose veins of left leg with edema    • Wears glasses        Past Surgical History:   Procedure Laterality Date   • BLADDER SURGERY      sling   • CHOLECYSTECTOMY  1998   • COLONOSCOPY     • CYST REMOVAL      thigh   • DILATION AND CURETTAGE OF UTERUS     • ESOPHAGOGASTRODUODENOSCOPY     • ESOPHAGOGASTRODUODENOSCOPY N/A 12/21/2017    Procedure: ESOPHAGOGASTRODUODENOSCOPY (EGD) with biopsy and polypectomy;  Surgeon: Jagdish Jones MD;  Location: [facility];  Service: Gastroenterology   • HARDWARE REMOVAL      right shoulder   • JOINT REPLACEMENT      both knees   • KNEE ARTHROSCOPY     • ORIF HUMERAL SHAFT FRACTURE Right    • CT ENDOVEN ABLTJ INCMPTNT VEIN XTR LASER 1ST VEIN Left 04/29/2016    Procedure: GREATER SAPHENOUS VEIN EVLT ;  Surgeon: Joe Loredo DO;  [facility]: [facility];  Service: Vascular   • CT ESOPHAGOGASTRODUODENOSCOPY TRANSORAL DIAGNOSTIC N/A 01/11/2018    Procedure: egd w/ emr ;  Surgeon: John Paul Smith MD;  Location: [facility];  Service: Gastroenterology   • CT ESOPHAGOGASTRODUODENOSCOPY TRANSORAL DIAGNOSTIC N/A 06/28/2018    Procedure: ESOPHAGOGASTRODUODENOSCOPY (EGD);  Surgeon: John Paul Smith MD;  Location: [facility];  Service: Gastroenterology   • CT LAPS [facility] RSTRICTIV PX LONGITUDINAL GASTRECTOMY N/A 03/19/2018    Procedure: GASTRECTOMY SLEEVE LAPAROSCOPIC AND INTRAOPERATIVE EGD;  Surgeon: Chris Tavarez MD;  Location: [facility];  Service:  "Bariatrics   • NE STAB PHLEBT VARICOSE VEINS 1 XTR 10-20 STAB INCS Left 04/29/2016    Procedure: AND STAB PHLEBECTOMIES ;  Surgeon: Joe Loredo DO;  Location: [facility];  Service: Vascular   • REDUCTION MAMMAPLASTY Bilateral 2011    Reduction   • SKIN CANCER EXCISION     • TOTAL KNEE ARTHROPLASTY Bilateral        Family History   Problem Relation Age of Onset   • Heart disease Mother    • Alzheimer's disease Mother    • Depression Mother    • Anxiety disorder Mother    • Coronary artery disease Mother    • Osteoporosis Mother    • Arthritis Father    • Osteoarthritis Father    • Prostate cancer Father         age on onset unknown   • Uterine cancer Sister 32   • Cardiomyopathy Sister         congestive   • Heart disease Sister    • Cancer Sister    • No Known Problems Daughter    • No Known Problems Maternal Grandmother    • No Known Problems Maternal Grandfather    • No Known Problems Paternal Grandmother    • No Known Problems Paternal Grandfather    • No Known Problems Maternal Aunt    • No Known Problems Maternal Aunt        Social History     Tobacco Use   • Smoking status: Never   • Smokeless tobacco: Never   • Tobacco comments:     teenage years    Vaping Use   • Vaping status: Never Used   Substance Use Topics   • Alcohol use: Yes     Alcohol/week: 1.0 standard drink of alcohol     Types: 1 Glasses of wine per week     Comment: socially   • Drug use: No       Objective:    Vitals:    01/02/24 1358   BP: 116/68   BP Location: Left arm   Patient Position: Sitting   Cuff Size: Large   Pulse: 82   Temp: 97.6 °F (36.4 °C)   TempSrc: Tympanic   SpO2: 97%   Weight: 70.3 kg (155 lb)   Height: 4' 10\" (1.473 m)        Physical Exam      Preop labs/testing available and reviewed: {YES/NO:20200}               EKG {YES/NO:20200}    Echo {YES/NO:20200}    Stress test/cath {YES/NO:20200}    PFT/Bijan {YES/NO:20200}    Functional capacity: {sl amb metabolic equivalents:0764965495}   Pick the highest level patient " can comfortably perform   4 mets or greater for surgery    RCRI  High Risk surgery?         1 Point  CAD History:         1 Point   MI; Positive Stress Test; CP due to Mi;  Nitrate Usage to control Angina; Pathologic Q wave on EKG  CHF Active:         1 Point   Pulm Edema; Paroxysmal Nocturnal Dyspnea;  Bibasilar Rales (crackles);S3; CHF on CXR  Cerebrovascular Disease (TIA or CVA):     1 Point  DM on Insulin:        1 Point  Serum Creat >2.0 mg/dl:       1 Point          Total Points: {Numbers; 0-6:38412}     Scorin: Class I, Very Low Risk (0.4%)     1: Class II, Low risk (0.9%)     2: Class III Moderate (6.6%)     3: Class IV High (>11%)      LATOYA Risk:  GFR:        For PCP:  If GFR>60, Hold ACE/ARB/Diuretic on the day of surgery, and NSAIDS 10 days before.    If GFR<45, Consider PRE and POST op Nephrology Consult.    If 46 <GFR> 59 : Has Patient had LATOYA in last 6 Months? {YES/NO:}   If YES: Preop Nephrology consult   If No:  Post Op Nephrology consult.           Assessment/Plan:    Patient {is/is not:48336} medically optimized (cleared) for the planned procedure.    Further testing/evaluation {is/is not:98970} required.    Postop concerns: {YES/NO:}    Problem List Items Addressed This Visit        Digestive    GERD without esophagitis       Other    Mild hyperlipidemia    Posterior vitreous detachment    Relevant Medications    Prolensa 0.07 % SOLN   Other Visit Diagnoses     Preoperative evaluation of a medical condition to rule out surgical contraindications (TAR required)    -  Primary           {Assess/PlanSmartLinks:94069}      Pre-Surgery Instructions:   Medication Instructions   • atorvastatin (LIPITOR) 10 mg tablet {OR PAT MED INSTRUCTIONS:65991}   • Calcium Carbonate 1500 (600 Ca) MG TABS {OR PAT MED INSTRUCTIONS:26572}   • Cholecalciferol (VITAMIN D-3) 1000 units CAPS {OR PAT MED INSTRUCTIONS:02519}   • Diphenhydramine-Phenylephrine ([facility] ALLERGY-D PO) {OR PAT MED INSTRUCTIONS:56134}   •  "fexofenadine (Allegra Allergy) 180 MG tablet {OR PAT MED INSTRUCTIONS:19529}   • fluticasone (FLONASE) 50 mcg/act nasal spray {OR PAT MED INSTRUCTIONS:20506}   • gabapentin (NEURONTIN) 600 MG tablet {OR PAT MED INSTRUCTIONS:42770}   • Melatonin 10 MG TABS {OR PAT MED INSTRUCTIONS:69732}   • Multiple Vitamin (MULTIVITAMIN) capsule {OR PAT MED INSTRUCTIONS:28054}   • pantoprazole (PROTONIX) 20 mg tablet {OR PAT MED INSTRUCTIONS:31426}   • PARoxetine (PAXIL) 30 mg tablet {OR PAT MED INSTRUCTIONS:28031}   • Prolensa 0.07 % SOLN {OR PAT MED INSTRUCTIONS:01035}   • Restasis 0.05 % ophthalmic emulsion {OR PAT MED INSTRUCTIONS:46713}        NOTE: Please use the above to review important meds for your specialty, the remainder \"per anesthesia Guidelines.\"    NOTE: Please place an Inbasket message for \"SOC\" pool for complicated patients.      "

## 2024-01-22 DIAGNOSIS — E78.5 MILD HYPERLIPIDEMIA: ICD-10-CM

## 2024-01-22 RX ORDER — ATORVASTATIN CALCIUM 10 MG/1
10 TABLET, FILM COATED ORAL DAILY
Qty: 90 TABLET | Refills: 2 | Status: SHIPPED | OUTPATIENT
Start: 2024-01-22

## 2024-02-07 DIAGNOSIS — F41.9 ANXIETY: ICD-10-CM

## 2024-02-07 DIAGNOSIS — K21.9 GERD WITHOUT ESOPHAGITIS: ICD-10-CM

## 2024-02-07 RX ORDER — PAROXETINE 30 MG/1
TABLET, FILM COATED ORAL
Qty: 90 TABLET | Refills: 1 | Status: SHIPPED | OUTPATIENT
Start: 2024-02-07

## 2024-02-07 RX ORDER — PANTOPRAZOLE SODIUM 20 MG/1
TABLET, DELAYED RELEASE ORAL
Qty: 90 TABLET | Refills: 1 | Status: SHIPPED | OUTPATIENT
Start: 2024-02-07

## 2024-04-15 ENCOUNTER — RA CDI HCC (OUTPATIENT)
Dept: OTHER | Facility: HOSPITAL | Age: 69
End: 2024-04-15

## 2024-04-15 PROBLEM — Z86.16 HISTORY OF COVID-19: Status: ACTIVE | Noted: 2023-11-02

## 2024-04-15 PROBLEM — Z86.16 HISTORY OF COVID-19: Status: ACTIVE | Noted: 2024-04-15

## 2024-04-15 NOTE — PROGRESS NOTES
HCC coding opportunities       Chart reviewed, no opportunity found: CHART REVIEWED, NO OPPORTUNITY FOUND      This is a reminder to address (resolve/update/assess) ALL HCC (risk adjustment) codes as found on active problem list for 2024 as patient scores reset to zero LISET.  Also, just a reminder to please review and assess all other chronic conditions for 2024  Patients Insurance     Medicare Insurance: [employer]

## 2024-04-17 ENCOUNTER — APPOINTMENT (OUTPATIENT)
Dept: LAB | Facility: CLINIC | Age: 69
End: 2024-04-17
Payer: COMMERCIAL

## 2024-04-17 DIAGNOSIS — E55.9 VITAMIN D DEFICIENCY: ICD-10-CM

## 2024-04-17 DIAGNOSIS — R73.9 HYPERGLYCEMIA: ICD-10-CM

## 2024-04-17 DIAGNOSIS — E78.5 MILD HYPERLIPIDEMIA: ICD-10-CM

## 2024-04-17 DIAGNOSIS — L03.90 CELLULITIS, UNSPECIFIED CELLULITIS SITE: ICD-10-CM

## 2024-04-17 LAB
ALBUMIN SERPL BCP-MCNC: 3.9 G/DL (ref 3.5–5)
ALP SERPL-CCNC: 74 U/L (ref 34–104)
ALT SERPL W P-5'-P-CCNC: 12 U/L (ref 7–52)
ANION GAP SERPL CALCULATED.3IONS-SCNC: 6 MMOL/L (ref 4–13)
AST SERPL W P-5'-P-CCNC: 13 U/L (ref 13–39)
BASOPHILS # BLD AUTO: 0.03 THOUSANDS/ÂΜL (ref 0–0.1)
BASOPHILS NFR BLD AUTO: 0 % (ref 0–1)
BILIRUB SERPL-MCNC: 0.49 MG/DL (ref 0.2–1)
BUN SERPL-MCNC: 9 MG/DL (ref 5–25)
CALCIUM SERPL-MCNC: 8.8 MG/DL (ref 8.4–10.2)
CHLORIDE SERPL-SCNC: 106 MMOL/L (ref 96–108)
CHOLEST SERPL-MCNC: 145 MG/DL
CO2 SERPL-SCNC: 28 MMOL/L (ref 21–32)
CREAT SERPL-MCNC: 0.46 MG/DL (ref 0.6–1.3)
EOSINOPHIL # BLD AUTO: 0.12 THOUSAND/ÂΜL (ref 0–0.61)
EOSINOPHIL NFR BLD AUTO: 2 % (ref 0–6)
ERYTHROCYTE [DISTWIDTH] IN BLOOD BY AUTOMATED COUNT: 12.2 % (ref 11.6–15.1)
GFR SERPL CREATININE-BSD FRML MDRD: 102 ML/MIN/1.73SQ M
GLUCOSE P FAST SERPL-MCNC: 100 MG/DL (ref 65–99)
HCT VFR BLD AUTO: 41 % (ref 34.8–46.1)
HDLC SERPL-MCNC: 50 MG/DL
HGB BLD-MCNC: 13.5 G/DL (ref 11.5–15.4)
IMM GRANULOCYTES # BLD AUTO: 0.02 THOUSAND/UL (ref 0–0.2)
IMM GRANULOCYTES NFR BLD AUTO: 0 % (ref 0–2)
LDLC SERPL CALC-MCNC: 75 MG/DL (ref 0–100)
LYMPHOCYTES # BLD AUTO: 1.92 THOUSANDS/ÂΜL (ref 0.6–4.47)
LYMPHOCYTES NFR BLD AUTO: 27 % (ref 14–44)
MCH RBC QN AUTO: 29.2 PG (ref 26.8–34.3)
MCHC RBC AUTO-ENTMCNC: 32.9 G/DL (ref 31.4–37.4)
MCV RBC AUTO: 89 FL (ref 82–98)
MONOCYTES # BLD AUTO: 0.47 THOUSAND/ÂΜL (ref 0.17–1.22)
MONOCYTES NFR BLD AUTO: 7 % (ref 4–12)
NEUTROPHILS # BLD AUTO: 4.51 THOUSANDS/ÂΜL (ref 1.85–7.62)
NEUTS SEG NFR BLD AUTO: 64 % (ref 43–75)
NRBC BLD AUTO-RTO: 0 /100 WBCS
PLATELET # BLD AUTO: 299 THOUSANDS/UL (ref 149–390)
PMV BLD AUTO: 10.9 FL (ref 8.9–12.7)
POTASSIUM SERPL-SCNC: 4.7 MMOL/L (ref 3.5–5.3)
PROT SERPL-MCNC: 6.6 G/DL (ref 6.4–8.4)
RBC # BLD AUTO: 4.62 MILLION/UL (ref 3.81–5.12)
SODIUM SERPL-SCNC: 140 MMOL/L (ref 135–147)
TRIGL SERPL-MCNC: 101 MG/DL
WBC # BLD AUTO: 7.07 THOUSAND/UL (ref 4.31–10.16)

## 2024-04-17 PROCEDURE — 36415 COLL VENOUS BLD VENIPUNCTURE: CPT

## 2024-04-17 PROCEDURE — 82306 VITAMIN D 25 HYDROXY: CPT

## 2024-04-17 PROCEDURE — 80061 LIPID PANEL: CPT

## 2024-04-17 PROCEDURE — 80053 COMPREHEN METABOLIC PANEL: CPT

## 2024-04-17 PROCEDURE — 85025 COMPLETE CBC W/AUTO DIFF WBC: CPT

## 2024-04-18 LAB — 25(OH)D3 SERPL-MCNC: 55.5 NG/ML (ref 30–100)

## 2024-04-22 ENCOUNTER — TELEPHONE (OUTPATIENT)
Dept: BARIATRICS | Facility: CLINIC | Age: 69
End: 2024-04-22

## 2024-04-22 ENCOUNTER — OFFICE VISIT (OUTPATIENT)
Dept: FAMILY MEDICINE CLINIC | Facility: CLINIC | Age: 69
End: 2024-04-22
Payer: COMMERCIAL

## 2024-04-22 VITALS
DIASTOLIC BLOOD PRESSURE: 68 MMHG | WEIGHT: 157.6 LBS | OXYGEN SATURATION: 97 % | HEIGHT: 58 IN | HEART RATE: 86 BPM | BODY MASS INDEX: 33.08 KG/M2 | SYSTOLIC BLOOD PRESSURE: 110 MMHG

## 2024-04-22 DIAGNOSIS — E55.9 VITAMIN D DEFICIENCY: ICD-10-CM

## 2024-04-22 DIAGNOSIS — F41.9 ANXIETY: ICD-10-CM

## 2024-04-22 DIAGNOSIS — K21.9 GERD WITHOUT ESOPHAGITIS: ICD-10-CM

## 2024-04-22 DIAGNOSIS — E78.5 MILD HYPERLIPIDEMIA: Primary | ICD-10-CM

## 2024-04-22 DIAGNOSIS — R73.9 HYPERGLYCEMIA: ICD-10-CM

## 2024-04-22 PROCEDURE — G0439 PPPS, SUBSEQ VISIT: HCPCS | Performed by: PHYSICIAN ASSISTANT

## 2024-04-22 PROCEDURE — 99214 OFFICE O/P EST MOD 30 MIN: CPT | Performed by: PHYSICIAN ASSISTANT

## 2024-04-22 RX ORDER — CHOLECALCIFEROL (VITAMIN D3) 25 MCG
3000 CAPSULE ORAL DAILY
Qty: 30 CAPSULE | Refills: 0 | Status: SHIPPED | OUTPATIENT
Start: 2024-04-22

## 2024-04-22 NOTE — TELEPHONE ENCOUNTER
Pt aware, will get the rest of her labs when she is getting her next labs. ----- Message from KEENA Mario sent at 4/22/2024  9:42 AM EDT -----  Please call patient to let her know that her vitamin D level is normal.  There are other labs to be done however I am not sure why it was not collected all at once.  Please let her know if she would like to get her vitamin levels checked she would need to go again to the lab.    Bill

## 2024-04-22 NOTE — PROGRESS NOTES
Assessment and Plan:     Problem List Items Addressed This Visit        Digestive    GERD without esophagitis     Stable on Protonix.            Behavioral Health    Anxiety     Stable on Paxil.            Other    Mild hyperlipidemia - Primary     Patient on Lipitor 10 keeping LDL at goal at 75 continue recheck 6 months.         Relevant Orders    Lipid Panel with Direct LDL reflex    Comprehensive metabolic panel    Vitamin D deficiency     Vitamin D is currently stable at 55 continue supplementations.         Relevant Medications    Cholecalciferol (Vitamin D-3) 25 MCG (1000 UT) CAPS    Hyperglycemia     Fasting glucose is normal at 100.         Relevant Orders    Comprehensive metabolic panel         Depression Screening and Follow-up Plan: Patient was screened for depression during today's encounter. They screened negative with a PHQ-2 score of 0.      Preventive health issues were discussed with patient, and age appropriate screening tests were ordered as noted in patient's After Visit Summary.  Personalized health advice and appropriate referrals for health education or preventive services given if needed, as noted in patient's After Visit Summary.     History of Present Illness:     Patient presents for a Medicare Wellness Visit      Jeannette Heard is here for chronic conditions f/u. Pt. had labs done prior to today's visit which included Recent Results (from the past 672 hour(s))  -Vitamin D 25 hydroxy:   Collection Time: 04/17/24 10:20 AM       Result                      Value             Ref Range           Vit D, 25-Hydroxy           55.5              30.0 - 100.0*  -Lipid Panel with Direct LDL reflex:   Collection Time: 04/17/24 10:20 AM       Result                      Value             Ref Range           Cholesterol                 145               See Comment *       Triglycerides               101               See Comment *       HDL, Direct                 50                >=50 mg/dL           LDL Calculated              75                0 - 100 mg/dL  -Comprehensive metabolic panel:   Collection Time: 04/17/24 10:20 AM       Result                      Value             Ref Range           Sodium                      140               135 - 147 mm*       Potassium                   4.7               3.5 - 5.3 mm*       Chloride                    106               96 - 108 mmo*       CO2                         28                21 - 32 mmol*       ANION GAP                   6                 4 - 13 mmol/L       BUN                         9                 5 - 25 mg/dL        Creatinine                  0.46 (L)          0.60 - 1.30 *       Glucose, Fasting            100 (H)           65 - 99 mg/dL       Calcium                     8.8               8.4 - 10.2 m*       AST                         13                13 - 39 U/L         ALT                         12                7 - 52 U/L          Alkaline Phosphatase        74                34 - 104 U/L        Total Protein               6.6               6.4 - 8.4 g/*       Albumin                     3.9               3.5 - 5.0 g/*       Total Bilirubin             0.49              0.20 - 1.00 *       eGFR                        102               ml/min/1.73s*  -CBC and differential:   Collection Time: 04/17/24 10:20 AM       Result                      Value             Ref Range           WBC                         7.07              4.31 - 10.16*       RBC                         4.62              3.81 - 5.12 *       Hemoglobin                  13.5              11.5 - 15.4 *       Hematocrit                  41.0              34.8 - 46.1 %       MCV                         89                82 - 98 fL          MCH                         29.2              26.8 - 34.3 *       MCHC                        32.9              31.4 - 37.4 *       RDW                         12.2              11.6 - 15.1 %       MPV                          10.9              8.9 - 12.7 fL       Platelets                   299               149 - 390 Th*       nRBC                        0                 /100 WBCs           Segmented %                 64                43 - 75 %           Immature Grans %            0                 0 - 2 %             Lymphocytes %               27                14 - 44 %           Monocytes %                 7                 4 - 12 %            Eosinophils Relative        2                 0 - 6 %             Basophils Relative          0                 0 - 1 %             Absolute Neutrophils        4.51              1.85 - 7.62 *       Absolute Immature Grans     0.02              0.00 - 0.20 *       Absolute Lymphocytes        1.92              0.60 - 4.47 *       Absolute Monocytes          0.47              0.17 - 1.22 *       Eosinophils Absolute        0.12              0.00 - 0.61 *       Basophils Absolute          0.03              0.00 - 0.10 *         Patient Care Team:  Andria Coates PA-C as PCP - General  MD Amber Morgan PA-C Ann Freeman, DO Sara Choudhry, MD Daniel Scott Heckman, MD Calogero Dimaggio, DO Chuy Finley MD     Review of Systems:     Review of Systems   Constitutional: Negative.    HENT: Negative.     Eyes: Negative.    Respiratory: Negative.     Cardiovascular: Negative.    Gastrointestinal: Negative.    Endocrine: Negative.    Genitourinary: Negative.    Musculoskeletal: Negative.    Skin: Negative.    Allergic/Immunologic: Negative.    Neurological: Negative.    Hematological: Negative.    Psychiatric/Behavioral: Negative.          Problem List:     Patient Active Problem List   Diagnosis   • Varicose veins of left leg with edema   • PETER (obstructive sleep apnea)   • Mild hyperlipidemia   • Adenomatous duodenal polyp   • Non-seasonal allergic rhinitis due to pollen   • Vulvar lesion   • Vitamin D deficiency   • Thyroid nodule   • Restless leg syndrome   • Osteoarthritis of knee    • Menopause   • Insomnia   • Hyperthyroidism   • Hyperglycemia   • GERD without esophagitis   • Gastric polyps   • Fibromyalgia syndrome   • Endometrial thickening on ultra sound   • Bariatric surgery status   • Postsurgical malabsorption   • PLMD (periodic limb movement disorder)   • Obesity, Class I, BMI 30-34.9   • PVC's (premature ventricular contractions)   • History of malignant neoplasm of skin   • Fluttering heart   • Posterior vitreous detachment   • History of COVID-19   • Anxiety      Past Medical and Surgical History:     Past Medical History:   Diagnosis Date   • Allergic    • Allergic rhinitis    • Arthritis    • Bariatric surgery status    • Basal cell carcinoma     skin CA removed from nose   • Bowel habit changes    • Cataract     starting with a catract   • Cellulitis of left lower extremity     last assessed 06/29/2016   • Chronic GERD    • Closed displaced fracture of greater tuberosity of humerus     last assessed 05/31/2016   • CPAP (continuous positive airway pressure) dependence    • Fatty liver    • Fibromyalgia    • Fibromyalgia, primary    • GERD (gastroesophageal reflux disease)    • Hiatal hernia    • High cholesterol    • Insomnia    • Morbid obesity (HCC)    • Osteoarthritis    • Palpitations     pt denies a fib   • PONV (postoperative nausea and vomiting)    • Postgastrectomy malabsorption    • Recurrent pregnancy loss, antepartum condition or complication    • Restless leg    • Sicca syndrome (HCC)    • Sleep apnea    • Superficial phlebitis and thrombophlebitis of left lower extremity     last assessed 06/08/2016   • Thyroid nodule     nodule on thyroid   • Urinary tract infection    • Varicose veins of left leg with edema    • Wears glasses      Past Surgical History:   Procedure Laterality Date   • BLADDER SURGERY      sling   • CHOLECYSTECTOMY  1998   • COLONOSCOPY     • CYST REMOVAL      thigh   • DILATION AND CURETTAGE OF UTERUS     • ESOPHAGOGASTRODUODENOSCOPY     •  ESOPHAGOGASTRODUODENOSCOPY N/A 12/21/2017    Procedure: ESOPHAGOGASTRODUODENOSCOPY (EGD) with biopsy and polypectomy;  Surgeon: Jagdish Jones MD;  Location: [facility];  Service: Gastroenterology   • HARDWARE REMOVAL      right shoulder   • JOINT REPLACEMENT      both knees   • KNEE ARTHROSCOPY     • ORIF HUMERAL SHAFT FRACTURE Right    • NV ENDOVEN ABLTJ INCMPTNT VEIN XTR LASER 1ST VEIN Left 04/29/2016    Procedure: GREATER SAPHENOUS VEIN EVLT ;  Surgeon: Joe Loredo DO;  Location: [facility];  Service: Vascular   • NV ESOPHAGOGASTRODUODENOSCOPY TRANSORAL DIAGNOSTIC N/A 01/11/2018    Procedure: egd w/ emr ;  Surgeon: John Paul Smith MD;  Location: [facility];  Service: Gastroenterology   • NV ESOPHAGOGASTRODUODENOSCOPY TRANSORAL DIAGNOSTIC N/A 06/28/2018    Procedure: ESOPHAGOGASTRODUODENOSCOPY (EGD);  Surgeon: John Paul Smith MD;  Location: [facility];  Service: Gastroenterology   • NV LAPS GSTRC RSTRICTIV PX LONGITUDINAL GASTRECTOMY N/A 03/19/2018    Procedure: GASTRECTOMY SLEEVE LAPAROSCOPIC AND INTRAOPERATIVE EGD;  Surgeon: Chris Tavarez MD;  Location: [facility];  Service: Bariatrics   • NV STAB PHLEBT VARICOSE VEINS 1 XTR 10-20 STAB INCS Left 04/29/2016    Procedure: AND STAB PHLEBECTOMIES ;  Surgeon: Joe Loredo DO;  Location: [facility];  Service: Vascular   • REDUCTION MAMMAPLASTY Bilateral 2011    Reduction   • SKIN CANCER EXCISION     • TOTAL KNEE ARTHROPLASTY Bilateral       Family History:     Family History   Problem Relation Age of Onset   • Heart disease Mother    • Alzheimer's disease Mother    • Depression Mother    • Anxiety disorder Mother    • Coronary artery disease Mother    • Osteoporosis Mother    • Arthritis Father    • Osteoarthritis Father    • Prostate cancer Father         age on onset unknown   • Uterine cancer Sister 32   • Cardiomyopathy Sister         congestive   • Heart disease Sister    • Cancer Sister    • No Known Problems Daughter    • No Known Problems Maternal  Grandmother    • No Known Problems Maternal Grandfather    • No Known Problems Paternal Grandmother    • No Known Problems Paternal Grandfather    • No Known Problems Maternal Aunt    • No Known Problems Maternal Aunt       Social History:     Social History     Socioeconomic History   • Marital status:      Spouse name: None   • Number of children: 2   • Years of education: None   • Highest education level: None   Occupational History   • None   Tobacco Use   • Smoking status: Never     Passive exposure: Never   • Smokeless tobacco: Never   • Tobacco comments:     teenage years    Vaping Use   • Vaping status: Never Used   Substance and Sexual Activity   • Alcohol use: Yes     Alcohol/week: 1.0 standard drink of alcohol     Types: 1 Glasses of wine per week     Comment: socially   • Drug use: No   • Sexual activity: Yes     Partners: Male     Birth control/protection: Post-menopausal   Other Topics Concern   • None   Social History Narrative    Caffeine use    No caffeine use    [employer] affiliation [employer]    Uses safety equipment-seat belt     Social Determinants of Health     Financial Resource Strain: Low Risk  (4/10/2023)    Overall Financial Resource Strain (CARDIA)    • Difficulty of Paying Living Expenses: Not hard at all   Food Insecurity: No Food Insecurity (4/22/2024)    Hunger Vital Sign    • Worried About Running Out of Food in the Last Year: Never true    • Ran Out of Food in the Last Year: Never true   Transportation Needs: No Transportation Needs (4/22/2024)    PRAPARE - Transportation    • Lack of Transportation (Medical): No    • Lack of Transportation (Non-Medical): No   Physical Activity: Not on file   Stress: Not on file   Social Connections: Not on file   Intimate Partner Violence: Not on file   Housing Stability: Low Risk  (4/22/2024)    Housing Stability Vital Sign    • Unable to Pay for Housing in the Last Year: No    • Number of Places Lived in the Last Year: 1    • Unstable  Housing in the Last Year: No      Medications and Allergies:     Current Outpatient Medications   Medication Sig Dispense Refill   • atorvastatin (LIPITOR) 10 mg tablet TAKE ONE TABLET BY MOUTH EVERY DAY 90 tablet 2   • Calcium Carbonate 1500 (600 Ca) MG TABS Take by mouth     • Cholecalciferol (Vitamin D-3) 25 MCG (1000 UT) CAPS Take 3 capsules (3,000 Units total) by mouth daily 30 capsule 0   • Diphenhydramine-Phenylephrine ([facility] ALLERGY-D PO) Take by mouth     • fexofenadine (Allegra Allergy) 180 MG tablet      • fluticasone (FLONASE) 50 mcg/act nasal spray 2 sprays into each nostril daily     • gabapentin (NEURONTIN) 600 MG tablet Take 600 mg by mouth daily    3   • Melatonin 10 MG TABS Take 10 mg by mouth     • Multiple Vitamin (MULTIVITAMIN) capsule Take 1 capsule by mouth daily.     • pantoprazole (PROTONIX) 20 mg tablet TAKE ONE TABLET BY MOUTH EVERY DAY 90 tablet 1   • PARoxetine (PAXIL) 30 mg tablet TAKE ONE TABLET BY MOUTH EVERY DAY 90 tablet 1   • Restasis 0.05 % ophthalmic emulsion      • Prolensa 0.07 % SOLN        No current facility-administered medications for this visit.     Allergies   Allergen Reactions   • Ketorolac Itching   • Other Allergic Rhinitis   • Percocet [Oxycodone-Acetaminophen] Itching   • Zoster Vaccine Recombinant, Adjuvanted Itching and Swelling     Redness of skin   • Flurbiprofen      NSAIDS   • Ketorolac Tromethamine Itching   • Pneumovax [Pneumococcal Polysaccharide Vaccine] Swelling and Rash      Immunizations:     Immunization History   Administered Date(s) Administered   • COVID-19 MODERNA VACC 0.5 ML IM 03/09/2021, 04/06/2021, 11/19/2021, 11/19/2021, 04/18/2022   • COVID-19 Moderna Vac BIVALENT 12 Yr+ IM 0.5 ML 01/24/2023   • INFLUENZA 11/13/2014, 11/30/2015, 11/03/2016, 11/03/2016, 10/17/2017, 10/17/2017, 10/17/2017, 10/17/2017, 10/17/2017, 10/17/2017, 10/17/2017, 10/17/2017, 10/17/2017, 10/17/2017, 01/03/2019, 10/17/2019   • Influenza Quadrivalent Preservative Free 3  years and older IM 11/13/2014, 11/30/2015, 11/03/2016, 10/17/2017   • Influenza, high dose seasonal 0.7 mL 12/07/2021   • Influenza, injectable, quadrivalent, preservative free 0.5 mL 10/27/2020   • Influenza, recombinant, quadrivalent,injectable, preservative free 01/03/2019, 10/17/2019   • Pneumococcal Polysaccharide PPV23 11/03/2016, 11/03/2016   • Tdap 10/02/2023   • influenza, trivalent, adjuvanted 12/07/2021      Health Maintenance:         Topic Date Due   • Breast Cancer Screening: Mammogram  05/30/2024   • DXA SCAN  07/29/2025   • Colorectal Cancer Screening  02/16/2027   • Hepatitis C Screening  Completed         Topic Date Due   • Pneumococcal Vaccine: 65+ Years (2 of 2 - PCV) 07/28/2020   • COVID-19 Vaccine (7 - 2023-24 season) 09/01/2023      Medicare Screening Tests and Risk Assessments:     Jeannette is here for her Subsequent Wellness visit.     Health Risk Assessment:   Patient rates overall health as very good. Patient feels that their physical health rating is same. Patient is very satisfied with their life. Eyesight was rated as same. Hearing was rated as same. Patient feels that their emotional and mental health rating is same. Patients states they are never, rarely angry. Patient states they are sometimes unusually tired/fatigued. Pain experienced in the last 7 days has been some. Patient's pain rating has been 2/10. Patient states that she has experienced weight loss or gain in last 6 months.     Depression Screening:   PHQ-2 Score: 0      Fall Risk Screening:   In the past year, patient has experienced: history of falling in past year    Number of falls: 1  Injured during fall?: No    Feels unsteady when standing or walking?: No    Worried about falling?: No      Urinary Incontinence Screening:   Patient has not leaked urine accidently in the last six months.     Home Safety:  Patient does not have trouble with stairs inside or outside of their home. Patient has working smoke alarms and has  working carbon monoxide detector. Home safety hazards include: none.     Nutrition:   Current diet is Regular.     Medications:   Patient is currently taking over-the-counter supplements. OTC medications include: see medication list. Patient is able to manage medications.     Activities of Daily Living (ADLs)/Instrumental Activities of Daily Living (IADLs):   Walk and transfer into and out of bed and chair?: Yes  Dress and groom yourself?: Yes    Bathe or shower yourself?: Yes    Feed yourself? Yes  Do your laundry/housekeeping?: Yes  Manage your money, pay your bills and track your expenses?: Yes  Make your own meals?: Yes    Do your own shopping?: Yes    Previous Hospitalizations:   Any hospitalizations or ED visits within the last 12 months?: No      Advance Care Planning:   Living will: Yes    Advanced directive: Yes    Advanced directive counseling given: Yes      PREVENTIVE SCREENINGS      Cardiovascular Screening:    General: Screening Not Indicated, History Lipid Disorder and Screening Current      Diabetes Screening:     General: Screening Current      Colorectal Cancer Screening:     General: Screening Current      Breast Cancer Screening:     General: Screening Current      Cervical Cancer Screening:    General: Screening Not Indicated      Osteoporosis Screening:    General: Screening Current      Abdominal Aortic Aneurysm (AAA) Screening:        General: Screening Not Indicated      Lung Cancer Screening:     General: Screening Not Indicated      Hepatitis C Screening:    General: Screening Current    Screening, Brief Intervention, and Referral to Treatment (SBIRT)    Screening    Typical number of drinks in a week: 0    Single Item Drug Screening:  How often have you used an illegal drug (including marijuana) or a prescription medication for non-medical reasons in the past year? never    Single Item Drug Screen Score: 0  Interpretation: Negative screen for possible drug use disorder    No results  "found.     Physical Exam:     /68 (BP Location: Right arm, Patient Position: Sitting, Cuff Size: Standard)   Pulse 86   Ht 4' 10\" (1.473 m)   Wt 71.5 kg (157 lb 9.6 oz)   LMP  (LMP Unknown)   SpO2 97%   BMI 32.94 kg/m²     Physical Exam  Vitals and nursing note reviewed.   Constitutional:       Appearance: Normal appearance. She is well-developed.   HENT:      Head: Normocephalic and atraumatic.   Eyes:      General: Lids are normal.      Conjunctiva/sclera: Conjunctivae normal.      Pupils: Pupils are equal, round, and reactive to light.   Cardiovascular:      Rate and Rhythm: Normal rate and regular rhythm.      Heart sounds: No murmur heard.  Pulmonary:      Effort: Pulmonary effort is normal.      Breath sounds: Normal breath sounds.   Skin:     General: Skin is warm and dry.   Neurological:      General: No focal deficit present.      Mental Status: She is alert.      Coordination: Coordination is intact.   Psychiatric:         Mood and Affect: Mood normal.         Behavior: Behavior normal. Behavior is cooperative.         Thought Content: Thought content normal.         Judgment: Judgment normal.          Andria Coates PA-C  "

## 2024-04-22 NOTE — PATIENT INSTRUCTIONS
1. Mild hyperlipidemia  Assessment & Plan:  Patient on Lipitor 10 keeping LDL at goal at 75 continue recheck 6 months.      2. Hyperglycemia  Assessment & Plan:  Fasting glucose is normal at 100.      3. GERD without esophagitis  Assessment & Plan:  Stable on Protonix.      4. Anxiety  Assessment & Plan:  Stable on Paxil.      5. Vitamin D deficiency  Assessment & Plan:  Vitamin D is currently stable at 55 continue supplementations.

## 2024-06-03 ENCOUNTER — HOSPITAL ENCOUNTER (OUTPATIENT)
Dept: MAMMOGRAPHY | Facility: MEDICAL CENTER | Age: 69
Discharge: HOME | End: 2024-06-03
Payer: COMMERCIAL

## 2024-06-03 VITALS — BODY MASS INDEX: 32.12 KG/M2 | HEIGHT: 58 IN | WEIGHT: 153 LBS

## 2024-06-03 DIAGNOSIS — Z12.31 ENCOUNTER FOR SCREENING MAMMOGRAM FOR MALIGNANT NEOPLASM OF BREAST: ICD-10-CM

## 2024-06-03 PROCEDURE — 77067 SCR MAMMO BI INCL CAD: CPT

## 2024-06-03 PROCEDURE — 77063 BREAST TOMOSYNTHESIS BI: CPT

## 2024-07-22 DIAGNOSIS — F41.9 ANXIETY: ICD-10-CM

## 2024-07-22 DIAGNOSIS — K21.9 GERD WITHOUT ESOPHAGITIS: ICD-10-CM

## 2024-07-23 RX ORDER — PANTOPRAZOLE SODIUM 20 MG/1
TABLET, DELAYED RELEASE ORAL
Qty: 100 TABLET | Refills: 1 | Status: SHIPPED | OUTPATIENT
Start: 2024-07-23

## 2024-07-23 RX ORDER — PAROXETINE 30 MG/1
TABLET, FILM COATED ORAL
Qty: 100 TABLET | Refills: 1 | Status: SHIPPED | OUTPATIENT
Start: 2024-07-23

## 2024-10-18 DIAGNOSIS — E78.5 MILD HYPERLIPIDEMIA: ICD-10-CM

## 2024-10-21 ENCOUNTER — RA CDI HCC (OUTPATIENT)
Dept: OTHER | Facility: HOSPITAL | Age: 69
End: 2024-10-21

## 2024-10-23 RX ORDER — ATORVASTATIN CALCIUM 10 MG/1
10 TABLET, FILM COATED ORAL DAILY
Qty: 90 TABLET | Refills: 3 | Status: SHIPPED | OUTPATIENT
Start: 2024-10-23

## 2024-10-25 ENCOUNTER — APPOINTMENT (OUTPATIENT)
Dept: LAB | Facility: CLINIC | Age: 69
End: 2024-10-25
Payer: COMMERCIAL

## 2024-10-25 DIAGNOSIS — E66.811 OBESITY, CLASS I, BMI 30-34.9: ICD-10-CM

## 2024-10-25 DIAGNOSIS — Z48.815 ENCOUNTER FOR SURGICAL AFTERCARE FOLLOWING SURGERY OF DIGESTIVE SYSTEM: ICD-10-CM

## 2024-10-25 DIAGNOSIS — Z98.84 BARIATRIC SURGERY STATUS: ICD-10-CM

## 2024-10-25 DIAGNOSIS — K91.2 POSTSURGICAL MALABSORPTION: ICD-10-CM

## 2024-10-25 DIAGNOSIS — E78.5 MILD HYPERLIPIDEMIA: ICD-10-CM

## 2024-10-25 DIAGNOSIS — R73.9 HYPERGLYCEMIA: ICD-10-CM

## 2024-10-25 LAB
ALBUMIN SERPL BCG-MCNC: 3.9 G/DL (ref 3.5–5)
ALP SERPL-CCNC: 66 U/L (ref 34–104)
ALT SERPL W P-5'-P-CCNC: 11 U/L (ref 7–52)
ANION GAP SERPL CALCULATED.3IONS-SCNC: 10 MMOL/L (ref 4–13)
AST SERPL W P-5'-P-CCNC: 11 U/L (ref 13–39)
BILIRUB SERPL-MCNC: 0.61 MG/DL (ref 0.2–1)
BUN SERPL-MCNC: 10 MG/DL (ref 5–25)
CALCIUM SERPL-MCNC: 8.8 MG/DL (ref 8.4–10.2)
CHLORIDE SERPL-SCNC: 103 MMOL/L (ref 96–108)
CHOLEST SERPL-MCNC: 153 MG/DL
CO2 SERPL-SCNC: 25 MMOL/L (ref 21–32)
CREAT SERPL-MCNC: 0.48 MG/DL (ref 0.6–1.3)
ERYTHROCYTE [DISTWIDTH] IN BLOOD BY AUTOMATED COUNT: 12.1 % (ref 11.6–15.1)
FERRITIN SERPL-MCNC: 19 NG/ML (ref 11–307)
FOLATE SERPL-MCNC: 18.5 NG/ML
GFR SERPL CREATININE-BSD FRML MDRD: 100 ML/MIN/1.73SQ M
GLUCOSE P FAST SERPL-MCNC: 97 MG/DL (ref 65–99)
HCT VFR BLD AUTO: 39.2 % (ref 34.8–46.1)
HDLC SERPL-MCNC: 44 MG/DL
HGB BLD-MCNC: 13 G/DL (ref 11.5–15.4)
IRON SATN MFR SERPL: 48 % (ref 15–50)
IRON SERPL-MCNC: 146 UG/DL (ref 50–212)
LDLC SERPL CALC-MCNC: 90 MG/DL (ref 0–100)
MCH RBC QN AUTO: 29.1 PG (ref 26.8–34.3)
MCHC RBC AUTO-ENTMCNC: 33.2 G/DL (ref 31.4–37.4)
MCV RBC AUTO: 88 FL (ref 82–98)
PLATELET # BLD AUTO: 285 THOUSANDS/UL (ref 149–390)
PMV BLD AUTO: 11.1 FL (ref 8.9–12.7)
POTASSIUM SERPL-SCNC: 4.2 MMOL/L (ref 3.5–5.3)
PROT SERPL-MCNC: 6.7 G/DL (ref 6.4–8.4)
PTH-INTACT SERPL-MCNC: 70.4 PG/ML (ref 12–88)
RBC # BLD AUTO: 4.47 MILLION/UL (ref 3.81–5.12)
SODIUM SERPL-SCNC: 138 MMOL/L (ref 135–147)
TIBC SERPL-MCNC: 302 UG/DL (ref 250–450)
TRIGL SERPL-MCNC: 94 MG/DL
UIBC SERPL-MCNC: 156 UG/DL (ref 155–355)
VIT B12 SERPL-MCNC: 529 PG/ML (ref 180–914)
WBC # BLD AUTO: 6.84 THOUSAND/UL (ref 4.31–10.16)

## 2024-10-25 PROCEDURE — 36415 COLL VENOUS BLD VENIPUNCTURE: CPT

## 2024-10-25 PROCEDURE — 85027 COMPLETE CBC AUTOMATED: CPT

## 2024-10-25 PROCEDURE — 83550 IRON BINDING TEST: CPT

## 2024-10-25 PROCEDURE — 83540 ASSAY OF IRON: CPT

## 2024-10-25 PROCEDURE — 84425 ASSAY OF VITAMIN B-1: CPT

## 2024-10-25 PROCEDURE — 80053 COMPREHEN METABOLIC PANEL: CPT

## 2024-10-25 PROCEDURE — 84590 ASSAY OF VITAMIN A: CPT

## 2024-10-25 PROCEDURE — 84630 ASSAY OF ZINC: CPT

## 2024-10-25 PROCEDURE — 82746 ASSAY OF FOLIC ACID SERUM: CPT

## 2024-10-25 PROCEDURE — 83970 ASSAY OF PARATHORMONE: CPT

## 2024-10-25 PROCEDURE — 82728 ASSAY OF FERRITIN: CPT

## 2024-10-25 PROCEDURE — 82607 VITAMIN B-12: CPT

## 2024-10-25 PROCEDURE — 80061 LIPID PANEL: CPT

## 2024-10-28 LAB — ZINC SERPL-MCNC: 72 UG/DL (ref 44–115)

## 2024-10-29 ENCOUNTER — OFFICE VISIT (OUTPATIENT)
Dept: FAMILY MEDICINE CLINIC | Facility: CLINIC | Age: 69
End: 2024-10-29
Payer: COMMERCIAL

## 2024-10-29 VITALS
OXYGEN SATURATION: 98 % | SYSTOLIC BLOOD PRESSURE: 116 MMHG | HEART RATE: 90 BPM | HEIGHT: 58 IN | DIASTOLIC BLOOD PRESSURE: 60 MMHG | BODY MASS INDEX: 33.58 KG/M2 | WEIGHT: 160 LBS

## 2024-10-29 DIAGNOSIS — G47.33 OSA (OBSTRUCTIVE SLEEP APNEA): ICD-10-CM

## 2024-10-29 DIAGNOSIS — G25.81 RESTLESS LEG SYNDROME: ICD-10-CM

## 2024-10-29 DIAGNOSIS — E55.9 VITAMIN D DEFICIENCY: ICD-10-CM

## 2024-10-29 DIAGNOSIS — R73.9 HYPERGLYCEMIA: ICD-10-CM

## 2024-10-29 DIAGNOSIS — J30.1 NON-SEASONAL ALLERGIC RHINITIS DUE TO POLLEN: ICD-10-CM

## 2024-10-29 DIAGNOSIS — K21.9 GERD WITHOUT ESOPHAGITIS: ICD-10-CM

## 2024-10-29 DIAGNOSIS — F41.9 ANXIETY: Primary | ICD-10-CM

## 2024-10-29 DIAGNOSIS — K91.2 POSTSURGICAL MALABSORPTION: ICD-10-CM

## 2024-10-29 DIAGNOSIS — E78.5 MILD HYPERLIPIDEMIA: ICD-10-CM

## 2024-10-29 PROCEDURE — 99214 OFFICE O/P EST MOD 30 MIN: CPT | Performed by: PHYSICIAN ASSISTANT

## 2024-10-29 PROCEDURE — G2211 COMPLEX E/M VISIT ADD ON: HCPCS | Performed by: PHYSICIAN ASSISTANT

## 2024-10-29 RX ORDER — ATORVASTATIN CALCIUM 10 MG/1
10 TABLET, FILM COATED ORAL DAILY
Qty: 90 TABLET | Refills: 3 | Status: SHIPPED | OUTPATIENT
Start: 2024-10-29

## 2024-10-29 NOTE — ASSESSMENT & PLAN NOTE
Patient's fasting glucose was 97 which is great continue to observe simple carbs and continue to observe.    Orders:    Comprehensive metabolic panel; Future

## 2024-10-29 NOTE — ASSESSMENT & PLAN NOTE
Patient is on Lipitor 10 keeping her LDL at goal at 90 continue recheck 6 months.    Orders:    atorvastatin (LIPITOR) 10 mg tablet; Take 1 tablet (10 mg total) by mouth daily    Comprehensive metabolic panel; Future    Lipid Panel with Direct LDL reflex; Future

## 2024-10-29 NOTE — PROGRESS NOTES
Ambulatory Visit  Name: Jeannette Heard      : 1955      MRN: [id number]  Encounter Provider: Andria Coates PA-C  Encounter Date: 10/29/2024   Encounter department: [facility] PRIMARY CARE    Assessment & Plan  Mild hyperlipidemia  Patient is on Lipitor 10 keeping her LDL at goal at 90 continue recheck 6 months.    Orders:    atorvastatin (LIPITOR) 10 mg tablet; Take 1 tablet (10 mg total) by mouth daily    Comprehensive metabolic panel; Future    Lipid Panel with Direct LDL reflex; Future    Anxiety  Stable on Paxil.         GERD without esophagitis  Stable on Protonix.         Hyperglycemia  Patient's fasting glucose was 97 which is great continue to observe simple carbs and continue to observe.    Orders:    Comprehensive metabolic panel; Future    Vitamin D deficiency    Orders:    Vitamin D 25 hydroxy; Future    PETER (obstructive sleep apnea)  PT was told she no longer needed to use CPAP by sleep med Dr. Bright.        Non-seasonal allergic rhinitis due to pollen  Having a tough time this . On nasal spray and OTC antihistamine.        Postsurgical malabsorption  Continues to follow with bariatrics status post blood work.         Restless leg syndrome  Continue with Rheum on Gabapentin.             History of Present Illness     Patient presents with:  Follow-up: Follow up to chronic conditions and review lab results.       Jeannette Heard is here for chronic conditions f/u. Pt. had labs done prior to today's visit which included Recent Results (from the past 672 hour(s))  -CBC and Platelet:   Collection Time: 10/25/24 10:54 AM       Result                      Value             Ref Range           WBC                         6.84              4.31 - 10.16*       RBC                         4.47              3.81 - 5.12 *       Hemoglobin                  13.0              11.5 - 15.4 *       Hematocrit                  39.2              34.8 - 46.1 %       MCV                          88                82 - 98 fL          MCH                         29.1              26.8 - 34.3 *       MCHC                        33.2              31.4 - 37.4 *       RDW                         12.1              11.6 - 15.1 %       Platelets                   285               149 - 390 Th*       MPV                         11.1              8.9 - 12.7 fL  -Ferritin:   Collection Time: 10/25/24 10:54 AM       Result                      Value             Ref Range           Ferritin                    19                11 - 307 ng/*  -Folate:   Collection Time: 10/25/24 10:54 AM       Result                      Value             Ref Range           Folate                      18.5              >5.9 ng/mL     -PTH, intact:   Collection Time: 10/25/24 10:54 AM       Result                      Value             Ref Range           PTH                         70.4              12.0 - 88.0 *  -TIBC Panel (incl. Iron, TIBC, % Iron Saturation):   Collection Time: 10/25/24 10:54 AM       Result                      Value             Ref Range           Iron Saturation             48                15 - 50 %           TIBC                        302               250 - 450 ug*       Iron                        146               50 - 212 ug/*       UIBC                        156               155 - 355 ug*  -Vitamin B12:   Collection Time: 10/25/24 10:54 AM       Result                      Value             Ref Range           Vitamin B-12                529               180 - 914 pg*  -Zinc:   Collection Time: 10/25/24 10:54 AM       Result                      Value             Ref Range           Zinc                        72                44 - 115 ug/*  -Lipid Panel with Direct LDL reflex:   Collection Time: 10/25/24 10:54 AM       Result                      Value             Ref Range           Cholesterol                 153               See Comment *       Triglycerides               94                 "See Comment *       HDL, Direct                 44 (L)            >=50 mg/dL          LDL Calculated              90                0 - 100 mg/dL  -Comprehensive metabolic panel:   Collection Time: 10/25/24 10:54 AM       Result                      Value             Ref Range           Sodium                      138               135 - 147 mm*       Potassium                   4.2               3.5 - 5.3 mm*       Chloride                    103               96 - 108 mmo*       CO2                         25                21 - 32 mmol*       ANION GAP                   10                4 - 13 mmol/L       BUN                         10                5 - 25 mg/dL        Creatinine                  0.48 (L)          0.60 - 1.30 *       Glucose, Fasting            97                65 - 99 mg/dL       Calcium                     8.8               8.4 - 10.2 m*       AST                         11 (L)            13 - 39 U/L         ALT                         11                7 - 52 U/L          Alkaline Phosphatase        66                34 - 104 U/L        Total Protein               6.7               6.4 - 8.4 g/*       Albumin                     3.9               3.5 - 5.0 g/*       Total Bilirubin             0.61              0.20 - 1.00 *       eGFR                        100               ml/min/1.73s*            Review of Systems   Constitutional: Negative.    HENT: Negative.     Eyes: Negative.    Respiratory: Negative.     Cardiovascular: Negative.    Gastrointestinal: Negative.    Endocrine: Negative.    Genitourinary: Negative.    Musculoskeletal: Negative.    Skin: Negative.    Allergic/Immunologic: Negative.    Neurological: Negative.    Hematological: Negative.    Psychiatric/Behavioral: Negative.             Objective     /60   Pulse 90   Ht 4' 10\" (1.473 m)   Wt 72.6 kg (160 lb)   LMP  (LMP Unknown)   SpO2 98%   BMI 33.44 kg/m²     Physical Exam  Vitals and nursing note reviewed. "   Constitutional:       Appearance: Normal appearance. She is well-developed.   HENT:      Head: Normocephalic and atraumatic.   Eyes:      General: Lids are normal.      Conjunctiva/sclera: Conjunctivae normal.      Pupils: Pupils are equal, round, and reactive to light.   Cardiovascular:      Rate and Rhythm: Normal rate and regular rhythm.      Heart sounds: No murmur heard.  Pulmonary:      Effort: Pulmonary effort is normal.      Breath sounds: Normal breath sounds.   Skin:     General: Skin is warm and dry.   Neurological:      General: No focal deficit present.      Mental Status: She is alert.      Coordination: Coordination is intact.   Psychiatric:         Mood and Affect: Mood normal.         Behavior: Behavior normal. Behavior is cooperative.         Thought Content: Thought content normal.         Judgment: Judgment normal.

## 2024-10-29 NOTE — PATIENT INSTRUCTIONS
1. Anxiety  Assessment & Plan:  Stable on Paxil.         2. Mild hyperlipidemia  Assessment & Plan:  Patient is on Lipitor 10 keeping her LDL at goal at 90 continue recheck 6 months.         Orders:  -     atorvastatin (LIPITOR) 10 mg tablet; Take 1 tablet (10 mg total) by mouth daily  -     Comprehensive metabolic panel; Future; Expected date: 04/29/2025  -     Lipid Panel with Direct LDL reflex; Future; Expected date: 04/29/2025  3. GERD without esophagitis  Assessment & Plan:  Stable on Protonix.         4. Hyperglycemia  Assessment & Plan:  Patient's fasting glucose was 97 which is great continue to observe simple carbs and continue to observe.         Orders:  -     Comprehensive metabolic panel; Future; Expected date: 04/29/2025  5. Vitamin D deficiency  Assessment & Plan:         Orders:  -     Vitamin D 25 hydroxy; Future; Expected date: 04/29/2025  6. PETER (obstructive sleep apnea)  Assessment & Plan:         7. Non-seasonal allergic rhinitis due to pollen  Assessment & Plan:         8. Postsurgical malabsorption  Assessment & Plan:  Continues to follow with bariatrics status post blood work.         9. Restless leg syndrome  Assessment & Plan:  Continue with Rheum on Gabapentin.

## 2024-10-30 LAB — VIT B1 BLD-SCNC: 98.7 NMOL/L (ref 66.5–200)

## 2024-10-31 LAB — VIT A SERPL-MCNC: 34.9 UG/DL (ref 22–69.5)

## 2024-11-04 ENCOUNTER — TELEPHONE (OUTPATIENT)
Dept: BARIATRICS | Facility: CLINIC | Age: 69
End: 2024-11-04

## 2024-11-04 NOTE — TELEPHONE ENCOUNTER
----- Message from KEENA Porter sent at 11/1/2024  8:23 AM EDT -----  Please call pt to let her know her labs are all within limits. Keep up the great work with her vitamins.     ASIF

## 2024-12-06 ENCOUNTER — OFFICE VISIT (OUTPATIENT)
Dept: BARIATRICS | Facility: CLINIC | Age: 69
End: 2024-12-06
Payer: COMMERCIAL

## 2024-12-06 VITALS
BODY MASS INDEX: 34.22 KG/M2 | DIASTOLIC BLOOD PRESSURE: 80 MMHG | HEART RATE: 91 BPM | HEIGHT: 58 IN | SYSTOLIC BLOOD PRESSURE: 130 MMHG | WEIGHT: 163 LBS | TEMPERATURE: 98.1 F

## 2024-12-06 DIAGNOSIS — K91.2 POSTSURGICAL MALABSORPTION: ICD-10-CM

## 2024-12-06 DIAGNOSIS — Z98.84 BARIATRIC SURGERY STATUS: ICD-10-CM

## 2024-12-06 DIAGNOSIS — Z48.815 ENCOUNTER FOR SURGICAL AFTERCARE FOLLOWING SURGERY OF DIGESTIVE SYSTEM: Primary | ICD-10-CM

## 2024-12-06 DIAGNOSIS — G47.33 OSA (OBSTRUCTIVE SLEEP APNEA): ICD-10-CM

## 2024-12-06 DIAGNOSIS — R73.03 PREDIABETES: ICD-10-CM

## 2024-12-06 DIAGNOSIS — E66.811 OBESITY, CLASS I, BMI 30-34.9: ICD-10-CM

## 2024-12-06 PROCEDURE — 99214 OFFICE O/P EST MOD 30 MIN: CPT | Performed by: NURSE PRACTITIONER

## 2024-12-06 RX ORDER — TOPIRAMATE 50 MG/1
50 TABLET, FILM COATED ORAL
Qty: 30 TABLET | Refills: 0 | Status: SHIPPED | OUTPATIENT
Start: 2024-12-06

## 2024-12-06 NOTE — PROGRESS NOTES
Assessment/Plan:     Patient ID: Jeannette Heard is a 69 y.o. female.     Bariatric Surgery Status/bmi 34/prediabetes  - Discussed role of weight loss medications.  - Initial weight loss goal of 5-10% weight loss for improved overall health  - Reviewed Screening labs - Has hx of HLD, recent labs done  and is on lipitor, TSH within the year within limits, prediabetic level from last HGBA1C.   - anticipate improvement with further weight loss.   - Patient is interested in pursuing Topamax. Discussed to use this to help with her cravings.   - advised to start 25 mg once at night for the first week then increase to 50 mg po qhs.   - side effects reviewed. Medication contract signed. She is not of child bearing age.   - Follow up in approximately 3 months with Surgical Advanced Practitioner.  - Routine follow up in 1 year for surgical annual.   - Continue with healthy lifestyle, adequate protein intake of 60 gm, fluid intake of at least 64 oz.   - Continue with MVI daily.   - Activity as tolerated.   - Labs ordered and will adjust accordingly if any deficiency.   - Follow up with RD and SW as needed.       Goals:  Food log (ie.) [email or web address],[email or web address],etc. baritastic  No sugary beverages. At least 64oz of water daily.  Increase physical activity by 10 minutes daily. Gradually increase physical activity to a goal of 5 days per week for 30 minutes of MODERATE intensity PLUS 2 days per week of FULL BODY resistance training  5-10 servings of fruits and vegetables per day and 25-35 grams of dietary fiber per day, gradually increasing  Practice 30/60 rule, Gradually increase physical activity to a goal of 5 days per week for 30 minutes of MODERATE intensity PLUS 2 days per week of FULL BODY resistance training, Continue with dietary and behavioral recommendations outlined in bariatric manual, Goal protein intake of 60-80 grams per day, 5-10 servings of fruits and vegetables per day,  25-35 grams of dietary fiber per day, and 7729-9697 calories per day    PETER - was told she is ronda to stop her CPAP machine. Denies fatigue and excessive sleepiness. F/u with Sleep medicine as needed.     Continued/Maintain healthy weight loss with good nutrition intakes.  Adequate hydration with at least 64oz. fluid intake.  Follow diet as discussed.  Follow vitamin and mineral recommendations as reviewed with you.  Exercise as tolerated.    Colonoscopy referral made: utd - Due in 2027  Mammogram - utd   Egd screening - UTD - due in 2026    Follow-up in 1 year for surgical annual; 3 months for MWM follow up. We kindly ask that your arrive 15 minutes before your scheduled appointment time with your provider to allow our staff to room you, get your vital signs and update your chart.    Get lab work done prior to annual visit. Please call the office if you need a script.  It is recommended to check with your insurance BEFORE getting labs done to make sure they are covered by your policy.      Call our office if you have any problems with abdominal pain especially associated with fever, chills, nausea, vomiting or any other concerns.    All  Post-bariatric surgery patients should be aware that very small quantities of any alcohol can cause impairment and it is very possible not to feel the effect. The effect can be in the system for several hours.  It is also a stomach irritant.     It is advised to AVOID alcohol, Nonsteroidal antiinflammatory drugs (NSAIDS) and nicotine of all forms . Any of these can cause stomach irritation/pain.    Discussed the effects of alcohol on a bariatric patient and the increased impairment risk.     Keep up the good work!     Postsurgical Malabsorption   -At risk for malabsorption of vitamins/minerals secondary to malabsorption and restriction of intake from bariatric surgery  -Currently taking adequate postop bariatric surgery vitamin supplementation  -Last set of bariatric labs completed  on 10/25/2024 and showed WNL   -Next set of bariatric labs ordered for approximately 6 months  -Patient received education about the importance of adhering to a lifelong supplementation regimen to avoid vitamin/mineral deficiencies      Diagnoses and all orders for this visit:    Encounter for surgical aftercare following surgery of digestive system  -     topiramate (Topamax) 50 MG tablet; Take 1 tablet (50 mg total) by mouth daily at bedtime  -     CBC; Future  -     Comprehensive metabolic panel; Future  -     Iron Panel (Includes Ferritin, Iron Sat%, Iron, and TIBC); Future  -     Folate; Future  -     PTH, intact; Future  -     Vitamin A; Future  -     Vitamin B1, whole blood; Future  -     Vitamin B12; Future  -     Vitamin D 25 hydroxy; Future  -     Zinc; Future    Bariatric surgery status  -     topiramate (Topamax) 50 MG tablet; Take 1 tablet (50 mg total) by mouth daily at bedtime  -     CBC; Future  -     Comprehensive metabolic panel; Future  -     Iron Panel (Includes Ferritin, Iron Sat%, Iron, and TIBC); Future  -     Folate; Future  -     PTH, intact; Future  -     Vitamin A; Future  -     Vitamin B1, whole blood; Future  -     Vitamin B12; Future  -     Vitamin D 25 hydroxy; Future  -     Zinc; Future    Postsurgical malabsorption  -     topiramate (Topamax) 50 MG tablet; Take 1 tablet (50 mg total) by mouth daily at bedtime  -     CBC; Future  -     Comprehensive metabolic panel; Future  -     Iron Panel (Includes Ferritin, Iron Sat%, Iron, and TIBC); Future  -     Folate; Future  -     PTH, intact; Future  -     Vitamin A; Future  -     Vitamin B1, whole blood; Future  -     Vitamin B12; Future  -     Vitamin D 25 hydroxy; Future  -     Zinc; Future    Obesity, Class I, BMI 30-34.9  -     topiramate (Topamax) 50 MG tablet; Take 1 tablet (50 mg total) by mouth daily at bedtime  -     CBC; Future  -     Comprehensive metabolic panel; Future  -     Iron Panel (Includes Ferritin, Iron Sat%, Iron, and  TIBC); Future  -     Folate; Future  -     PTH, intact; Future  -     Vitamin A; Future  -     Vitamin B1, whole blood; Future  -     Vitamin B12; Future  -     Vitamin D 25 hydroxy; Future  -     Zinc; Future  -     Hemoglobin A1C; Future    BMI 34.0-34.9,adult  -     topiramate (Topamax) 50 MG tablet; Take 1 tablet (50 mg total) by mouth daily at bedtime  -     CBC; Future  -     Comprehensive metabolic panel; Future  -     Iron Panel (Includes Ferritin, Iron Sat%, Iron, and TIBC); Future  -     Folate; Future  -     PTH, intact; Future  -     Vitamin A; Future  -     Vitamin B1, whole blood; Future  -     Vitamin B12; Future  -     Vitamin D 25 hydroxy; Future  -     Zinc; Future    PETER (obstructive sleep apnea)  -     topiramate (Topamax) 50 MG tablet; Take 1 tablet (50 mg total) by mouth daily at bedtime  -     CBC; Future  -     Comprehensive metabolic panel; Future  -     Iron Panel (Includes Ferritin, Iron Sat%, Iron, and TIBC); Future  -     Folate; Future  -     PTH, intact; Future  -     Vitamin A; Future  -     Vitamin B1, whole blood; Future  -     Vitamin B12; Future  -     Vitamin D 25 hydroxy; Future  -     Zinc; Future    Prediabetes  -     Hemoglobin A1C; Future         Subjective:      Patient ID: Jeannette Heard is a 69 y.o. female.    -s/p Vertical Sleeve Gastrectomy with Dr. Tavarez on 03/19/2018. Presents to the office today for annual visit. Overall doing poorly - gained weight - feels often hungry and has a lot of cravings. She is interested in starting medications to help with weight loss. Eating mostly protein and drinking enough fluids. Walks often. Denies having any abdominal pain, N/V/D/C, regurgitation, reflux or dysphagia. Taking her multivitamins daily.     Initial: 199 lbs  Current: 163 lbs   EWL: (Weight loss is ahead of schedule at this post surgical period.)  Stanley: 140 lbs.   Goal  140 lbs  Current BMI is Body mass index is 34.07 kg/m².    Tolerating a regular  diet-yes  Eating at least 60 grams of protein per day-yes  Following 30/60 minute rule with liquids-yes  Drinking at least 64 ounces of fluid per day-yes  Drinking carbonated beverages- YES - regular soda 4-6 oz of soda.   Sufficient exercise-yes - walking AND exercises at home.   Using NSAIDs regularly-no  Using nicotine-no  Using alcohol-no  Supplements: Multivitamins, Calcium , and VITAMIN D3, B12    EWL is 45%, which places the patient ahead of schedule for expected post surgical weight loss at this time.     Obesity/Excess Weight:   BMI 34,07  Severity: Moderate   Onset:  LIFELONG but worsened in the past 1-2 years.    Modifiers: Diet and Exercise and BARIATRIC SURGERY   Contributing factors: Poor Food Choices, Insufficient Physical Activity, Menopause, Lack of knowledge of appropriate lifestyle changes, Medications, and Insufficient time to make appropriate lifestyle changes  Associated symptoms: fatigue, increased joint pain, decreased exercise capacity, inability to do certain activities, and clothes do not fit  Colonoscopy-Completed    Highest weight  199 LBS  Current Weight  163 LBS  Stanley 140 LBS   Goal - 145 LBS  5% weight loss - 8.17 lbs (154 lbs)    Hydration: drinks at least 64 oz of fluids.   Alcohol: NONE  Exercise: walking and strength training.   Dining out: none  Occupation: RETIRED   Sleep: 6-8 hrs  Has sleep apnea    B: 2 eggs, cheese  S: none  L: Dianne meals - frozen meals.   S: pretzels, cookies, or popcorn.   D: frozen meals -  protein and starch that she gets from the farmer's market   S: pretzels, or cookies, or popcorn.     Wt Readings from Last 3 Encounters:   12/06/24 73.9 kg (163 lb)   10/29/24 72.6 kg (160 lb)   06/03/24 69.4 kg (153 lb)         Patient is not pregnant/breastfeeding (metformin only)  Patient denies personal and family history of  pancreatitis, thyroid cancer, MEN-2 tumors. (Consideration for GLP-1 Agonists - but lacks coverage)  Denies any hx of glaucoma, seizures,  "kidney stones. Had gallstones with subsequent cholecystectomy. (Consideration for topamax)  Has a hx of PVCs, arrhythmia, hyperthyroidism  - would avoid phentermine. Denies Hx of CAD, PAD. (contraindicated for phentermine or Wellbutrin)  Denies uncontrolled anxiety or depression, suicidal behavior or thinking. Admits to insomnia/sleep disturbances      The following portions of the patient's history were reviewed and updated as appropriate: allergies, current medications, past family history, past medical history, past social history, past surgical history and problem list.    Review of Systems   Constitutional:  Positive for activity change, appetite change and unexpected weight change.   Respiratory: Negative.     Cardiovascular: Negative.    Gastrointestinal: Negative.    Musculoskeletal:  Positive for arthralgias and back pain.   Neurological: Negative.    Psychiatric/Behavioral: Negative.           Objective:    /80 (Patient Position: Sitting, Cuff Size: Standard)   Pulse 91   Temp 98.1 °F (36.7 °C) (Tympanic)   Ht 4' 10\" (1.473 m)   Wt 73.9 kg (163 lb)   LMP  (LMP Unknown)   BMI 34.07 kg/m²      Physical Exam  Vitals and nursing note reviewed.   Constitutional:       Appearance: Normal appearance. She is obese.   Cardiovascular:      Rate and Rhythm: Normal rate and regular rhythm.      Pulses: Normal pulses.      Heart sounds: Normal heart sounds.   Pulmonary:      Effort: Pulmonary effort is normal.      Breath sounds: Normal breath sounds.   Abdominal:      General: Bowel sounds are normal.      Palpations: Abdomen is soft.      Tenderness: There is no abdominal tenderness.   Musculoskeletal:         General: Normal range of motion.   Skin:     General: Skin is warm and dry.   Neurological:      General: No focal deficit present.      Mental Status: She is alert and oriented to person, place, and time.   Psychiatric:         Mood and Affect: Mood normal.         Behavior: Behavior normal.        "  Thought Content: Thought content normal.         Judgment: Judgment normal.

## 2024-12-06 NOTE — PROGRESS NOTES
Date of surgery: 3/19/2018  Procedure: Sleeve  Performing surgeon: Dr. Tavarez    Initial Weight - 199 lb  Current Weight -163 lb  Stanley Weight -  140 lb  Total Body Weight Loss (EWL)- 36%  EWL% - 45%  TWB % -18%

## 2024-12-06 NOTE — PATIENT INSTRUCTIONS
The potential side effects of topiramate may include numbness or tingling, fatigue, upper respiratory infection symptoms, depression/anxiety, changes in taste, confusion, abdominal upset/heartburn, and trouble sleeping. Notify the provider with any changes in mood. ER with thoughts of harming yourself/others. Notify the provider with any changes in vision.

## 2024-12-27 ENCOUNTER — TELEMEDICINE (OUTPATIENT)
Dept: FAMILY MEDICINE CLINIC | Facility: CLINIC | Age: 69
End: 2024-12-27
Payer: COMMERCIAL

## 2024-12-27 VITALS — BODY MASS INDEX: 32.75 KG/M2 | WEIGHT: 156 LBS | HEIGHT: 58 IN

## 2024-12-27 DIAGNOSIS — Z48.815 ENCOUNTER FOR SURGICAL AFTERCARE FOLLOWING SURGERY OF DIGESTIVE SYSTEM: ICD-10-CM

## 2024-12-27 DIAGNOSIS — Z98.84 BARIATRIC SURGERY STATUS: ICD-10-CM

## 2024-12-27 DIAGNOSIS — U07.1 COVID-19 VIRUS INFECTION: Primary | ICD-10-CM

## 2024-12-27 DIAGNOSIS — G47.33 OSA (OBSTRUCTIVE SLEEP APNEA): ICD-10-CM

## 2024-12-27 DIAGNOSIS — E66.811 OBESITY, CLASS I, BMI 30-34.9: ICD-10-CM

## 2024-12-27 DIAGNOSIS — K91.2 POSTSURGICAL MALABSORPTION: ICD-10-CM

## 2024-12-27 DIAGNOSIS — J30.1 NON-SEASONAL ALLERGIC RHINITIS DUE TO POLLEN: ICD-10-CM

## 2024-12-27 PROCEDURE — 99214 OFFICE O/P EST MOD 30 MIN: CPT | Performed by: NURSE PRACTITIONER

## 2024-12-27 PROCEDURE — G2211 COMPLEX E/M VISIT ADD ON: HCPCS | Performed by: NURSE PRACTITIONER

## 2024-12-27 RX ORDER — PROMETHAZINE HYDROCHLORIDE AND CODEINE PHOSPHATE 6.25; 1 MG/5ML; MG/5ML
5 SYRUP ORAL EVERY 4 HOURS PRN
Qty: 118 ML | Refills: 0 | Status: SHIPPED | OUTPATIENT
Start: 2024-12-27

## 2024-12-27 RX ORDER — TOPIRAMATE 50 MG/1
50 TABLET, FILM COATED ORAL
Qty: 30 TABLET | Refills: 2 | Status: SHIPPED | OUTPATIENT
Start: 2024-12-27

## 2024-12-27 RX ORDER — NIRMATRELVIR AND RITONAVIR 300-100 MG
3 KIT ORAL 2 TIMES DAILY
Qty: 30 TABLET | Refills: 0 | Status: SHIPPED | OUTPATIENT
Start: 2024-12-27 | End: 2025-01-01

## 2024-12-27 NOTE — ASSESSMENT & PLAN NOTE
Acute head trauma on 12/2 with mentation changes following  · STAT head CT  · Will restart PT based on results  · Advised to not drive, nor use a single-pronged cane   Patient was advised to continue allergy medications as directed to help with congestion.

## 2024-12-27 NOTE — PROGRESS NOTES
COVID-19 Outpatient Progress Note  Name: Jeannette Heard      : 1955      MRN: [id number]  Encounter Provider: KEENA Schaffer  Encounter Date: 2024   Encounter department: [facility] PRIMARY CARE    Assessment & Plan  COVID-19 virus infection  Paxlovid was ordered to be taken as directed over the next 5 days for treatment.  Phenergan with codeine was also ordered to be used as directed as needed for cough.  She was also advised to hold atorvastatin while taking Paxlovid.  Patient was advised to continue to quarantine through 2024 and if she remains afebrile for 24 hours without the use of antipyretics starting on 2024 she may return from isolation on 2024 and begin to mask for 5 days when in public and when around others.  She was advised to contact the office for any worsening symptoms.  Orders:  •  nirmatrelvir & ritonavir (Paxlovid, 300/100,) tablet therapy pack; Take 3 tablets by mouth 2 (two) times a day for 5 days Take 2 nirmatrelvir tablets + 1 ritonavir tablet together per dose  •  promethazine-codeine (PHENERGAN WITH CODEINE) 6.25-10 mg/5 mL syrup; Take 5 mL by mouth every 4 (four) hours as needed for cough    Non-seasonal allergic rhinitis due to pollen  Patient was advised to continue allergy medications as directed to help with congestion.       Disposition:     Discussed symptom directed medication options with patient.   Patient already tested positive for COVID.    Patient meets criteria for Paxlovid and they have been counseled appropriately regarding risks, benefits, side effects, and alternative treatment options. After discussion, patient agrees to treatment.    Possible side effects of Paxlovid?    Possible side effects of Paxlovid are:  - Liver Problems. Notify us right away if you start to experience loss of appetite, yellowing of your skin and the whites of eyes (jaundice), dark-colored urine, pale colored stools and itchy skin, stomach  area (abdominal) pain.  - Resistance to HIV Medicines. If you have untreated HIV infection, Paxlovid may lead to some HIV medicines not working as well in the future.  - Other possible side effects include: altered sense of taste, diarrhea, high blood pressure, or muscle aches.    I have spent a total time of 20 minutes on the day of the encounter for this patient including discussing diagnostic results, discussing prognosis, risks and benefits of treatment options, instructions for management, patient and family education, importance of treatment compliance, risk factor reductions, impressions, counseling/coordination of care, documenting in the medical record, reviewing/ordering tests, medicine, procedures and obtaining or reviewing history.          Encounter provider: KEENA Schaffer     Provider located at: [address] [address] [phone]     Recent Visits  No visits were found meeting these conditions.  Showing recent visits within past 7 days and meeting all other requirements  Today's Visits  Date Type Provider Dept   12/27/24 Telemedicine KEENA Schaffer [facility]   Showing today's visits and meeting all other requirements  Future Appointments  No visits were found meeting these conditions.  Showing future appointments within next 150 days and meeting all other requirements    History of Present Illness      This virtual check-in was done via [employer] and patient was informed that this is a secure, HIPAA-compliant platform. She agrees to proceed.    Patient agrees to participate in a virtual check in via telephone or video visit instead of presenting to the office to address urgent/immediate medical needs. Patient is aware this is a billable service. She acknowledged consent and understanding of privacy and security of the video platform. The patient has agreed to participate and understands they can discontinue the  visit at any time.    After connecting through [employer], the patient was identified by name and date of birth. Jeannette Heard was informed that this was a telemedicine visit and that the exam was being conducted confidentially over secure lines. My office door was closed. No one else was in the room. Jeannette Heard acknowledged consent and understanding of privacy and security of the telemedicine visit. I informed the patient that I have reviewed her record in Epic and presented the opportunity for her to ask any questions regarding the visit today. The patient agreed to participate.     Verification of patient location:  Patient is located in the following state in which I hold an active license: [place]    Subjective:   Jeannette Heard is a 69 y.o. female who is concerned about COVID-19. Patient's symptoms include fatigue, malaise, nasal congestion, rhinorrhea, cough (productive) and headache. Patient denies fever, chills, sore throat, anosmia, loss of taste, shortness of breath, chest tightness, abdominal pain, nausea, vomiting, diarrhea and myalgias.     - Date of symptom onset: 12/26/2024      COVID-19 vaccination status: Fully vaccinated with booster    Patient tested positive for COVID via home test earlier today.  The patient is currently reporting symptoms of productive cough, rhinorrhea, nasal congestion, headaches, and increased fatigue.  Patient denies any fever or shortness of breath.  Patient is fully vaccinated for COVID.  I did speak with the patient about the benefits and potential side effects of Paxlovid and the patient was agreeable to beginning the medication.    Allergic rhinitis: Patient is currently managed on Allegra and Flonase.    Lab Results   Component Value Date    SARSCOVAG Positive (A) 11/02/2023       Review of Systems   Constitutional:  Positive for fatigue. Negative for chills and fever.   HENT:  Positive for congestion and rhinorrhea. Negative for ear pain and sore  "throat.    Eyes:  Negative for pain and visual disturbance.   Respiratory:  Positive for cough (productive). Negative for chest tightness and shortness of breath.    Cardiovascular:  Negative for chest pain and palpitations.   Gastrointestinal:  Negative for abdominal pain, constipation, diarrhea, nausea and vomiting.   Endocrine: Negative for cold intolerance and heat intolerance.   Genitourinary:  Negative for decreased urine volume, dysuria and hematuria.   Musculoskeletal:  Negative for arthralgias, back pain and myalgias.   Skin:  Negative for color change and rash.   Allergic/Immunologic: Positive for environmental allergies.   Neurological:  Positive for headaches. Negative for dizziness, seizures, syncope and weakness.   Hematological:  Negative for adenopathy.   Psychiatric/Behavioral:  Negative for confusion. The patient is not nervous/anxious.    All other systems reviewed and are negative.    Objective   Ht 4' 10\" (1.473 m)   Wt 70.8 kg (156 lb)   LMP  (LMP Unknown)   BMI 32.60 kg/m²     Physical Exam  Vitals reviewed: limited due to video visit.   Constitutional:       General: She is not in acute distress.     Appearance: Normal appearance. She is not ill-appearing.   Neurological:      Mental Status: She is alert.       "

## 2024-12-27 NOTE — ASSESSMENT & PLAN NOTE
Paxlovid was ordered to be taken as directed over the next 5 days for treatment.  Phenergan with codeine was also ordered to be used as directed as needed for cough.  She was also advised to hold atorvastatin while taking Paxlovid.  Patient was advised to continue to quarantine through 12/30/2024 and if she remains afebrile for 24 hours without the use of antipyretics starting on 12/30/2024 she may return from isolation on 12/31/2024 and begin to mask for 5 days when in public and when around others.  She was advised to contact the office for any worsening symptoms.  Orders:  •  nirmatrelvir & ritonavir (Paxlovid, 300/100,) tablet therapy pack; Take 3 tablets by mouth 2 (two) times a day for 5 days Take 2 nirmatrelvir tablets + 1 ritonavir tablet together per dose  •  promethazine-codeine (PHENERGAN WITH CODEINE) 6.25-10 mg/5 mL syrup; Take 5 mL by mouth every 4 (four) hours as needed for cough

## 2024-12-27 NOTE — TELEPHONE ENCOUNTER
Reason for call:   [x] Refill   [] Prior Auth  [] Other:     Office:   [] PCP/Provider -   [x] Specialty/Provider - [facility]     Medication: topiramate (Topamax) 50 MG tablet     Dose/Frequency: 50 mg, Daily at bedtime     Quantity: 30    Pharmacy: [id number]    Does the patient have enough for 3 days?   [x] Yes   [] No - Send as HP to POD

## 2025-02-11 ENCOUNTER — OFFICE VISIT (OUTPATIENT)
Dept: FAMILY MEDICINE CLINIC | Facility: CLINIC | Age: 70
End: 2025-02-11
Payer: COMMERCIAL

## 2025-02-11 VITALS
OXYGEN SATURATION: 97 % | HEIGHT: 58 IN | RESPIRATION RATE: 18 BRPM | BODY MASS INDEX: 33.58 KG/M2 | WEIGHT: 160 LBS | DIASTOLIC BLOOD PRESSURE: 72 MMHG | SYSTOLIC BLOOD PRESSURE: 120 MMHG | HEART RATE: 77 BPM

## 2025-02-11 DIAGNOSIS — N30.01 ACUTE CYSTITIS WITH HEMATURIA: Primary | ICD-10-CM

## 2025-02-11 DIAGNOSIS — K21.9 GERD WITHOUT ESOPHAGITIS: ICD-10-CM

## 2025-02-11 DIAGNOSIS — N39.0 URINARY TRACT INFECTION WITHOUT HEMATURIA, SITE UNSPECIFIED: ICD-10-CM

## 2025-02-11 LAB
SL AMB  POCT GLUCOSE, UA: NEGATIVE
SL AMB LEUKOCYTE ESTERASE,UA: ABNORMAL
SL AMB POCT BILIRUBIN,UA: NEGATIVE
SL AMB POCT BLOOD,UA: ABNORMAL
SL AMB POCT CLARITY,UA: CLEAR
SL AMB POCT COLOR,UA: YELLOW
SL AMB POCT KETONES,UA: NEGATIVE
SL AMB POCT NITRITE,UA: NEGATIVE
SL AMB POCT PH,UA: 5.5
SL AMB POCT SPECIFIC GRAVITY,UA: 1.02
SL AMB POCT URINE PROTEIN: NEGATIVE
SL AMB POCT UROBILINOGEN: 0.2

## 2025-02-11 PROCEDURE — G2211 COMPLEX E/M VISIT ADD ON: HCPCS | Performed by: NURSE PRACTITIONER

## 2025-02-11 PROCEDURE — 81002 URINALYSIS NONAUTO W/O SCOPE: CPT | Performed by: NURSE PRACTITIONER

## 2025-02-11 PROCEDURE — 99214 OFFICE O/P EST MOD 30 MIN: CPT | Performed by: NURSE PRACTITIONER

## 2025-02-11 PROCEDURE — 87086 URINE CULTURE/COLONY COUNT: CPT | Performed by: NURSE PRACTITIONER

## 2025-02-11 RX ORDER — NITROFURANTOIN 25; 75 MG/1; MG/1
100 CAPSULE ORAL 2 TIMES DAILY
Qty: 10 CAPSULE | Refills: 0 | Status: SHIPPED | OUTPATIENT
Start: 2025-02-11 | End: 2025-02-16

## 2025-02-11 NOTE — PROGRESS NOTES
Name: Jeannette Heard      : 1955      MRN: [id number]  Encounter Provider: KEENA Schaffer  Encounter Date: 2025   Encounter department: [facility] PRIMARY [facility]ARE  :  Assessment & Plan  Acute cystitis with hematuria  5-day course of Macrobid was ordered for treatment of acute UTI.  Urine culture was also sent.  Orders:  •  nitrofurantoin (MACROBID) 100 mg capsule; Take 1 capsule (100 mg total) by mouth 2 (two) times a day for 5 days  •  Urine culture; Future    Urinary tract infection without hematuria, site unspecified    Orders:  •  POCT urine dip  •  Urine culture; Future    GERD without esophagitis  Well-controlled on current regimen.             Depression Screening and Follow-up Plan: Patient was screened for depression during today's encounter. They screened negative with a PHQ-2 score of 0.        History of Present Illness   UTI symptoms: The patient reports that since earlier today she has been experiencing symptoms of dysuria, incomplete bladder emptying, pelvic pressure, increased frequency, and urgency.  Patient denies fever or chills.  Urine dip performed in the office today showed    GERD: Well-controlled with daily use of Protonix.      Review of Systems   Constitutional:  Negative for chills and fever.   HENT:  Negative for ear pain and sore throat.    Eyes:  Negative for pain and visual disturbance.   Respiratory:  Negative for cough, chest tightness, shortness of breath and wheezing.    Cardiovascular:  Negative for chest pain, palpitations and leg swelling.   Gastrointestinal:  Negative for abdominal pain, constipation, diarrhea, nausea and vomiting.   Endocrine: Negative for cold intolerance and heat intolerance.   Genitourinary:  Positive for dysuria, frequency and urgency. Negative for decreased urine volume, difficulty urinating and hematuria.        Incomplete bladder emptying   Musculoskeletal:  Negative for arthralgias, back pain and myalgias.   Skin:   "Negative for color change and rash.   Allergic/Immunologic: Negative for environmental allergies.   Neurological:  Negative for dizziness, seizures, syncope, weakness, light-headedness, numbness and headaches.   Hematological:  Negative for adenopathy.   Psychiatric/Behavioral:  Negative for confusion. The patient is not nervous/anxious.    All other systems reviewed and are negative.      Objective   /72 (BP Location: Right arm, Patient Position: Sitting, Cuff Size: Standard)   Pulse 77   Resp 18   Ht 4' 10\" (1.473 m)   Wt 72.6 kg (160 lb)   LMP  (LMP Unknown)   SpO2 97%   BMI 33.44 kg/m²      Physical Exam  Vitals and nursing note reviewed.   Constitutional:       General: She is not in acute distress.     Appearance: Normal appearance. She is well-developed. She is not ill-appearing.   HENT:      Head: Normocephalic.   Eyes:      Conjunctiva/sclera: Conjunctivae normal.   Cardiovascular:      Rate and Rhythm: Normal rate and regular rhythm.      Pulses: Normal pulses.           Carotid pulses are 2+ on the right side and 2+ on the left side.       Radial pulses are 2+ on the right side and 2+ on the left side.        Posterior tibial pulses are 2+ on the right side and 2+ on the left side.      Heart sounds: Normal heart sounds. No murmur heard.  Pulmonary:      Effort: Pulmonary effort is normal. No respiratory distress.      Breath sounds: Normal breath sounds. No decreased breath sounds, wheezing, rhonchi or rales.   Abdominal:      General: Abdomen is flat. Bowel sounds are normal. There is no distension.      Palpations: Abdomen is soft.      Tenderness: There is no abdominal tenderness. There is no right CVA tenderness, left CVA tenderness or guarding.   Musculoskeletal:         General: No swelling. Normal range of motion.      Cervical back: Normal range of motion and neck supple.      Right lower leg: No edema.      Left lower leg: No edema.   Skin:     General: Skin is warm and dry.      " Capillary Refill: Capillary refill takes less than 2 seconds.   Neurological:      General: No focal deficit present.      Mental Status: She is alert and oriented to person, place, and time.   Psychiatric:         Mood and Affect: Mood normal.         Behavior: Behavior normal.         Thought Content: Thought content normal.         Judgment: Judgment normal.

## 2025-02-11 NOTE — ASSESSMENT & PLAN NOTE
5-day course of Macrobid was ordered for treatment of acute UTI.  Urine culture was also sent.  Orders:  •  nitrofurantoin (MACROBID) 100 mg capsule; Take 1 capsule (100 mg total) by mouth 2 (two) times a day for 5 days  •  Urine culture; Future

## 2025-02-12 LAB — BACTERIA UR CULT: NORMAL

## 2025-02-13 ENCOUNTER — RESULTS FOLLOW-UP (OUTPATIENT)
Dept: FAMILY MEDICINE CLINIC | Facility: CLINIC | Age: 70
End: 2025-02-13

## 2025-03-07 ENCOUNTER — OFFICE VISIT (OUTPATIENT)
Dept: BARIATRICS | Facility: CLINIC | Age: 70
End: 2025-03-07
Payer: COMMERCIAL

## 2025-03-07 VITALS
HEART RATE: 71 BPM | HEIGHT: 58 IN | BODY MASS INDEX: 33.69 KG/M2 | TEMPERATURE: 97.8 F | SYSTOLIC BLOOD PRESSURE: 120 MMHG | DIASTOLIC BLOOD PRESSURE: 80 MMHG | WEIGHT: 160.5 LBS

## 2025-03-07 DIAGNOSIS — E66.811 OBESITY, CLASS I, BMI 30-34.9: ICD-10-CM

## 2025-03-07 DIAGNOSIS — Z48.815 ENCOUNTER FOR SURGICAL AFTERCARE FOLLOWING SURGERY OF DIGESTIVE SYSTEM: Primary | ICD-10-CM

## 2025-03-07 DIAGNOSIS — Z98.84 BARIATRIC SURGERY STATUS: ICD-10-CM

## 2025-03-07 PROCEDURE — 99213 OFFICE O/P EST LOW 20 MIN: CPT | Performed by: NURSE PRACTITIONER

## 2025-03-07 NOTE — PROGRESS NOTES
Date of surgery:3/19/2018  Procedure: Sleeve  Performing surgeon: Dr Earnest Tavarez    Initial Weight - 198 lb  Current Weight -160.5 lb  Stanley Weight - 140 lb  Total Body Weight Loss (EWL)- 38.6%  EWL% - 48%  TWB % -19%

## 2025-03-07 NOTE — PROGRESS NOTES
Assessment/Plan:    Obesity I//bmi 33/prediabetes    - Initial weight loss goal of 5-10% weight loss for improved overall health  - Reviewed Screening labs - Has hx of HLD, recent labs done  and is on lipitor, TSH within the year within limits, prediabetic level from last HGBA1C.   - anticipate improvement with further weight loss.   - she is currently on topamax 50 mg PO QHS. She is having tingling of her bilateral hands worse at night up until early morning but eventually goes away. She reports this is not bothersome. + decrease appetite and decrease cravings but not entirely. Frustrated weight loss is slow.   - she does not have GLP-1 coverage. Would avoid phentermine at due to hx of PVCs.   - at this time, would recommend to continue with topamax at current dose. She will work on meal prepping and increasing her physical activity.   - Follow up with RD and SW as needed.       Follow up in approximately 3 months with Surgical Advanced Practitioner.  Goals:  Food log (ie.) www.myfitnesspal.com,sparkpeople.com,loseit.com,calorieking.com,etc. baritastic  No sugary beverages. At least 64oz of water daily.  Increase physical activity by 10 minutes daily. Gradually increase physical activity to a goal of 5 days per week for 30 minutes of MODERATE intensity PLUS 2 days per week of FULL BODY resistance training  5-10 servings of fruits and vegetables per day and 25-35 grams of dietary fiber per day, gradually increasing  Food log (ie.) www.myfitnesspal.com,sparkpeople.com,loseit.com,calorieking.com,etc. , No sugary beverages. At least 64oz of water daily., Increase physical activity by 10 minutes daily, Practice lesson plans 1-6 in bariatric manual , Practice 30/60 rule, Gradually increase physical activity to a goal of 5 days per week for 30 minutes of MODERATE intensity PLUS 2 days per week of FULL BODY resistance training, Continue with dietary and behavioral recommendations outlined in bariatric manual, Continue to  take recommended bariatric vitamins as directed, Goal protein intake of 60-80 grams per day, 5-10 servings of fruits and vegetables per day, 25-35 grams of dietary fiber per day, and 5612-2399 calories per day      Diagnoses and all orders for this visit:    Encounter for surgical aftercare following surgery of digestive system    Obesity, Class I, BMI 30-34.9    Bariatric surgery status    BMI 33.0-33.9,adult        Subjective:   Chief Complaint   Patient presents with    Follow-up     3 month Meds F/U      Patient ID: Jeannette Heard  is a 69 y.o. female with excess weight/obesity here to pursue medical weight management. -s/p Vertical Sleeve Gastrectomy with Dr. Tavarez on 03/19/2018.   Past Medical History:   Diagnosis Date    Allergic     Allergic rhinitis     Arthritis     Bariatric surgery status     Basal cell carcinoma     skin CA removed from nose    Bowel habit changes     Cataract     starting with a catract    Cellulitis of left lower extremity     last assessed 06/29/2016    Chronic GERD     Closed displaced fracture of greater tuberosity of humerus     last assessed 05/31/2016    CPAP (continuous positive airway pressure) dependence     Fatty liver     Fibromyalgia     Fibromyalgia, primary     GERD (gastroesophageal reflux disease)     Hiatal hernia     High cholesterol     Insomnia     Morbid obesity (HCC)     Osteoarthritis     Palpitations     pt denies a fib    PONV (postoperative nausea and vomiting)     Postgastrectomy malabsorption     Recurrent pregnancy loss, antepartum condition or complication     Restless leg     Sicca syndrome (HCC)     Sleep apnea     Superficial phlebitis and thrombophlebitis of left lower extremity     last assessed 06/08/2016    Thyroid nodule     nodule on thyroid    Thyroid nodule 08/21/2012    Urinary tract infection     Varicose veins of left leg with edema     Wears glasses        HPI:  Obesity/Excess Weight:   On topamax 50 mg po qd and has been tolerating  this well. Some appetite suppressant and cravings suppressant. Weight loss is slow. She is having tingling of her bilateral hands that comes and goes away during the day. Takes medication at night.      Highest weight  199 LBS  Last OV/Initial weight on topamax - 163 lbs  Current Weight  160.5 LBS (-2.5 lbs)  Stanley 140 LBS   Goal - 145 LBS  5% weight loss - 8.17 lbs (154 lbs)     Hydration: drinks at least 64 oz of fluids.   Alcohol: NONE  Exercise: walking and strength training.   Dining out: none  Occupation: RETIRED   Sleep: 6-8 hrs  Has sleep apnea     B: 2 eggs, cheese  S: none  L: Dianne meals - frozen meals.   S: pretzels, cookies, or popcorn.   D: frozen meals -  protein and starch that she gets from the farmer's market   S: pretzels, or cookies, or popcorn.          Wt Readings from Last 3 Encounters:   03/07/25 72.8 kg (160 lb 8 oz)   02/11/25 72.6 kg (160 lb)   12/27/24 70.8 kg (156 lb)     12/06/24 73.9 kg (163 lb)         Patient is not pregnant/breastfeeding (metformin only)  Patient denies personal and family history of  pancreatitis, thyroid cancer, MEN-2 tumors. (Consideration for GLP-1 Agonists - but lacks coverage)  Denies any hx of glaucoma, seizures, kidney stones. Had gallstones with subsequent cholecystectomy. (Consideration for topamax)  Has a hx of PVCs, arrhythmia, hyperthyroidism  - would avoid phentermine. Denies Hx of CAD, PAD. (contraindicated for phentermine or Wellbutrin)  Denies uncontrolled anxiety or depression, suicidal behavior or thinking. Admits to insomnia/sleep disturbances      The following portions of the patient's history were reviewed and updated as appropriate: allergies, current medications, past family history, past medical history, past social history, past surgical history, and problem list.    Past Medical History:   Diagnosis Date    Allergic     Allergic rhinitis     Arthritis     Bariatric surgery status     Basal cell carcinoma     skin CA removed from nose     Bowel habit changes     Cataract     starting with a catract    Cellulitis of left lower extremity     last assessed 06/29/2016    Chronic GERD     Closed displaced fracture of greater tuberosity of humerus     last assessed 05/31/2016    CPAP (continuous positive airway pressure) dependence     Fatty liver     Fibromyalgia     Fibromyalgia, primary     GERD (gastroesophageal reflux disease)     Hiatal hernia     High cholesterol     Insomnia     Morbid obesity (HCC)     Osteoarthritis     Palpitations     pt denies a fib    PONV (postoperative nausea and vomiting)     Postgastrectomy malabsorption     Recurrent pregnancy loss, antepartum condition or complication     Restless leg     Sicca syndrome (HCC)     Sleep apnea     Superficial phlebitis and thrombophlebitis of left lower extremity     last assessed 06/08/2016    Thyroid nodule     nodule on thyroid    Thyroid nodule 08/21/2012    Urinary tract infection     Varicose veins of left leg with edema     Wears glasses      Past Surgical History:   Procedure Laterality Date    BLADDER SURGERY      sling    CHOLECYSTECTOMY  1998    COLONOSCOPY      CYST REMOVAL      thigh    DILATION AND CURETTAGE OF UTERUS      ESOPHAGOGASTRODUODENOSCOPY      ESOPHAGOGASTRODUODENOSCOPY N/A 12/21/2017    Procedure: ESOPHAGOGASTRODUODENOSCOPY (EGD) with biopsy and polypectomy;  Surgeon: Jagdish Jones MD;  Location: [facility];  Service: Gastroenterology    HARDWARE REMOVAL      right shoulder    JOINT REPLACEMENT      both knees    KNEE ARTHROSCOPY      ORIF HUMERAL SHAFT FRACTURE Right     MD ENDOVEN ABLTJ INCMPTNT VEIN XTR LASER 1ST VEIN Left 04/29/2016    Procedure: GREATER SAPHENOUS VEIN EVLT ;  Surgeon: Joe Loredo DO;  Location: [facility];  Service: Vascular    MD ESOPHAGOGASTRODUODENOSCOPY TRANSORAL DIAGNOSTIC N/A 01/11/2018    Procedure: egd w/ emr ;  Surgeon: John Paul Smith MD;  Location: [facility];  Service: Gastroenterology    MD ESOPHAGOGASTRODUODENOSCOPY  TRANSORAL DIAGNOSTIC N/A 06/28/2018    Procedure: ESOPHAGOGASTRODUODENOSCOPY (EGD);  Surgeon: John Paul Smith MD;  Location: [facility];  Service: Gastroenterology    UT LAPS [facility] RSTRICTIV PX LONGITUDINAL GASTRECTOMY N/A 03/19/2018    Procedure: GASTRECTOMY SLEEVE LAPAROSCOPIC AND INTRAOPERATIVE EGD;  Surgeon: Chris Tavarez MD;  Location: [facility];  Service: Bariatrics    UT STAB PHLEBT VARICOSE VEINS 1 XTR 10-20 STAB INCS Left 04/29/2016    Procedure: AND STAB PHLEBECTOMIES ;  Surgeon: Joe Loredo DO;  Location: [facility];  Service: Vascular    REDUCTION MAMMAPLASTY Bilateral 2011    Reduction    SKIN CANCER EXCISION      SLEEVE GASTROPLASTY      TOTAL KNEE ARTHROPLASTY Bilateral        Current Outpatient Medications:     atorvastatin (LIPITOR) 10 mg tablet, Take 1 tablet (10 mg total) by mouth daily, Disp: 90 tablet, Rfl: 3    Calcium Carbonate 1500 (600 Ca) MG TABS, Take by mouth, Disp: , Rfl:     Cholecalciferol (Vitamin D-3) 25 MCG (1000 UT) CAPS, Take 3 capsules (3,000 Units total) by mouth daily, Disp: 30 capsule, Rfl: 0    Diphenhydramine-Phenylephrine ([facility] ALLERGY-D PO), Take by mouth, Disp: , Rfl:     fexofenadine ([facility]ra Allergy) 180 MG tablet, , Disp: , Rfl:     fluticasone (FLONASE) 50 mcg/act nasal spray, 2 sprays into each nostril daily, Disp: , Rfl:     gabapentin (NEURONTIN) 600 MG tablet, Take 600 mg by mouth daily  , Disp: , Rfl: 3    Melatonin 10 MG TABS, Take 10 mg by mouth, Disp: , Rfl:     Multiple Vitamin (MULTIVITAMIN) capsule, Take 1 capsule by mouth daily., Disp: , Rfl:     pantoprazole (PROTONIX) 20 mg tablet, TAKE ONE TABLET BY MOUTH EVERY DAY, Disp: 100 tablet, Rfl: 1    PARoxetine (PAXIL) 30 mg tablet, TAKE ONE TABLET BY MOUTH EVERY DAY, Disp: 100 tablet, Rfl: 1    Restasis 0.05 % ophthalmic emulsion, , Disp: , Rfl:     topiramate (Topamax) 50 MG tablet, Take 1 tablet (50 mg total) by mouth daily at bedtime, Disp: 30 tablet, Rfl: 2    promethazine-codeine (PHENERGAN WITH  "CODEINE) 6.25-10 mg/5 mL syrup, Take 5 mL by mouth every 4 (four) hours as needed for cough, Disp: 118 mL, Rfl: 0    Review of Systems   Constitutional: Negative.    Respiratory: Negative.     Cardiovascular: Negative.    Gastrointestinal: Negative.    Musculoskeletal: Negative.    Neurological:  Positive for numbness (tingling of bilateral hands).   Psychiatric/Behavioral: Negative.         Objective:    /80 (Patient Position: Sitting, Cuff Size: Standard)   Pulse 71   Temp 97.8 °F (36.6 °C) (Tympanic)   Ht 4' 10\" (1.473 m)   Wt 72.8 kg (160 lb 8 oz)   LMP  (LMP Unknown)   BMI 33.54 kg/m²     Physical Exam  Vitals and nursing note reviewed.   Constitutional:       Appearance: Normal appearance. She is obese.   Cardiovascular:      Rate and Rhythm: Normal rate and regular rhythm.      Pulses: Normal pulses.      Heart sounds: Normal heart sounds.   Pulmonary:      Effort: Pulmonary effort is normal.      Breath sounds: Normal breath sounds.   Abdominal:      General: Bowel sounds are normal.      Palpations: Abdomen is soft.      Tenderness: There is no abdominal tenderness.   Musculoskeletal:         General: Normal range of motion.   Skin:     General: Skin is warm and dry.   Neurological:      General: No focal deficit present.      Mental Status: She is alert and oriented to person, place, and time.   Psychiatric:         Mood and Affect: Mood normal.         Behavior: Behavior normal.         Thought Content: Thought content normal.         Judgment: Judgment normal.         "

## 2025-03-13 PROBLEM — N30.01 ACUTE CYSTITIS WITH HEMATURIA: Status: RESOLVED | Noted: 2023-02-03 | Resolved: 2025-03-13

## 2025-03-20 ENCOUNTER — ANNUAL EXAM (OUTPATIENT)
Dept: GYNECOLOGY | Facility: CLINIC | Age: 70
End: 2025-03-20
Payer: COMMERCIAL

## 2025-03-20 VITALS
HEIGHT: 58 IN | BODY MASS INDEX: 33.92 KG/M2 | SYSTOLIC BLOOD PRESSURE: 120 MMHG | DIASTOLIC BLOOD PRESSURE: 72 MMHG | WEIGHT: 161.6 LBS

## 2025-03-20 DIAGNOSIS — Z01.419 ENCOUNTER FOR ANNUAL ROUTINE GYNECOLOGICAL EXAMINATION: Primary | ICD-10-CM

## 2025-03-20 DIAGNOSIS — Z12.31 ENCOUNTER FOR SCREENING MAMMOGRAM FOR BREAST CANCER: ICD-10-CM

## 2025-03-20 PROBLEM — N90.89 VULVAR LESION: Status: RESOLVED | Noted: 2017-09-12 | Resolved: 2025-03-20

## 2025-03-20 PROCEDURE — G0101 CA SCREEN;PELVIC/BREAST EXAM: HCPCS | Performed by: OBSTETRICS & GYNECOLOGY

## 2025-03-20 PROCEDURE — G0145 SCR C/V CYTO,THINLAYER,RESCR: HCPCS | Performed by: OBSTETRICS & GYNECOLOGY

## 2025-03-20 NOTE — PROGRESS NOTES
"Name: Jeannette Heard      : 1955      MRN: [id number]  Encounter Provider: Sheial Torres DO  Encounter Date: 3/20/2025   Encounter department: [facility]  :  Assessment & Plan  Encounter for annual routine gynecological examination    Orders:    Liquid-based pap, screening    Encounter for screening mammogram for breast cancer    Orders:    Mammo screening bilateral w 3d and cad; Future      pap with reflex done today    mammogram reviewed with her including breast density.  RX given for  and she will schedule    Discussed self breast exams    colon cancer screening -  colonoscopy in , recall in 10 years    Osteopenia - reviewed exercise, Ca and vit D    discussed preventive care, regular exercise and a healthy diet      History of Present Illness   HPI  Jeannette Heard is a 69 y.o. female who presents for yearly.  She is sexually active.  New partner for 2 years, no dryness    Normal 3D mammogram in  with scattered fibroglandular densities and average risk  DEXA in October showed osteopenia in the femoral neck and hip.  She did not meet treatment criteria.      Review of Systems   Constitutional: Negative.    Gastrointestinal: Negative.    Genitourinary: Negative.           Objective   /72 (BP Location: Left arm, Patient Position: Sitting, Cuff Size: Standard)   Ht 4' 10\" (1.473 m)   Wt 73.3 kg (161 lb 9.6 oz)   LMP  (LMP Unknown)   BMI 33.77 kg/m²      Physical Exam  Vitals and nursing note reviewed. Exam conducted with a chaperone present.   Constitutional:       Appearance: She is well-developed.   HENT:      Head: Normocephalic and atraumatic.   Neck:      Thyroid: No thyromegaly.   Cardiovascular:      Rate and Rhythm: Normal rate and regular rhythm.   Pulmonary:      Effort: Pulmonary effort is normal.      Breath sounds: Normal breath sounds.   Chest:   Breasts:     Right: Normal.      Left: Normal.      Comments: Examined seated and " supine  Abdominal:      Palpations: Abdomen is soft.   Genitourinary:     General: Normal vulva.      Vagina: Normal.      Cervix: Normal.      Uterus: Normal.       Adnexa: Right adnexa normal and left adnexa normal.      Rectum: Normal.   Musculoskeletal:      Cervical back: Neck supple.   Skin:     General: Skin is warm and dry.   Neurological:      Mental Status: She is alert.   Psychiatric:         Mood and Affect: Mood normal.

## 2025-03-25 DIAGNOSIS — Z98.84 BARIATRIC SURGERY STATUS: ICD-10-CM

## 2025-03-25 DIAGNOSIS — F41.9 ANXIETY: ICD-10-CM

## 2025-03-25 DIAGNOSIS — K91.2 POSTSURGICAL MALABSORPTION: ICD-10-CM

## 2025-03-25 DIAGNOSIS — K21.9 GERD WITHOUT ESOPHAGITIS: ICD-10-CM

## 2025-03-25 DIAGNOSIS — E66.811 OBESITY, CLASS I, BMI 30-34.9: ICD-10-CM

## 2025-03-25 DIAGNOSIS — Z48.815 ENCOUNTER FOR SURGICAL AFTERCARE FOLLOWING SURGERY OF DIGESTIVE SYSTEM: ICD-10-CM

## 2025-03-25 DIAGNOSIS — G47.33 OSA (OBSTRUCTIVE SLEEP APNEA): ICD-10-CM

## 2025-03-25 RX ORDER — TOPIRAMATE 50 MG/1
50 TABLET, FILM COATED ORAL
Qty: 30 TABLET | Refills: 0 | Status: SHIPPED | OUTPATIENT
Start: 2025-03-25

## 2025-03-26 ENCOUNTER — RESULTS FOLLOW-UP (OUTPATIENT)
Dept: GYNECOLOGY | Facility: CLINIC | Age: 70
End: 2025-03-26

## 2025-03-26 LAB
LAB AP GYN PRIMARY INTERPRETATION: NORMAL
Lab: NORMAL

## 2025-03-27 RX ORDER — PAROXETINE 30 MG/1
30 TABLET, FILM COATED ORAL DAILY
Qty: 100 TABLET | Refills: 1 | Status: SHIPPED | OUTPATIENT
Start: 2025-03-27

## 2025-03-27 RX ORDER — PANTOPRAZOLE SODIUM 20 MG/1
20 TABLET, DELAYED RELEASE ORAL DAILY
Qty: 100 TABLET | Refills: 1 | Status: SHIPPED | OUTPATIENT
Start: 2025-03-27

## 2025-04-26 DIAGNOSIS — Z98.84 BARIATRIC SURGERY STATUS: ICD-10-CM

## 2025-04-26 DIAGNOSIS — K91.2 POSTSURGICAL MALABSORPTION: ICD-10-CM

## 2025-04-26 DIAGNOSIS — E66.811 OBESITY, CLASS I, BMI 30-34.9: ICD-10-CM

## 2025-04-26 DIAGNOSIS — G47.33 OSA (OBSTRUCTIVE SLEEP APNEA): ICD-10-CM

## 2025-04-26 DIAGNOSIS — Z48.815 ENCOUNTER FOR SURGICAL AFTERCARE FOLLOWING SURGERY OF DIGESTIVE SYSTEM: ICD-10-CM

## 2025-04-28 RX ORDER — TOPIRAMATE 50 MG/1
50 TABLET, FILM COATED ORAL
Qty: 30 TABLET | Refills: 0 | Status: SHIPPED | OUTPATIENT
Start: 2025-04-28

## 2025-04-29 ENCOUNTER — RA CDI HCC (OUTPATIENT)
Dept: OTHER | Facility: HOSPITAL | Age: 70
End: 2025-04-29

## 2025-05-01 ENCOUNTER — APPOINTMENT (OUTPATIENT)
Dept: LAB | Facility: CLINIC | Age: 70
End: 2025-05-01
Payer: COMMERCIAL

## 2025-05-01 DIAGNOSIS — E78.5 MILD HYPERLIPIDEMIA: ICD-10-CM

## 2025-05-01 DIAGNOSIS — E55.9 VITAMIN D DEFICIENCY: ICD-10-CM

## 2025-05-01 DIAGNOSIS — R73.9 HYPERGLYCEMIA: ICD-10-CM

## 2025-05-01 LAB
25(OH)D3 SERPL-MCNC: 85.5 NG/ML (ref 30–100)
ALBUMIN SERPL BCG-MCNC: 4 G/DL (ref 3.5–5)
ALP SERPL-CCNC: 64 U/L (ref 34–104)
ALT SERPL W P-5'-P-CCNC: 13 U/L (ref 7–52)
ANION GAP SERPL CALCULATED.3IONS-SCNC: 10 MMOL/L (ref 4–13)
AST SERPL W P-5'-P-CCNC: 12 U/L (ref 13–39)
BILIRUB SERPL-MCNC: 0.5 MG/DL (ref 0.2–1)
BUN SERPL-MCNC: 11 MG/DL (ref 5–25)
CALCIUM SERPL-MCNC: 9.1 MG/DL (ref 8.4–10.2)
CHLORIDE SERPL-SCNC: 108 MMOL/L (ref 96–108)
CHOLEST SERPL-MCNC: 155 MG/DL (ref ?–200)
CO2 SERPL-SCNC: 22 MMOL/L (ref 21–32)
CREAT SERPL-MCNC: 0.51 MG/DL (ref 0.6–1.3)
GFR SERPL CREATININE-BSD FRML MDRD: 98 ML/MIN/1.73SQ M
GLUCOSE P FAST SERPL-MCNC: 91 MG/DL (ref 65–99)
HDLC SERPL-MCNC: 48 MG/DL
LDLC SERPL CALC-MCNC: 92 MG/DL (ref 0–100)
POTASSIUM SERPL-SCNC: 3.6 MMOL/L (ref 3.5–5.3)
PROT SERPL-MCNC: 6.7 G/DL (ref 6.4–8.4)
SODIUM SERPL-SCNC: 140 MMOL/L (ref 135–147)
TRIGL SERPL-MCNC: 76 MG/DL (ref ?–150)

## 2025-05-01 PROCEDURE — 82306 VITAMIN D 25 HYDROXY: CPT

## 2025-05-01 PROCEDURE — 36415 COLL VENOUS BLD VENIPUNCTURE: CPT

## 2025-05-01 PROCEDURE — 80053 COMPREHEN METABOLIC PANEL: CPT

## 2025-05-01 PROCEDURE — 80061 LIPID PANEL: CPT

## 2025-05-05 ENCOUNTER — OFFICE VISIT (OUTPATIENT)
Dept: FAMILY MEDICINE CLINIC | Facility: CLINIC | Age: 70
End: 2025-05-05
Payer: COMMERCIAL

## 2025-05-05 VITALS
BODY MASS INDEX: 34 KG/M2 | DIASTOLIC BLOOD PRESSURE: 80 MMHG | OXYGEN SATURATION: 96 % | SYSTOLIC BLOOD PRESSURE: 122 MMHG | WEIGHT: 162 LBS | HEART RATE: 89 BPM | HEIGHT: 58 IN

## 2025-05-05 DIAGNOSIS — Z98.84 BARIATRIC SURGERY STATUS: ICD-10-CM

## 2025-05-05 DIAGNOSIS — R73.9 HYPERGLYCEMIA: ICD-10-CM

## 2025-05-05 DIAGNOSIS — H34.8310: ICD-10-CM

## 2025-05-05 DIAGNOSIS — M79.7 FIBROMYALGIA SYNDROME: ICD-10-CM

## 2025-05-05 DIAGNOSIS — Z85.828 HISTORY OF MALIGNANT NEOPLASM OF SKIN: ICD-10-CM

## 2025-05-05 DIAGNOSIS — E05.90 HYPERTHYROIDISM: ICD-10-CM

## 2025-05-05 DIAGNOSIS — E55.9 VITAMIN D DEFICIENCY: ICD-10-CM

## 2025-05-05 DIAGNOSIS — F41.9 ANXIETY: ICD-10-CM

## 2025-05-05 DIAGNOSIS — K21.9 GERD WITHOUT ESOPHAGITIS: ICD-10-CM

## 2025-05-05 DIAGNOSIS — E78.5 MILD HYPERLIPIDEMIA: Primary | ICD-10-CM

## 2025-05-05 PROBLEM — Z86.16 HISTORY OF COVID-19: Status: RESOLVED | Noted: 2023-11-02 | Resolved: 2025-05-05

## 2025-05-05 PROBLEM — U07.1 COVID-19 VIRUS INFECTION: Status: RESOLVED | Noted: 2024-12-27 | Resolved: 2025-05-05

## 2025-05-05 PROCEDURE — 99214 OFFICE O/P EST MOD 30 MIN: CPT | Performed by: PHYSICIAN ASSISTANT

## 2025-05-05 PROCEDURE — G0439 PPPS, SUBSEQ VISIT: HCPCS | Performed by: PHYSICIAN ASSISTANT

## 2025-05-05 PROCEDURE — G2211 COMPLEX E/M VISIT ADD ON: HCPCS | Performed by: PHYSICIAN ASSISTANT

## 2025-05-05 NOTE — PATIENT INSTRUCTIONS
1. Mild hyperlipidemia  Assessment & Plan:  Patient is on Lipitor 10 keeping LDL at 92.       2. Vitamin D deficiency  Assessment & Plan:  Vitamin D very good at 85 continue current supplementation check yearly.       3. Hyperglycemia  Assessment & Plan:  Fasting glucose was actually normal at 91       4. History of malignant neoplasm of skin  Assessment & Plan:  Make sure to see dermatology on a regular basis.       5. GERD without esophagitis  Assessment & Plan:  Stable on daily PPI.       6. Bariatric surgery status  Assessment & Plan:  Patient currently seeing weight management bariatrics and on Topamax       7. Anxiety  Assessment & Plan:  Stable on Paxil 30 mg daily.       8. Fibromyalgia syndrome  Assessment & Plan:  Patient seeing rheumatology on gabapentin       9. Hyperthyroidism  10. 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    Medicare Preventive Visit Patient Instructions  Thank you for completing your Welcome to Medicare Visit or Medicare Annual Wellness Visit today. Your next wellness visit will be due in one year (5/6/2026).  The screening/preventive services that you may require over the next 5-10 years are detailed below. Some tests may not apply to you based off risk factors and/or age. Screening tests ordered at today's visit but not completed yet may show as past due. Also, please note that scanned in results may not display below.  Preventive Screenings:  Service Recommendations Previous Testing/Comments   Colorectal Cancer Screening  * Colonoscopy    * Fecal Occult Blood Test (FOBT)/Fecal Immunochemical Test (FIT)  * Fecal DNA/Cologuard Test  * Flexible Sigmoidoscopy Age: 45-75 years old   Colonoscopy: every 10 years (may be performed more frequently if at higher risk)  OR  FOBT/FIT: every 1 year  OR  Cologuard: every 3 years  OR  Sigmoidoscopy: every 5 years  Screening may be recommended earlier  than age 45 if at higher risk for colorectal cancer. Also, an individualized decision between you and your healthcare provider will decide whether screening between the ages of 76-85 would be appropriate. Colonoscopy: 02/16/2017  FOBT/FIT: Not on file  Cologuard: Not on file  Sigmoidoscopy: Not on file    Screening Current     Breast Cancer Screening Age: 40+ years old  Frequency: every 1-2 years  Not required if history of left and right mastectomy Mammogram: 06/03/2024    Screening Current   Cervical Cancer Screening Between the ages of 21-29, pap smear recommended once every 3 years.   Between the ages of 30-65, can perform pap smear with HPV co-testing every 5 years.   Recommendations may differ for women with a history of total hysterectomy, cervical cancer, or abnormal pap smears in past. Pap Smear: 03/20/2025    Screening Not Indicated   Hepatitis C Screening Once for adults born between 1945 and 1965  More frequently in patients at high risk for Hepatitis C Hep C Antibody: 02/14/2022    Screening Current   Diabetes Screening 1-2 times per year if you're at risk for diabetes or have pre-diabetes Fasting glucose: 91 mg/dL (5/1/2025)  A1C: 5.7 % (10/13/2023)  Screening Current   Cholesterol Screening Once every 5 years if you don't have a lipid disorder. May order more often based on risk factors. Lipid panel: 05/01/2025    Screening Not Indicated  History Lipid Disorder  Screening Current     Other Preventive Screenings Covered by Medicare:  Abdominal Aortic Aneurysm (AAA) Screening: covered once if your at risk. You're considered to be at risk if you have a family history of AAA.  Lung Cancer Screening: covers low dose CT scan once per year if you meet all of the following conditions: (1) Age 55-77; (2) No signs or symptoms of lung cancer; (3) Current smoker or have quit smoking within the last 15 years; (4) You have a tobacco smoking history of at least 20 pack years (packs per day multiplied by number of  years you smoked); (5) You get a written order from a healthcare provider.  Glaucoma Screening: covered annually if you're considered high risk: (1) You have diabetes OR (2) Family history of glaucoma OR (3)  aged 50 and older OR (4)  American aged 65 and older  Osteoporosis Screening: covered every 2 years if you meet one of the following conditions: (1) You're estrogen deficient and at risk for osteoporosis based off medical history and other findings; (2) Have a vertebral abnormality; (3) On glucocorticoid therapy for more than 3 months; (4) Have primary hyperparathyroidism; (5) On osteoporosis medications and need to assess response to drug therapy.   Last bone density test (DXA Scan): 07/29/2020.  HIV Screening: covered annually if you're between the age of 15-65. Also covered annually if you are younger than 15 and older than 65 with risk factors for HIV infection. For pregnant patients, it is covered up to 3 times per pregnancy.    Immunizations:  Immunization Recommendations   Influenza Vaccine Annual influenza vaccination during flu season is recommended for all persons aged >= 6 months who do not have contraindications   Pneumococcal Vaccine   * Pneumococcal conjugate vaccine = PCV13 (Prevnar 13), PCV15 (Vaxneuvance), PCV20 (Prevnar 20)  * Pneumococcal polysaccharide vaccine = PPSV23 (Pneumovax) Adults 19-65 yo with certain risk factors or if 65+ yo  If never received any pneumonia vaccine: recommend Prevnar 20 (PCV20)  Give PCV20 if previously received 1 dose of PCV13 or PPSV23   Hepatitis B Vaccine 3 dose series if at intermediate or high risk (ex: diabetes, end stage renal disease, liver disease)   Respiratory syncytial virus (RSV) Vaccine - COVERED BY MEDICARE PART D  * RSVPreF3 (Arexvy) CDC recommends that adults 60 years of age and older may receive a single dose of RSV vaccine using shared clinical decision-making (SCDM)   Tetanus (Td) Vaccine - COST NOT COVERED BY MEDICARE  PART B Following completion of primary series, a booster dose should be given every 10 years to maintain immunity against tetanus. Td may also be given as tetanus wound prophylaxis.   Tdap Vaccine - COST NOT COVERED BY MEDICARE PART B Recommended at least once for all adults. For pregnant patients, recommended with each pregnancy.   Shingles Vaccine (Shingrix) - COST NOT COVERED BY MEDICARE PART B  2 shot series recommended in those 19 years and older who have or will have weakened immune systems or those 50 years and older     Health Maintenance Due:      Topic Date Due    Breast Cancer Screening: Mammogram  06/03/2025    DXA SCAN  07/29/2025    Colorectal Cancer Screening  02/16/2027    Hepatitis C Screening  Completed     Immunizations Due:      Topic Date Due    Pneumococcal Vaccine: 65+ Years (2 of 2 - PCV) 11/03/2017    COVID-19 Vaccine (7 - 2024-25 season) 09/01/2024     Advance Directives   What are advance directives?  Advance directives are legal documents that state your wishes and plans for medical care. These plans are made ahead of time in case you lose your ability to make decisions for yourself. Advance directives can apply to any medical decision, such as the treatments you want, and if you want to donate organs.   What are the types of advance directives?  There are many types of advance directives, and each state has rules about how to use them. You may choose a combination of any of the following:  Living will:  This is a written record of the treatment you want. You can also choose which treatments you do not want, which to limit, and which to stop at a certain time. This includes surgery, medicine, IV fluid, and tube feedings.   Durable power of  for healthcare (DPAHC):  This is a written record that states who you want to make healthcare choices for you when you are unable to make them for yourself. This person, called a proxy, is usually a family member or a friend. You may choose  more than 1 proxy.  Do not resuscitate (DNR) order:  A DNR order is used in case your heart stops beating or you stop breathing. It is a request not to have certain forms of treatment, such as CPR. A DNR order may be included in other types of advance directives.  Medical directive:  This covers the care that you want if you are in a coma, near death, or unable to make decisions for yourself. You can list the treatments you want for each condition. Treatment may include pain medicine, surgery, blood transfusions, dialysis, IV or tube feedings, and a ventilator (breathing machine).  Values history:  This document has questions about your views, beliefs, and how you feel and think about life. This information can help others choose the care that you would choose.  Why are advance directives important?  An advance directive helps you control your care. Although spoken wishes may be used, it is better to have your wishes written down. Spoken wishes can be misunderstood, or not followed. Treatments may be given even if you do not want them. An advance directive may make it easier for your family to make difficult choices about your care.   Urinary Incontinence   Urinary incontinence (UI)  is when you lose control of your bladder. UI develops because your bladder cannot store or empty urine properly. The 3 most common types of UI are stress incontinence, urge incontinence, or both.  Medicines:   May be given to help strengthen your bladder control. Report any side effects of medication to your healthcare provider.  Do pelvic muscle exercises often:  Your pelvic muscles help you stop urinating. Squeeze these muscles tight for 5 seconds, then relax for 5 seconds. Gradually work up to squeezing for 10 seconds. Do 3 sets of 15 repetitions a day, or as directed. This will help strengthen your pelvic muscles and improve bladder control.  Train your bladder:  Go to the bathroom at set times, such as every 2 hours, even if you do  not feel the urge to go. You can also try to hold your urine when you feel the urge to go. For example, hold your urine for 5 minutes when you feel the urge to go. As that becomes easier, hold your urine for 10 minutes.   Self-care:   Keep a UI record.  Write down how often you leak urine and how much you leak. Make a note of what you were doing when you leaked urine.  Drink liquids as directed. You may need to limit the amount of liquid you drink to help control your urine leakage. Do not drink any liquid right before you go to bed. Limit or do not have drinks that contain caffeine or alcohol.   Prevent constipation.  Eat a variety of high-fiber foods. Good examples are high-fiber cereals, beans, vegetables, and whole-grain breads. Walking is the best way to trigger your intestines to have a bowel movement.  Exercise regularly and maintain a healthy weight.  Weight loss and exercise will decrease pressure on your bladder and help you control your leakage.   Use a catheter as directed  to help empty your bladder. A catheter is a tiny, plastic tube that is put into your bladder to drain your urine.   Go to behavior therapy as directed.  Behavior therapy may be used to help you learn to control your urge to urinate.    Weight Management   Why it is important to manage your weight:  Being overweight increases your risk of health conditions such as heart disease, high blood pressure, type 2 diabetes, and certain types of cancer. It can also increase your risk for osteoarthritis, sleep apnea, and other respiratory problems. Aim for a slow, steady weight loss. Even a small amount of weight loss can lower your risk of health problems.  How to lose weight safely:  A safe and healthy way to lose weight is to eat fewer calories and get regular exercise. You can lose up about 1 pound a week by decreasing the number of calories you eat by 500 calories each day.   Healthy meal plan for weight management:  A healthy meal plan  includes a variety of foods, contains fewer calories, and helps you stay healthy. A healthy meal plan includes the following:  Eat whole-grain foods more often.  A healthy meal plan should contain fiber. Fiber is the part of grains, fruits, and vegetables that is not broken down by your body. Whole-grain foods are healthy and provide extra fiber in your diet. Some examples of whole-grain foods are whole-wheat breads and pastas, oatmeal, brown rice, and bulgur.  Eat a variety of vegetables every day.  Include dark, leafy greens such as spinach, kale, hadley greens, and mustard greens. Eat yellow and orange vegetables such as carrots, sweet potatoes, and winter squash.   Eat a variety of fruits every day.  Choose fresh or canned fruit (canned in its own juice or light syrup) instead of juice. Fruit juice has very little or no fiber.  Eat low-fat dairy foods.  Drink fat-free (skim) milk or 1% milk. Eat fat-free yogurt and low-fat cottage cheese. Try low-fat cheeses such as mozzarella and other reduced-fat cheeses.  Choose meat and other protein foods that are low in fat.  Choose beans or other legumes such as split peas or lentils. Choose fish, skinless poultry (chicken or turkey), or lean cuts of red meat (beef or pork). Before you cook meat or poultry, cut off any visible fat.   Use less fat and oil.  Try baking foods instead of frying them. Add less fat, such as margarine, sour cream, regular salad dressing and mayonnaise to foods. Eat fewer high-fat foods. Some examples of high-fat foods include french fries, doughnuts, ice cream, and cakes.  Eat fewer sweets.  Limit foods and drinks that are high in sugar. This includes candy, cookies, regular soda, and sweetened drinks.  Exercise:  Exercise at least 30 minutes per day on most days of the week. Some examples of exercise include walking, biking, dancing, and swimming. You can also fit in more physical activity by taking the stairs instead of the elevator or  parking farther away from stores. Ask your healthcare provider about the best exercise plan for you.   Narcotic (Opioid) Safety    Use narcotics safely:  Take prescribed narcotics exactly as directed  Do not give narcotics to others or take narcotics that belong to someone else  Do not mix narcotics without medicines or alcohol  Do not drive or operate heavy machinery after you take the narcotic  Monitor for side effects and notify your healthcare provider if you experienced side effects such as nausea, sleepiness, itching, or trouble thinking clearly.    Manage constipation:    Constipation is the most common side effect of narcotic medicine. Constipation is when you have hard, dry bowel movements, or you go longer than usual between bowel movements. Tell your healthcare provider about all changes in your bowel movements while you are taking narcotics. He or she may recommend laxative medicine to help you have a bowel movement. He or she may also change the kind of narcotic you are taking, or change when you take it. The following are more ways you can prevent or relieve constipation:    Drink liquids as directed.  You may need to drink extra liquids to help soften and move your bowels. Ask how much liquid to drink each day and which liquids are best for you.  Eat high-fiber foods.  This may help decrease constipation by adding bulk to your bowel movements. High-fiber foods include fruits, vegetables, whole-grain breads and cereals, and beans. Your healthcare provider or dietitian can help you create a high-fiber meal plan. Your provider may also recommend a fiber supplement if you cannot get enough fiber from food.  Exercise regularly.  Regular physical activity can help stimulate your intestines. Walking is a good exercise to prevent or relieve constipation. Ask which exercises are best for you.  Schedule a time each day to have a bowel movement.  This may help train your body to have regular bowel movements.  Bend forward while you are on the toilet to help move the bowel movement out. Sit on the toilet for at least 10 minutes, even if you do not have a bowel movement.    Store narcotics safely:   Store narcotics where others cannot easily get them.  Keep them in a locked cabinet or secure area. Do not  keep them in a purse or other bag you carry with you. A person may be looking for something else and find the narcotics.  Make sure narcotics are stored out of the reach of children.  A child can easily overdose on narcotics. Narcotics may look like candy to a small child.    The best way to dispose of narcotics:      The laws vary by country and area. In the [place], the best way is to return the narcotics through a take-back program. This program is offered by the [place] Drug Enforcement Agency ([employer]). The following are options for using the program:  Take the narcotics to a [employer] collection site.  The site is often a law enforcement center. Call your local law enforcement center for scheduled take-back days in your area. You will be given information on where to go if the collection site is in a different location.  Take the narcotics to an approved pharmacy or hospital.  A pharmacy or hospital may be set up as a collection site. You will need to ask if it is a [employer] collection site if you were not directed there. A pharmacy or doctor's office may not be able to take back narcotics unless it is a [employer] site.  Use a mail-back system.  This means you are given containers to put the narcotics into. You will then mail them in the containers.  Use a take-back drop box.  This is a place to leave the narcotics at any time. People and animals will not be able to get into the box. Your local law enforcement agency can tell you where to find a drop box in your area.    Other ways to manage pain:   Ask your healthcare provider about non-narcotic medicines to control pain.  Nonprescription medicines include NSAIDs (such as ibuprofen)  and acetaminophen. Prescription medicines include muscle relaxers, antidepressants, and steroids.  Pain may be managed without any medicines.  Some ways to relieve pain include massage, aromatherapy, or meditation. Physical or occupational therapy may also help.    For more information:   Drug Enforcement Administration  [address] [place] [address]  Phone: 8- 228 - 028-5722  Web Address: https://www.deadiversion.[facility].gov/drug_disposal/    [place] Food and Drug Administration  [address] [place] [address]  Phone: 9- 571 - 478-0988  Web Address: http://www.fda.gov     © Copyright [employer] 2018 Information is for End User's use only and may not be sold, redistributed or otherwise used for commercial purposes. All illustrations and images included in CareNotes® are the copyrighted property of [employer]M., Inc[employer] or [employer]

## 2025-05-05 NOTE — ASSESSMENT & PLAN NOTE
Patient currently seeing weight management bariatrics and on Topamax  Orders:  •  CBC and differential; Future

## 2025-05-05 NOTE — PROGRESS NOTES
Name: Jeannette Heard      : 1955      MRN: [id number]  Encounter Provider: Andria Coates PA-C  Encounter Date: 2025   Encounter department: [facility]  :  Assessment & Plan  Mild hyperlipidemia  Patient is on Lipitor 10 keeping LDL at 92.  Orders:  •  CBC and differential; Future  •  Lipid panel; Future    Vitamin D deficiency  Vitamin D very good at 85 which is much higher than her usual. She thinks she is taking 6,000 IU D daily and is supposed to be on 3,000 so will h ave her go back to 3,000 daily.        Hyperglycemia  Fasting glucose was actually normal at 91.  Orders:  •  CBC and differential; Future  •  Comprehensive metabolic panel; Future    History of malignant neoplasm of skin  Make sure to see dermatology on a regular basis.       GERD without esophagitis  Stable on daily PPI.  Orders:  •  CBC and differential; Future    Bariatric surgery status  Patient currently seeing weight management bariatrics and on Topamax  Orders:  •  CBC and differential; Future    Anxiety  Stable on Paxil 30 mg daily.  Orders:  •  CBC and differential; Future    Fibromyalgia syndrome  Patient seeing rheumatology on gabapentin       Hyperthyroidism  Check TSH has been normal.  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         Depression Screening and Follow-up Plan: Patient was screened for depression during today's encounter. They screened negative with a PHQ-2 score of 0.      Urinary Incontinence Plan of Care: counseling topics discussed: practice Kegel (pelvic floor strengthening) exercises.       Preventive health issues were discussed with patient, and age appropriate screening tests were ordered as noted in patient's After Visit Summary. Personalized health advice and appropriate referrals for health education or preventive services given if  needed, as noted in patient's After Visit Summary.    History of Present Illness     Patient presents with:  Follow-up: 6 month fu      Jeannette Heard is here for chronic conditions f/u. Pt. had labs done prior to today's visit which included Recent Results (from the past 4 weeks)  -Comprehensive metabolic panel:   Collection Time: 05/01/25  9:55 AM       Result                      Value             Ref Range           Sodium                      140               135 - 147 mm*       Potassium                   3.6               3.5 - 5.3 mm*       Chloride                    108               96 - 108 mmo*       CO2                         22                21 - 32 mmol*       ANION GAP                   10                4 - 13 mmol/L       BUN                         11                5 - 25 mg/dL        Creatinine                  0.51 (L)          0.60 - 1.30 *       Glucose, Fasting            91                65 - 99 mg/dL       Calcium                     9.1               8.4 - 10.2 m*       AST                         12 (L)            13 - 39 U/L         ALT                         13                7 - 52 U/L          Alkaline Phosphatase        64                34 - 104 U/L        Total Protein               6.7               6.4 - 8.4 g/*       Albumin                     4.0               3.5 - 5.0 g/*       Total Bilirubin             0.50              0.20 - 1.00 *       eGFR                        98                ml/min/1.73s*  -Lipid Panel with Direct LDL reflex:   Collection Time: 05/01/25  9:55 AM       Result                      Value             Ref Range           Cholesterol                 155               See Comment *       Triglycerides               76                See Comment *       HDL, Direct                 48 (L)            >=50 mg/dL          LDL Calculated              92                0 - 100 mg/dL  -Vitamin D 25 hydroxy:   Collection Time: 05/01/25  9:55 AM        Result                      Value             Ref Range           Vit D, 25-Hydroxy           85.5              30.0 - 100.0*         Patient Care Team:  Andria Coates PA-C as PCP - General  MD Amber Morgan PA-C Ann Freeman, DO Sara Choudhry, MD Daniel Scott Heckman, MD Calogero Dimaggio, DO Sheldon Linn, MD    Review of Systems   Constitutional: Negative.    HENT: Negative.     Eyes: Negative.    Respiratory: Negative.     Cardiovascular: Negative.    Gastrointestinal: Negative.    Endocrine: Negative.    Genitourinary: Negative.    Musculoskeletal: Negative.    Skin: Negative.    Allergic/Immunologic: Negative.    Neurological: Negative.    Hematological: Negative.    Psychiatric/Behavioral: Negative.       Medical History Reviewed by provider this encounter:       Annual Wellness Visit Questionnaire   Jeannette is here for her Subsequent Wellness visit.     Health Risk Assessment:   Patient rates overall health as very good. Patient feels that their physical health rating is same. Patient is very satisfied with their life. Eyesight was rated as same. Hearing was rated as same. Patient feels that their emotional and mental health rating is same. Patients states they are never, rarely angry. Patient states they are sometimes unusually tired/fatigued. Pain experienced in the last 7 days has been none. Patient states that she has experienced no weight loss or gain in last 6 months.     Depression Screening:   PHQ-2 Score: 0      Fall Risk Screening:   In the past year, patient has experienced: no history of falling in past year      Urinary Incontinence Screening:   Patient has not leaked urine accidently in the last six months.     Home Safety:  Patient does not have trouble with stairs inside or outside of their home. Patient has working smoke alarms and has working carbon monoxide detector. Home safety hazards include: none.     Nutrition:   Current diet is Regular.     Medications:   Patient is  currently taking over-the-counter supplements. OTC medications include: see medication list. Patient is able to manage medications.     Activities of Daily Living (ADLs)/Instrumental Activities of Daily Living (IADLs):   Walk and transfer into and out of bed and chair?: Yes  Dress and groom yourself?: Yes    Bathe or shower yourself?: Yes    Feed yourself? Yes  Do your laundry/housekeeping?: Yes  Manage your money, pay your bills and track your expenses?: Yes  Make your own meals?: Yes    Do your own shopping?: Yes    Previous Hospitalizations:   Any hospitalizations or ED visits within the last 12 months?: No      Advance Care Planning:   Living will: Yes    Advanced directive: Yes    Advanced directive counseling given: Yes    Five wishes given: No      Cognitive Screening:   Provider or family/friend/caregiver concerned regarding cognition?: No    Preventive Screenings      Cardiovascular Screening:    General: Screening Not Indicated, History Lipid Disorder and Screening Current      Diabetes Screening:     General: Screening Current      Colorectal Cancer Screening:     General: Screening Current      Breast Cancer Screening:     General: Screening Current      Cervical Cancer Screening:    General: Screening Not Indicated      Osteoporosis Screening:    General: Screening Current      Abdominal Aortic Aneurysm (AAA) Screening:        General: Screening Not Indicated      Lung Cancer Screening:     General: Screening Not Indicated      Hepatitis C Screening:    General: Screening Current    Immunizations:  - Immunizations due: Prevnar 20    Screening, Brief Intervention, and Referral to Treatment (SBIRT)     Screening  Typical number of drinks in a day: 0  Typical number of drinks in a week: 0  Interpretation: Low risk drinking behavior.    Single Item Drug Screening:  How often have you used an illegal drug (including marijuana) or a prescription medication for non-medical reasons in the past year?  "never    Single Item Drug Screen Score: 0  Interpretation: Negative screen for possible drug use disorder    Review of Current Opioid Use    Opioid Risk Tool (ORT) Interpretation: Complete Opioid Risk Tool (ORT)    Social Drivers of Health     Financial Resource Strain: Low Risk  (4/10/2023)    Overall Financial Resource Strain (CARDIA)    • Difficulty of Paying Living Expenses: Not hard at all   Food Insecurity: No Food Insecurity (5/5/2025)    Hunger Vital Sign    • Worried About Running Out of Food in the Last Year: Never true    • Ran Out of Food in the Last Year: Never true   Transportation Needs: No Transportation Needs (5/5/2025)    PRAPARE - Transportation    • Lack of Transportation (Medical): No    • Lack of Transportation (Non-Medical): No   Housing Stability: Low Risk  (5/5/2025)    Housing Stability Vital Sign    • Unable to Pay for Housing in the Last Year: No    • Number of Times Moved in the Last Year: 0    • Homeless in the Last Year: No   Utilities: Not At Risk (5/5/2025)    [facility] Utilities    • Threatened with loss of utilities: No     No results found.    Objective   /80 (BP Location: Left arm, Patient Position: Sitting, Cuff Size: Standard)   Pulse 89   Ht 4' 10\" (1.473 m)   Wt 73.5 kg (162 lb)   LMP  (LMP Unknown)   SpO2 96%   BMI 33.86 kg/m²     Physical Exam  Vitals and nursing note reviewed.   Constitutional:       Appearance: Normal appearance. She is well-developed.   HENT:      Head: Normocephalic and atraumatic.   Eyes:      General: Lids are normal.      Conjunctiva/sclera: Conjunctivae normal.      Pupils: Pupils are equal, round, and reactive to light.   Cardiovascular:      Rate and Rhythm: Normal rate and regular rhythm.      Heart sounds: No murmur heard.  Pulmonary:      Effort: Pulmonary effort is normal.      Breath sounds: Normal breath sounds.   Skin:     General: Skin is warm and dry.   Neurological:      General: No focal deficit present.      Mental Status: She is " alert.      Coordination: Coordination is intact.   Psychiatric:         Mood and Affect: Mood normal.         Behavior: Behavior normal. Behavior is cooperative.         Thought Content: Thought content normal.         Judgment: Judgment normal.

## 2025-05-05 NOTE — ASSESSMENT & PLAN NOTE
Vitamin D very good at 85 which is much higher than her usual. She thinks she is taking 6,000 IU D daily and is supposed to be on 3,000 so will h ave her go back to 3,000 daily.

## 2025-05-05 NOTE — ASSESSMENT & PLAN NOTE
Fasting glucose was actually normal at 91.  Orders:  •  CBC and differential; Future  •  Comprehensive metabolic panel; Future

## 2025-05-05 NOTE — ASSESSMENT & PLAN NOTE
Patient is on Lipitor 10 keeping LDL at 92.  Orders:  •  CBC and differential; Future  •  Lipid panel; Future

## 2025-05-27 DIAGNOSIS — G47.33 OSA (OBSTRUCTIVE SLEEP APNEA): ICD-10-CM

## 2025-05-27 DIAGNOSIS — Z48.815 ENCOUNTER FOR SURGICAL AFTERCARE FOLLOWING SURGERY OF DIGESTIVE SYSTEM: ICD-10-CM

## 2025-05-27 DIAGNOSIS — Z98.84 BARIATRIC SURGERY STATUS: ICD-10-CM

## 2025-05-27 DIAGNOSIS — E66.811 OBESITY, CLASS I, BMI 30-34.9: ICD-10-CM

## 2025-05-27 DIAGNOSIS — K91.2 POSTSURGICAL MALABSORPTION: ICD-10-CM

## 2025-05-28 RX ORDER — TOPIRAMATE 50 MG/1
50 TABLET, FILM COATED ORAL
Qty: 90 TABLET | Refills: 1 | Status: SHIPPED | OUTPATIENT
Start: 2025-05-28

## 2025-06-03 ENCOUNTER — TELEPHONE (OUTPATIENT)
Dept: BARIATRICS | Facility: CLINIC | Age: 70
End: 2025-06-03

## 2025-06-04 ENCOUNTER — HOSPITAL ENCOUNTER (OUTPATIENT)
Dept: MAMMOGRAPHY | Facility: MEDICAL CENTER | Age: 70
Discharge: HOME | End: 2025-06-04
Payer: COMMERCIAL

## 2025-06-04 VITALS — BODY MASS INDEX: 34 KG/M2 | WEIGHT: 162 LBS | HEIGHT: 58 IN

## 2025-06-04 DIAGNOSIS — Z12.31 ENCOUNTER FOR SCREENING MAMMOGRAM FOR BREAST CANCER: ICD-10-CM

## 2025-06-04 PROCEDURE — 77067 SCR MAMMO BI INCL CAD: CPT

## 2025-06-04 PROCEDURE — 77063 BREAST TOMOSYNTHESIS BI: CPT

## 2025-06-11 ENCOUNTER — TELEPHONE (OUTPATIENT)
Dept: BARIATRICS | Facility: CLINIC | Age: 70
End: 2025-06-11

## 2025-06-11 NOTE — TELEPHONE ENCOUNTER
Contacted pt in regards to an appt 12/5/25 at 11:30 am with Bill. Provider will be out of office and appt needed to be rescheduled. Pt r/s 12/3/25 at 11:30 am with Bill.

## 2025-06-12 ENCOUNTER — OFFICE VISIT (OUTPATIENT)
Dept: FAMILY MEDICINE CLINIC | Facility: CLINIC | Age: 70
End: 2025-06-12
Payer: COMMERCIAL

## 2025-06-12 ENCOUNTER — TELEPHONE (OUTPATIENT)
Dept: FAMILY MEDICINE CLINIC | Facility: CLINIC | Age: 70
End: 2025-06-12

## 2025-06-12 VITALS
HEIGHT: 58 IN | OXYGEN SATURATION: 97 % | BODY MASS INDEX: 34 KG/M2 | SYSTOLIC BLOOD PRESSURE: 132 MMHG | WEIGHT: 162 LBS | DIASTOLIC BLOOD PRESSURE: 70 MMHG | HEART RATE: 87 BPM

## 2025-06-12 DIAGNOSIS — J30.1 NON-SEASONAL ALLERGIC RHINITIS DUE TO POLLEN: ICD-10-CM

## 2025-06-12 DIAGNOSIS — M54.31 RIGHT SIDED SCIATICA: Primary | ICD-10-CM

## 2025-06-12 PROCEDURE — 99214 OFFICE O/P EST MOD 30 MIN: CPT | Performed by: NURSE PRACTITIONER

## 2025-06-12 PROCEDURE — G2211 COMPLEX E/M VISIT ADD ON: HCPCS | Performed by: NURSE PRACTITIONER

## 2025-06-12 RX ORDER — METHYLPREDNISOLONE 4 MG/1
TABLET ORAL
Qty: 21 EACH | Refills: 0 | Status: SHIPPED | OUTPATIENT
Start: 2025-06-12

## 2025-06-12 NOTE — ASSESSMENT & PLAN NOTE
Patient symptoms are consistent with right-sided sciatica.  Medrol Dosepak was ordered to help with acute pain and inflammation in the affected areas.  Patient was also advised that Tylenol and ice can be used as needed for pain.  Patient was also provided with sciatica exercises to complete at home on her AVS today.  Orders:  •  methylPREDNISolone 4 MG tablet therapy pack; Use as directed on package

## 2025-06-12 NOTE — PATIENT INSTRUCTIONS
"Patient Education     Exercises for sciatic pain   The Basics   Written by the doctors and editors at [facility]   What is sciatica? -- The sciatic nerve is a large nerve that starts in the lower back. It runs all the way down the back of the leg.  Something like a disc or bone spur can pinch or damage the sciatic nerve. Tight, inflamed muscles can also pinch or damage it. This can cause pain, weakness, numbness, or tingling that goes from the buttock down the leg toward the heel. When these symptoms happen, people often call it \"sciatica.\" The medical name for this is \"radiculopathy.\"  You can have sciatic pain on 1 side or both. Most of the time, it gets better without surgery.  Why do I need to do exercises if I have sciatica? -- Stretching and strengthening exercises can help ease back pain. It might also help prevent future back pain. Long term, it is important to strengthen the muscles in your lower back, buttocks, and belly. These are your \"core muscles.\" Stretching exercises are also important to keep your muscles flexible.  Below are some stretching and strengthening exercises that might help you. Other forms of movement can help ease or prevent back pain, too. For example, some people like to walk or do aerobic exercise, yoga, or luca chi. The most important thing is to move your body. Your doctor, nurse, or physical therapist can help you find different types of activity that work for you.  What stretching exercises should I do? -- Warm up your muscles before stretching. This helps prevent injury. To warm up, you can walk, jog, or cycle. Below are some examples of stretching exercises.  Start by repeating each of these stretches 2 to 3 times. For your body to make changes, try to hold each stretch for 5 to 10 seconds. Try to do the stretches 2 to 3 times each day. Breathe slowly and deeply as you do the exercises. Never bounce when doing stretches.   Single knee-to-chest stretches (figure 1) ? While lying on " your back, bend your knees with your feet flat on the floor. Pull 1 knee toward your chest until you feel a stretch in your lower back and buttock area. Lower, and repeat with the other knee. If you have knee problems, pull your knee up by grabbing the back of your thigh instead of the front of your knee. You can also do this exercise by grabbing both knees at the same time.   Deep hip stretches lying down (figure 2) ? Lie on your back, and bend 1 knee, keeping that foot flat on the floor. Cross the other leg over your knee. Grab the thigh of the leg that has the foot on the floor. Slowly, pull the bottom leg toward your chest until you feel a stretch in the other buttock. Repeat using the opposite leg as the bottom leg.   Deep hip stretches sitting (figure 3) ? Sit on the floor with both legs straight. Take 1 leg and cross it over the other leg so that the ankle or foot of your top leg is next to your other knee. Now, take the elbow on the opposite side of your bent knee and bring it to the outside of the bent knee. With your elbow, slowly push the bent knee further across your body to get a good stretch in the hip.   Sit backs (figure 4) - Start on your hands and knees. Your hands should be directly under your shoulders. Your knees should be spread slightly and directly under your hips. Slowly stretch your back as you bring your hips toward your ankles. Your arms are extended forward in a relaxed position, as your upper body sinks toward the floor.  What strengthening exercises should I do? -- Below are some examples of strengthening exercises.  Start by doing each exercise 2 to 3 times. Work up to doing each exercise 10 times. Hold each exercise for 3 to 5 seconds. Try to do the exercises 2 to 3 times each day. Do all exercises slowly.   Pelvic tilts (figure 5) ? Lie on your back with your knees bent and feet flat on the floor. Breathe slowly and deeply. Press your lower back down to the floor. Tighten your  stomach muscles as you breathe deeply and slowly, then relax.   Hip lifts (figure 6) ? Lie on your back with your knees bent and feet flat on the floor. Breathe deeply and slowly. Tighten your stomach muscles, keep your back flat, and lift your buttocks off the floor. Relax. You should feel this in your buttocks, not in your lower back.  What else should I know?    Exercise, including stretching, might be slightly uncomfortable, but you should not have sharp or severe pain. If you do get severe pain, stop what you are doing. If severe pain continues, call your doctor or nurse.   Do not hold your breath when exercising. If you tend to hold your breath, try counting out loud when exercising.   Always warm up your muscles before exercising. Stretching before warming up can lead to injury.   Doing exercises before a meal can help you get into a routine.  All topics are updated as new evidence becomes available and our peer review process is complete.  This topic retrieved from [employer] on: May 15, 2024.  Topic 538381 Version 1.0  Release: 32.4.3 - C32.134  © 2024 UpToDate, Inc. and/or its affiliates. All rights reserved.  figure 1: Single knee-to-chest stretch     Lie on your back, bend your knees, and have your feet flat on the floor. Pull 1 knee toward your chest until you feel a stretch in your lower back and buttock area. Repeat with the other knee. If you have knee problems, pull your knee up by grabbing the back of your thigh instead of the front of your knee. You can also do this exercise by grabbing both knees at the same time.  Graphic [id number] Version 1.0  figure 2: Deep hip stretch lying down     Lieon your back, and bend 1 knee, keeping that foot flat on the floor. Cross theother leg over your knee. Grab the thigh of the leg that has the foot on thefloor. Slowly, pull the bottom leg toward your chest until you feel a stretchin the other buttock. Repeat using the opposite leg as the bottom leg.  Graphic [id number]  Version 1.0  figure 3: Deep hip stretch sitting     Siton the floor with both legs straight. Take 1 leg and cross it over the otherleg so that the foot of your top leg is next to your outer knee. Now, take theelbow on the opposite side of your bent knee and bring it to the outside of thebent knee. With your elbow, slowly push the bent knee further across your bodyto get a good stretch in the hip.  Graphic [id number] Version 1.0  figure 4: Sit backs     Starton your hands and knees. Your hands should be directly under your shoulders.Your knees should be spread slightly and directly under your hips. Slowly stretchyour back as you bring your hips toward your ankles. Your arms should be extendedforward in a relaxed position, as your upper body sinks toward the floor.  Graphic [id number] Version 1.0  figure 5: Pelvic tilts     Lie on your back with your knees bent and feet flat on the floor. Tighten your stomach muscles and press your lower back down to the floor. Relax.  Graphic [id number] Version 1.0  figure 6: Hip lifts     Lie on your back with your knees bent and feet flat on the floor. Tighten your stomach muscles and lift your buttocks off of the floor. Relax.  Graphic [id number] Version 1.0  Consumer Information Use and Disclaimer   Disclaimer: This generalized information is a limited summary of diagnosis, treatment, and/or medication information. It is not meant to be comprehensive and should be used as a tool to help the user understand and/or assess potential diagnostic and treatment options. It does NOT include all information about conditions, treatments, medications, side effects, or risks that may apply to a specific patient. It is not intended to be medical advice or a substitute for the medical advice, diagnosis, or treatment of a health care provider based on the health care provider's examination and assessment of a patient's specific and unique circumstances. Patients must speak with a health care provider for  complete information about their health, medical questions, and treatment options, including any risks or benefits regarding use of medications. This information does not endorse any treatments or medications as safe, effective, or approved for treating a specific patient. Up[employer], Inc. and its affiliates disclaim any warranty or liability relating to this information or the use thereof.The use of this information is governed by the Terms of Use, available at https://www.[employer].com/en/know/clinical-effectiveness-terms. 2024© Up[employer]Date, Inc. and its affiliates and/or licensors. All rights reserved.  Copyright   © 2024 Up[employer]Date, Inc. and/or its affiliates. All rights reserved.

## 2025-06-12 NOTE — PROGRESS NOTES
Name: Jeannette Heard      : 1955      MRN: [id number]  Encounter Provider: KEENA Schaffer  Encounter Date: 2025   Encounter department: [facility]  :  Assessment & Plan  Right sided sciatica  Patient symptoms are consistent with right-sided sciatica.  Medrol Dosepak was ordered to help with acute pain and inflammation in the affected areas.  Patient was also advised that Tylenol and ice can be used as needed for pain.  Patient was also provided with sciatica exercises to complete at home on her AVS today.  Orders:  •  methylPREDNISolone 4 MG tablet therapy pack; Use as directed on package    Non-seasonal allergic rhinitis due to pollen  Well-controlled on current regimen.              History of Present Illness   Right-sided sciatica: Patient reports over the past few days she has been experiencing right buttocks pain which radiates into the right posterior leg.  She denies any associated numbness or paresthesias.  She also denies any recent falls or known injuries to the affected areas.  The patient reports that she does have a history of sciatica in the past.    Allergic rhinitis: Well-controlled on current regimen.      Review of Systems   Constitutional:  Negative for chills and fever.   HENT:  Negative for ear pain and sore throat.    Eyes:  Negative for pain and visual disturbance.   Respiratory:  Negative for cough, chest tightness, shortness of breath and wheezing.    Cardiovascular:  Negative for chest pain, palpitations and leg swelling.   Gastrointestinal:  Negative for abdominal pain, constipation, diarrhea, nausea and vomiting.   Endocrine: Negative for cold intolerance and heat intolerance.   Genitourinary:  Negative for decreased urine volume, dysuria and hematuria.   Musculoskeletal:  Positive for myalgias (right buttocks and RLE). Negative for arthralgias and back pain.   Skin:  Negative for color change and rash.   Allergic/Immunologic: Negative for  "environmental allergies.   Neurological:  Negative for dizziness, seizures, syncope, weakness, light-headedness, numbness and headaches.   Hematological:  Negative for adenopathy.   Psychiatric/Behavioral:  Negative for confusion. The patient is not nervous/anxious.    All other systems reviewed and are negative.      Objective   /70 (BP Location: Right arm, Patient Position: Sitting, Cuff Size: Standard)   Pulse 87   Ht 4' 10\" (1.473 m)   Wt 73.5 kg (162 lb)   LMP  (LMP Unknown)   SpO2 97%   BMI 33.86 kg/m²      Physical Exam  Vitals and nursing note reviewed.   Constitutional:       General: She is not in acute distress.     Appearance: Normal appearance. She is well-developed. She is not ill-appearing.   HENT:      Head: Normocephalic.     Eyes:      Conjunctiva/sclera: Conjunctivae normal.       Cardiovascular:      Rate and Rhythm: Normal rate and regular rhythm.      Pulses: Normal pulses.           Carotid pulses are 2+ on the right side and 2+ on the left side.       Radial pulses are 2+ on the right side and 2+ on the left side.        Posterior tibial pulses are 2+ on the right side and 2+ on the left side.      Heart sounds: Normal heart sounds. No murmur heard.  Pulmonary:      Effort: Pulmonary effort is normal. No respiratory distress.      Breath sounds: Normal breath sounds. No decreased breath sounds, wheezing, rhonchi or rales.   Abdominal:      General: Abdomen is flat. There is no distension.      Palpations: Abdomen is soft.      Tenderness: There is no abdominal tenderness. There is no guarding.     Musculoskeletal:         General: No swelling. Normal range of motion.      Cervical back: Normal range of motion and neck supple.      Lumbar back: Tenderness present. No swelling, edema, spasms or bony tenderness. Normal range of motion. Negative right straight leg raise test and negative left straight leg raise test.      Right lower leg: No edema.      Left lower leg: No edema.      " Comments: Patient denies any pain with palpation of the midline lumbar spine or bilateral lumbar paraspinals.  Patient did report pain with palpation of the right SI notch.  Right straight leg raise test was also negative.     Skin:     General: Skin is warm and dry.      Capillary Refill: Capillary refill takes less than 2 seconds.     Neurological:      General: No focal deficit present.      Mental Status: She is alert and oriented to person, place, and time.     Psychiatric:         Mood and Affect: Mood normal.         Behavior: Behavior normal.         Thought Content: Thought content normal.         Judgment: Judgment normal.

## 2025-06-12 NOTE — TELEPHONE ENCOUNTER
Patient would like a letter to return to work, asked for You to complete so she could pick it up when ready.

## 2025-06-13 ENCOUNTER — OFFICE VISIT (OUTPATIENT)
Dept: BARIATRICS | Facility: CLINIC | Age: 70
End: 2025-06-13
Payer: COMMERCIAL

## 2025-06-13 VITALS
WEIGHT: 160 LBS | BODY MASS INDEX: 33.58 KG/M2 | HEART RATE: 89 BPM | DIASTOLIC BLOOD PRESSURE: 74 MMHG | HEIGHT: 58 IN | TEMPERATURE: 98.4 F | OXYGEN SATURATION: 97 % | SYSTOLIC BLOOD PRESSURE: 116 MMHG

## 2025-06-13 DIAGNOSIS — R73.03 PREDIABETES: ICD-10-CM

## 2025-06-13 DIAGNOSIS — Z48.815 ENCOUNTER FOR SURGICAL AFTERCARE FOLLOWING SURGERY OF DIGESTIVE SYSTEM: Primary | ICD-10-CM

## 2025-06-13 DIAGNOSIS — Z98.84 BARIATRIC SURGERY STATUS: ICD-10-CM

## 2025-06-13 DIAGNOSIS — E66.811 OBESITY, CLASS I, BMI 30-34.9: ICD-10-CM

## 2025-06-13 PROCEDURE — 99214 OFFICE O/P EST MOD 30 MIN: CPT | Performed by: NURSE PRACTITIONER

## 2025-06-13 NOTE — PATIENT INSTRUCTIONS
- continue with topamax as is.   - add metformin - 500 mg once a day with lunch for two weeks. If tolerating well, then increase to twice a day for lunch and dinner. Take with meals.

## 2025-06-13 NOTE — PROGRESS NOTES
Assessment/Plan:      OBESITY I/BMI 33/Prediabetes  - reviewed screening labs.  - shows prediabetic range.   - on topamax 50 mg once daily at night time. Her numbness and tingling has improved and is not present any further.   - has been going through some stress with family social issues, she will try to get back to trying to eating healthier and consistently.   - we discussed possibly adding metformin due to weight gaining profile drugs. - discussed s/e of metformin. She is agreeable to this.   - will start metformin 500 mg once a day to start then increase to twice daily if tolerating well after two weeks.     Follow up in approximately 3 months with Surgical Advanced Practitioner.  Goals:  Food log (ie.) www.[employer].com,Mixbook.com,loseit.com,ObserveIT.com,etc. baritastic  No sugary beverages. At least 64oz of water daily.  Increase physical activity by 10 minutes daily. Gradually increase physical activity to a goal of 5 days per week for 30 minutes of MODERATE intensity PLUS 2 days per week of FULL BODY resistance training  5-10 servings of fruits and vegetables per day and 25-35 grams of dietary fiber per day, gradually increasing  No sugary beverages. At least 64oz of water daily., Increase physical activity by 10 minutes daily, Practice lesson plans 1-6 in bariatric manual , Practice 30/60 rule, Gradually increase physical activity to a goal of 5 days per week for 30 minutes of MODERATE intensity PLUS 2 days per week of FULL BODY resistance training, Continue with dietary and behavioral recommendations outlined in bariatric manual, Continue to take recommended bariatric vitamins as directed, Goal protein intake of 60-80 grams per day, 5-10 servings of fruits and vegetables per day, 25-35 grams of dietary fiber per day, and 5281-0925 calories per day    Bariatric surgery [id number] with her annual.  Due for surgical annual in Dec 2025.     Diagnoses and all orders for this  visit:    Encounter for surgical aftercare following surgery of digestive system    Bariatric surgery status    Prediabetes  -     metFORMIN (GLUCOPHAGE) 500 mg tablet; Take 1 tablet (500 mg total) by mouth 2 (two) times a day with meals    Obesity, Class I, BMI 30-34.9  -     metFORMIN (GLUCOPHAGE) 500 mg tablet; Take 1 tablet (500 mg total) by mouth 2 (two) times a day with meals    BMI 33.0-33.9,adult  -     metFORMIN (GLUCOPHAGE) 500 mg tablet; Take 1 tablet (500 mg total) by mouth 2 (two) times a day with meals        Subjective:   Chief Complaint   Patient presents with    Follow-up     3MO MWM F/U     Patient ID: Jeannette Heard  is a 69 y.o. female with excess weight/obesity here to pursue medical weight management. -s/p Vertical Sleeve Gastrectomy with Dr. Tavarez on 03/19/2018. Here for MWM follow up.    Past Medical History[1]    HPI:  Obesity/Excess Weight:   On topamax 50 mg po qd and has been tolerating this well. Some appetite suppressant and cravings suppressant. Weight loss is slow. She has been undergoing significant amount of stress since last seen - daughter is having a divorce and she is trying to provide emotional support but this has also affected her. Has fallen off track with her eating habits.      Highest weight  199 LBS  Last OV/Initial weight on topamax - 163 lbs  Last OV - 160.5 lbs  Current Weight  160 LBS (--0.5 lbs since last seen; -3 lbs since last start)  Stanley 140 LBS   Goal - 145 LBS  5% weight loss - 8.17 lbs (154 lbs)     Hydration: drinks at least 64 oz of fluids.   Alcohol: NONE  Exercise: walking and strength training.   Dining out: none  Occupation: RETIRED   Sleep: 6-8 hrs  Has sleep apnea     B: 2 eggs, cheese  S: none  L: Dianne meals - frozen meals or half a sandwich when she is working.   S: popcorn   D: Chicken, salad  S: pretzels with peanut butter.    Wt Readings from Last 3 Encounters:   06/13/25 72.6 kg (160 lb)   06/12/25 73.5 kg (162 lb)   06/04/25 73.5 kg  "(162 lb)       Patient is not pregnant/breastfeeding (metformin only)  Patient denies personal and family history of  pancreatitis, thyroid cancer, MEN-2 tumors. (Consideration for GLP-1 Agonists - but lacks coverage)  Denies any hx of glaucoma, seizures, kidney stones. Had gallstones with subsequent cholecystectomy. (Consideration for topamax)  Has a hx of PVCs, arrhythmia, hyperthyroidism  - would avoid phentermine. Denies Hx of CAD, PAD. (contraindicated for phentermine or Wellbutrin)  Denies uncontrolled anxiety or depression, suicidal behavior or thinking. Admits to insomnia/sleep disturbances      The following portions of the patient's history were reviewed and updated as appropriate: allergies, current medications, past family history, past medical history, past social history, past surgical history, and problem list.    Past Medical History[2]  Past Surgical History[3]  Current Medications[4]    Review of Systems   Constitutional:  Positive for activity change, appetite change and unexpected weight change.   Respiratory: Negative.     Cardiovascular: Negative.    Gastrointestinal: Negative.    Musculoskeletal: Negative.    Neurological: Negative.    Psychiatric/Behavioral:  The patient is nervous/anxious.        Objective:    /74   Pulse 89   Temp 98.4 °F (36.9 °C)   Ht 4' 10\" (1.473 m)   Wt 72.6 kg (160 lb)   LMP  (LMP Unknown)   SpO2 97%   BMI 33.44 kg/m²     Physical Exam  Vitals and nursing note reviewed.   Constitutional:       Appearance: Normal appearance. She is obese.     Cardiovascular:      Rate and Rhythm: Normal rate and regular rhythm.      Pulses: Normal pulses.      Heart sounds: Normal heart sounds.   Pulmonary:      Effort: Pulmonary effort is normal.      Breath sounds: Normal breath sounds.   Abdominal:      General: Bowel sounds are normal.      Palpations: Abdomen is soft.      Tenderness: There is no abdominal tenderness.     Musculoskeletal:         General: Normal range " of motion.     Skin:     General: Skin is warm and dry.     Neurological:      General: No focal deficit present.      Mental Status: She is alert and oriented to person, place, and time.     Psychiatric:         Mood and Affect: Mood normal.         Behavior: Behavior normal.         Thought Content: Thought content normal.         Judgment: Judgment normal.              [1]   Past Medical History:  Diagnosis Date    Allergic     Allergic rhinitis     Arthritis     Bariatric surgery status     Basal cell carcinoma     skin CA removed from nose    Bowel habit changes     Cataract     starting with a catract    Cellulitis of left lower extremity     last assessed 06/29/2016    Chronic GERD     Closed displaced fracture of greater tuberosity of humerus     last assessed 05/31/2016    CPAP (continuous positive airway pressure) dependence     Fatty liver     Fibromyalgia     Fibromyalgia, primary     GERD (gastroesophageal reflux disease)     Hiatal hernia     High cholesterol     Insomnia     Miscarriage 1978    Morbid obesity (HCC)     Osteoarthritis     Palpitations     pt denies a fib    PONV (postoperative nausea and vomiting)     Postgastrectomy malabsorption     Recurrent pregnancy loss, antepartum condition or complication     Restless leg     Sicca syndrome (HCC)     Sleep apnea     Superficial phlebitis and thrombophlebitis of left lower extremity     last assessed 06/08/2016    Thyroid nodule     nodule on thyroid    Thyroid nodule 08/21/2012    Urinary tract infection     Varicose veins of left leg with edema     Vulvar lesion 09/12/2017    Wears glasses    [2]   Past Medical History:  Diagnosis Date    Allergic     Allergic rhinitis     Arthritis     Bariatric surgery status     Basal cell carcinoma     skin CA removed from nose    Bowel habit changes     Cataract     starting with a catract    Cellulitis of left lower extremity     last assessed 06/29/2016    Chronic GERD     Closed displaced fracture of  greater tuberosity of humerus     last assessed 05/31/2016    CPAP (continuous positive airway pressure) dependence     Fatty liver     Fibromyalgia     Fibromyalgia, primary     GERD (gastroesophageal reflux disease)     Hiatal hernia     High cholesterol     Insomnia     Miscarriage 1978    Morbid obesity (HCC)     Osteoarthritis     Palpitations     pt denies a fib    PONV (postoperative nausea and vomiting)     Postgastrectomy malabsorption     Recurrent pregnancy loss, antepartum condition or complication     Restless leg     Sicca syndrome (HCC)     Sleep apnea     Superficial phlebitis and thrombophlebitis of left lower extremity     last assessed 06/08/2016    Thyroid nodule     nodule on thyroid    Thyroid nodule 08/21/2012    Urinary tract infection     Varicose veins of left leg with edema     Vulvar lesion 09/12/2017    Wears glasses    [3]   Past Surgical History:  Procedure Laterality Date    BARIATRIC SURGERY  2018    Bariatric Sleeve    BLADDER SURGERY      sling    CHOLECYSTECTOMY  1998    COLONOSCOPY      CYST REMOVAL      thigh    DILATION AND CURETTAGE OF UTERUS      ESOPHAGOGASTRODUODENOSCOPY      ESOPHAGOGASTRODUODENOSCOPY N/A 12/21/2017    Procedure: ESOPHAGOGASTRODUODENOSCOPY (EGD) with biopsy and polypectomy;  Surgeon: Jagdish Jones MD;  Location: [facility];  Service: Gastroenterology    HARDWARE REMOVAL      right shoulder    JOINT REPLACEMENT      both knees    KNEE ARTHROSCOPY      ORIF HUMERAL SHAFT FRACTURE Right     GA ENDOVEN ABLTJ INCMPTNT VEIN XTR LASER 1ST VEIN Left 04/29/2016    Procedure: GREATER SAPHENOUS VEIN EVLT ;  Surgeon: Joe Loredo DO;  Location: [facility];  Service: Vascular    GA ESOPHAGOGASTRODUODENOSCOPY TRANSORAL DIAGNOSTIC N/A 01/11/2018    Procedure: egd w/ emr ;  Surgeon: John Paul Smith MD;  Location: [facility];  Service: Gastroenterology    GA ESOPHAGOGASTRODUODENOSCOPY TRANSORAL DIAGNOSTIC N/A 06/28/2018    Procedure: ESOPHAGOGASTRODUODENOSCOPY (EGD);   Surgeon: John Paul Smith MD;  Location: [facility];  Service: Gastroenterology    DC LAPS [facility] RSTRICTIV PX LONGITUDINAL GASTRECTOMY N/A 03/19/2018    Procedure: GASTRECTOMY SLEEVE LAPAROSCOPIC AND INTRAOPERATIVE EGD;  Surgeon: Chris Tavarez MD;  Location: [facility];  Service: Bariatrics    DC STAB PHLEBT VARICOSE VEINS 1 XTR 10-20 STAB INCS Left 04/29/2016    Procedure: AND STAB PHLEBECTOMIES ;  Surgeon: Joe Loredo DO;  Location: [facility];  Service: Vascular    REDUCTION MAMMAPLASTY Bilateral 2011    Reduction    SKIN CANCER EXCISION      SLEEVE GASTROPLASTY      TOTAL KNEE ARTHROPLASTY Bilateral    [4]   Current Outpatient Medications:     atorvastatin (LIPITOR) 10 mg tablet, Take 1 tablet (10 mg total) by mouth daily, Disp: 90 tablet, Rfl: 3    Calcium Carbonate 1500 (600 Ca) MG TABS, Take by mouth, Disp: , Rfl:     Cholecalciferol (Vitamin D-3) 25 MCG (1000 UT) CAPS, Take 3 capsules (3,000 Units total) by mouth daily, Disp: 30 capsule, Rfl: 0    Diphenhydramine-Phenylephrine ([facility] ALLERGY-D PO), Take by mouth, Disp: , Rfl:     fexofenadine ([facility]legra Allergy) 180 MG tablet, , Disp: , Rfl:     fluticasone (FLONASE) 50 mcg/act nasal spray, 2 sprays into each nostril in the morning., Disp: , Rfl:     gabapentin (NEURONTIN) 600 MG tablet, Take 600 mg by mouth in the morning., Disp: , Rfl: 3    Melatonin 10 MG TABS, Take 10 mg by mouth, Disp: , Rfl:     metFORMIN (GLUCOPHAGE) 500 mg tablet, Take 1 tablet (500 mg total) by mouth 2 (two) times a day with meals, Disp: 60 tablet, Rfl: 3    methylPREDNISolone 4 MG tablet therapy pack, Use as directed on package, Disp: 21 each, Rfl: 0    Multiple Vitamin (MULTIVITAMIN) capsule, Take 1 capsule by mouth in the morning., Disp: , Rfl:     pantoprazole (PROTONIX) 20 mg tablet, TAKE ONE TABLET BY MOUTH EVERY DAY, Disp: 100 tablet, Rfl: 1    PARoxetine (PAXIL) 30 mg tablet, TAKE ONE TABLET BY MOUTH EVERY DAY, Disp: 100 tablet, Rfl: 1    Restasis 0.05 % ophthalmic emulsion,  , Disp: , Rfl:     topiramate (TOPAMAX) 50 MG tablet, Take 1 tablet (50 mg total) by mouth daily at bedtime, Disp: 90 tablet, Rfl: 1

## 2025-06-13 NOTE — PROGRESS NOTES
Date of surgery:3/19/2018  Procedure:Sleeve  Performing surgeon:Dr. Tavarez    Initial Weight - 199.1 Ibs  Current Weight -160.0   Ibs  Stanley Weight - 139.0 Ibs  Total Body Weight Loss (EWL)- 39.0  EWL% - 49%  TWB % -20%    For *NEW/TRANSFER* patients only: Who referred the patient? Please provide name and specialty (PCP, GI, etc.).

## 2025-06-17 ENCOUNTER — NURSE TRIAGE (OUTPATIENT)
Age: 70
End: 2025-06-17

## 2025-06-17 NOTE — TELEPHONE ENCOUNTER
"REASON FOR CONVERSATION: Medication Reaction    SYMPTOMS: liquid brown diarrhea x3 days; reports approx x4 episodes per day. Denies any other complaints.    OTHER HEALTH INFORMATION: PT started metformin 500mg BID and aware that provider discussed diarrhea side effect.     PROTOCOL DISPOSITION: Discuss with Provider and Call Back Patient (overriding Callback by PCP Today)    CARE ADVICE PROVIDED: Advised PT to increase fluid intake & may eat BRAT/bland diet. PT verbalized understanding and agreeable to plan. PT will call back with any new or worsening sxs.    PRACTICE FOLLOW-UP: PT asking if she may also take Imodium. Please advise.       Reason for Disposition   Caller has NON-URGENT medicine question about med that PCP or specialist prescribed and triager unable to answer question    Answer Assessment - Initial Assessment Questions  1. NAME of MEDICINE: \"What medicine(s) are you calling about?\"      metformin  2. QUESTION: \"What is your question?\" (e.g., double dose of medicine, side effect)      Side effect  3. PRESCRIBER: \"Who prescribed the medicine?\" Reason: if prescribed by specialist, call should be referred to that group.      KEENA Manning  4. SYMPTOMS: \"Do you have any symptoms?\" If Yes, ask: \"What symptoms are you having?\"  \"How bad are the symptoms (e.g., mild, moderate, severe)      Diarrhea, moderate  5. PREGNANCY:  \"Is there any chance that you are pregnant?\" \"When was your last menstrual period?\"      N/a    Protocols used: Medication Question Call-Adult-[place]    "

## 2025-06-19 ENCOUNTER — TELEPHONE (OUTPATIENT)
Dept: BARIATRICS | Facility: CLINIC | Age: 70
End: 2025-06-19

## 2025-08-18 ENCOUNTER — OFFICE VISIT (OUTPATIENT)
Dept: FAMILY MEDICINE CLINIC | Facility: CLINIC | Age: 70
End: 2025-08-18
Payer: COMMERCIAL

## 2025-08-18 VITALS
DIASTOLIC BLOOD PRESSURE: 62 MMHG | SYSTOLIC BLOOD PRESSURE: 110 MMHG | OXYGEN SATURATION: 98 % | RESPIRATION RATE: 18 BRPM | HEART RATE: 85 BPM | WEIGHT: 155 LBS | HEIGHT: 58 IN | BODY MASS INDEX: 32.54 KG/M2 | TEMPERATURE: 98.8 F

## 2025-08-18 DIAGNOSIS — R35.0 URINE FREQUENCY: Primary | ICD-10-CM

## 2025-08-18 DIAGNOSIS — N30.01 ACUTE CYSTITIS WITH HEMATURIA: ICD-10-CM

## 2025-08-18 LAB
SL AMB  POCT GLUCOSE, UA: NEGATIVE
SL AMB LEUKOCYTE ESTERASE,UA: NORMAL
SL AMB POCT BILIRUBIN,UA: NEGATIVE
SL AMB POCT BLOOD,UA: NORMAL
SL AMB POCT CLARITY,UA: NORMAL
SL AMB POCT COLOR,UA: YELLOW
SL AMB POCT KETONES,UA: NORMAL
SL AMB POCT NITRITE,UA: NEGATIVE
SL AMB POCT PH,UA: 5.5
SL AMB POCT SPECIFIC GRAVITY,UA: 1.03
SL AMB POCT URINE PROTEIN: 30
SL AMB POCT UROBILINOGEN: 0.2

## 2025-08-18 PROCEDURE — G2211 COMPLEX E/M VISIT ADD ON: HCPCS | Performed by: NURSE PRACTITIONER

## 2025-08-18 PROCEDURE — 81002 URINALYSIS NONAUTO W/O SCOPE: CPT | Performed by: NURSE PRACTITIONER

## 2025-08-18 PROCEDURE — 87086 URINE CULTURE/COLONY COUNT: CPT | Performed by: NURSE PRACTITIONER

## 2025-08-18 PROCEDURE — 99213 OFFICE O/P EST LOW 20 MIN: CPT | Performed by: NURSE PRACTITIONER

## 2025-08-18 RX ORDER — CEPHALEXIN 500 MG/1
CAPSULE ORAL
COMMUNITY
Start: 2025-07-15

## 2025-08-18 RX ORDER — CEPHALEXIN 500 MG/1
500 CAPSULE ORAL 2 TIMES DAILY
Qty: 14 CAPSULE | Refills: 0 | Status: SHIPPED | OUTPATIENT
Start: 2025-08-18 | End: 2025-08-25

## 2025-08-18 RX ORDER — PHENAZOPYRIDINE HYDROCHLORIDE 100 MG/1
100 TABLET, FILM COATED ORAL 3 TIMES DAILY PRN
Qty: 10 TABLET | Refills: 0 | Status: SHIPPED | OUTPATIENT
Start: 2025-08-18

## 2025-08-19 LAB — BACTERIA UR CULT: NORMAL

## (undated) DEVICE — POWER SHELL SIGNIA

## (undated) DEVICE — TUBING SUCTION 5MM X 12 FT

## (undated) DEVICE — WEBRIL 6 IN UNSTERILE

## (undated) DEVICE — CHLORAPREP HI-LITE 26ML ORANGE

## (undated) DEVICE — ENDOPATH 5MM CURVED SCISSORS WITH MONOPOLAR CAUTERY: Brand: ENDOPATH

## (undated) DEVICE — [HIGH FLOW INSUFFLATOR,  DO NOT USE IF PACKAGE IS DAMAGED,  KEEP DRY,  KEEP AWAY FROM SUNLIGHT,  PROTECT FROM HEAT AND RADIOACTIVE SOURCES.]: Brand: PNEUMOSURE

## (undated) DEVICE — TRAVELKIT CONTAINS FIRST STEP KIT (200ML EP-4 KIT) AND SOILED SCOPE BAG - 1 KIT: Brand: TRAVELKIT CONTAINS FIRST STEP KIT AND SOILED SCOPE BAG

## (undated) DEVICE — 3000CC GUARDIAN II: Brand: GUARDIAN

## (undated) DEVICE — SURGICAL GOWN, XL SMARTSLEEVE: Brand: CONVERTORS

## (undated) DEVICE — SEAMGUARD STPL REINF ENDO GIA ULTRA UNIV 60 PURPLE

## (undated) DEVICE — TISSEEL FIBRIN 4ML FROZEN

## (undated) DEVICE — PMI DISPOSABLE PUNCTURE CLOSURE DEVICE / SUTURE GRASPER: Brand: PMI

## (undated) DEVICE — BLUE HEAT SCOPE WARMER

## (undated) DEVICE — GLOVE SRG BIOGEL 8

## (undated) DEVICE — SCD SEQUENTIAL COMPRESSION COMFORT SLEEVE MEDIUM KNEE LENGTH: Brand: KENDALL SCD

## (undated) DEVICE — SPRAY SET ENDO 360DEG GAS ASSIST FLEX APPLICATOR DUPLOSPRAY

## (undated) DEVICE — TUBING SMOKE EVAC W/FILTRATION DEVICE PLUMEPORT ACTIV

## (undated) DEVICE — SYRINGE 30ML LL

## (undated) DEVICE — ENDOPATH XCEL BLADELESS TROCARS WITH STABILITY SLEEVES: Brand: ENDOPATH XCEL

## (undated) DEVICE — THE EXACTO COLD SNARE IS INTENDED TO BE USED WITHOUT DIATHERMIC ENERGY FOR THE ENDOSCOPIC RESECTION OF POLYP TISSUE IN THE GASTROINTESTINAL TRACT.: Brand: EXACTO

## (undated) DEVICE — ALLENTOWN LAP CHOLE APP PACK: Brand: CARDINAL HEALTH

## (undated) DEVICE — VIOLET BRAIDED (POLYGLACTIN 910), SYNTHETIC ABSORBABLE SUTURE: Brand: COATED VICRYL

## (undated) DEVICE — URETERAL CATHETER ADAPTOR TIP

## (undated) DEVICE — SUT MONOCRYL 4-0 PS-2 27 IN Y426H

## (undated) DEVICE — NEEDLE HYPO 22G X 1-1/2 IN

## (undated) DEVICE — SEAMGUARD STPL REINF ENDO GIA ULTRA UNV 60 BLACK

## (undated) DEVICE — NEEDLE SCLERO INTERJECT 25G 240MM

## (undated) DEVICE — COVIDIEN GIA BLACK (XTRA THICK) RELOAD

## (undated) DEVICE — ENDOPATH XCEL UNIVERSAL TROCAR STABLILITY SLEEVES: Brand: ENDOPATH XCEL

## (undated) DEVICE — TROCAR VISIPORT

## (undated) DEVICE — INTENDED FOR TISSUE SEPARATION, AND OTHER PROCEDURES THAT REQUIRE A SHARP SURGICAL BLADE TO PUNCTURE OR CUT.: Brand: BARD-PARKER SAFETY BLADES SIZE 15, STERILE

## (undated) DEVICE — IRRIG ENDO FLO TUBING

## (undated) DEVICE — ADHESIVE SKIN CLSR DERMABOND NX

## (undated) DEVICE — GLOVE SRG BIOGEL 7.5

## (undated) DEVICE — VISIGI 3D®  CALIBRATION SYSTEM  SIZE 36FR SLEEVE/STD: Brand: BOEHRINGER® VISIGI 3D™ SLEEVE GASTRECTOMY CALIBRATION SYSTEM, SIZE 36FR

## (undated) DEVICE — HARMONIC ACE +7 LAPAROSCOPIC SHEARS ADVANCED HEMOSTASIS 5MM DIAMETER 36CM SHAFT LENGTH  FOR USE WITH GRAY HAND PIECE ONLY: Brand: HARMONIC ACE

## (undated) DEVICE — COVIDIEN ENDO GIA PURPLE (MED) RELOAD 60MM

## (undated) DEVICE — SINGLE-USE BIOPSY FORCEPS: Brand: RADIAL JAW 4

## (undated) DEVICE — TIBURON LAPAROSCOPIC ABDOMINAL DRAPE: Brand: CONVERTORS